# Patient Record
Sex: MALE | Race: WHITE | NOT HISPANIC OR LATINO | Employment: FULL TIME | URBAN - METROPOLITAN AREA
[De-identification: names, ages, dates, MRNs, and addresses within clinical notes are randomized per-mention and may not be internally consistent; named-entity substitution may affect disease eponyms.]

---

## 2018-07-10 ENCOUNTER — HOSPITAL ENCOUNTER (OUTPATIENT)
Dept: RADIOLOGY | Facility: MEDICAL CENTER | Age: 58
End: 2018-07-10

## 2018-07-10 ENCOUNTER — APPOINTMENT (OUTPATIENT)
Dept: RADIOLOGY | Facility: MEDICAL CENTER | Age: 58
DRG: 853 | End: 2018-07-10
Attending: EMERGENCY MEDICINE

## 2018-07-10 ENCOUNTER — HOSPITAL ENCOUNTER (INPATIENT)
Facility: MEDICAL CENTER | Age: 58
LOS: 15 days | DRG: 853 | End: 2018-07-25
Attending: EMERGENCY MEDICINE | Admitting: HOSPITALIST

## 2018-07-10 DIAGNOSIS — J43.1 PANLOBULAR EMPHYSEMA (HCC): Primary | ICD-10-CM

## 2018-07-10 DIAGNOSIS — J86.9 EMPYEMA (HCC): ICD-10-CM

## 2018-07-10 DIAGNOSIS — J44.9 PULMONARY CACHEXIA DUE TO COPD (HCC): ICD-10-CM

## 2018-07-10 DIAGNOSIS — R64 PULMONARY CACHEXIA DUE TO COPD (HCC): ICD-10-CM

## 2018-07-10 DIAGNOSIS — J96.01 ACUTE HYPOXEMIC RESPIRATORY FAILURE (HCC): ICD-10-CM

## 2018-07-10 PROBLEM — E87.20 LACTIC ACIDOSIS: Status: ACTIVE | Noted: 2018-07-10

## 2018-07-10 PROBLEM — D72.829 LEUKOCYTOSIS: Status: ACTIVE | Noted: 2018-07-10

## 2018-07-10 PROBLEM — Z72.0 TOBACCO ABUSE: Status: ACTIVE | Noted: 2018-07-10

## 2018-07-10 LAB
AMYLASE FLD-CCNC: 14 U/L
ANISOCYTOSIS BLD QL SMEAR: ABNORMAL
APPEARANCE FLD: NORMAL
APTT PPP: 34.6 SEC (ref 24.7–36)
BASOPHILS # BLD AUTO: 0 % (ref 0–1.8)
BASOPHILS # BLD: 0 K/UL (ref 0–0.12)
BODY FLD TYPE: NORMAL
COLOR FLD: NORMAL
CSF COMMENTS 1658: NORMAL
CYTOLOGY REG CYTOL: NORMAL
EOSINOPHIL # BLD AUTO: 0 K/UL (ref 0–0.51)
EOSINOPHIL NFR BLD: 0 % (ref 0–6.9)
ERYTHROCYTE [DISTWIDTH] IN BLOOD BY AUTOMATED COUNT: 52.2 FL (ref 35.9–50)
GLUCOSE FLD-MCNC: <10 MG/DL
HCT VFR BLD AUTO: 36.3 % (ref 42–52)
HGB BLD-MCNC: 12.1 G/DL (ref 14–18)
INR PPP: 1.42 (ref 0.87–1.13)
LACTATE BLD-SCNC: 1.9 MMOL/L (ref 0.5–2)
LDH FLD L TO P-CCNC: NORMAL U/L
LYMPHOCYTES # BLD AUTO: 1.09 K/UL (ref 1–4.8)
LYMPHOCYTES NFR BLD: 7 % (ref 22–41)
MACROCYTES BLD QL SMEAR: ABNORMAL
MANUAL DIFF BLD: NORMAL
MCH RBC QN AUTO: 27.8 PG (ref 27–33)
MCHC RBC AUTO-ENTMCNC: 33.3 G/DL (ref 33.7–35.3)
MCV RBC AUTO: 83.4 FL (ref 81.4–97.8)
MONOCYTES # BLD AUTO: 0.67 K/UL (ref 0–0.85)
MONOCYTES NFR BLD AUTO: 4.3 % (ref 0–13.4)
MORPHOLOGY BLD-IMP: NORMAL
NEUTROPHILS # BLD AUTO: 13.75 K/UL (ref 1.82–7.42)
NEUTROPHILS NFR BLD: 82.6 % (ref 44–72)
NEUTS BAND NFR BLD MANUAL: 6.1 % (ref 0–10)
NRBC # BLD AUTO: 0 K/UL
NRBC BLD-RTO: 0 /100 WBC
PH FLD: 7.2 [PH]
PLATELET # BLD AUTO: 727 K/UL (ref 164–446)
PLATELET BLD QL SMEAR: NORMAL
PMV BLD AUTO: 9.3 FL (ref 9–12.9)
POIKILOCYTOSIS BLD QL SMEAR: NORMAL
PROT FLD-MCNC: 3.9 G/DL
PROTHROMBIN TIME: 17 SEC (ref 12–14.6)
RBC # BLD AUTO: 4.35 M/UL (ref 4.7–6.1)
RBC # FLD: NORMAL CELLS/UL
RBC BLD AUTO: PRESENT
STOMATOCYTES BLD QL SMEAR: NORMAL
WBC # BLD AUTO: 15.5 K/UL (ref 4.8–10.8)
WBC # FLD: NORMAL CELLS/UL

## 2018-07-10 PROCEDURE — 83605 ASSAY OF LACTIC ACID: CPT

## 2018-07-10 PROCEDURE — 700105 HCHG RX REV CODE 258: Performed by: STUDENT IN AN ORGANIZED HEALTH CARE EDUCATION/TRAINING PROGRAM

## 2018-07-10 PROCEDURE — 0W9B3ZZ DRAINAGE OF LEFT PLEURAL CAVITY, PERCUTANEOUS APPROACH: ICD-10-PCS | Performed by: STUDENT IN AN ORGANIZED HEALTH CARE EDUCATION/TRAINING PROGRAM

## 2018-07-10 PROCEDURE — 87015 SPECIMEN INFECT AGNT CONCNTJ: CPT

## 2018-07-10 PROCEDURE — 99285 EMERGENCY DEPT VISIT HI MDM: CPT

## 2018-07-10 PROCEDURE — 87206 SMEAR FLUORESCENT/ACID STAI: CPT

## 2018-07-10 PROCEDURE — 770020 HCHG ROOM/CARE - TELE (206)

## 2018-07-10 PROCEDURE — 85007 BL SMEAR W/DIFF WBC COUNT: CPT

## 2018-07-10 PROCEDURE — 87070 CULTURE OTHR SPECIMN AEROBIC: CPT

## 2018-07-10 PROCEDURE — 88112 CYTOPATH CELL ENHANCE TECH: CPT

## 2018-07-10 PROCEDURE — 84157 ASSAY OF PROTEIN OTHER: CPT

## 2018-07-10 PROCEDURE — 83615 LACTATE (LD) (LDH) ENZYME: CPT

## 2018-07-10 PROCEDURE — 82945 GLUCOSE OTHER FLUID: CPT

## 2018-07-10 PROCEDURE — 87102 FUNGUS ISOLATION CULTURE: CPT

## 2018-07-10 PROCEDURE — 99223 1ST HOSP IP/OBS HIGH 75: CPT | Mod: GC | Performed by: HOSPITALIST

## 2018-07-10 PROCEDURE — 304561 HCHG STAT O2

## 2018-07-10 PROCEDURE — 89051 BODY FLUID CELL COUNT: CPT

## 2018-07-10 PROCEDURE — 71045 X-RAY EXAM CHEST 1 VIEW: CPT

## 2018-07-10 PROCEDURE — 82150 ASSAY OF AMYLASE: CPT

## 2018-07-10 PROCEDURE — 700111 HCHG RX REV CODE 636 W/ 250 OVERRIDE (IP): Performed by: STUDENT IN AN ORGANIZED HEALTH CARE EDUCATION/TRAINING PROGRAM

## 2018-07-10 PROCEDURE — 86480 TB TEST CELL IMMUN MEASURE: CPT

## 2018-07-10 PROCEDURE — 85027 COMPLETE CBC AUTOMATED: CPT

## 2018-07-10 PROCEDURE — 88305 TISSUE EXAM BY PATHOLOGIST: CPT

## 2018-07-10 PROCEDURE — 83986 ASSAY PH BODY FLUID NOS: CPT

## 2018-07-10 PROCEDURE — 85610 PROTHROMBIN TIME: CPT

## 2018-07-10 PROCEDURE — 87077 CULTURE AEROBIC IDENTIFY: CPT

## 2018-07-10 PROCEDURE — 87205 SMEAR GRAM STAIN: CPT

## 2018-07-10 PROCEDURE — 99255 IP/OBS CONSLTJ NEW/EST HI 80: CPT | Performed by: INTERNAL MEDICINE

## 2018-07-10 PROCEDURE — 87116 MYCOBACTERIA CULTURE: CPT

## 2018-07-10 PROCEDURE — 36415 COLL VENOUS BLD VENIPUNCTURE: CPT

## 2018-07-10 PROCEDURE — 32555 ASPIRATE PLEURA W/ IMAGING: CPT | Mod: LT

## 2018-07-10 PROCEDURE — 85730 THROMBOPLASTIN TIME PARTIAL: CPT

## 2018-07-10 RX ORDER — PROMETHAZINE HYDROCHLORIDE 25 MG/1
12.5-25 SUPPOSITORY RECTAL EVERY 4 HOURS PRN
Status: DISCONTINUED | OUTPATIENT
Start: 2018-07-10 | End: 2018-07-25 | Stop reason: HOSPADM

## 2018-07-10 RX ORDER — AMOXICILLIN 250 MG
2 CAPSULE ORAL 2 TIMES DAILY
Status: DISCONTINUED | OUTPATIENT
Start: 2018-07-10 | End: 2018-07-12

## 2018-07-10 RX ORDER — NICOTINE 21 MG/24HR
21 PATCH, TRANSDERMAL 24 HOURS TRANSDERMAL
Status: DISCONTINUED | OUTPATIENT
Start: 2018-07-11 | End: 2018-07-25 | Stop reason: HOSPADM

## 2018-07-10 RX ORDER — SODIUM CHLORIDE 9 MG/ML
INJECTION, SOLUTION INTRAVENOUS CONTINUOUS
Status: DISCONTINUED | OUTPATIENT
Start: 2018-07-10 | End: 2018-07-25 | Stop reason: HOSPADM

## 2018-07-10 RX ORDER — ASPIRIN 325 MG
325 TABLET ORAL DAILY
COMMUNITY

## 2018-07-10 RX ORDER — PROMETHAZINE HYDROCHLORIDE 25 MG/1
12.5-25 TABLET ORAL EVERY 4 HOURS PRN
Status: DISCONTINUED | OUTPATIENT
Start: 2018-07-10 | End: 2018-07-25 | Stop reason: HOSPADM

## 2018-07-10 RX ORDER — BISACODYL 10 MG
10 SUPPOSITORY, RECTAL RECTAL
Status: DISCONTINUED | OUTPATIENT
Start: 2018-07-10 | End: 2018-07-12

## 2018-07-10 RX ORDER — ONDANSETRON 4 MG/1
4 TABLET, ORALLY DISINTEGRATING ORAL EVERY 4 HOURS PRN
Status: DISCONTINUED | OUTPATIENT
Start: 2018-07-10 | End: 2018-07-25 | Stop reason: HOSPADM

## 2018-07-10 RX ORDER — ACETAMINOPHEN 325 MG/1
650 TABLET ORAL EVERY 6 HOURS PRN
Status: DISCONTINUED | OUTPATIENT
Start: 2018-07-10 | End: 2018-07-25 | Stop reason: HOSPADM

## 2018-07-10 RX ORDER — ONDANSETRON 2 MG/ML
4 INJECTION INTRAMUSCULAR; INTRAVENOUS EVERY 4 HOURS PRN
Status: DISCONTINUED | OUTPATIENT
Start: 2018-07-10 | End: 2018-07-25 | Stop reason: HOSPADM

## 2018-07-10 RX ORDER — POLYETHYLENE GLYCOL 3350 17 G/17G
1 POWDER, FOR SOLUTION ORAL
Status: DISCONTINUED | OUTPATIENT
Start: 2018-07-10 | End: 2018-07-12

## 2018-07-10 RX ORDER — M-VIT,TX,IRON,MINS/CALC/FOLIC 27MG-0.4MG
1 TABLET ORAL DAILY
COMMUNITY
End: 2018-09-18

## 2018-07-10 RX ADMIN — SODIUM CHLORIDE: 9 INJECTION, SOLUTION INTRAVENOUS at 21:14

## 2018-07-10 RX ADMIN — VANCOMYCIN HYDROCHLORIDE 1700 MG: 100 INJECTION, POWDER, LYOPHILIZED, FOR SOLUTION INTRAVENOUS at 22:28

## 2018-07-10 RX ADMIN — AMPICILLIN SODIUM AND SULBACTAM SODIUM 3 G: 2; 1 INJECTION, POWDER, FOR SOLUTION INTRAMUSCULAR; INTRAVENOUS at 21:14

## 2018-07-10 ASSESSMENT — LIFESTYLE VARIABLES
HAVE YOU EVER FELT YOU SHOULD CUT DOWN ON YOUR DRINKING: NO
ALCOHOL_USE: YES
TOTAL SCORE: 0
EVER_SMOKED: YES
TOTAL SCORE: 0
HOW MANY TIMES IN THE PAST YEAR HAVE YOU HAD 5 OR MORE DRINKS IN A DAY: 12
TOTAL SCORE: 0
AVERAGE NUMBER OF DAYS PER WEEK YOU HAVE A DRINK CONTAINING ALCOHOL: 6
CONSUMPTION TOTAL: POSITIVE
EVER FELT BAD OR GUILTY ABOUT YOUR DRINKING: NO
ON A TYPICAL DAY WHEN YOU DRINK ALCOHOL HOW MANY DRINKS DO YOU HAVE: 6
EVER HAD A DRINK FIRST THING IN THE MORNING TO STEADY YOUR NERVES TO GET RID OF A HANGOVER: NO
HAVE PEOPLE ANNOYED YOU BY CRITICIZING YOUR DRINKING: NO

## 2018-07-10 ASSESSMENT — ENCOUNTER SYMPTOMS
COUGH: 1
DIZZINESS: 0
NAUSEA: 0
FEVER: 0
WEIGHT LOSS: 1
BLURRED VISION: 0
HEARTBURN: 0
SPUTUM PRODUCTION: 1
MYALGIAS: 0
ABDOMINAL PAIN: 0
HEADACHES: 0
CHILLS: 0
HEMOPTYSIS: 1
PALPITATIONS: 1
SHORTNESS OF BREATH: 1
ORTHOPNEA: 1
VOMITING: 0

## 2018-07-10 ASSESSMENT — PAIN SCALES - GENERAL
PAINLEVEL_OUTOF10: 0
PAINLEVEL_OUTOF10: 0

## 2018-07-10 ASSESSMENT — COPD QUESTIONNAIRES
HAVE YOU SMOKED AT LEAST 100 CIGARETTES IN YOUR ENTIRE LIFE: NO/DON'T KNOW
COPD SCREENING SCORE: 1
DURING THE PAST 4 WEEKS HOW MUCH DID YOU FEEL SHORT OF BREATH: MOST  OR ALL OF THE TIME
COPD SCREENING SCORE: 7
HAVE YOU SMOKED AT LEAST 100 CIGARETTES IN YOUR ENTIRE LIFE: YES
IN THE PAST 12 MONTHS DO YOU DO LESS THAN YOU USED TO BECAUSE OF YOUR BREATHING PROBLEMS: DISAGREE/UNSURE
DO YOU EVER COUGH UP ANY MUCUS OR PHLEGM?: NO/ONLY WITH OCCASIONAL COLDS OR INFECTIONS
DURING THE PAST 4 WEEKS HOW MUCH DID YOU FEEL SHORT OF BREATH: NONE/LITTLE OF THE TIME
DO YOU EVER COUGH UP ANY MUCUS OR PHLEGM?: YES, EVERY DAY

## 2018-07-10 ASSESSMENT — PATIENT HEALTH QUESTIONNAIRE - PHQ9
SUM OF ALL RESPONSES TO PHQ QUESTIONS 1-9: 13
5. POOR APPETITE OR OVEREATING: NEARLY EVERY DAY
7. TROUBLE CONCENTRATING ON THINGS, SUCH AS READING THE NEWSPAPER OR WATCHING TELEVISION: SEVERAL DAYS
9. THOUGHTS THAT YOU WOULD BE BETTER OFF DEAD, OR OF HURTING YOURSELF: NOT AT ALL
3. TROUBLE FALLING OR STAYING ASLEEP OR SLEEPING TOO MUCH: NEARLY EVERY DAY
6. FEELING BAD ABOUT YOURSELF - OR THAT YOU ARE A FAILURE OR HAVE LET YOURSELF OR YOUR FAMILY DOWN: SEVERAL DAYS
8. MOVING OR SPEAKING SO SLOWLY THAT OTHER PEOPLE COULD HAVE NOTICED. OR THE OPPOSITE, BEING SO FIGETY OR RESTLESS THAT YOU HAVE BEEN MOVING AROUND A LOT MORE THAN USUAL: NOT AT ALL
SUM OF ALL RESPONSES TO PHQ9 QUESTIONS 1 AND 2: 2
4. FEELING TIRED OR HAVING LITTLE ENERGY: NEARLY EVERY DAY
2. FEELING DOWN, DEPRESSED, IRRITABLE, OR HOPELESS: SEVERAL DAYS
1. LITTLE INTEREST OR PLEASURE IN DOING THINGS: SEVERAL DAYS

## 2018-07-10 NOTE — ED PROVIDER NOTES
ED Provider Note    CHIEF COMPLAINT  Chief Complaint   Patient presents with   • Shortness of Breath     transfer to rule out empyema vs TB       HPI  Donna Frederic Ceballos is a 58 y.o. male who presents for evaluation of fever cough pruning sputum shortness of breath. The patient reportedly been feeling ill for several months. He finally went to a hospital in [facility] and was diagnosed with a large left-sided fluid collection in his left hemithorax consistent with a likely hydrothorax empyema. Patient had noted white blood cell count 24,000. Blood cultures were performed he was given breathing treatments IV magnesium IV Unasyn and doxycycline and transferred here for higher level of care. He does smoke cigarettes and a history of COPD but is otherwise relatively healthy    REVIEW OF SYSTEMS  See HPI for further details. Positive for night sweats weight loss cough no numbness tingling weakness rash All other systems are negative.     PAST MEDICAL HISTORY  Past Medical History:   Diagnosis Date   • Chronic obstructive pulmonary disease (HCC)        FAMILY HISTORY  Noncontributory    SOCIAL HISTORY  Social History     Social History   • Marital status: N/A     Spouse name: N/A   • Number of children: N/A   • Years of education: N/A     Social History Main Topics   • Smoking status: Current Every Day Smoker     Packs/day: 1.00     Types: Cigarettes   • Smokeless tobacco: Never Used   • Alcohol use Yes   • Drug use: Yes     Types: Inhaled      Comment: cannabis   • Sexual activity: Not on file     Other Topics Concern   • Not on file     Social History Narrative   • No narrative on file     Denies IV drugs  SURGICAL HISTORY  No past surgical history on file.  No major surgeries  CURRENT MEDICATIONS  Home Medications    **Home medications have not yet been reviewed for this encounter**       She received IV Unasyn, doxycycline and magnesium prior to arrival  ALLERGIES  No Known Allergies    PHYSICAL EXAM  VITAL SIGNS:  BP (!) 99/59   Pulse 97   Temp 37 °C (98.6 °F)   Resp 20   Ht 1.829 m (6')   Wt 68 kg (150 lb)   SpO2 93%   BMI 20.34 kg/m²  Room air O2: 88    Constitutional: Patient appears chronically ill  HENT: Normocephalic, Atraumatic, Bilateral external ears normal, Oropharynx moist, No oral exudates, Nose normal.   Eyes: PERRLA, EOMI, Conjunctiva normal, No discharge.   Neck: Normal range of motion, No tenderness, Supple, No stridor.   Cardiovascular: Normal heart rate, Normal rhythm, No murmurs, No rubs, No gallops.   Thorax & Lungs rhonchi noted the entire left hemithorax.   Abdomen: Bowel sounds normal, Soft, No tenderness, No masses, No pulsatile masses.   Skin: Warm, Dry, No erythema, No rash.   Back: No tenderness, No CVA tenderness.   Extremities: Intact distal pulses, No edema, No tenderness, No cyanosis, No clubbing.   Musculoskeletal: Good range of motion in all major joints. No tenderness to palpation or major deformities noted.   Neurologic: Alert & oriented x 3, Normal motor function, Normal sensory function, No focal deficits noted.   Psychiatric: Affect normal, Judgment normal, Mood normal.       RADIOLOGY/PROCEDURES  CT scan from outside facility demonstrates a large fluid collection with air that appears to be  from normal lung by pleura represents a large empyema or possible infected pleural effusion with hydrothorax. There are some multiple's dictated masses in the left upper lobe consistent with metastases and a dense area of consolidation in the lingula with a superimposed pneumonia    Results for orders placed or performed during the hospital encounter of 07/10/18   LACTIC ACID   Result Value Ref Range    Lactic Acid 1.9 0.5 - 2.0 mmol/L      Laboratory studies from outside facility are as follows: White blood cell count 24,000 H&H 13 and 39 respectively platelets 732. Metabolic panel is glucose 1:30 BUN 27 creatinine 0.8 sodium 129 potassium 4.2 chloride 92 CO2 25 anion gap 12 L Seim  9.1 albumin 1.5 transaminases normal. Lactic acid was elevated at 4.0. Troponin is less than 0.02    COURSE & MEDICAL DECISION MAKING  Pertinent Labs & Imaging studies reviewed. (See chart for details)  I reviewed the patient's records. His lactic acidemia appears to be clearing. Here he had blood cultures and antibiotics. I consulted interventional radiology to assess whether or not this fluid collection is amenable to either thoracentesis for CT-guided tube thoracostomy. He will be admitted to [facility]    FINAL IMPRESSION  1. Left-sided pleural effusion versus empyema  2. Compensated sepsis    Electronically signed by: El Brush, 7/10/2018 2:40 PM

## 2018-07-10 NOTE — ED TRIAGE NOTES
Pt sent from [facility] in [place] for SOB and to rule out Empyema vs TB. Pt PPD smoker and has had cough and SOB since March. Denies fevers or night sweats. Says that he has lost about 10 lbs since March because he is unable to eat much.

## 2018-07-11 PROBLEM — J43.1 PANLOBULAR EMPHYSEMA (HCC): Status: ACTIVE | Noted: 2018-07-11

## 2018-07-11 PROBLEM — R91.8 PULMONARY PARENCHYMAL MASS: Status: ACTIVE | Noted: 2018-07-11

## 2018-07-11 PROBLEM — J44.9 PULMONARY CACHEXIA DUE TO COPD (HCC): Status: ACTIVE | Noted: 2018-07-11

## 2018-07-11 PROBLEM — R64 PULMONARY CACHEXIA DUE TO COPD (HCC): Status: ACTIVE | Noted: 2018-07-11

## 2018-07-11 PROBLEM — E46 PROTEIN MALNUTRITION (HCC): Status: ACTIVE | Noted: 2018-07-11

## 2018-07-11 LAB
ACTION RANGE TRIGGERED IACRT: NO
ALBUMIN SERPL BCP-MCNC: 2.2 G/DL (ref 3.2–4.9)
ALBUMIN/GLOB SERPL: 0.6 G/DL
ALP SERPL-CCNC: 85 U/L (ref 30–99)
ALT SERPL-CCNC: 27 U/L (ref 2–50)
ANION GAP SERPL CALC-SCNC: 4 MMOL/L (ref 0–11.9)
ANISOCYTOSIS BLD QL SMEAR: ABNORMAL
AST SERPL-CCNC: 20 U/L (ref 12–45)
BASE EXCESS BLDA CALC-SCNC: -2 MMOL/L (ref -4–3)
BASOPHILS # BLD AUTO: 0 % (ref 0–1.8)
BASOPHILS # BLD: 0 K/UL (ref 0–0.12)
BILIRUB SERPL-MCNC: 0.2 MG/DL (ref 0.1–1.5)
BODY TEMPERATURE: ABNORMAL DEGREES
BUN SERPL-MCNC: 22 MG/DL (ref 8–22)
BURR CELLS BLD QL SMEAR: NORMAL
CALCIUM SERPL-MCNC: 8.4 MG/DL (ref 8.5–10.5)
CHLORIDE SERPL-SCNC: 96 MMOL/L (ref 96–112)
CO2 BLDA-SCNC: 26 MMOL/L (ref 20–33)
CO2 SERPL-SCNC: 31 MMOL/L (ref 20–33)
CREAT SERPL-MCNC: 0.53 MG/DL (ref 0.5–1.4)
EOSINOPHIL # BLD AUTO: 0 K/UL (ref 0–0.51)
EOSINOPHIL NFR BLD: 0 % (ref 0–6.9)
ERYTHROCYTE [DISTWIDTH] IN BLOOD BY AUTOMATED COUNT: 58.2 FL (ref 35.9–50)
GLOBULIN SER CALC-MCNC: 3.8 G/DL (ref 1.9–3.5)
GLUCOSE SERPL-MCNC: 103 MG/DL (ref 65–99)
GRAM STN SPEC: ABNORMAL
HCO3 BLDA-SCNC: 24.1 MMOL/L (ref 17–25)
HCT VFR BLD AUTO: 36.7 % (ref 42–52)
HGB BLD-MCNC: 11.7 G/DL (ref 14–18)
INST. QUALIFIED PATIENT IIQPT: YES
LYMPHOCYTES # BLD AUTO: 1.42 K/UL (ref 1–4.8)
LYMPHOCYTES NFR BLD: 8.7 % (ref 22–41)
MACROCYTES BLD QL SMEAR: ABNORMAL
MANUAL DIFF BLD: NORMAL
MCH RBC QN AUTO: 28.1 PG (ref 27–33)
MCHC RBC AUTO-ENTMCNC: 31.9 G/DL (ref 33.7–35.3)
MCV RBC AUTO: 88.2 FL (ref 81.4–97.8)
MONOCYTES # BLD AUTO: 1.14 K/UL (ref 0–0.85)
MONOCYTES NFR BLD AUTO: 7 % (ref 0–13.4)
MORPHOLOGY BLD-IMP: NORMAL
NEUTROPHILS # BLD AUTO: 13.74 K/UL (ref 1.82–7.42)
NEUTROPHILS NFR BLD: 84.3 % (ref 44–72)
NRBC # BLD AUTO: 0 K/UL
NRBC BLD-RTO: 0 /100 WBC
O2/TOTAL GAS SETTING VFR VENT: 100 %
PCO2 BLDA: 47.2 MMHG (ref 26–37)
PCO2 TEMP ADJ BLDA: 45.2 MMHG (ref 26–37)
PH BLDA: 7.32 [PH] (ref 7.4–7.5)
PH TEMP ADJ BLDA: 7.33 [PH] (ref 7.4–7.5)
PLATELET # BLD AUTO: 766 K/UL (ref 164–446)
PMV BLD AUTO: 9.6 FL (ref 9–12.9)
PO2 BLDA: 55 MMHG (ref 64–87)
PO2 TEMP ADJ BLDA: 51 MMHG (ref 64–87)
POIKILOCYTOSIS BLD QL SMEAR: NORMAL
POTASSIUM SERPL-SCNC: 4.8 MMOL/L (ref 3.6–5.5)
PROT SERPL-MCNC: 6 G/DL (ref 6–8.2)
RBC # BLD AUTO: 4.16 M/UL (ref 4.7–6.1)
RBC BLD AUTO: PRESENT
SAO2 % BLDA: 85 % (ref 93–99)
SIGNIFICANT IND 70042: ABNORMAL
SITE SITE: ABNORMAL
SODIUM SERPL-SCNC: 131 MMOL/L (ref 135–145)
SOURCE SOURCE: ABNORMAL
SPECIMEN DRAWN FROM PATIENT: ABNORMAL
TOXIC GRANULES BLD QL SMEAR: NORMAL
WBC # BLD AUTO: 16.3 K/UL (ref 4.8–10.8)

## 2018-07-11 PROCEDURE — 94002 VENT MGMT INPAT INIT DAY: CPT

## 2018-07-11 PROCEDURE — 99291 CRITICAL CARE FIRST HOUR: CPT | Performed by: INTERNAL MEDICINE

## 2018-07-11 PROCEDURE — A6404 STERILE GAUZE > 48 SQ IN: HCPCS | Performed by: SURGERY

## 2018-07-11 PROCEDURE — 500700 HCHG HEMOCLIP, SMALL (RED): Performed by: SURGERY

## 2018-07-11 PROCEDURE — 87015 SPECIMEN INFECT AGNT CONCNTJ: CPT

## 2018-07-11 PROCEDURE — 37799 UNLISTED PX VASCULAR SURGERY: CPT

## 2018-07-11 PROCEDURE — 160041 HCHG SURGERY MINUTES - EA ADDL 1 MIN LEVEL 4: Performed by: SURGERY

## 2018-07-11 PROCEDURE — C1729 CATH, DRAINAGE: HCPCS | Performed by: SURGERY

## 2018-07-11 PROCEDURE — 501445 HCHG STAPLER, SKIN DISP: Performed by: SURGERY

## 2018-07-11 PROCEDURE — 501581 HCHG TROCAR: Performed by: SURGERY

## 2018-07-11 PROCEDURE — 87070 CULTURE OTHR SPECIMN AEROBIC: CPT

## 2018-07-11 PROCEDURE — 500385 HCHG DRAIN, PLEUROVAC ADUL: Performed by: SURGERY

## 2018-07-11 PROCEDURE — 500122 HCHG BOVIE, BLADE: Performed by: SURGERY

## 2018-07-11 PROCEDURE — 700101 HCHG RX REV CODE 250

## 2018-07-11 PROCEDURE — C1725 CATH, TRANSLUMIN NON-LASER: HCPCS | Performed by: SURGERY

## 2018-07-11 PROCEDURE — 80053 COMPREHEN METABOLIC PANEL: CPT

## 2018-07-11 PROCEDURE — 500698 HCHG HEMOCLIP, MEDIUM: Performed by: SURGERY

## 2018-07-11 PROCEDURE — 700111 HCHG RX REV CODE 636 W/ 250 OVERRIDE (IP)

## 2018-07-11 PROCEDURE — 87116 MYCOBACTERIA CULTURE: CPT | Mod: 91

## 2018-07-11 PROCEDURE — 160048 HCHG OR STATISTICAL LEVEL 1-5: Performed by: SURGERY

## 2018-07-11 PROCEDURE — C1894 INTRO/SHEATH, NON-LASER: HCPCS | Performed by: SURGERY

## 2018-07-11 PROCEDURE — 0W9B00Z DRAINAGE OF LEFT PLEURAL CAVITY WITH DRAINAGE DEVICE, OPEN APPROACH: ICD-10-PCS | Performed by: SURGERY

## 2018-07-11 PROCEDURE — 501838 HCHG SUTURE GENERAL: Performed by: SURGERY

## 2018-07-11 PROCEDURE — 700105 HCHG RX REV CODE 258: Performed by: STUDENT IN AN ORGANIZED HEALTH CARE EDUCATION/TRAINING PROGRAM

## 2018-07-11 PROCEDURE — 87077 CULTURE AEROBIC IDENTIFY: CPT

## 2018-07-11 PROCEDURE — 700102 HCHG RX REV CODE 250 W/ 637 OVERRIDE(OP)

## 2018-07-11 PROCEDURE — 85027 COMPLETE CBC AUTOMATED: CPT

## 2018-07-11 PROCEDURE — 87102 FUNGUS ISOLATION CULTURE: CPT

## 2018-07-11 PROCEDURE — 770022 HCHG ROOM/CARE - ICU (200)

## 2018-07-11 PROCEDURE — 160029 HCHG SURGERY MINUTES - 1ST 30 MINS LEVEL 4: Performed by: SURGERY

## 2018-07-11 PROCEDURE — 87206 SMEAR FLUORESCENT/ACID STAI: CPT

## 2018-07-11 PROCEDURE — 160002 HCHG RECOVERY MINUTES (STAT): Performed by: SURGERY

## 2018-07-11 PROCEDURE — 500800 HCHG LAPAROSCOPIC J/L HOOK: Performed by: SURGERY

## 2018-07-11 PROCEDURE — 87075 CULTR BACTERIA EXCEPT BLOOD: CPT

## 2018-07-11 PROCEDURE — 87205 SMEAR GRAM STAIN: CPT

## 2018-07-11 PROCEDURE — 0BNJ0ZZ RELEASE LEFT LOWER LUNG LOBE, OPEN APPROACH: ICD-10-PCS | Performed by: SURGERY

## 2018-07-11 PROCEDURE — 700111 HCHG RX REV CODE 636 W/ 250 OVERRIDE (IP): Performed by: STUDENT IN AN ORGANIZED HEALTH CARE EDUCATION/TRAINING PROGRAM

## 2018-07-11 PROCEDURE — 160035 HCHG PACU - 1ST 60 MINS PHASE I: Performed by: SURGERY

## 2018-07-11 PROCEDURE — 99233 SBSQ HOSP IP/OBS HIGH 50: CPT | Mod: GC | Performed by: HOSPITALIST

## 2018-07-11 PROCEDURE — 85007 BL SMEAR W/DIFF WBC COUNT: CPT

## 2018-07-11 PROCEDURE — A9270 NON-COVERED ITEM OR SERVICE: HCPCS | Performed by: STUDENT IN AN ORGANIZED HEALTH CARE EDUCATION/TRAINING PROGRAM

## 2018-07-11 PROCEDURE — 160009 HCHG ANES TIME/MIN: Performed by: SURGERY

## 2018-07-11 PROCEDURE — 501837 HCHG SUTURE CV: Performed by: SURGERY

## 2018-07-11 PROCEDURE — A6402 STERILE GAUZE <= 16 SQ IN: HCPCS | Performed by: SURGERY

## 2018-07-11 PROCEDURE — 160036 HCHG PACU - EA ADDL 30 MINS PHASE I: Performed by: SURGERY

## 2018-07-11 PROCEDURE — 0BNG0ZZ RELEASE LEFT UPPER LUNG LOBE, OPEN APPROACH: ICD-10-PCS | Performed by: SURGERY

## 2018-07-11 PROCEDURE — 700102 HCHG RX REV CODE 250 W/ 637 OVERRIDE(OP): Performed by: STUDENT IN AN ORGANIZED HEALTH CARE EDUCATION/TRAINING PROGRAM

## 2018-07-11 PROCEDURE — 5A1955Z RESPIRATORY VENTILATION, GREATER THAN 96 CONSECUTIVE HOURS: ICD-10-PCS | Performed by: INTERNAL MEDICINE

## 2018-07-11 PROCEDURE — 0BBG0ZZ EXCISION OF LEFT UPPER LUNG LOBE, OPEN APPROACH: ICD-10-PCS | Performed by: SURGERY

## 2018-07-11 PROCEDURE — 82803 BLOOD GASES ANY COMBINATION: CPT

## 2018-07-11 PROCEDURE — 500697 HCHG HEMOCLIP, LARGE (ORANGE): Performed by: SURGERY

## 2018-07-11 PROCEDURE — A9270 NON-COVERED ITEM OR SERVICE: HCPCS

## 2018-07-11 RX ORDER — MAGNESIUM HYDROXIDE 1200 MG/15ML
LIQUID ORAL
Status: COMPLETED | OUTPATIENT
Start: 2018-07-11 | End: 2018-07-11

## 2018-07-11 RX ORDER — VORICONAZOLE 200 MG/1
200 TABLET, FILM COATED ORAL EVERY 12 HOURS
Status: DISCONTINUED | OUTPATIENT
Start: 2018-07-12 | End: 2018-07-12

## 2018-07-11 RX ORDER — PROPOFOL 10 MG/ML
200 INJECTION, EMULSION INTRAVENOUS ONCE
Status: ACTIVE | OUTPATIENT
Start: 2018-07-11 | End: 2018-07-12

## 2018-07-11 RX ORDER — OXYCODONE HCL 10 MG/1
TABLET, FILM COATED, EXTENDED RELEASE ORAL
Status: DISPENSED
Start: 2018-07-11 | End: 2018-07-12

## 2018-07-11 RX ORDER — MIDAZOLAM HYDROCHLORIDE 1 MG/ML
INJECTION INTRAMUSCULAR; INTRAVENOUS
Status: COMPLETED
Start: 2018-07-11 | End: 2018-07-11

## 2018-07-11 RX ORDER — ACETAMINOPHEN 500 MG
TABLET ORAL
Status: DISPENSED
Start: 2018-07-11 | End: 2018-07-12

## 2018-07-11 RX ORDER — SODIUM CHLORIDE 450 MG/100ML
INJECTION, SOLUTION INTRAVENOUS
Status: ACTIVE
Start: 2018-07-11 | End: 2018-07-11

## 2018-07-11 RX ORDER — CELECOXIB 200 MG/1
CAPSULE ORAL
Status: DISPENSED
Start: 2018-07-11 | End: 2018-07-12

## 2018-07-11 RX ADMIN — NICOTINE 21 MG: 21 PATCH, EXTENDED RELEASE TRANSDERMAL at 05:46

## 2018-07-11 RX ADMIN — MIDAZOLAM 1 MG: 1 INJECTION INTRAMUSCULAR; INTRAVENOUS at 22:08

## 2018-07-11 RX ADMIN — SODIUM CHLORIDE: 9 INJECTION, SOLUTION INTRAVENOUS at 17:42

## 2018-07-11 RX ADMIN — AMPICILLIN SODIUM AND SULBACTAM SODIUM 3 G: 2; 1 INJECTION, POWDER, FOR SOLUTION INTRAMUSCULAR; INTRAVENOUS at 06:11

## 2018-07-11 RX ADMIN — FENTANYL CITRATE 50 MCG: 50 INJECTION, SOLUTION INTRAMUSCULAR; INTRAVENOUS at 22:07

## 2018-07-11 RX ADMIN — AMPICILLIN SODIUM AND SULBACTAM SODIUM 3 G: 2; 1 INJECTION, POWDER, FOR SOLUTION INTRAMUSCULAR; INTRAVENOUS at 11:57

## 2018-07-11 RX ADMIN — MIDAZOLAM 1 MG: 1 INJECTION INTRAMUSCULAR; INTRAVENOUS at 22:15

## 2018-07-11 RX ADMIN — FENTANYL CITRATE 50 MCG: 50 INJECTION, SOLUTION INTRAMUSCULAR; INTRAVENOUS at 22:15

## 2018-07-11 RX ADMIN — VANCOMYCIN HYDROCHLORIDE 1400 MG: 100 INJECTION, POWDER, LYOPHILIZED, FOR SOLUTION INTRAVENOUS at 12:30

## 2018-07-11 RX ADMIN — AMPICILLIN SODIUM AND SULBACTAM SODIUM 3 G: 2; 1 INJECTION, POWDER, FOR SOLUTION INTRAMUSCULAR; INTRAVENOUS at 01:46

## 2018-07-11 RX ADMIN — AMPICILLIN SODIUM AND SULBACTAM SODIUM 3 G: 2; 1 INJECTION, POWDER, FOR SOLUTION INTRAMUSCULAR; INTRAVENOUS at 17:31

## 2018-07-11 ASSESSMENT — ENCOUNTER SYMPTOMS
HEMOPTYSIS: 1
FEVER: 0
DIZZINESS: 0
MYALGIAS: 0
CHILLS: 0
ABDOMINAL PAIN: 0
NAUSEA: 0
COUGH: 1
SPUTUM PRODUCTION: 1
WEIGHT LOSS: 1
HEADACHES: 0
DEPRESSION: 0
STRIDOR: 0
VOMITING: 0
ORTHOPNEA: 1

## 2018-07-11 ASSESSMENT — PAIN SCALES - GENERAL
PAINLEVEL_OUTOF10: 0
PAINLEVEL_OUTOF10: 0
PAINLEVEL_OUTOF10: ASSUMED PAIN PRESENT
PAINLEVEL_OUTOF10: 0

## 2018-07-11 NOTE — CARE PLAN
Problem: Knowledge Deficit  Goal: Knowledge of disease process/condition, treatment plan, diagnostic tests, and medications will improve    Intervention: Explain information regarding disease process/condition, treatment plan, diagnostic tests, and medications and document in education  Patient educated on disease process, treatment plan, medications and diagnostic tests as well as plan of care for the day. Patient verbalized understanding.

## 2018-07-11 NOTE — PROCEDURES
Indication: Large pleural effusion (Left side) Diagnostic and Therapeutic  Resident:Dr Laith Fine PGY3 IM  Attending: Dr Chris Goddard      Consent was obtained after explaining the risks, benefits, and complications. A time-out was completed verifying correct patient, procedure, site, positioning, and special equipment if applicable.  Ultrasound guided fluid pocket was localized. The patient’s  Left  side was prepped and draped in a sterile manner after the appropriate infiltration level was confirmed by ultrasound. 1% lidocaine was used anesthetize the surrounding skin.A finder needle was then used to locate fluid and yellowish hemorraghic pleural fluid was obtained. A 11-blade scalpel used to make the incision. The thoracentesis catheter was then threaded without difficulty.  We could drain 500 mL of thick tubid creamy yellow pus like pleural fluid which clogged the catheter completely, and procedure was terminated . Albeit patient reported feeling much better to breathe, he coughed up sputum about 20 mL that was thick, mucoid, mixed with blood. Attending Dr Goddard  was present for the entire procedure. A post-procedure chest x-ray was ordered. Results of the xray are pending at the time of writing this note.     Estimated Blood Loss: minimal  The patient tolerated the procedure well and there were no complications.

## 2018-07-11 NOTE — PROGRESS NOTES
Pt brought up from ER. Placed in neg pressure iso room. Tele box placed. Monitor room and charge notified. Pt is A&Ox4, on 3L NC, no complaints of pain at this time. Assessment completed, plan of care discussed. Call light and personal possessions in reach. Bed is locked in low position, bed alarm is off, pt calling and ambulating appropriately. All needs met at this time. Hourly rounding in place.

## 2018-07-11 NOTE — CONSULTS
DATE OF SERVICE:  07/10/2018    PULMONARY CRITICAL CARE CONSULTATION    REQUESTING PHYSICIAN:  Dr. Mcghee.    REASON FOR REQUEST:  Acute hypoxic respiratory failure.    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  The patient initially was seen at an   outside facility with approximately 4 days of increasing dyspnea on exertion,   cough with purulent sputum, and chest pain and discomfort with deep   inspiration.  He also indicates that he has had approximately 10 pounds of   weight loss since March of this year.  He denies any fever, chills, or sweats.    Denies any night sweats.  No lymphadenopathy, headache, visual changes, neck   stiffness.  Denies any nausea, vomiting, abdominal pain, diarrhea, or lower   extremity edema.  Patient says he has never been outside of [facility] [place].  He   has never been to [facility] or custodial.  While at the outside facility, patient had   CT scan that showed a significant left greater than right changes with   suggestive empyema as well as multiloculated abscess versus cavitary masses.    Patient was transferred to [facility] for higher level of care and further   evaluation.  Patient indicates at the present time that he is not in any   current discomfort.  Denies any significant sick contacts.  Denies any history   of known lung problems.  Denies any environmental exposures.  Has a cat as a   pet.    ALLERGIES:  No known drug allergies.    OUTPATIENT MEDICATIONS:  None.    FAMILY HISTORY:  Reported no history of lung disease or cancer.    SOCIAL HISTORY:  Six beers per night.  Smokes 1 pack a day x40 years.    PAST MEDICAL HISTORY:  As indicated above in the HPI.    REVIEW OF SYSTEMS:  As indicated in the HPI, otherwise fully reviewed and   negative.    PHYSICAL  EXAMINATION:  VITAL SIGNS:  Temperature 98.6, pulse rate 99, blood pressure 107/67, 99% on   2.5 liters.  GENERAL:  Patient appears older than stated age, somewhat malnourished.  HEENT:  Normocephalic, atraumatic.  Anicteric.  CARDIOVASCULAR:  Regular rate and rhythm.  RESPIRATORY:  Extremely diminished with scattered areas of rhonchi.  No   wheezes or rales.  ABDOMEN:  Soft, nontender and nondistended.  Positive bowel sounds.  EXTREMITIES:  Without edema.  NEUROLOGIC:  Cranial nerves II-XII grossly intact.  PSYCHIATRIC:  Normal affect and behavior.  Lymph node examination did not   identify any cervical or supraclavicular lymphadenopathy, nor did I find any   axillary lymphadenopathy.  SKIN:  No skin rashes or lesions noted.    LABORATORY RESULTS:  From outside facility showed white count of 24, H and H   of 13 and 39, platelet count 732, 29% bands.  Glucose 130, BUN 27, creatinine   0.1.  Sodium 129, potassium 4.2, chloride 92, bicarbonate 25, anion gap 12,   calcium 9.1, albumin 1.5, AST 24, ALT 39, alkaline phosphatase 130.  Lactic   acid 4.0, troponin 0.02.    IMAGING:  Shows a very large left dependent and layering effusion with obvious   fluid-air mixture, also biapical cystic like lesions, left upper lobe   infiltrative process as well as mediastinal shifting towards the right.    ASSESSMENT:  1.  Acute hypoxic respiratory failure.  2.  Bilateral multilobular loculated likely empyema with air fluid levels.  3.  Leukocytosis with bandemia.  4.  Hyponatremia.  5.  Lactic acidosis.  6.  Protein-calorie malnutrition.    PLAN:  Again, this is a 58-year-old gentleman with no known past medical   history that had presented with approximately 4 days of worsening shortness of   breath as well as purulent sputum production and had significantly remarkable   CT findings at an outside facility and transferred to [facility] for further   evaluation.  At this present time, patient will go forward with a   thoracentesis and  further evaluation of the pleural effusion which clearly   appears to be empyema in nature.  Further, he will require chest tube   placement for complete drainage.  This appears most likely infectious in   etiology given the fact that he has got significant leukocytosis with bandemia   and lactic acidosis.  He has not had any associated fever, chills, or sweats.    No significant travels or TB exposures.  No night sweats.  I presume this is   likely bacterial in nature; however, other consideration would be fungal.    Patient will be placed on empiric antibiotic therapy.  We will follow up   culture, cytology, and evaluate amount of fluid that will be able to be   evacuated via interventional radiology.  If chest tube is unable to evacuate,   considerations to a VATS procedure versus TPA and Pulmozyme instillation.  We   will repeat labs and blood work including cultures here.    Prognosis is extremely guarded.         ____________________________________     MD NEAL Hernandez / IRIS    DD:  07/10/2018 17:23:58  DT:  07/10/2018 18:14:13    D#:  [id number]  Job#:  [id number]

## 2018-07-11 NOTE — PROGRESS NOTES
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  The patient initially was seen at an   outside facility with approximately 4 days of increasing dyspnea on exertion,   cough with purulent sputum, and chest pain and discomfort with deep   inspiration.  He also indicates that he has had approximately 10 pounds of   weight loss since March of this year.  He denies any fever, chills, or sweats.   Denies any night sweats.  No lymphadenopathy, headache, visual changes, neck   stiffness.  Denies any nausea, vomiting, abdominal pain, diarrhea, or lower   extremity edema.  Patient says he has never been outside of [place].  He   has never been to [facility] or nursing home.  While at the outside facility, patient had   CT scan that showed a significant left greater than right changes with   suggestive empyema as well as multiloculated abscess versus cavitary masses.    Patient was transferred to [facility] for higher level of care and further   evaluation.  Patient indicates at the present time that he is not in any   current discomfort.  Denies any significant sick contacts.  Denies any history   of known lung problems.  Denies any environmental exposures.  Has a cat as a   pet.    7/11/18: Patient states that he developed productive cough with hemoptysis in month   of march 2018 but he did not seek medical treatment as he had no insurance.   His clinical condition kept on deterioration and he developed exertional dyspnea too.   Functional capacity is now limited to walking a few feet hence he decide to come to hospital.   Patient is s/p left thoracentesis with removal of purulent effusion  left 500 ml.  He complains of cough productive of greenish expectoration accompanied by hemoptysis,  and pleuritic chest pain left. Has exertional dyspnea. Has anorexia with weight loss also.   No joint inflammation or skin rash. Patient is on contact and droplet isolation.     ALLERGIES:  No known drug allergies.     OUTPATIENT MEDICATIONS:  None.     FAMILY HISTORY:  Reported no history of lung disease or cancer.     SOCIAL HISTORY:  Six beers per night.  Smokes 1 pack a day x40 years.     PAST MEDICAL HISTORY:  As indicated above in the HPI.     REVIEW OF SYSTEMS:  As indicated in the HPI, otherwise fully reviewed and   negative.     PHYSICAL EXAMINATION:  VITAL SIGNS:  Temperature 98.8, pulse rate 89, Resp rate: 16. blood pressure 87/56,   SpO2 92% on 2.5 liters.  GENERAL:  Patient appears older than stated age, somewhat malnourished.  HEENT:  Normocephalic, atraumatic.  Anicteric. Poor dental hygiene  CARDIOVASCULAR:  Regular rate and rhythm.  RESPIRATORY:  Percussion is dull on left hemithorax with diminished breath sounds.  Normal breath sounds on right hemithorax. No wheeze  ABDOMEN:  Soft, nontender and nondistended.  Positive bowel sounds.  EXTREMITIES:  Without edema.  NEUROLOGIC:  Cranial nerves II-XII grossly intact.  PSYCHIATRIC:  Normal affect and behavior.  Lymph node examination did not   identify any cervical or supraclavicular lymphadenopathy, nor did I find any   axillary lymphadenopathy.  SKIN:  No skin rashes or lesions noted.     LABORATORY RESULTS:     Results for WALLY HINDS (MRN [id number]) as of 7/11/2018 13:15   Ref. Range 7/10/2018 17:13 7/10/2018 17:33 7/10/2018 18:14 7/10/2018 20:47 7/11/2018 02:16   WBC Latest Ref Range: 4.8 - 10.8 K/uL    15.5 (H) 16.3 (H)   RBC Latest Ref Range: 4.70 - 6.10 M/uL    4.35 (L) 4.16 (L)   Hemoglobin Latest Ref Range: 14.0 - 18.0 g/dL    12.1 (L) 11.7 (L)   Hematocrit Latest Ref Range: 42.0 - 52.0 %    36.3 (L) 36.7 (L)   MCV Latest Ref Range: 81.4 -  97.8 fL    83.4 88.2   MCH Latest Ref Range: 27.0 - 33.0 pg    27.8 28.1   MCHC Latest Ref Range: 33.7 - 35.3 g/dL    33.3 (L) 31.9 (L)   RDW Latest Ref Range: 35.9 - 50.0 fL    52.2 (H) 58.2 (H)   Platelet Count Latest Ref Range: 164 - 446 K/uL    727 (H) 766 (H)   MPV Latest Ref Range: 9.0 - 12.9 fL    9.3 9.6   Neutrophils-Polys Latest Ref Range: 44.00 - 72.00 %    82.60 (H)    Neutrophils (Absolute) Latest Ref Range: 1.82 - 7.42 K/uL    13.75 (H)    Bands-Stabs Latest Ref Range: 0.00 - 10.00 %    6.10    Lymphocytes Latest Ref Range: 22.00 - 41.00 %    7.00 (L)    Results for WALLY HINDS (MRN [id number]) as of 7/11/2018 13:15   Ref. Range 7/11/2018 02:16   Sodium Latest Ref Range: 135 - 145 mmol/L 131 (L)   Potassium Latest Ref Range: 3.6 - 5.5 mmol/L 4.8   Chloride Latest Ref Range: 96 - 112 mmol/L 96   Co2 Latest Ref Range: 20 - 33 mmol/L 31   Anion Gap Latest Ref Range: 0.0 - 11.9  4.0   Glucose Latest Ref Range: 65 - 99 mg/dL 103 (H)   Bun Latest Ref Range: 8 - 22 mg/dL 22   Creatinine Latest Ref Range: 0.50 - 1.40 mg/dL 0.53   GFR If  Latest Ref Range: >60 mL/min/1.73 m 2 >60   GFR If Non  Latest Ref Range: >60 mL/min/1.73 m 2 >60   Calcium Latest Ref Range: 8.5 - 10.5 mg/dL 8.4 (L)   AST(SGOT) Latest Ref Range: 12 - 45 U/L 20   ALT(SGPT) Latest Ref Range: 2 - 50 U/L 27   Alkaline Phosphatase Latest Ref Range: 30 - 99 U/L 85   Total Bilirubin Latest Ref Range: 0.1 - 1.5 mg/dL 0.2   Albumin Latest Ref Range: 3.2 - 4.9 g/dL 2.2 (L)   Total Protein Latest Ref Range: 6.0 - 8.2 g/dL 6.0   Globulin Latest Ref Range: 1.9 - 3.5 g/dL 3.8 (H)   A-G Ratio Latest Units: g/dL 0.6     IMAGING:  CT chest has been reviewed personally  There is a large necrotizing cavitary lesion right upper lobe with mediastinal   shift towards right. Loculated pleural effusion left, with compression atelectasis   of left lower lobe. Consolidation left upper lobe with nodular lesions in left upper  lobe    ASSESSMENT:  1.  Acute hypoxic respiratory failure improved with O2 supplementation  2.  Loculated empyema left with left upper lobe consolidation and lower lobe atelectasis  3.  Right upper lobe necrotizing pneumonia.  4.  Hyponatremia  5.  Leukocytosis with bandemia.  6.  Hyponatremia.  7.  Lactic acidosis.       PLAN:    Administer  ml intravenous bolus stat.  Chest surgery consult appreciated. I agree that patient needs VATS pleuroscopy with   decortication and chest tube placement with under water seal.   Continue Unasyn and vancomycin  Add Voriconazole for empirical treatment of invasive aspergillosis  Send sputum for AFB x 3  Sputum for fungal smear and culture  After VATS and chest tube placement when sputum AFB specimens are negative and patient is   stable will consider bronchoscopy with BAL, brushings and transbroncial biopsy left upper lobe.  Continue droplet isolation  Smoking cessation counseling done    Prognosis is extremely guarded.

## 2018-07-11 NOTE — PROGRESS NOTES
Charo from Lab called to notify of gram + cocci in sputum. [facility] white paged and notified of update. No orders received.

## 2018-07-11 NOTE — ASSESSMENT & PLAN NOTE
Pt reports easy work of breathing  Afebrile  s/p thoracentesis on 7/10  s/p left thoracotomy decortication and evacuation of purulent empyema and decortication of lung and rib resection on 7/11  The cultures + group F strep  Continue Unasyn X 2 wks, complete tomorrow and then Augmentin on d/c  Chest physiotherapy.   Chest tubes in place: 1 removed today, CXR in the AM, d/c home if stable.   OP apt for pulm, CT surg and PCP in place.

## 2018-07-11 NOTE — SENIOR ADMIT NOTE
Senior Admission Note    In summary: Donna Ceballos is a 58 y.o. male with no known past medical history and not on any home meds who presented to the ED as a transfer from [facility] after a CT chest showed a large left empyema requiring higher level of care.  Patient complains of cough with shortness of breath since the past 3 months, associated with greenish and bloody sputum, loss of appetite and weight loss of 10 pounds over the past 3 months.  Since the past 5 days he has noticed increasing dyspnea on exertion, orthopnea and left-sided chest pain, 8 on 10, worsening on inspiration.  Denies any fevers, chills, night sweats, PND, nausea, vomiting, abdominal pain or blood in the stool.  He is a chronic smoker, smokes 1 pack per day since the age of 18.  Drinks 2-3 beers every day and admits to using marijuana. 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  Pertinent labs from outside hospital: WBC 24K, toxic granulation seen, lactic acid 4, troponin less than 0.02, hemoglobin 13.2, platelet count 732, sodium 129, potassium 4.2, BUN/creatinine  27/0.81, albumin 1.5, LFTs within normal limits.  Blood cultures were collected and was started on Unasyn and doxycycline.  Chest x-ray showed moderate to large left hydropneumothorax, nodular opacities in bilateral upper lobes, cavitary right upper lobe lesion and traction on the right hilum.  CT scan showed a very large fluid collection likely empyema.  Large cavitary lesion in the right upper lobe.  Mass in the right upper lobe measuring 3 cm in diameter.  He also received albuterol,  DuoNeb's, 1 L bolus NS, 2 g magnesium, 125 IV methylprednisolone and transferred to [facility].    In our ED, he was alert oriented ×4, not in any acute distress, was able to speak in full sentences.  Vitals showed temperature of 98.6, heart rate 97, respiratory rate 20, blood pressure 99/59, SPO2 93% on 2.5 L nasal cannula.  On exam: Bilateral coarse rhonchi.   Labs here  showed lactic acid of 1.9, CBC CMP pending.  Blood cultures were collected.   EKG showed sinus tachycardia with a rate of 117, no acute ST-T wave changes.  ED physician contacted IR and ultrasound-guided thoracentesis was done 500 mL of thick creamy yellow-purulent fluid was drained which clogged up the catheter and procedure had to be terminated.  Patient did report feeling better after the procedure.  Vitals remained stable and transferred to the floor.        Assessment and plan in summary:    1.  Empyema  -Presented with cough, greenish and bloody sputum, dyspnea on exertion since 3 months  -Labs at the outside hospital showed WBC elevated at 24K, lactic acid at 4  -CT of the outside hospital showed a large empyema with cavitary lesions/masses in the right upper lobe  -ED physician contacted IR and ultrasound-guided thoracentesis was done-500 mL of purulent fluid was drained  -Chest x-ray status post thoracentesis showed an interval decrease in the left pleural effusion  -Fluid sent for studies  Plan  -Admitted to telemetry with continuous pulse oximetry  -Started Unasyn and vancomycin  -Lactic acid came down to 1.9, IV fluids at a low rate of 75 mL/h-do not want fluid to reaccumulate at the site of thoracentesis.  -Follow-up blood and sputum cultures, de-escalate antibiotics based on sensitivities  -Follow-up pleural fluid labs  -Follow-up LDH, AFB cultures X3  and Quantiferon Gold to rule out TB  -Pulmonary evaluated patient in the ED- will require chest tube placement for continuous drainage and possibly VATS and decortication.  -We will follow-up with pulmonary and cardiothoracic surgeon tomorrow morning  -RT and oxygen per protocol  -On aspiration precautions  -On SCDs for DVT prophylaxis     2. Leukocytosis  -24k at the outside hospital  -Secondary to empyema  -On Unasyn and Vanco  -Gentle fluids as do not want reaccumulation to the site of thoracentesis  -Continue to monitor      3.  Lactic  acidosis  -Trended down to 1.9 status post fluids  -Continue to monitor     4. Tobacco abuse  -Nicotine patches while inpatient  -CT scan suspicious for lung carcinoma  -Counseled patient to quit smoking      For full plan, please see Intern note for details   Helene Smith M.D.  PGY 2

## 2018-07-11 NOTE — PROGRESS NOTES
Patient blood pressure 87/57, sinus tach at 106 otherwise patient within normal limits. Patient is asymptomatic, no complaints of pain, lightheadedness, confusion, or diaphoresis. Dr. Peace notified. Orders received to give 500 mL bolus. Will continue to monitor patients vitals/

## 2018-07-11 NOTE — PROGRESS NOTES
Received report from previous shift. Plan of care discussed with patient. no concerns voiced at this time. Patient is A&O 4, bed alarm off.  Patient educated on the importance of calling if in need of assistance.  Patient verbalized understanding.  Bed controls on, bed locked and in lowest position. Patient without pain at this time. Will continue to monitor for safety and comfort.

## 2018-07-11 NOTE — PROGRESS NOTES
2 RN skin assessment completed with Steve MARSHALL. Pt has red and blanching spots on top of both ears, silicone tubing in place, pt also wears glasses at home, glasses are at bedside. Pt also has red and blanching elbows and sacrum. Pt encouraged to turn from side to side. No other signs of skin breakdown at this time

## 2018-07-11 NOTE — H&P
Internal Medicine Admitting History and Physical    Note Author: Yogi Jesus M.D.       Name Donna Ceballos 1960   Age/Sex 58 y.o. male   MRN [id number]   Code Status FULL     After 5PM or if no immediate response to page, please call for cross-coverage  Attending/Team: Dr. Kelsey/Natanael See Patient List for primary contact information  Call [phone] to page    1st Call - Day Intern (R1):   Dr. Jesus 2nd Call - Day Sr. Resident (R2/R3):   Dr. Smith       Chief Complaint:   Chest pain, palpitations    HPI:  Patient is a 58 year old male with no known past history who was transferred from [facility] for higher level of care after chest CT showed large left empyema. He c/o left-sided chest pain and palpitations since the past 5 days. Intensity of chest pain is 8/10 and it is worse on inspiration. He also has dyspnea on exertion, orthopnea, cough with sputum production and shortness of breath since March of this year. Sputum is greenish, thick, mucoid and mixed with blood. No complaints of fever, chills, edema, nausea, vomiting, abdominal pain or blood in the stool. Patient states he lost 10 pounds since March due to decreased appetite.  In the ED, Pt. was alert and oriented x 4. Not in any distress.   Vitals: 98.6 temp, 97 HR, 20 RR, 99/59 BP, 93% spO2 on 2.5 L nasal canula.  On exam: Heart sounds normal                   Bilateral coarse ronchi  Labs: Lactic acid - 1.9, PT/INR - 17/1.42            CBC, CMP, blood cultures pending  EKG - Sinus tachycardia, rate - 117              No ST-T wave changes  CXR - Per radiology :   1.  There has been interval decrease in the left pleural effusion.  2.  There is still some component of LEFT pneumothorax although the hydropneumothorax air-fluid level is no longer evident.  3.  Multiple left bone or masses are again seen.  4.  Left lower lobe airspace disease is again seen.  5.  Ill-defined nodular and linear opacity in the right  upper lobe with pleural thickening and hilar retraction is again seen.  IR was contacted by ED physician. 500 ml of thick, turbid creamy yellow pus like pleural fluid was drained.    Review of Systems   Constitutional: Positive for weight loss. Negative for chills and fever.   HENT: Negative for congestion.    Eyes: Negative for blurred vision.   Respiratory: Positive for cough, hemoptysis, sputum production and shortness of breath.    Cardiovascular: Positive for chest pain, palpitations and orthopnea.   Gastrointestinal: Negative for abdominal pain, heartburn, nausea and vomiting.   Genitourinary: Negative for dysuria and urgency.   Musculoskeletal: Negative for myalgias.   Skin: Negative for rash.   Neurological: Negative for dizziness and headaches.             Past Medical History (Chronic medical problem, known complications and current treatment)    No known past medical history. He doesn't take any medications except for over the counter aspirin.    Past Surgical History:  History reviewed. No pertinent surgical history.    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    Social History (mandatory)   Social History     Social History   • Marital status: Single     Spouse name: N/A   • Number of children: N/A   • Years of education: N/A     Occupational History   • Cook     Social History Main Topics   • Smoking status: Current Every Day Smoker     Packs/day: 1.00     Types: Cigarettes   • Smokeless tobacco: Never Used   • Alcohol use Yes, 6 beers/day   • Drug use: Yes     Types: Inhaled      Comment:  cannabis   • Sexual activity: Not on file     Other Topics Concern   • Not on file     Social History Narrative   • No narrative on file     Living situation: Lives alone in apartment  PCP : Pcp Pt States None    Physical Exam     Vitals:    07/10/18 1630 07/10/18 1645 07/10/18 1650 07/10/18 1805   BP: 104/63 (!) 87/46 107/60    Pulse: 65 (!) 104 99 100   Resp:       Temp:       SpO2: 92% 92% 93% 92%   Weight:       Height:         Body mass index is 20.34 kg/m².  /60   Pulse 100   Temp 37 °C (98.6 °F)   Resp 20   Ht 1.829 m (6')   Wt 68 kg (150 lb)   SpO2 92%   BMI 20.34 kg/m²   O2 therapy: Pulse Oximetry: 92 %, O2 (LPM): 2.5, O2 Delivery: Nasal Cannula    Physical Exam   Constitutional: He is oriented to person, place, and time and well-developed, well-nourished, and in no distress.   HENT:   Head: Normocephalic and atraumatic.   Eyes: Pupils are equal, round, and reactive to light.   Neck: Normal range of motion.   Cardiovascular: Normal rate and normal heart sounds.    Pulmonary/Chest: Effort normal.   Bilateral coarse ronchi heard   Abdominal: Soft.   Musculoskeletal: Normal range of motion.   Neurological: He is alert and oriented to person, place, and time.   Skin: Skin is warm.         Data Review       Old Records Request:   Completed  Current Records review/summary: Completed    Lab Data Review:  Recent Results (from the past 24 hour(s))   LACTIC ACID    Collection Time: 07/10/18  2:50 PM   Result Value Ref Range    Lactic Acid 1.9 0.5 - 2.0 mmol/L   PROTHROMBIN TIME    Collection Time: 07/10/18  2:50 PM   Result Value Ref Range    PT 17.0 (H) 12.0 - 14.6 sec    INR 1.42 (H) 0.87 - 1.13   APTT    Collection Time: 07/10/18  2:50 PM   Result Value Ref Range    APTT 34.6 24.7 - 36.0 sec   FLUID AMYLASE    Collection Time: 07/10/18  4:35 PM   Result Value Ref Range    Fluid Type Pleural     Body Fluid Amylase 14 U/L   FLUID PH    Collection Time: 07/10/18  4:35 PM   Result Value Ref Range    Fluid  Type Pleural     Ph Misc 7.20    FLUID TOTAL PROTEIN    Collection Time: 07/10/18  4:35 PM   Result Value Ref Range    Total Protein Fluid 3.9 g/dL   FLUID LDH    Collection Time: 07/10/18  4:35 PM   Result Value Ref Range    Body Fluid LDH 84886 U/L   FLUID GLUCOSE    Collection Time: 07/10/18  4:35 PM   Result Value Ref Range    Glucose, Fluid <10 mg/dL   FLUID CELL COUNT    Collection Time: 07/10/18  4:35 PM   Result Value Ref Range    Fluid Type Pleural     Color-Body Fluid Brown     Character-Body Fluid Turbid     Total RBC Count 580020 cells/uL    Total WBC 664380 cells/uL    Comments see below    CYTOLOGY    Collection Time: 07/10/18  5:33 PM   Result Value Ref Range    Cytology Reg See Path Report        Imaging/Procedures Review:    Independant Imaging Review: Completed  DX-CHEST-PORTABLE (1 VIEW)   Final Result      1.  There has been interval decrease in the left pleural effusion.   2.  There is still some component of LEFT pneumothorax although the hydropneumothorax air-fluid level is no longer evident.   3.  Multiple left bone or masses are again seen.   4.  Left lower lobe airspace disease is again seen.   5.  Ill-defined nodular and linear opacity in the right upper lobe with pleural thickening and hilar retraction is again seen.      US-THORACENTESIS PUNCTURE LEFT   Final Result      1. Ultrasound guided left sided diagnostic thoracentesis.      2. 500 mL of fluid withdrawn. Additional fluid was not removed as the catheter became occluded by the thick liquid      OUTSIDE IMAGES-CT CHEST/ABDOMEN/PELVIS   Final Result      OUTSIDE IMAGES-DX CHEST   Final Result      DX-CHEST-2 VIEWS    (Results Pending)            EKG:   EKG Independant Review: Completed  QTc: 413, HR: 117, Sinus tachycardia, Non-specific T-abnormalities (lateral leads)    Records reviewed and summarized in current documentation :  Yes  [employer] teaching service handout given to patient:  No         Assessment/Plan     Empyema (HCC)    Assessment & Plan    Pt presents with cough, greenish and bloody sputum, dyspnea on exertion and orthopnea since March of this year.  Labs at other hosp - WBC 24,000 and Lactic acid 4  CT at the other hospital - large empyema with cavitary lesion/masses in the right upper lobe.  IR was contacted by ED physician. U/S guided thoracocentesis was done - 500 ml of thick fluid drained.  CXR post thoracocentesis - interval decrease in the left pleural effusion    Admitted to telemetry  Continuous pulse oximetry  Unasyn and Vancomycin started  IV fluids at 75 ml/hr  Blood and sputum cultures pending.  Pleural fluid labs pending  Follow up LDH, AFB cultures x3 and Quantiferon Gold to r/o TB  Per pulmonary, patient will require chest tube placement for continuous drainage  RT and oxygen per protocol  SCDs for DVT prophylaxis              Tobacco abuse   Assessment & Plan    Nicotine patches while IP.  Tobacco cessation counseling          Lactic acidosis   Assessment & Plan    Lactic acid - 4 at outside hospital.  Came down to 1.9 after IV fluids  Continue to monitor        Leukocytosis   Assessment & Plan    WBC - 24K at outside hospital secondary to empyema  Pt put on Unasyn and Vancomycin  IV fluids at 75 ml/hr  Continue to monitor              Anticipated Hospital stay:  >2 midnights        Quality Measures  Quality-Core Measures   Reviewed items::  Labs reviewed, Medications reviewed and Radiology images reviewed  Motley catheter::  No Motley  DVT prophylaxis - mechanical:  SCDs    PCP: Pcp Pt States None

## 2018-07-11 NOTE — PROGRESS NOTES
PHYSICIAN: DR MARTE AND DR HEAD  TECH: TAMMIE    PROCEDURE: ULTRASOUND GUIDED THORACENTESIS    PROCEDURE WAS DONE IN ULTRASOUND ROOM 1. 500 ML OF FLUID WAS REMOVED. 400 ML OF FLUID WAS SENT TO LAB. VITALS WERE MONITORED AT BEDSIDE. PT APPEARED TO BE IN A STABLE CONDITION BEFORE, DURING AND AFTER THE PROCEDURE. X RAY WAS ORDERED.

## 2018-07-11 NOTE — CARE PLAN
Problem: Nutritional:  Goal: Achieve adequate nutritional intake  Advance diet when medically feasible and patient will consume 50% of meals  Outcome: NOT MET

## 2018-07-11 NOTE — PROGRESS NOTES
Pharmacy Kinetics 58 y.o. male on vancomycin day # 1   7/10/2018    Vancomycin New Start    Indication for Treatment: Empiric - empyema/pleural effusion    Pertinent history per medical record: Admitted on 7/10/2018 for loculated pleural effusion. Patient presented to [facility] complaining of SOB, had CT imaging completed which revealed multiloculated abscess vs cavitary masses and patient transferred to [facility] for further care. Patient underwent thoracentesis in ED on 7/10 and has been initiated on empiric antibiotics for treatment of his effusion.    Other antibiotics: ampicillin/sulbactam 3 g IV q6hrs    Allergies: Patient has no known allergies.     Concerns for renal function include BUN/SCr ratio > 20:1, albumin 1.5 & contrast for CT 7/10.    Pertinent cultures to date:   7/10: pleural fluid cx - in process    Pertinent lab results from [facility] include:   BUN 27  SCr 0.81  Lactic Acid 4 >> repeat 1.9 at [facility]  Albumin 1.5  LFTs wnl    No results for input(s): VANCOTROUGH, VANCOPEAK, VANCORANDOM in the last 72 hours.    No intake or output data in the 24 hours ending 07/10/18 1858     Blood pressure 107/60, pulse 100, temperature 37 °C (98.6 °F), resp. rate 20, height 1.829 m (6'), weight 68 kg (150 lb), SpO2 92 %. Temp (24hrs), Av °C (98.6 °F), Min:37 °C (98.6 °F), Max:37 °C (98.6 °F)      A/P   1. Vancomycin dose change: Give vanco load 1700 mg IV followed by 1400 mg IV q12hrs  2. Next vancomycin level: 2 days (not currently ordered)   3. Goal trough: 16-20 mcg/mL   4. Comments: Empiric vancomycin initiated for treatment of pleural effusion in patient with normal renal function. Pleural fluid sent for cultures. Will start scheduled vancomycin per protocol and plan trough at steady state to evaluate and adjust as necessary to achieve goal trough levels. Will continue to follow cultures and recommend de-escalation of antibiotics if continued MRSA coverage is no longer indicated.    Pharmacy will continue  to follow.     Raiza HawkinsD

## 2018-07-11 NOTE — DIETARY
Nutrition services: Day 1 of admit.  Donna Ceballos is a 58 y.o. male with admitting DX of loculated pleural effusion and empyema.  Pt noted with wt loss on nutrition admit screen.  RD attempted to visit pt however pt was busy with other disciplines and not appropriate at the time per RN.  Pt is currently on airborne precautions.  RD to follow-up with pt at another appropriate time.      Assessment:  Height: 182.9 cm (6')  Weight: 65.2 kg (143 lb 11.8 oz)  Body mass index is 19.49 kg/m². - on the low end of WNL.  Diet/Intake:  Pt was previously on a regular diet however is now NPO for surgery later today.    Evaluation:   1. Pt noted with leukocytosis, lactic acidosis, protein-calorie malnutrition, and tobacco abuse.    2. Per MD note, pt reports feeling ill for several month and states that he has lost 10 lbs since March due to decreased appetite.   3. Labs and meds reviewed.  4. Last BM: 7/10.    Recommendations/Plan:  1. Advance diet when medically feasible.  2. Encourage intake of meals.  3. Document intake of all meals as % taken in ADL's to provide interdisciplinary communication across all shifts.   4. Monitor weight.  5. Nutrition rep will continue to see patient for ongoing meal and snack preferences.   6. RD to obtain supplement order as needed.    RD following

## 2018-07-11 NOTE — RESPIRATORY CARE
COPD EDUCATION by COPD CLINICAL EDUCATOR  7/11/2018 at 11:58 AM by Silva Pearles     Patient reviewed by COPD education team. Patient does not qualify for COPD program.

## 2018-07-11 NOTE — CARE PLAN
Problem: Respiratory:  Goal: Respiratory status will improve    Intervention: Educate and encourage coughing and deep breathing  Patient educated on the importance of coughing and deep breathing. Patient verbalized understanding and performed correctly.

## 2018-07-11 NOTE — PROGRESS NOTES
Pharmacy Kinetics 58 y.o. male on vancomycin day # 2  7/11/2018    Currently Dose: Vancomycin 1100 mg iv q12hr (~17 mg/kg)  *was previously on 1400 mg iv q12h (~20 mg/kg)  Received Load Dose: Yes    Indication for Treatment: pneumonia/empyema  ID Service Following: No (PMA consulted)    Pertinent history per medical record: Admitted on 7/10/2018 for loculated pleural effusion as transfer from [facility]. Noted thoracentesis on admission. PMA consulted.    Other antibiotics: ampicillin/sulbactam 3 gm iv q6h    Allergies: Patient has no known allergies.     List concerns for accumulation of vancomycin: age 58, BUN:SCr > 20:1, recent IV contrast exposure, low albumin/malnutrition, electrolyte derangement    Pertinent cultures to date:   Results     Procedure Component Value Units Date/Time    FLUID CULTURE W/GRAM STAIN [[id number]]  (Abnormal) Collected:  07/10/18 1635    Order Status:  Completed Specimen:  Body Fluid from Thoracentesis Fluid Updated:  07/11/18 1504     Significant Indicator POS (POS)     Source BF     Site THORACENTESIS FLUID     Culture Result Bdf -- (A)     Gram Stain Result Many WBCs.  Many Gram positive cocci.       Culture Result Bdf Beta Streptococcus Group F  Moderate growth   (A)    Narrative:       CALL  [place]  183 tel. [phone],  CALLED  183 tel. [phone] 07/11/2018, 12:41, RB PERF. RESULTS CALLED TO:  ROLANDO [id number]  src:pleural effusion    FUNGAL CULTURE [[id number]] Collected:  07/10/18 1635    Order Status:  Completed Specimen:  Body Fluid from Thoracentesis Fluid Updated:  07/11/18 1504     Significant Indicator NEG     Source BF     Site THORACENTESIS FLUID     Fungal Culture Culture in progress.    Narrative:       CALL  [place]  183 tel. [phone],  CALLED  183 tel. [phone] 07/11/2018, 12:41, RB PERF. RESULTS CALLED TO:  ROLANDO [id number]  src:pleural effusion    AFB CULTURE [[id number]]     Order Status:  No result     GRAM STAIN [[id number]] Collected:  07/10/18 1635    Order  Status:  Completed Specimen:  Body Fluid Updated:  18 1242     Significant Indicator .     Source BF     Site THORACENTESIS FLUID     Gram Stain Result Many WBCs.  Many Gram positive cocci.      Narrative:       CALL  [place]  183 tel. [phone],  CALLED  183 tel. [phone] 2018, 12:41, RB PERF. RESULTS CALLED TO:  ROLANDO [id number]  src:pleural effusion    AFB CULTURE [[id number]] Collected:  18 1100    Order Status:  Completed Specimen:  Sputum from Bile Fluid Updated:  18 1215    Narrative:       [id number] NEYMAR CLEARY    AFB CULTURE [[id number]]     Order Status:  No result     BLOOD CULTURE [[id number]]     Order Status:  Sent Specimen:  Blood from Peripheral     BLOOD CULTURE [[id number]]     Order Status:  Sent Specimen:  Blood from Peripheral     AFB CULTURE [[id number]]     Order Status:  Canceled Specimen:  Sputum from Nasopharyngeal Aspirate     AFB CULTURE [[id number]] Collected:  07/10/18 1635    Order Status:  Completed Specimen:  Body Fluid from Thoracentesis Fluid Updated:  07/10/18 1732        Recent Labs      07/10/18   2047  18   0216   WBC  15.5*  16.3*   NEUTSPOLYS  82.60*  84.30*   BANDSSTABS  6.10   --      Recent Labs      18   0216   BUN  22   CREATININE  0.53   ALBUMIN  2.2*     Intake/Output Summary (Last 24 hours) at 18 1654  Last data filed at 18 0600   Gross per 24 hour   Intake                0 ml   Output              825 ml   Net             -825 ml      Blood pressure (!) 99/64, pulse 100, temperature 37.3 °C (99.2 °F), resp. rate 18, height 1.829 m (6'), weight 65.2 kg (143 lb 11.8 oz), SpO2 92 %. Temp (24hrs), Av.9 °C (98.4 °F), Min:36.6 °C (97.8 °F), Max:37.3 °C (99.2 °F)    Estimated Creatinine Clearance: 143.5 mL/min (by C-G formula based on SCr of 0.53 mg/dL).    A/P   1. Vancomycin dose change: not indicated   2. Next vancomycin level: 18 @1200  3. Goal trough: 16-20 mcg/mL  4. Comments: Hypotensive. Afebrile. WBC  elevated/stable. CrCl ~144 mL/min. Microbiology pending. Factors for accumulation noted. Prior dose regimen noted. Discontinued by primary team then re-ordered by consult service Given factors for accumulation will adjust to per protocol dosing of ~17 mg/kg q12h with repeat trough tentatively placed 7/12/18 to ensure clearance. BMP with AM labs. Pharmacy will follow and continue to adjust as appropriate. Would consider de-escalation as able per clinical course and final C/S results.    Yusuf Yousif, PharmD

## 2018-07-11 NOTE — CONSULTS
DATE OF CONSULTATION: 7/11/2018     REFERRING PHYSICIAN: ROCIO joya MD.     CONSULTING PHYSICIAN: Diego Martínez M.D.      REASON FOR CONSULTATION: Empyema    HISTORY OF PRESENT ILLNESS: The patient is a 58-year-old male who was transferred from [facility] for higher level of care after a CT scan showed what appeared to be a large left-sided empyema. The patient had been experiencing left-sided chest pain and palpitations for the past 5 days prior to transfer. Patient was transferred to [facility] and a thoracentesis was performed which resulted in 500 mL of purulent material being evacuated from the left chest. The lung failed to reexpand after thoracentesis. I'm being consulted for video-assisted thoracoscopy and decortication versus thoracotomy.    PAST MEDICAL HISTORY:  has a past medical history of Chronic obstructive pulmonary disease (HCC).     PAST SURGICAL HISTORY: patient denies any surgical history     ALLERGIES: No Known Allergies     CURRENT MEDICATIONS:   Home Medications     Reviewed by Pepper Davis (Pharmacy Tech) on 07/10/18 at 1526  Med List Status: Complete   Medication Last Dose Status   aspirin (ASA) 325 MG Tab 7/10/2018 Active   therapeutic multivitamin-minerals (THERAGRAN-M) Tab 7/10/2018 Active                FAMILY HISTORY: History reviewed. No pertinent family history.     SOCIAL HISTORY:   Social History     Social History Main Topics   • Smoking status: Current Every Day Smoker     Packs/day: 1.00     Types: Cigarettes   • Smokeless tobacco: Never Used   • Alcohol use Yes   • Drug use: Yes     Types: Inhaled      Comment: cannabis   • Sexual activity: Not on file       Review of Systems:      Constitutional: Denies fevers, Denies weight changes  Eyes: Denies changes in vision, no eye pain  Ears/Nose/Throat/Mouth: Denies nasal congestion or sore throat   Cardiovascular: Reports palpitations.  Respiratory: Denies shortness of breath, cough, and  wheezing.  Gastrointestinal/Hepatic: Denies abdominal pain, nausea, vomiting, diarrhea, constipation or GI bleeding   Genitourinary: Denies dysuria or frequency  Musculoskeletal/Rheum: Reports left-sided discomfort   Skin: Denies rash  Neurological: Denies headache, confusion, memory loss or focal weakness/parasthesias  Psychiatric: denies mood disorder   Endocrine: Dionna thyroid problems  Heme/Oncology/Lymph Nodes: Denies enlarged lymph nodes, denies brusing or known bleeding disorder  All other systems were reviewed and are negative (AMA/CMS criteria)                  PHYSICAL EXAMINATION:       Constitutional:   Well developed, Well nourished, No acute distress  HENMT:  Normocephalic, Atraumatic, Oropharynx moist mucous membranes, No oral exudates, Nose normal.  No thyromegaly.  Eyes:  EOMI, Conjunctiva normal, No discharge.  Neck:  Normal range of motion, No cervical tenderness,  no JVD.  Cardiovascular:  Normal heart rate, Normal rhythm, No murmurs, No rubs, No gallops.   Extremitites with intact distal pulses, no cyanosis, or edema.  Lungs:  Normal breath sounds, breath sounds clear to auscultation bilaterally,  no crackles, no wheezing.   Abdomen: Bowel sounds normal, Soft, No tenderness, No guarding, No rebound, No masses, No hepatosplenomegaly.  Skin: Warm, Dry, No erythema, No rash, no induration.  Neurologic: Alert & oriented x 3, No focal deficits noted, cranial nerves II through X are grossly intact.  Psychiatric: Affect normal, Judgment normal, Mood normal.        LABORATORY VALUES:   Recent Labs      07/10/18   2047   WBC  15.5*   RBC  4.35*   HEMOGLOBIN  12.1*   HEMATOCRIT  36.3*   MCV  83.4   MCH  27.8   MCHC  33.3*   RDW  52.2*   PLATELETCT  727*   MPV  9.3     Recent Labs      07/11/18   0216   SODIUM  131*   POTASSIUM  4.8   CHLORIDE  96   CO2  31   GLUCOSE  103*   BUN  22   CREATININE  0.53   CALCIUM  8.4*     Recent Labs      07/10/18   1450  07/11/18   0216   ASTSGOT   --   20   ALTSGPT   --    27   TBILIRUBIN   --   0.2   ALKPHOSPHAT   --   85   GLOBULIN   --   3.8*   INR  1.42*   --      Recent Labs      07/10/18   1450   APTT  34.6   INR  1.42*        IMAGING:   DX-CHEST-PORTABLE (1 VIEW)   Final Result      1.  There has been interval decrease in the left pleural effusion.   2.  There is still some component of LEFT pneumothorax although the hydropneumothorax air-fluid level is no longer evident.   3.  Multiple left bone or masses are again seen.   4.  Left lower lobe airspace disease is again seen.   5.  Ill-defined nodular and linear opacity in the right upper lobe with pleural thickening and hilar retraction is again seen.      US-THORACENTESIS PUNCTURE LEFT   Final Result      1. Ultrasound guided left sided diagnostic thoracentesis.      2. 500 mL of fluid withdrawn. Additional fluid was not removed as the catheter became occluded by the thick liquid      OUTSIDE IMAGES-CT CHEST/ABDOMEN/PELVIS   Final Result      OUTSIDE IMAGES-DX CHEST   Final Result      DX-CHEST-2 VIEWS    (Results Pending)       IMPRESSION AND PLAN:     Active Hospital Problems    Diagnosis   • Empyema (HCC) [J86.9]     Priority: High   • Leukocytosis [D72.829]     Priority: Medium   • Lactic acidosis [E87.2]     Priority: Medium   • Tobacco abuse [Z72.0]     Priority: Medium     Empyema    Plan- video-assisted thoracoscopy and decortication versus thoracotomy. We'll plan surgery later today.  ____________________________________   Diego Martínez M.D.          DD: 7/11/2018   DT: 9:44 AM

## 2018-07-12 ENCOUNTER — APPOINTMENT (OUTPATIENT)
Dept: RADIOLOGY | Facility: MEDICAL CENTER | Age: 58
DRG: 853 | End: 2018-07-12
Attending: INTERNAL MEDICINE

## 2018-07-12 PROBLEM — E87.20 LACTIC ACIDOSIS: Status: RESOLVED | Noted: 2018-07-10 | Resolved: 2018-07-12

## 2018-07-12 LAB
ACTION RANGE TRIGGERED IACRT: NO
ANION GAP SERPL CALC-SCNC: 11 MMOL/L (ref 0–11.9)
ANISOCYTOSIS BLD QL SMEAR: ABNORMAL
BACTERIA FLD AEROBE CULT: ABNORMAL
BACTERIA FLD AEROBE CULT: ABNORMAL
BASE EXCESS BLDA CALC-SCNC: -1 MMOL/L (ref -4–3)
BASOPHILS # BLD AUTO: 0 % (ref 0–1.8)
BASOPHILS # BLD: 0 K/UL (ref 0–0.12)
BODY TEMPERATURE: ABNORMAL DEGREES
BUN SERPL-MCNC: 16 MG/DL (ref 8–22)
CALCIUM SERPL-MCNC: 7.4 MG/DL (ref 8.5–10.5)
CHLORIDE SERPL-SCNC: 104 MMOL/L (ref 96–112)
CO2 BLDA-SCNC: 21 MMOL/L (ref 20–33)
CO2 SERPL-SCNC: 20 MMOL/L (ref 20–33)
CREAT SERPL-MCNC: 0.39 MG/DL (ref 0.5–1.4)
EOSINOPHIL # BLD AUTO: 0 K/UL (ref 0–0.51)
EOSINOPHIL NFR BLD: 0 % (ref 0–6.9)
ERYTHROCYTE [DISTWIDTH] IN BLOOD BY AUTOMATED COUNT: 54.1 FL (ref 35.9–50)
GLUCOSE SERPL-MCNC: 137 MG/DL (ref 65–99)
GRAM STN SPEC: ABNORMAL
GRAM STN SPEC: NORMAL
GRAM STN SPEC: NORMAL
HCO3 BLDA-SCNC: 20.5 MMOL/L (ref 17–25)
HCT VFR BLD AUTO: 31.6 % (ref 42–52)
HGB BLD-MCNC: 10.3 G/DL (ref 14–18)
INST. QUALIFIED PATIENT IIQPT: YES
LYMPHOCYTES # BLD AUTO: 1.91 K/UL (ref 1–4.8)
LYMPHOCYTES NFR BLD: 8.7 % (ref 22–41)
M TB TUBERC IFN-G BLD QL: NEGATIVE
M TB TUBERC IFN-G/MITOGEN IGNF BLD: 0.01
M TB TUBERC IGNF/MITOGEN IGNF CONTROL: 2.41 [IU]/ML
MACROCYTES BLD QL SMEAR: ABNORMAL
MAGNESIUM SERPL-MCNC: 1.8 MG/DL (ref 1.5–2.5)
MANUAL DIFF BLD: NORMAL
MCH RBC QN AUTO: 27.7 PG (ref 27–33)
MCHC RBC AUTO-ENTMCNC: 32.6 G/DL (ref 33.7–35.3)
MCV RBC AUTO: 84.9 FL (ref 81.4–97.8)
MITOGEN IGNF BCKGRD COR BLD-ACNC: 0.01 [IU]/ML
MONOCYTES # BLD AUTO: 1.34 K/UL (ref 0–0.85)
MONOCYTES NFR BLD AUTO: 6.1 % (ref 0–13.4)
MORPHOLOGY BLD-IMP: NORMAL
NEUTROPHILS # BLD AUTO: 18.66 K/UL (ref 1.82–7.42)
NEUTROPHILS NFR BLD: 76.5 % (ref 44–72)
NEUTS BAND NFR BLD MANUAL: 8.7 % (ref 0–10)
NRBC # BLD AUTO: 0 K/UL
NRBC BLD-RTO: 0 /100 WBC
O2/TOTAL GAS SETTING VFR VENT: 60 %
PCO2 BLDA: 24.7 MMHG (ref 26–37)
PCO2 TEMP ADJ BLDA: 24.3 MMHG (ref 26–37)
PH BLDA: 7.53 [PH] (ref 7.4–7.5)
PH TEMP ADJ BLDA: 7.53 [PH] (ref 7.4–7.5)
PHOSPHATE SERPL-MCNC: 2.8 MG/DL (ref 2.5–4.5)
PLATELET # BLD AUTO: 659 K/UL (ref 164–446)
PLATELET BLD QL SMEAR: NORMAL
PMV BLD AUTO: 9.3 FL (ref 9–12.9)
PO2 BLDA: 57 MMHG (ref 64–87)
PO2 TEMP ADJ BLDA: 55 MMHG (ref 64–87)
POIKILOCYTOSIS BLD QL SMEAR: NORMAL
POTASSIUM SERPL-SCNC: 3.6 MMOL/L (ref 3.6–5.5)
RBC # BLD AUTO: 3.72 M/UL (ref 4.7–6.1)
RBC BLD AUTO: PRESENT
RHODAMINE-AURAMINE STN SPEC: NORMAL
SAO2 % BLDA: 93 % (ref 93–99)
SIGNIFICANT IND 70042: ABNORMAL
SIGNIFICANT IND 70042: NORMAL
SITE SITE: ABNORMAL
SITE SITE: NORMAL
SODIUM SERPL-SCNC: 135 MMOL/L (ref 135–145)
SOURCE SOURCE: ABNORMAL
SOURCE SOURCE: NORMAL
SPECIMEN DRAWN FROM PATIENT: ABNORMAL
TARGETS BLD QL SMEAR: NORMAL
TOXIC GRANULES BLD QL SMEAR: NORMAL
TRIGL SERPL-MCNC: 109 MG/DL (ref 0–149)
WBC # BLD AUTO: 21.9 K/UL (ref 4.8–10.8)

## 2018-07-12 PROCEDURE — 87015 SPECIMEN INFECT AGNT CONCNTJ: CPT

## 2018-07-12 PROCEDURE — 87206 SMEAR FLUORESCENT/ACID STAI: CPT

## 2018-07-12 PROCEDURE — C1751 CATH, INF, PER/CENT/MIDLINE: HCPCS

## 2018-07-12 PROCEDURE — 700105 HCHG RX REV CODE 258: Performed by: STUDENT IN AN ORGANIZED HEALTH CARE EDUCATION/TRAINING PROGRAM

## 2018-07-12 PROCEDURE — 36556 INSERT NON-TUNNEL CV CATH: CPT

## 2018-07-12 PROCEDURE — 84100 ASSAY OF PHOSPHORUS: CPT

## 2018-07-12 PROCEDURE — 85027 COMPLETE CBC AUTOMATED: CPT

## 2018-07-12 PROCEDURE — 71045 X-RAY EXAM CHEST 1 VIEW: CPT

## 2018-07-12 PROCEDURE — B548ZZA ULTRASONOGRAPHY OF SUPERIOR VENA CAVA, GUIDANCE: ICD-10-PCS | Performed by: INTERNAL MEDICINE

## 2018-07-12 PROCEDURE — 87116 MYCOBACTERIA CULTURE: CPT

## 2018-07-12 PROCEDURE — 87040 BLOOD CULTURE FOR BACTERIA: CPT

## 2018-07-12 PROCEDURE — 88112 CYTOPATH CELL ENHANCE TECH: CPT

## 2018-07-12 PROCEDURE — 302978 HCHG BRONCHOSCOPY-DIAGNOSTIC

## 2018-07-12 PROCEDURE — 36556 INSERT NON-TUNNEL CV CATH: CPT | Mod: RT | Performed by: INTERNAL MEDICINE

## 2018-07-12 PROCEDURE — 80048 BASIC METABOLIC PNL TOTAL CA: CPT

## 2018-07-12 PROCEDURE — 700111 HCHG RX REV CODE 636 W/ 250 OVERRIDE (IP): Performed by: INTERNAL MEDICINE

## 2018-07-12 PROCEDURE — HZ2ZZZZ DETOXIFICATION SERVICES FOR SUBSTANCE ABUSE TREATMENT: ICD-10-PCS | Performed by: INTERNAL MEDICINE

## 2018-07-12 PROCEDURE — 0BC38ZZ EXTIRPATION OF MATTER FROM RIGHT MAIN BRONCHUS, VIA NATURAL OR ARTIFICIAL OPENING ENDOSCOPIC: ICD-10-PCS | Performed by: INTERNAL MEDICINE

## 2018-07-12 PROCEDURE — 87070 CULTURE OTHR SPECIMN AEROBIC: CPT

## 2018-07-12 PROCEDURE — 700111 HCHG RX REV CODE 636 W/ 250 OVERRIDE (IP)

## 2018-07-12 PROCEDURE — 82803 BLOOD GASES ANY COMBINATION: CPT

## 2018-07-12 PROCEDURE — 99291 CRITICAL CARE FIRST HOUR: CPT | Mod: 25 | Performed by: INTERNAL MEDICINE

## 2018-07-12 PROCEDURE — 700101 HCHG RX REV CODE 250: Performed by: INTERNAL MEDICINE

## 2018-07-12 PROCEDURE — 88305 TISSUE EXAM BY PATHOLOGIST: CPT

## 2018-07-12 PROCEDURE — 0B9F8ZX DRAINAGE OF RIGHT LOWER LUNG LOBE, VIA NATURAL OR ARTIFICIAL OPENING ENDOSCOPIC, DIAGNOSTIC: ICD-10-PCS | Performed by: INTERNAL MEDICINE

## 2018-07-12 PROCEDURE — 87102 FUNGUS ISOLATION CULTURE: CPT

## 2018-07-12 PROCEDURE — 99153 MOD SED SAME PHYS/QHP EA: CPT

## 2018-07-12 PROCEDURE — 700102 HCHG RX REV CODE 250 W/ 637 OVERRIDE(OP): Performed by: STUDENT IN AN ORGANIZED HEALTH CARE EDUCATION/TRAINING PROGRAM

## 2018-07-12 PROCEDURE — 84478 ASSAY OF TRIGLYCERIDES: CPT

## 2018-07-12 PROCEDURE — 700111 HCHG RX REV CODE 636 W/ 250 OVERRIDE (IP): Performed by: STUDENT IN AN ORGANIZED HEALTH CARE EDUCATION/TRAINING PROGRAM

## 2018-07-12 PROCEDURE — 94640 AIRWAY INHALATION TREATMENT: CPT

## 2018-07-12 PROCEDURE — A9270 NON-COVERED ITEM OR SERVICE: HCPCS | Performed by: STUDENT IN AN ORGANIZED HEALTH CARE EDUCATION/TRAINING PROGRAM

## 2018-07-12 PROCEDURE — 700105 HCHG RX REV CODE 258: Performed by: INTERNAL MEDICINE

## 2018-07-12 PROCEDURE — 02HV33Z INSERTION OF INFUSION DEVICE INTO SUPERIOR VENA CAVA, PERCUTANEOUS APPROACH: ICD-10-PCS | Performed by: INTERNAL MEDICINE

## 2018-07-12 PROCEDURE — 99152 MOD SED SAME PHYS/QHP 5/>YRS: CPT

## 2018-07-12 PROCEDURE — 87077 CULTURE AEROBIC IDENTIFY: CPT

## 2018-07-12 PROCEDURE — 83735 ASSAY OF MAGNESIUM: CPT

## 2018-07-12 PROCEDURE — 31645 BRNCHSC W/THER ASPIR 1ST: CPT | Mod: 59 | Performed by: INTERNAL MEDICINE

## 2018-07-12 PROCEDURE — 770022 HCHG ROOM/CARE - ICU (200)

## 2018-07-12 PROCEDURE — 31624 DX BRONCHOSCOPE/LAVAGE: CPT | Performed by: INTERNAL MEDICINE

## 2018-07-12 PROCEDURE — 87205 SMEAR GRAM STAIN: CPT

## 2018-07-12 PROCEDURE — 85007 BL SMEAR W/DIFF WBC COUNT: CPT

## 2018-07-12 PROCEDURE — 94003 VENT MGMT INPAT SUBQ DAY: CPT

## 2018-07-12 RX ORDER — POTASSIUM CHLORIDE 14.9 MG/ML
20 INJECTION INTRAVENOUS ONCE
Status: COMPLETED | OUTPATIENT
Start: 2018-07-12 | End: 2018-07-12

## 2018-07-12 RX ORDER — BISACODYL 10 MG
10 SUPPOSITORY, RECTAL RECTAL
Status: DISCONTINUED | OUTPATIENT
Start: 2018-07-12 | End: 2018-07-25 | Stop reason: HOSPADM

## 2018-07-12 RX ORDER — POLYETHYLENE GLYCOL 3350 17 G/17G
1 POWDER, FOR SOLUTION ORAL
Status: DISCONTINUED | OUTPATIENT
Start: 2018-07-12 | End: 2018-07-25 | Stop reason: HOSPADM

## 2018-07-12 RX ORDER — IPRATROPIUM BROMIDE AND ALBUTEROL SULFATE 2.5; .5 MG/3ML; MG/3ML
3 SOLUTION RESPIRATORY (INHALATION)
Status: DISCONTINUED | OUTPATIENT
Start: 2018-07-12 | End: 2018-07-12 | Stop reason: ALTCHOICE

## 2018-07-12 RX ORDER — MAGNESIUM SULFATE 1 G/100ML
1 INJECTION INTRAVENOUS
Status: COMPLETED | OUTPATIENT
Start: 2018-07-12 | End: 2018-07-12

## 2018-07-12 RX ORDER — AMOXICILLIN 250 MG
2 CAPSULE ORAL 2 TIMES DAILY
Status: DISCONTINUED | OUTPATIENT
Start: 2018-07-12 | End: 2018-07-25 | Stop reason: HOSPADM

## 2018-07-12 RX ORDER — IPRATROPIUM BROMIDE AND ALBUTEROL SULFATE 2.5; .5 MG/3ML; MG/3ML
3 SOLUTION RESPIRATORY (INHALATION)
Status: DISCONTINUED | OUTPATIENT
Start: 2018-07-12 | End: 2018-07-22

## 2018-07-12 RX ORDER — IPRATROPIUM BROMIDE AND ALBUTEROL SULFATE 2.5; .5 MG/3ML; MG/3ML
3 SOLUTION RESPIRATORY (INHALATION)
Status: DISCONTINUED | OUTPATIENT
Start: 2018-07-12 | End: 2018-07-16

## 2018-07-12 RX ADMIN — VANCOMYCIN HYDROCHLORIDE 1100 MG: 100 INJECTION, POWDER, LYOPHILIZED, FOR SOLUTION INTRAVENOUS at 03:20

## 2018-07-12 RX ADMIN — AMPICILLIN SODIUM AND SULBACTAM SODIUM 3 G: 2; 1 INJECTION, POWDER, FOR SOLUTION INTRAMUSCULAR; INTRAVENOUS at 11:37

## 2018-07-12 RX ADMIN — FENTANYL CITRATE 75 MCG: 50 INJECTION INTRAMUSCULAR; INTRAVENOUS at 11:16

## 2018-07-12 RX ADMIN — NOREPINEPHRINE BITARTRATE 5 MCG/MIN: 1 INJECTION INTRAVENOUS at 00:56

## 2018-07-12 RX ADMIN — SODIUM CHLORIDE: 9 INJECTION, SOLUTION INTRAVENOUS at 11:46

## 2018-07-12 RX ADMIN — VANCOMYCIN HYDROCHLORIDE 1100 MG: 100 INJECTION, POWDER, LYOPHILIZED, FOR SOLUTION INTRAVENOUS at 13:23

## 2018-07-12 RX ADMIN — MAGNESIUM SULFATE 1 G: 1 INJECTION INTRAVENOUS at 10:17

## 2018-07-12 RX ADMIN — FENTANYL CITRATE 100 MCG: 50 INJECTION INTRAMUSCULAR; INTRAVENOUS at 18:21

## 2018-07-12 RX ADMIN — IPRATROPIUM BROMIDE AND ALBUTEROL SULFATE 3 ML: .5; 3 SOLUTION RESPIRATORY (INHALATION) at 14:18

## 2018-07-12 RX ADMIN — AMPICILLIN SODIUM AND SULBACTAM SODIUM 3 G: 2; 1 INJECTION, POWDER, FOR SOLUTION INTRAMUSCULAR; INTRAVENOUS at 06:34

## 2018-07-12 RX ADMIN — NOREPINEPHRINE BITARTRATE 10 MCG/MIN: 1 INJECTION INTRAVENOUS at 21:28

## 2018-07-12 RX ADMIN — PROPOFOL 35 MCG/KG/MIN: 10 INJECTION, EMULSION INTRAVENOUS at 18:14

## 2018-07-12 RX ADMIN — IPRATROPIUM BROMIDE AND ALBUTEROL SULFATE 3 ML: .5; 3 SOLUTION RESPIRATORY (INHALATION) at 18:26

## 2018-07-12 RX ADMIN — SODIUM CHLORIDE: 9 INJECTION, SOLUTION INTRAVENOUS at 00:00

## 2018-07-12 RX ADMIN — AMPICILLIN SODIUM AND SULBACTAM SODIUM 3 G: 2; 1 INJECTION, POWDER, FOR SOLUTION INTRAMUSCULAR; INTRAVENOUS at 01:35

## 2018-07-12 RX ADMIN — FENTANYL CITRATE 100 MCG: 50 INJECTION INTRAMUSCULAR; INTRAVENOUS at 22:43

## 2018-07-12 RX ADMIN — IPRATROPIUM BROMIDE AND ALBUTEROL SULFATE 3 ML: .5; 3 SOLUTION RESPIRATORY (INHALATION) at 06:39

## 2018-07-12 RX ADMIN — FAMOTIDINE 20 MG: 10 INJECTION, SOLUTION INTRAVENOUS at 09:12

## 2018-07-12 RX ADMIN — PROPOFOL 35 MCG/KG/MIN: 10 INJECTION, EMULSION INTRAVENOUS at 11:44

## 2018-07-12 RX ADMIN — AMPICILLIN SODIUM AND SULBACTAM SODIUM 3 G: 2; 1 INJECTION, POWDER, FOR SOLUTION INTRAMUSCULAR; INTRAVENOUS at 17:19

## 2018-07-12 RX ADMIN — NICOTINE 21 MG: 21 PATCH, EXTENDED RELEASE TRANSDERMAL at 06:34

## 2018-07-12 RX ADMIN — MAGNESIUM SULFATE 1 G: 1 INJECTION INTRAVENOUS at 09:07

## 2018-07-12 RX ADMIN — FAMOTIDINE 20 MG: 10 INJECTION, SOLUTION INTRAVENOUS at 17:19

## 2018-07-12 RX ADMIN — POTASSIUM CHLORIDE 20 MEQ: 200 INJECTION, SOLUTION INTRAVENOUS at 09:03

## 2018-07-12 ASSESSMENT — PAIN SCALES - GENERAL
PAINLEVEL_OUTOF10: 0

## 2018-07-12 NOTE — PROGRESS NOTES
Bronch at bedside with Miguelina GARDUNO and RTs and this RN, procedure start 1115, 30 mcg prop push verbal with closed loop communication at 1116, 1117, and 1125. 75 mcg fent pushed verbal with closed loop communication Miguelina GARDUNO at 1116. US of left side start 1130. Procedure stop at 1135. NE titrated per BP. Prop drip titrated to patient pain and RASS. Two sputum samples taken.

## 2018-07-12 NOTE — ASSESSMENT & PLAN NOTE
- Patient appears cachectic.  - Lost around 10 lbs since March when the symptoms started.  - Nutrition consult

## 2018-07-12 NOTE — PROGRESS NOTES
S: sedated on vent    O: Large air leak chest tubes, CXR improved   Blood pressure (!) 97/74, pulse (!) 101, temperature 37 °C (98.6 °F), resp. rate (!) 37, height 1.829 m (6'), weight 65.2 kg (143 lb 11.8 oz), SpO2 100 %.    Intake/Output Summary (Last 24 hours) at 07/12/18 1641  Last data filed at 07/12/18 1600   Gross per 24 hour   Intake          3758.19 ml   Output             1825 ml   Net          1933.19 ml     Recent Labs      07/10/18   2047  07/11/18   0216  07/12/18   0400   WBC  15.5*  16.3*  21.9*   RBC  4.35*  4.16*  3.72*   HEMOGLOBIN  12.1*  11.7*  10.3*   HEMATOCRIT  36.3*  36.7*  31.6*   MCV  83.4  88.2  84.9   MCH  27.8  28.1  27.7   MCHC  33.3*  31.9*  32.6*   RDW  52.2*  58.2*  54.1*   PLATELETCT  727*  766*  659*   MPV  9.3  9.6  9.3     Recent Labs      07/11/18   0216  07/12/18   0400   SODIUM  131*  135   POTASSIUM  4.8  3.6   CHLORIDE  96  104   CO2  31  20   GLUCOSE  103*  137*   BUN  22  16   CREATININE  0.53  0.39*   CALCIUM  8.4*  7.4*     Recent Labs      07/11/18   0216  07/12/18   0400   SODIUM  131*  135   POTASSIUM  4.8  3.6   CHLORIDE  96  104   CO2  31  20   GLUCOSE  103*  137*   BUN  22  16     Recent Labs      07/10/18   1450   APTT  34.6   INR  1.42*           Current Facility-Administered Medications   Medication Dose   • vasopressin (VASOSTRICT) 20 Units in  mL Infusion  0.03 Units/min   • famotidine (PEPCID) injection 20 mg  20 mg   • Respiratory Care per Protocol     • senna-docusate (PERICOLACE or SENOKOT S) 8.6-50 MG per tablet 2 Tab  2 Tab    And   • polyethylene glycol/lytes (MIRALAX) PACKET 1 Packet  1 Packet    And   • magnesium hydroxide (MILK OF MAGNESIA) suspension 30 mL  30 mL    And   • bisacodyl (DULCOLAX) suppository 10 mg  10 mg   • MD ALERT...Adult ICU Electrolyte Replacement per Pharmacy Protocol     • fentaNYL (SUBLIMAZE) injection 25 mcg  25 mcg    Or   • fentaNYL (SUBLIMAZE) injection 50 mcg  50 mcg    Or   • fentaNYL (SUBLIMAZE) injection 100  mcg  100 mcg   • ipratropium-albuterol (DUONEB) nebulizer solution  3 mL   • ipratropium-albuterol (DUONEB) nebulizer solution  3 mL   • MD ALERT... vancomycin per pharmacy protocol     • vancomycin 1,100 mg in  mL IVPB  17 mg/kg   • propofol (DIPRIVAN) injection  200 mg   • norepinephrine (LEVOPHED) 8 mg in  mL Infusion  0-30 mcg/min   • propofol (DIPRIVAN) injection  0-80 mcg/kg/min   • NS infusion     • ondansetron (ZOFRAN) syringe/vial injection 4 mg  4 mg   • ondansetron (ZOFRAN ODT) dispertab 4 mg  4 mg   • promethazine (PHENERGAN) tablet 12.5-25 mg  12.5-25 mg   • promethazine (PHENERGAN) suppository 12.5-25 mg  12.5-25 mg   • prochlorperazine (COMPAZINE) injection 5-10 mg  5-10 mg   • acetaminophen (TYLENOL) tablet 650 mg  650 mg   • nicotine (NICODERM) 21 MG/24HR 21 mg  21 mg    And   • nicotine polacrilex (NICORETTE) 2 MG piece 2 mg  2 mg   • ampicillin/sulbactam (UNASYN) 3 g in  mL IVPB  3 g       A:  Active Hospital Problems    Diagnosis   • Panlobular emphysema (HCC) [J43.1]     Priority: High   • Empyema (HCC) [J86.9]     Priority: High   • Pulmonary parenchymal mass [R91.8]     Priority: Medium   • Pulmonary cachexia due to COPD (HCC) [J44.9, R64]     Priority: Medium   • Protein malnutrition (HCC) [E46]     Priority: Medium   • Leukocytosis [D72.829]     Priority: Low   • Tobacco abuse [Z72.0]     Priority: Low       P - Continue CT drainage of left chest cavity     Follow

## 2018-07-12 NOTE — ASSESSMENT & PLAN NOTE
Improving Resp status  Continue scheduled duonebs.  RT protocol, Oxygen, Cont pulse ox.  IS 10 X q hr.

## 2018-07-12 NOTE — PROGRESS NOTES
Dr. Johnson at bedside reviewing chest X ray on portable x ray machine screen, OKed central line for use

## 2018-07-12 NOTE — CONSULTS
Critical Care/Pulmonary Consultation    Date of service: 7/12/2018    Consulting Physician: Diego Martínez M.D.    Chief Complaint: Shortness of breath, hemoptysis.    History of Present Illness: Donna Ceballos is a 58 y.o. male with a past medical history of COPD, presented to the hospital for evaluation of worsening shortness of breath generalized weakness, progressive weight loss, cough productive of purulent secretions, hemoptysis or 1 month duration.  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  Patient underwent a left lung decortication, postoperative transferred to the ICU on mechanical vent, therefore ICU consult.    ROS: Unable to obtain because the patient is intubated and sedated    No current facility-administered medications on file prior to encounter.      No current outpatient prescriptions on file prior to encounter.       Social History   Substance Use Topics   • Smoking status: Current Every Day Smoker     Packs/day: 1.00     Types: Cigarettes   • Smokeless tobacco: Never Used   • Alcohol use Yes        Past Medical History:   Diagnosis Date   • Chronic obstructive pulmonary disease (HCC)        Past Surgical History:   Procedure Laterality Date   • THORACOTOMY Left 7/11/2018    Procedure: THORACOTOMY-WITH DECORTICATION;  Surgeon: Diego Martínez M.D.;  Location: SURGERY [facility];  Service: General       Allergies: Patient has no known allergies.    History reviewed. No pertinent family history.    Vitals:    07/10/18 1335 07/10/18 1345 07/10/18 1630 07/10/18 1645   Height: 1.829 m (6')      Weight: 68 kg (150 lb)      Weight % change since last entry.: 0 %      BP:  (!) 99/59 104/63 (!) 87/46   Pulse:  97 65 (!) 104   BMI (Calculated): 20.34      Resp:  20     Temp:  37 °C (98.6 °F)      07/10/18 1650 07/10/18 1805 07/10/18 2100 07/11/18 0100   Height:       Weight:       Weight % change since  last entry.:       BP: 107/60   (!) 91/62   Pulse: 99 100 92 90   BMI (Calculated):       Resp:   16 16   Temp:   36.6 °C (97.8 °F) 36.7 °C (98.1 °F)    07/11/18 0400 07/11/18 0856 07/11/18 1240 07/11/18 1755   Height:       Weight: 65.2 kg (143 lb 11.8 oz)      Weight % change since last entry.: -4.17 %      BP: (!) 94/60 (!) 87/56 (!) 99/64 (!) 94/63   Pulse: 92 89 100 (!) 111   BMI (Calculated):       Resp: 16 16 18 16   Temp: 36.7 °C (98.1 °F) 37.1 °C (98.8 °F) 37.3 °C (99.2 °F) 37.3 °C (99.2 °F)    07/11/18 1858 07/11/18 1933 07/11/18 2143 07/11/18 2145   Height:       Weight:       Weight % change since last entry.:       BP:  (!) 97/74     Pulse: (!) 108  (!) 105 (!) 102   BMI (Calculated):       Resp: (!) 31  14 16   Temp: 37.2 °C (98.9 °F)  36.1 °C (96.9 °F)     07/11/18 2200 07/11/18 2215 07/11/18 2230 07/11/18 2245   Height:       Weight:       Weight % change since last entry.:       BP:       Pulse: (!) 107 (!) 107 97 94   BMI (Calculated):       Resp: 19 16 20 20   Temp:        07/12/18 0000 07/12/18 0020 07/12/18 0035 07/12/18 0100   Height:       Weight:       Weight % change since last entry.:       BP:       Pulse: 100 97 100 90   BMI (Calculated):       Resp: (!) 24 15 (!) 24 (!) 26   Temp: 36.7 °C (98 °F)       07/12/18 0200 07/12/18 0300   Height:     Weight:     Weight % change since last entry.:     BP:     Pulse: 90 95   BMI (Calculated):     Resp: 20 (!) 38   Temp:         Physical Examination  General: Slim built  Neuro/Psych: When awake alert and oriented ×4, CN II-XII grossly within normal limits no focal neurological deficits  HEENT: Head normocephalic, atraumatic, PERRLA, EOMI  CVS: S1 - S2 within normal limits, regular rate, no murmurs rubs or gallops, no peripheral edema.  Respiratory: Oral endotracheally intubated, diminished breath sounds bilaterally, left chest tube in place draining serosanguineous fluid and persistent air leak, symmetric expansion of the chest with respirations,  trach is midline  Abdomen/: Diminished soft, nontender, nondistended bowel sounds  Extremities: No bony deformities, joint swelling, joint erythema  Skin: No skin rashes, bruises, jaundice.  Skin turgor is decreased    Recent Labs      07/10/18   2047  07/11/18   0216   WBC  15.5*  16.3*   NEUTSPOLYS  82.60*  84.30*   LYMPHOCYTES  7.00*  8.70*   MONOCYTES  4.30  7.00   EOSINOPHILS  0.00  0.00   BASOPHILS  0.00  0.00   ASTSGOT   --   20   ALTSGPT   --   27   ALKPHOSPHAT   --   85   TBILIRUBIN   --   0.2     Recent Labs      07/11/18   0216   SODIUM  131*   POTASSIUM  4.8   CHLORIDE  96   CO2  31   BUN  22   CREATININE  0.53   CALCIUM  8.4*     Recent Labs      07/11/18   0216   ALTSGPT  27   ASTSGOT  20   ALKPHOSPHAT  85   TBILIRUBIN  0.2   GLUCOSE  103*     Recent Labs      07/11/18   2226   ISTATAPH  7.316*   ISTATAPCO2  47.2*   ISTATAPO2  55*   ISTATATCO2  26   [id number]  85*   ISTATARTHCO3  24.1   ISTATARTBE  -2   ISTATTEMP  96.8 F   ISTATFIO2  100   ISTATSPEC  Arterial   ISTATAPHTC  7.330*   [id number]  51*     DX-CHEST-PORTABLE (1 VIEW)   Final Result      1.  There has been interval decrease in the left pleural effusion.   2.  There is still some component of LEFT pneumothorax although the hydropneumothorax air-fluid level is no longer evident.   3.  Multiple left bone or masses are again seen.   4.  Left lower lobe airspace disease is again seen.   5.  Ill-defined nodular and linear opacity in the right upper lobe with pleural thickening and hilar retraction is again seen.      US-THORACENTESIS PUNCTURE LEFT   Final Result      1. Ultrasound guided left sided diagnostic thoracentesis.      2. 500 mL of fluid withdrawn. Additional fluid was not removed as the catheter became occluded by the thick liquid      OUTSIDE IMAGES-CT CHEST/ABDOMEN/PELVIS   Final Result      OUTSIDE IMAGES-DX CHEST   Final Result      DX-CHEST-PORTABLE (1 VIEW)    (Results Pending)       Assessment and Plan:    1.  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  Postoperative respiratory failure  -Lung protective mechanical ventilation  -Daily spontaneous awakening and spontaneous breathing trials  -Bronchodilators    3.  Hypotension  -IV fluids  -Levophed as needed map above 65 mmHg    4.  ICU sedation  -Propofol    5.  ICU prophylaxis  -SCDs, resume heparin in am if no active bleeding    Critical care time: I spent 30 minutes personally providing critical care services including formulating plan of care, this time was exclusive to this patient and does not include time spent in procedures.

## 2018-07-12 NOTE — OP REPORT
07/11/18    OPERATIVE NOTE    PRE-OPERATIVE DIAGNOSIS -Large left empyema, pneumonia    POST-OPERATIVE DIAGNOSIS - Large left empyema, necrotic pulmonary abscess cavity/lung    PROCEDURE PERFORMED - Left thoracotomy, decortication, evacuation of purulent empyema, debridement of necrotic lung, rib resection    SURGEON - Diego Martínez M.D.    ASSISTANT - TERESITA Giles    TYPE OF ANESTHESIA - General     ANESTHESIOLOGIST  - Dr. Zavala      SPECIMENS - cultures, lung tissue    INDICATIONS - 58 y.o.      PROCEDURE IN DETAIL - patient was taken to the operative suite placed in the supine position. Anesthesia attempted to place a double lumen endotracheal tube but was unsuccessful. The patient had significant desaturations during the course of his attempt to place a double-lumen tube. A standard endotracheal tube was then placed. Based on the difficulty of placing a double-lumen tube like to proceed to proceed with a standard endotracheal tube for patient safety. The patient was then placed in the right lateral decubitus position with left side up. The patient's left chest was prepped and draped in sterile fashion. Posterolateral left thoracotomy incision was made and the incision was carried down through the subcutaneous tissue and through the muscle layers. The rib was encountered and the dissection took place over the cephalad portion of the rib. The periosteum was then removed from the rib and once it was cleared a  was used to resect the rib. A  was then used to resect additional rib. A rib  retractor was then placed and the chest cavity was opened. Approximately 1 L of purulent pus was evacuated from the left chest cavity. Cultures were obtained. After evacuating the purulent parapneumonic effusion the lung was noted to be trapped beneath a rind. In addition there is a cavitary necrotic lesion in the left upper lobe in the region of the fissure. It is unknown if this represents a  ruptured pulmonary abscess or necrotic tumor. The lung was sharply debrided using Metzenbaum scissors and tissue was sent for pathology. A decortication was then performed of the left lower lobe and a portion of the left upper lobe. After the decortication was performed and further debridement of the necrotic lung a Pulsavac was then used to Pulsavac and 3 L of antibiotic saline within the chest cavity. The lung appeared to inflate after the decortication procedure. A 32 [place] right angle tube and a 32 [place] chest tube were placed and brought out through separate stab incisions. The chest tubes were secured to the skin using 0 silk sutures. Rib sutures were then placed #1 Vicryl in figure-of-eight fashion to reapproximate the ribs and #1 Vicryl runners were used to reapproximate the fascial layers. A 2-0 Vicryl runner was then used for the subcutaneous tissue followed by skin staples. Sterile dressings were applied and the chest tube was placed to 20 cm of water suction. Patient was taken intubated to the intensive care unit for further critical care management      Diego Martínez M.D.

## 2018-07-12 NOTE — PROGRESS NOTES
Internal Medicine Interval Note  Note Author: Mendez Blood M.D.     Name Donna Ceballos 1960   Age/Sex 58 y.o. male   MRN [id number]   Code Status Full     After 5PM or if no immediate response to page, please call for cross-coverage  Attending/Team: Dr. Mcintyre/ Jonas  See Patient List for primary contact information  Call [phone] to page    1st Call - Day Intern (R1):   Caitie GARDUNO      ICU day: 1  Ventilator day: 1  Reason for interval visit  (Principal Problem)   Empyema  Acute respiratory failure    Interval Problem Daily Status Update  (24 hours, problem oriented, brief subjective history, new lab/imaging data pertinent to that problem)     2018 -  Left thoracotomy, decortication, evacuation of purulent empyema, debridement of necrotic lung, rib resection by surgery, Dr. Diego Martínez. 1 L of purulent pus was evacuated from the left chest cavity.   Vanc and unasyn, discontinue voriconazole.   Thoracentesis fluid cultures show Gram positive cocci and Beta strep group F.   Bronchoscopy with BAL 2018      Review of Systems   Unable to perform ROS: Intubated       Disposition/Barriers to discharge:   ICU/intubated    Consultants/Specialty  Surgery  Pulmonology      Quality Measures  Quality-Core Measures   Reviewed items::  Labs reviewed, Medications reviewed and Radiology images reviewed  Motley catheter::  Unconscious / Sedated Patient on a Ventilator  DVT prophylaxis - mechanical:  SCDs  Antibiotics:  Treating active infection/contamination beyond 24 hours perioperative coverage          Physical Exam       Vitals:    18 1700 18 1715 18 1730 18 1745   BP:       Pulse: 94 89 89 94   Resp: 20 (!) 21 20 20   Temp:       SpO2: 100% 100% 100% 100%   Weight:       Height:         Body mass index is 19.49 kg/m².    Oxygen Therapy:  Pulse Oximetry: 100 %, O2 (LPM): 4.5, FiO2%: 80 %, O2 Delivery: Ventilator    Physical Exam   Constitutional: No distress.   HENT:   Head:  Normocephalic and atraumatic.   Eyes: Conjunctivae and EOM are normal. Right eye exhibits no discharge. Left eye exhibits no discharge.   Neck: Neck supple. No JVD present.   Cardiovascular: Normal rate and regular rhythm.  Exam reveals no friction rub.    No murmur heard.  Pulmonary/Chest: Effort normal. No respiratory distress. He has no wheezes.   Coarse breath sounds   Abdominal: Soft. He exhibits no distension. There is no tenderness. There is no guarding.   Musculoskeletal: He exhibits no edema.   Neurological:   Drowsy, awakes on stimulation,   Moves all extremities spontaneously   Skin: Skin is warm and dry. He is not diaphoretic.   Psychiatric:   Intubated, sedated   Nursing note and vitals reviewed.            Assessment/Plan     Panlobular emphysema (HCC)- (present on admission)   Assessment & Plan    Current every day smoker  Intubated, scheduled duonebs  Unasyn and Vancomycin for empyema  SBTs        Empyema (HCC)- (present on admission)   Assessment & Plan    Pt presents with cough, greenish and bloody sputum, dyspnea on exertion and orthopnea since March of this year.  Labs at other hosp - WBC 15.5K and Lactic acid 4  CT at the other hospital - large empyema with cavitary lesion/masses in the right upper lobe.  500 ml of thick fluid drained.  Patient seen by pulmonary and surgery  Pleural fluid: turbid, brown, WBC - 021739, RBC - 050011, Glucose- <10, protein - 3.9; exudative  7/11/2018 -  Left thoracotomy, decortication, evacuation of purulent empyema, debridement of necrotic lung, rib resection by surgery, Dr. Diego Martínez. 1 L of purulent pus was evacuated from the left chest cavity  Thoracentesis fluid cultures show Gram positive cocci and Beta strep group F.     Plan  Unasyn and Vancomycin  IV fluids  intubated  Cultures/sensitivities of empyema fluid pending            Protein malnutrition (HCC)- (present on admission)   Assessment & Plan    Patient appears malnourished.  Lost around 10 lbs since  March when the symptoms started.  IV fluids  Restart nutrition as feasible          Pulmonary cachexia due to COPD (HCC)- (present on admission)   Assessment & Plan    Patient states he lost 10 lbs since March when the symptoms started.  IV fluids  Unasyn , vancomycin   Intubated following thoracentesis and decortication procedure        Pulmonary parenchymal mass- (present on admission)   Assessment & Plan    Studies of empyema/thora pending        Tobacco abuse- (present on admission)   Assessment & Plan    Nicotine patches while IP.  Tobacco cessation counseling          Leukocytosis- (present on admission)   Assessment & Plan    WBC - 15.5K  Unasyn , Vancomycin and voriconazole  IV fluids at 75 ml/hr  Continue to monitor

## 2018-07-12 NOTE — PROGRESS NOTES
Sent second page to Dr. Johnson as he has not returned page yet and pt continues to be hypotensive

## 2018-07-12 NOTE — PROGRESS NOTES
Pulmonary Critical Care Progress Note      Admit Date: 7/10/2018    Chief Complaint: SOB    History of Present Illness:  58-year-old gentleman with no known   past medical history, works as a cook in [employer], smokes approximately 1 pack   of cigarettes a day for the last 40 years, drinks approximately 6 beers a day,   little marijuana, denies any illicits.  The patient initially was seen at an   outside facility with approximately 4 days of increasing dyspnea on exertion,   cough with purulent sputum, and chest pain and discomfort with deep   inspiration.  He also indicates that he has had approximately 10 pounds of   weight loss since March of this year.  He denies any fever, chills, or sweats.    Denies any night sweats.  No lymphadenopathy, headache, visual changes, neck   stiffness.  Denies any nausea, vomiting, abdominal pain, diarrhea, or lower   extremity edema.  Patient says he has never been outside of [place].  He   has never been to snf or intermediate.  While at the outside facility, patient had   CT scan that showed a significant left greater than right changes with   suggestive empyema as well as multiloculated abscess versus cavitary masses.    Patient was transferred to [facility] for higher level of care and further   evaluation.  Patient indicates at the present time that he is not in any   current discomfort.  Denies any significant sick contacts.  Denies any history   of known lung problems.  Denies any environmental exposures.  Has a cat as a   pet.        Interval Events:  24 hour interval history reviewed   Tm 99.2  +1.1L over last 24hr  cxr, per my interpretation, left sided with improved aeration and chest tube, 3/4 rt side complete opacification  Wbc 16->21 w 8% bands  K 3.6  Cr 0.39  Mg 1.8  Abg 7.52/24/57/93 on 60%    S/P Left thoracotomy and decortication w evac of emyema 7/11  Chest tube x 2 on left with + airleak    Thora 7/10 w Group F B-Strep      Levophed 5  NS 75  Prop on  pause    vanco  Unasyn  Voriconazole     Review of Systems   Unable to perform ROS: Acuity of condition     Physical Exam   Constitutional:   Malnourished appearing   HENT:   Head: Normocephalic and atraumatic.   Eyes: Conjunctivae are normal. Pupils are equal, round, and reactive to light.   Neck: No tracheal deviation present.   Cardiovascular: Normal rate and regular rhythm.    Pulmonary/Chest:   Diminished with scattered rhonchi   Abdominal: Soft. He exhibits no distension. There is no tenderness.   Musculoskeletal: He exhibits no edema.   Neurological: No cranial nerve deficit.   Skin: Skin is warm and dry. He is not diaphoretic.   Psychiatric:   sedate   Nursing note and vitals reviewed.      PFSH:  No change.    Respiratory:  [place] Vent Mode: APVCMV, Rate (breaths/min): 20, Vt Target (mL): 450, PEEP/CPAP: 8, FiO2: 80, Static Compliance (ml / cm H2O): 27, Control VTE (exp VT): 320  Pulse Oximetry: 100 %  Chest Tube Drains:            Recent Labs      07/11/18   2226  07/12/18   0356   ISTATAPH  7.316*  7.528*   ISTATAPCO2  47.2*  24.7*   ISTATAPO2  55*  57*   ISTATATCO2  26  21   MBXEJXU2TMR  85*  93   ISTATARTHCO3  24.1  20.5   ISTATARTBE  -2  -1   ISTATTEMP  96.8 F  98.0 F   ISTATFIO2  100  60   ISTATSPEC  Arterial  Arterial   ISTATAPHTC  7.330*  7.533*   [id number]  51*  55*       HemoDynamics:  Pulse: 92, Heart Rate (Monitored): 91  Blood Pressure: (!) 97/74, Arterial BP: (!) 97/44, NIBP: 103/66               Neuro:  GCS             Fluids:  Intake/Output       07/10/18 0700 - 07/11/18 0659 07/11/18 0700 - 07/12/18 0659 07/12/18 0700 - 07/13/18 0659      4922-9838 3090-1252 Total 6175-8273 9853-4343 Total 7454-8439 0383-9487 Total       Intake    I.V.  --  -- --  --  1850 1850  --  -- --    Crystalloid Intake -- -- -- -- 1400 1400 -- -- --    IV Volume (0.9% NS) -- -- -- -- 450 450 -- -- --    Total Intake -- -- -- -- 1850 1850 -- -- --       Output    Urine  --  790 947  --  813 652  --  -- --     Urine Void (mL) (non-catheter) -- 625 625 -- -- -- -- -- --    Output (mL) (Urinary Catheter Indwelling Catheter 16) -- -- -- -- 395 395 -- -- --    Emesis  --  200 200  --  -- --  --  -- --    Emesis -- 200 200 -- -- -- -- -- --    Chest Tube  --  -- --  --  180 180  --  -- --    Output (mL) (Chest Tube Group 1 (A) Left straight 32) -- -- -- -- 110 110 -- -- --    Output (mL) (Chest Tube Group 2 (B) Left angled 32) -- -- -- -- 70 70 -- -- --    Blood  --  -- --  --  100 100  --  -- --    Est. Blood Loss (mL) -- -- -- -- 100 100 -- -- --    Total Output -- 825 825 -- 675 675 -- -- --       Net I/O     -- -825 -825 -- 1175 1175 -- -- --           Recent Labs      18   0216  18   0400   SODIUM  131*  135   POTASSIUM  4.8  3.6   CHLORIDE  96  104   CO2  31  20   BUN  22  16   CREATININE  0.53  0.39*   MAGNESIUM   --   1.8   PHOSPHORUS   --   2.8   CALCIUM  8.4*  7.4*       GI/Nutrition:    Liver Function  Recent Labs      18   0216  18   0400   ALTSGPT  27   --    ASTSGOT  20   --    ALKPHOSPHAT  85   --    TBILIRUBIN  0.2   --    GLUCOSE  103*  137*       Heme:  Recent Labs      07/10/18   1450  07/10/18   2047  18   0216  18   0400   RBC   --   4.35*  4.16*  3.72*   HEMOGLOBIN   --   12.1*  11.7*  10.3*   HEMATOCRIT   --   36.3*  36.7*  31.6*   PLATELETCT   --   727*  766*  659*   PROTHROMBTM  17.0*   --    --    --    APTT  34.6   --    --    --    INR  1.42*   --    --    --        Infectious Disease:  Temp  Av.9 °C (98.4 °F)  Min: 36.1 °C (96.9 °F)  Max: 37.3 °C (99.2 °F)  Micro: cultures reviewed  Recent Labs      07/10/18   2047  07/11/18   0216  07/12/18   0400   WBC  15.5*  16.3*  21.9*   NEUTSPOLYS  82.60*  84.30*  76.50*   LYMPHOCYTES  7.00*  8.70*  8.70*   MONOCYTES  4.30  7.00  6.10   EOSINOPHILS  0.00  0.00  0.00   BASOPHILS  0.00  0.00  0.00   ASTSGOT   --   20   --    ALTSGPT   --   27   --    ALKPHOSPHAT   --   85   --    TBILIRUBIN   --   0.2   --      Current  Facility-Administered Medications   Medication Dose Frequency Provider Last Rate Last Dose   • vasopressin (VASOSTRICT) 20 Units in  mL Infusion  0.03 Units/min Continuous Erasmo Johnson M.D.   Stopped at 07/12/18 0130   • ipratropium-albuterol (DUONEB) nebulizer solution  3 mL Q6HRS (RT) Erasmo Johnson M.D.   Stopped at 07/12/18 0415   • MD ALERT... vancomycin per pharmacy protocol   pharmacy to dose Yogi Jesus M.D.       • vancomycin 1,100 mg in  mL IVPB  17 mg/kg Q12HR Yogi Jesus M.D.   Stopped at 07/12/18 0520   • voriconazole (VFEND) 390 mg in  mL IVPB  6 mg/kg Q12HRS Muhammad K Gondal, M.D.   Stopped at 07/11/18 2200    Followed by   • voriconazole (VFEND) tablet 200 mg  200 mg Q12HRS Muhammad K Gondal, M.D.       • OXYCODONE HCL ER 10 MG PO T12A           • CELECOXIB 200 MG PO CAPS           • ACETAMINOPHEN 500 MG PO TABS           • propofol (DIPRIVAN) injection  200 mg Once Ersamo Johnson M.D.   Stopped at 07/11/18 2345   • norepinephrine (LEVOPHED) 8 mg in  mL Infusion  0-30 mcg/min Continuous Erasmo Johnson M.D. 9.4 mL/hr at 07/12/18 0200 5 mcg/min at 07/12/18 0200   • propofol (DIPRIVAN) injection  0-80 mcg/kg/min Continuous Erasmo Johnson M.D.       • MD ALERT...Adult ICU Electrolyte Replacement per Pharmacy Protocol   PRN Erasmo Johnson M.D.       • senna-docusate (PERICOLACE or SENOKOT S) 8.6-50 MG per tablet 2 Tab  2 Tab BID Shruthi Phan M.D.   Stopped at 07/12/18 0600    And   • polyethylene glycol/lytes (MIRALAX) PACKET 1 Packet  1 Packet QDAY PRN Shruthi Phan M.D.        And   • magnesium hydroxide (MILK OF MAGNESIA) suspension 30 mL  30 mL QDAY PRN Shruthi Phan M.D.        And   • bisacodyl (DULCOLAX) suppository 10 mg  10 mg QDAY PRN Shruthi Phan M.D.       • Respiratory Care per Protocol   Continuous RT Shruthi Phan M.D.       • NS infusion   Continuous Helene Smith M.D. 75 mL/hr at 07/12/18 0000     • ondansetron  (ZOFRAN) syringe/vial injection 4 mg  4 mg Q4HRS PRN Shruthi Phan M.D.       • ondansetron (ZOFRAN ODT) dispertab 4 mg  4 mg Q4HRS PRN Shruthi Phan M.D.       • promethazine (PHENERGAN) tablet 12.5-25 mg  12.5-25 mg Q4HRS PRN Shruthi Phan M.D.       • promethazine (PHENERGAN) suppository 12.5-25 mg  12.5-25 mg Q4HRS PRN Shruthi Phan M.D.       • prochlorperazine (COMPAZINE) injection 5-10 mg  5-10 mg Q4HRS PRN Shruthi Phan M.D.       • acetaminophen (TYLENOL) tablet 650 mg  650 mg Q6HRS PRN Shruthi Phan M.D.       • nicotine (NICODERM) 21 MG/24HR 21 mg  21 mg Daily-0600 Shruthi Phan M.D.   21 mg at 07/11/18 0546    And   • nicotine polacrilex (NICORETTE) 2 MG piece 2 mg  2 mg Q HOUR PRN Shruthi Phan M.D.       • ampicillin/sulbactam (UNASYN) 3 g in  mL IVPB  3 g Q6HRS Helene Smith M.D.   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   - s/p left decort 7/11   - thora 7/10 with Group F B-strep   - on abx   - Chest tube in place with drainage     -  Hypotension   - sepsis protocols   - continue abx   - check cortisol   - titrate levophed to maintain map > 65    -  Leukocytosis   - related to pulmonary infection   - thora w group F b-strep   - does not appear bcx were drawn from 7/10 (needs to be collected), stat draw   - abx   - ID consult    -  Chronic Etoh use   - monitor for w/d   - thiamine/folate/mvi    -  Chronic tob use   -  on cessation when appropriate     Discussed patient condition and risk of morbidity  and/or mortality with RN, RT, Therapies, Pharmacy and [employer] [facility] resident.    The patient remains critically ill.  Critical care time = 32 minutes in directly providing and coordinating critical care and extensive data review.  No time overlap and excludes procedures.

## 2018-07-12 NOTE — PROGRESS NOTES
Internal Medicine Interval Note  Note Author: Yogi Jesus M.D.     Name Donna Ceballos 1960   Age/Sex 58 y.o. male   MRN [id number]   Code Status FULL     After 5PM or if no immediate response to page, please call for cross-coverage  Attending/Team: Dr. Kelsey/Natanael See Patient List for primary contact information  Call [phone] to page    1st Call - Day Intern (R1):   Dr. Jesus 2nd Call - Day Sr. Resident (R2/R3):   Dr. Smith         Reason for interval visit  (Principal Problem)   Large left-sided empyema       Interval Problem Daily Status Update  (24 hours, problem oriented, brief subjective history, new lab/imaging data pertinent to that problem)   Patient was alert and oriented.   Vitals: 98.1 temp, 92 pulse, 16 RR, 94/60 BP, 90% on 2 L oxygen through nasal canula  Phy exam: Heart sounds normal , normal breath sounds on right side and diminished sounds on left, soft and non tender abdomen  CT chest: Per pulmonary, large necrotizing cavitary lesion right upper lobe with mediastinal   shift towards right. Loculated pleural effusion left, with compression atelectasis   of left lower lobe. Consolidation left upper lobe with nodular lesions in left upper lobe  PLAN:    Administer  ml intravenous bolus stat.  Chest surgery consult appreciated. I agree that patient needs VATS pleuroscopy with   decortication and chest tube placement with under water seal.   Continue Unasyn and vancomycin  Add Voriconazole for empirical treatment of invasive aspergillosis  Send sputum for AFB x 3  Sputum for fungal smear and culture  After VATS and chest tube placement when sputum AFB specimens are negative and patient is   stable will consider bronchoscopy with BAL, brushings and transbroncial biopsy left upper lobe.  Continue droplet isolation  Smoking cessation counseling done     Prognosis is extremely guarded.    Per surgery, patient scheduled for video-assisted thoracoscopy and  decortication versus thoracotomy later today.    Review of Systems   Constitutional: Positive for weight loss. Negative for chills and fever.   HENT: Negative for hearing loss.    Respiratory: Positive for cough, hemoptysis and sputum production. Negative for stridor.    Cardiovascular: Positive for chest pain and orthopnea.   Gastrointestinal: Negative for abdominal pain, nausea and vomiting.   Genitourinary: Negative for dysuria and urgency.   Musculoskeletal: Negative for joint pain and myalgias.   Skin: Negative for rash.   Neurological: Negative for dizziness and headaches.   Psychiatric/Behavioral: Negative for depression.       Disposition/Barriers to discharge:   Surgery scheduled    Consultants/Specialty  Dr. Martínez/ Surgery ; Dr Mcintyre/Pulmonary  PCP: Pt States None        Quality Measures  Quality-Core Measures   Reviewed items::  Labs reviewed, EKG reviewed, Medications reviewed and Radiology images reviewed  Motley catheter::  No Motley          Physical Exam       Vitals:    07/11/18 0856 07/11/18 1240 07/11/18 1755 07/11/18 1858   BP: (!) 87/56 (!) 99/64 (!) 94/63    Pulse: 89 100 (!) 111 (!) 108   Resp: 16 18 16 (!) 31   Temp: 37.1 °C (98.8 °F) 37.3 °C (99.2 °F) 37.3 °C (99.2 °F) 37.2 °C (98.9 °F)   SpO2: 92% 92% 92% 92%   Weight:       Height:         Body mass index is 19.49 kg/m². Weight: 65.2 kg (143 lb 11.8 oz)  Oxygen Therapy:  Pulse Oximetry: 92 %, O2 (LPM): 4.5, O2 Delivery: None (Room Air)    Physical Exam   Constitutional: He is oriented to person, place, and time. He appears malnourished.   HENT:   Head: Normocephalic.   Eyes: Pupils are equal, round, and reactive to light.   Neck: Normal range of motion.   Cardiovascular: Normal rate and normal heart sounds.    Pulmonary/Chest: Breath sounds normal.   Abdominal: Soft. Bowel sounds are normal.   Musculoskeletal: Normal range of motion.   Neurological: He is alert and oriented to person, place, and time.   Skin: Skin is warm.              Assessment/Plan     Panlobular emphysema (HCC)- (present on admission)   Assessment & Plan    CT at the other hospital - large empyema with cavitary lesion/masses in the right upper lobe.  500 ml of thick fluid drained.  Patient seen by pulmonary and surgery  Scheduled for video-assisted thoracoscopy and decortication versus thoracotomy later today.  Unasyn and Vancomycin and voriconazole  IV fluids at 75 ml/hr  Cultures positive for strptococcus  Pleural fluid: turbid, brown, WBC - 697173, RBC - 594701, Glucose- <10, protein - 3.9        Empyema (HCC)- (present on admission)   Assessment & Plan    Pt presents with cough, greenish and bloody sputum, dyspnea on exertion and orthopnea since March of this year.  Labs at other hosp - WBC 15.5K and Lactic acid 4  CT at the other hospital - large empyema with cavitary lesion/masses in the right upper lobe.  500 ml of thick fluid drained.  Patient seen by pulmonary and surgery  Scheduled for video-assisted thoracoscopy and decortication versus thoracotomy later today.    Unasyn and Vancomycin and voriconazole  IV fluids at 75 ml/hr  Cultures positive for strptococcus  Pleural fluid: turbid, brown, WBC - 382149, RBC - 371104, Glucose- <10, protein - 3.9  RT and oxygen per protocol  SCDs for DVT prophylaxis              Protein malnutrition (HCC)- (present on admission)   Assessment & Plan    Patient appears malnourished.  Lost around 10 lbs since March when the symptoms started.  IV fluids  Scheduled for scheduled for video-assisted thoracoscopy and decortication versus thoracotomy later today.          Pulmonary cachexia due to COPD (HCC)- (present on admission)   Assessment & Plan    Patient states he lost 10 lbs since March when the symptoms started.  IV fluids  Unasyn , vancomycin and voriconazole          Lactic acidosis- (present on admission)   Assessment & Plan    Lactic acid - 4 at outside hospital.  Came down to 1.9 after IV fluids  Continue to  monitor        Tobacco abuse- (present on admission)   Assessment & Plan    Nicotine patches while IP.  Tobacco cessation counseling          Leukocytosis- (present on admission)   Assessment & Plan    WBC - 15.5K  Unasyn , Vancomycin and voriconazole  IV fluids at 75 ml/hr  Continue to monitor

## 2018-07-12 NOTE — PROGRESS NOTES
Pharmacy Kinetics 58 y.o. male on vancomycin day # 3 2018    Currently on Vancomycin 1400 mg iv q12hr    Indication for Treatment: empiric - empyema/pleural effusion    Pertinent history per medical record: Admitted on 7/10/2018 for loculated pleural effusion. Patient presented to  [place] complaining of SOB, had CT imaging completed which revealed multiloculated abscess vs cavitary masses and patient transferred to [facility] for further care. Patient underwent thoracentesis in ED on 7/10 and has been initiated on empiric antibiotics for treatment of his effusion.     Other antibiotics: Unasyn 3 g IV q6h    Allergies: Patient has no known allergies.     List concerns for renal function: BUN/SCr ratio > 20:1, albumin 1.5 & contrast for CT 7/10.    Pertinent cultures to date:   7/10 thoracentesis: Group F strep   L lung tissue: NGTD   BAL: in process    Recent Labs      07/10/18   2047  18   0216  18   0400   WBC  15.5*  16.3*  21.9*   NEUTSPOLYS  82.60*  84.30*  76.50*   BANDSSTABS  6.10   --   8.70     Recent Labs      18   0216  18   0400   BUN  22  16   CREATININE  0.53  0.39*   ALBUMIN  2.2*   --      Intake/Output Summary (Last 24 hours) at 18 1633  Last data filed at 18 1600   Gross per 24 hour   Intake          3758.19 ml   Output             1825 ml   Net          1933.19 ml      Blood pressure (!) 97/74, pulse (!) 101, temperature 37 °C (98.6 °F), resp. rate (!) 37, height 1.829 m (6'), weight 65.2 kg (143 lb 11.8 oz), SpO2 100 %. Temp (24hrs), Av.8 °C (98.2 °F), Min:36.1 °C (96.9 °F), Max:37.3 °C (99.2 °F)      A/P   1. Vancomycin dose change: not indicated at this time  2. Next vancomycin level:  @ 2330 (not yet ordered)  3. Goal trough: 16-20 mcg/mL  4. Comments: dosing with vancomycin continues. Cultures finalized from thoracentesis with Group F strep - unlikely continued dosing with vancomycin is necessary. Will not plan for a level unless  dosing to continue beyond tomorrow. Will continue current dose for now as renal indices are stable. Will follow.    Raiza ToneyD

## 2018-07-12 NOTE — PROGRESS NOTES
Dr. Johnson returned page, notified of hypotension with levophed now initiated via peripheral IV. MD will be coming to bedside to place central line as soon as possible

## 2018-07-12 NOTE — CARE PLAN
Problem: Ventilation Defect:  Goal: Ability to achieve and maintain unassisted ventilation or tolerate decreased levels of ventilator support  Outcome: PROGRESSING AS EXPECTED    Intervention: Support and monitor invasive and noninvasive mechanical ventilation  Adult Ventilation Update    Total Vent Days: 2    Patient Lines/Drains/Airways Status    Active Airway     Name: Placement date: Placement time: Site: Days:    Airway Group ET Tube Oral 8.0 07/11/18 2143   Oral   2                           Plateau Pressure (Q Shift): 23 (07/11/18 2307)  Static Compliance (ml / cm H2O): 27 (07/12/18 0402)                    Mobility  Level of Mobility: Level IV (07/11/18 1600)  Activity Performed: Unable to mobilize (07/11/18 2145)  Time Activity Tolerated: 10 min (07/11/18 1600)  Distance Per Occurrence (ft.): 100 feet (patient walking in room) (07/11/18 1600)  # of Times Distance was Traveled: 1 (07/11/18 1600)  Assistance / Tolerance: No assistance required;Tolerates Well (07/11/18 1600)  Pt Calls for Assistance: Yes (07/11/18 1600)  Staff Present for Mobilization: RN (07/11/18 1600)  Gait: Steady (07/11/18 1600)  Assistive Devices: None (07/11/18 1600)  Reason Not Mobilized: Bed rest (07/11/18 2145)    Events/Summary/Plan: FIO2 increased to 80% post ABG.  (07/12/18 0401)

## 2018-07-12 NOTE — PROGRESS NOTES
Dr Johnson at bedside to place central line at 0245. Timeout performed prior to initiating procedure. Line successfully placed under sterile technique at 0252. Orders placed for stat chest xray.

## 2018-07-12 NOTE — PROCEDURES
Date: 7/12/2018    Procedure:  Emergent diagnostic and therapeutic bronchoscopy w aspiration and bal    Indication: Respiratory failure, ? pneumonia    Physician:  Carlton Mcintyre M.D.    Consent:  Emergent     Procedure:  This patient has developed increasing respiratory failure, on full vent support.  Bronchoscopy was indicated for airway evaluation and BAL to evaluate for pneumonia.  A flexible FOB was inserted through the ETT without difficulty.  All airways were evaluated to the sub-segmental level.  The airway mucosa was mildly inflamed, right main was occluded with dark, thick, creamy secretions which were all flushed and aspirated.  No endobronchial lesions were seen.  The bronchoscope was then wedged in a segment of the right lower lobe bronchus.  30cc of saline was instilled with moderate return of thick, dark, creamy BAL fluid.  The BAL specimen will be sent for appropriate culture.  No immediate complications.  EBL = 0.

## 2018-07-12 NOTE — PROCEDURES
"Date: 7/12/2018    Procedure:  Central Venous Catheter Insertion    Indication: hypotension    Physician:  Dr. Erasmo Vanessa MD    Consent:  Obtained from patient included in the medical record; time out performed    Procedure:  The patient was placed in the [place] position.  Appropriate \"time out\" was performed.  The right neck was prepped and draped in the usual sterile fashion.  Under full body sterile barrier precautions, a triple lumen central venous catheter was placed without difficulty in the right IJ vein.  US was used for localization and placement.  All lumens were flushed with sterile saline and sterile caps were applied.  The line was sutured in place and a sterile dressing was applied.  There were no immediate complications.  A post-procedure CXR will be reviewed.  Estimated blood loss < 5cc.    "

## 2018-07-12 NOTE — PROGRESS NOTES
Starting levo via peripheral IV as I have still not heard back from Dr. Johnson and pt. BP is 78/40 with MAP 52

## 2018-07-13 ENCOUNTER — APPOINTMENT (OUTPATIENT)
Dept: RADIOLOGY | Facility: MEDICAL CENTER | Age: 58
DRG: 853 | End: 2018-07-13
Attending: INTERNAL MEDICINE

## 2018-07-13 LAB
ANION GAP SERPL CALC-SCNC: 12 MMOL/L (ref 0–11.9)
ANISOCYTOSIS BLD QL SMEAR: ABNORMAL
BACTERIA TISS AEROBE CULT: ABNORMAL
BACTERIA TISS AEROBE CULT: ABNORMAL
BASOPHILS # BLD AUTO: 0 % (ref 0–1.8)
BASOPHILS # BLD: 0 K/UL (ref 0–0.12)
BUN SERPL-MCNC: 10 MG/DL (ref 8–22)
CALCIUM SERPL-MCNC: 7.4 MG/DL (ref 8.5–10.5)
CHLORIDE SERPL-SCNC: 109 MMOL/L (ref 96–112)
CO2 SERPL-SCNC: 20 MMOL/L (ref 20–33)
CREAT SERPL-MCNC: 0.4 MG/DL (ref 0.5–1.4)
EOSINOPHIL # BLD AUTO: 0 K/UL (ref 0–0.51)
EOSINOPHIL NFR BLD: 0 % (ref 0–6.9)
ERYTHROCYTE [DISTWIDTH] IN BLOOD BY AUTOMATED COUNT: 54.9 FL (ref 35.9–50)
GLUCOSE SERPL-MCNC: 119 MG/DL (ref 65–99)
GRAM STN SPEC: ABNORMAL
HCT VFR BLD AUTO: 27.9 % (ref 42–52)
HGB BLD-MCNC: 9.2 G/DL (ref 14–18)
LYMPHOCYTES # BLD AUTO: 1.65 K/UL (ref 1–4.8)
LYMPHOCYTES NFR BLD: 8.8 % (ref 22–41)
MACROCYTES BLD QL SMEAR: ABNORMAL
MAGNESIUM SERPL-MCNC: 2.1 MG/DL (ref 1.5–2.5)
MANUAL DIFF BLD: NORMAL
MCH RBC QN AUTO: 28 PG (ref 27–33)
MCHC RBC AUTO-ENTMCNC: 33 G/DL (ref 33.7–35.3)
MCV RBC AUTO: 85.1 FL (ref 81.4–97.8)
MONOCYTES # BLD AUTO: 0.17 K/UL (ref 0–0.85)
MONOCYTES NFR BLD AUTO: 0.9 % (ref 0–13.4)
MORPHOLOGY BLD-IMP: NORMAL
NEUTROPHILS # BLD AUTO: 16.98 K/UL (ref 1.82–7.42)
NEUTROPHILS NFR BLD: 90.3 % (ref 44–72)
NRBC # BLD AUTO: 0 K/UL
NRBC BLD-RTO: 0 /100 WBC
PHOSPHATE SERPL-MCNC: 2 MG/DL (ref 2.5–4.5)
PLATELET # BLD AUTO: 599 K/UL (ref 164–446)
PLATELET BLD QL SMEAR: NORMAL
PMV BLD AUTO: 9.5 FL (ref 9–12.9)
POLYCHROMASIA BLD QL SMEAR: NORMAL
POTASSIUM SERPL-SCNC: 3.9 MMOL/L (ref 3.6–5.5)
RBC # BLD AUTO: 3.28 M/UL (ref 4.7–6.1)
RBC BLD AUTO: PRESENT
SIGNIFICANT IND 70042: ABNORMAL
SITE SITE: ABNORMAL
SODIUM SERPL-SCNC: 141 MMOL/L (ref 135–145)
SOURCE SOURCE: ABNORMAL
TOXIC GRANULES BLD QL SMEAR: SLIGHT
TRIGL SERPL-MCNC: 125 MG/DL (ref 0–149)
VANCOMYCIN TROUGH SERPL-MCNC: 15.2 UG/ML (ref 10–20)
VANCOMYCIN TROUGH SERPL-MCNC: 15.2 UG/ML (ref 10–20)
WBC # BLD AUTO: 18.8 K/UL (ref 4.8–10.8)

## 2018-07-13 PROCEDURE — 37799 UNLISTED PX VASCULAR SURGERY: CPT

## 2018-07-13 PROCEDURE — 700111 HCHG RX REV CODE 636 W/ 250 OVERRIDE (IP): Performed by: INTERNAL MEDICINE

## 2018-07-13 PROCEDURE — 85007 BL SMEAR W/DIFF WBC COUNT: CPT

## 2018-07-13 PROCEDURE — 700105 HCHG RX REV CODE 258: Performed by: INTERNAL MEDICINE

## 2018-07-13 PROCEDURE — 700105 HCHG RX REV CODE 258: Performed by: STUDENT IN AN ORGANIZED HEALTH CARE EDUCATION/TRAINING PROGRAM

## 2018-07-13 PROCEDURE — A9270 NON-COVERED ITEM OR SERVICE: HCPCS | Performed by: STUDENT IN AN ORGANIZED HEALTH CARE EDUCATION/TRAINING PROGRAM

## 2018-07-13 PROCEDURE — 700101 HCHG RX REV CODE 250: Performed by: INTERNAL MEDICINE

## 2018-07-13 PROCEDURE — 700111 HCHG RX REV CODE 636 W/ 250 OVERRIDE (IP): Performed by: STUDENT IN AN ORGANIZED HEALTH CARE EDUCATION/TRAINING PROGRAM

## 2018-07-13 PROCEDURE — 82803 BLOOD GASES ANY COMBINATION: CPT

## 2018-07-13 PROCEDURE — 80048 BASIC METABOLIC PNL TOTAL CA: CPT

## 2018-07-13 PROCEDURE — 83735 ASSAY OF MAGNESIUM: CPT

## 2018-07-13 PROCEDURE — 99291 CRITICAL CARE FIRST HOUR: CPT | Performed by: INTERNAL MEDICINE

## 2018-07-13 PROCEDURE — 770022 HCHG ROOM/CARE - ICU (200)

## 2018-07-13 PROCEDURE — 84478 ASSAY OF TRIGLYCERIDES: CPT

## 2018-07-13 PROCEDURE — 700102 HCHG RX REV CODE 250 W/ 637 OVERRIDE(OP): Performed by: STUDENT IN AN ORGANIZED HEALTH CARE EDUCATION/TRAINING PROGRAM

## 2018-07-13 PROCEDURE — 94003 VENT MGMT INPAT SUBQ DAY: CPT

## 2018-07-13 PROCEDURE — 80202 ASSAY OF VANCOMYCIN: CPT

## 2018-07-13 PROCEDURE — 94640 AIRWAY INHALATION TREATMENT: CPT

## 2018-07-13 PROCEDURE — 85027 COMPLETE CBC AUTOMATED: CPT

## 2018-07-13 PROCEDURE — 84100 ASSAY OF PHOSPHORUS: CPT

## 2018-07-13 PROCEDURE — 71045 X-RAY EXAM CHEST 1 VIEW: CPT

## 2018-07-13 RX ADMIN — IPRATROPIUM BROMIDE AND ALBUTEROL SULFATE 3 ML: .5; 3 SOLUTION RESPIRATORY (INHALATION) at 06:16

## 2018-07-13 RX ADMIN — FENTANYL CITRATE 50 MCG: 50 INJECTION INTRAMUSCULAR; INTRAVENOUS at 23:20

## 2018-07-13 RX ADMIN — PROPOFOL 40 MCG/KG/MIN: 10 INJECTION, EMULSION INTRAVENOUS at 20:38

## 2018-07-13 RX ADMIN — AMPICILLIN SODIUM AND SULBACTAM SODIUM 3 G: 2; 1 INJECTION, POWDER, FOR SOLUTION INTRAMUSCULAR; INTRAVENOUS at 05:56

## 2018-07-13 RX ADMIN — FAMOTIDINE 20 MG: 10 INJECTION, SOLUTION INTRAVENOUS at 17:42

## 2018-07-13 RX ADMIN — FAMOTIDINE 20 MG: 10 INJECTION, SOLUTION INTRAVENOUS at 05:56

## 2018-07-13 RX ADMIN — PROPOFOL 40 MCG/KG/MIN: 10 INJECTION, EMULSION INTRAVENOUS at 14:56

## 2018-07-13 RX ADMIN — AMPICILLIN SODIUM AND SULBACTAM SODIUM 3 G: 2; 1 INJECTION, POWDER, FOR SOLUTION INTRAMUSCULAR; INTRAVENOUS at 17:42

## 2018-07-13 RX ADMIN — IPRATROPIUM BROMIDE AND ALBUTEROL SULFATE 3 ML: .5; 3 SOLUTION RESPIRATORY (INHALATION) at 10:15

## 2018-07-13 RX ADMIN — FENTANYL CITRATE 100 MCG: 50 INJECTION INTRAMUSCULAR; INTRAVENOUS at 08:29

## 2018-07-13 RX ADMIN — VANCOMYCIN HYDROCHLORIDE 1100 MG: 100 INJECTION, POWDER, LYOPHILIZED, FOR SOLUTION INTRAVENOUS at 00:40

## 2018-07-13 RX ADMIN — FENTANYL CITRATE 100 MCG: 50 INJECTION INTRAMUSCULAR; INTRAVENOUS at 02:32

## 2018-07-13 RX ADMIN — FENTANYL CITRATE 50 MCG: 50 INJECTION INTRAMUSCULAR; INTRAVENOUS at 20:38

## 2018-07-13 RX ADMIN — SODIUM CHLORIDE: 9 INJECTION, SOLUTION INTRAVENOUS at 14:55

## 2018-07-13 RX ADMIN — AMPICILLIN SODIUM AND SULBACTAM SODIUM 3 G: 2; 1 INJECTION, POWDER, FOR SOLUTION INTRAMUSCULAR; INTRAVENOUS at 23:20

## 2018-07-13 RX ADMIN — POTASSIUM PHOSPHATE, MONOBASIC AND POTASSIUM PHOSPHATE, DIBASIC 15 MMOL: 224; 236 INJECTION, SOLUTION INTRAVENOUS at 08:39

## 2018-07-13 RX ADMIN — AMPICILLIN SODIUM AND SULBACTAM SODIUM 3 G: 2; 1 INJECTION, POWDER, FOR SOLUTION INTRAMUSCULAR; INTRAVENOUS at 00:40

## 2018-07-13 RX ADMIN — IPRATROPIUM BROMIDE AND ALBUTEROL SULFATE 3 ML: .5; 3 SOLUTION RESPIRATORY (INHALATION) at 14:31

## 2018-07-13 RX ADMIN — PROPOFOL 35 MCG/KG/MIN: 10 INJECTION, EMULSION INTRAVENOUS at 00:44

## 2018-07-13 RX ADMIN — AMPICILLIN SODIUM AND SULBACTAM SODIUM 3 G: 2; 1 INJECTION, POWDER, FOR SOLUTION INTRAMUSCULAR; INTRAVENOUS at 12:09

## 2018-07-13 RX ADMIN — HYDROCORTISONE SODIUM SUCCINATE 50 MG: 100 INJECTION, POWDER, FOR SOLUTION INTRAMUSCULAR; INTRAVENOUS at 23:33

## 2018-07-13 RX ADMIN — SODIUM CHLORIDE: 9 INJECTION, SOLUTION INTRAVENOUS at 00:41

## 2018-07-13 RX ADMIN — VANCOMYCIN HYDROCHLORIDE 1100 MG: 100 INJECTION, POWDER, LYOPHILIZED, FOR SOLUTION INTRAVENOUS at 23:33

## 2018-07-13 RX ADMIN — PROPOFOL 35 MCG/KG/MIN: 10 INJECTION, EMULSION INTRAVENOUS at 08:13

## 2018-07-13 RX ADMIN — IPRATROPIUM BROMIDE AND ALBUTEROL SULFATE 3 ML: .5; 3 SOLUTION RESPIRATORY (INHALATION) at 19:39

## 2018-07-13 RX ADMIN — IPRATROPIUM BROMIDE AND ALBUTEROL SULFATE 3 ML: .5; 3 SOLUTION RESPIRATORY (INHALATION) at 23:46

## 2018-07-13 RX ADMIN — NICOTINE 21 MG: 21 PATCH, EXTENDED RELEASE TRANSDERMAL at 05:55

## 2018-07-13 RX ADMIN — VANCOMYCIN HYDROCHLORIDE 1100 MG: 100 INJECTION, POWDER, LYOPHILIZED, FOR SOLUTION INTRAVENOUS at 12:52

## 2018-07-13 RX ADMIN — IPRATROPIUM BROMIDE AND ALBUTEROL SULFATE 3 ML: .5; 3 SOLUTION RESPIRATORY (INHALATION) at 02:30

## 2018-07-13 NOTE — PROGRESS NOTES
Pharmacy Kinetics 58 y.o. male on vancomycin day # 4 2018    Currently on Vancomycin 1100 mg iv q12hr    Indication for Treatment: empiric - empyema/pleural effusion     Pertinent history per medical record: Admitted on 7/10/2018 for loculated pleural effusion. Patient presented to [facility] [place] complaining of SOB, had CT imaging completed which revealed multiloculated abscess vs cavitary masses and patient transferred to [facility] for further care. Patient underwent thoracentesis in ED on 7/10 and has been initiated on empiric antibiotics for treatment of his effusion.      Other antibiotics: Unasyn 3 g IV q6h     Allergies: Patient has no known allergies.      List concerns for renal function: BUN/SCr ratio > 20:1, albumin 1.5 & contrast for CT 7/10.     Pertinent cultures to date:   7/10 thoracentesis: Group F strep   L lung tissue: NGTD   BAL: NGTD    Recent Labs      07/10/18   2047  18   0216  18   0400  18   0430   WBC  15.5*  16.3*  21.9*  18.8*   NEUTSPOLYS  82.60*  84.30*  76.50*  90.30*   BANDSSTABS  6.10   --   8.70   --      Recent Labs      18   0216  18   0400  18   0430   BUN  22  16  10   CREATININE  0.53  0.39*  0.40*   ALBUMIN  2.2*   --    --      Recent Labs      18   1200   VANCOTROUGH  15.2     Intake/Output Summary (Last 24 hours) at 18 1600  Last data filed at 18 1442   Gross per 24 hour   Intake          3643.51 ml   Output             1365 ml   Net          2278.51 ml      Blood pressure (!) 97/74, pulse 84, temperature 37.1 °C (98.8 °F), resp. rate 20, height 1.829 m (6'), weight 68.9 kg (151 lb 14.4 oz), SpO2 100 %. Temp (24hrs), Av °C (98.6 °F), Min:36.8 °C (98.2 °F), Max:37.1 °C (98.8 °F)      A/P   1. Vancomycin dose change: not indicated at this time  2. Next vancomycin level: tonight at 2330  3. Goal trough: 16-20 mcg/mL  4. Comments: Dosing with vancomycin continues. Cultures positive for Group F strep. Would  highly recommend de-escalation to alternate therapy given lack of MRSA. Level tonight. Will follow.    Corina Murray, PharmD

## 2018-07-13 NOTE — CARE PLAN
Problem: Skin Integrity  Goal: Risk for impaired skin integrity will decrease  2 RN skin assessment at change of shift, q2 hour turns, pillows used to float heels, mepilex in place    Problem: Pain Management  Goal: Pain level will decrease to patient's comfort goal  Pain assessed regularly, repositioned for pain alleviation, medicated per MAR

## 2018-07-13 NOTE — PROGRESS NOTES
Surgery     Remains on vent     Large air leak from chest tube     Continue CT drainage     Will see again on Monday     Diego Martínez MD

## 2018-07-13 NOTE — CARE PLAN
Problem: Ventilation Defect:  Goal: Ability to achieve and maintain unassisted ventilation or tolerate decreased levels of ventilator support    Intervention: Support and monitor invasive and noninvasive mechanical ventilation  Adult Ventilation Update    Total Vent Days: 2    Patient Lines/Drains/Airways Status    Active Airway     Name: Placement date: Placement time: Site: Days:    Airway Group ET Tube Oral 8.0 07/11/18 2143   Oral   less than 1                 Plateau Pressure (Q Shift): 23 (07/12/18 1418)  Static Compliance (ml / cm H2O): 28 (07/12/18 1418)    Patient failed trials because of    Barriers to SBT    Length of Weaning Trial         Cough: Non Productive (07/12/18 1418)  Sputum Amount: Copious (07/12/18 1145)  Sputum Color: Brown;Green (07/12/18 1145)  Sputum Consistency: Thin (07/12/18 1145)    Mobility  Level of Mobility: Level IV (07/12/18 1530)  Activity Performed: Stand;Edge of bed (07/12/18 1530)  Time Activity Tolerated: 20 min (07/12/18 1530)  Distance Per Occurrence (ft.): 100 feet (patient walking in room) (07/11/18 1600)  # of Times Distance was Traveled: 1 (07/11/18 1600)  Assistance / Tolerance: Standby Assist;Tolerates Well (07/12/18 1530)  Pt Calls for Assistance: No (07/12/18 0800)  Staff Present for Mobilization: RN;CNA (07/12/18 1530)  Gait: Steady (07/11/18 1600)  Assistive Devices: None (07/11/18 1600)  Reason Not Mobilized: Bed rest;Unstable condition (07/12/18 0000)    Events/Summary/Plan: bronch done, no vent changes made this shift  (07/12/18 1741)

## 2018-07-13 NOTE — PROGRESS NOTES
Internal Medicine Interval Note  Note Author: Mendez Blood M.D.     Name Donna Ceballos 1960   Age/Sex 58 y.o. male   MRN [id number]   Code Status Full     After 5PM or if no immediate response to page, please call for cross-coverage  Attending/Team: Dr. Espino/ Jonas  See Patient List for primary contact information  Call [phone] to page    1st Call - Day Intern (R1):   Caitie GARDUNO      ICU day: 2  Ventilator day:   Reason for interval visit  (Principal Problem)   Empyema  Acute respiratory failure    Interval Problem Daily Status Update  (24 hours, problem oriented, brief subjective history, new lab/imaging data pertinent to that problem)     Continue Vanc and unasyn   Thoracentesis fluid cultures show Gram positive cocci and Beta strep group F.   Chest tube with air leak  On ventilator- 20/450/+8/40fio2  WBCs trending down      Review of Systems   Unable to perform ROS: Intubated       Disposition/Barriers to discharge:   ICU/intubated    Consultants/Specialty  Surgery  Pulmonology      Quality Measures  Quality-Core Measures   Reviewed items::  Labs reviewed, Medications reviewed and Radiology images reviewed  [place] catheter::  Unconscious / Sedated Patient on a Ventilator  DVT prophylaxis - mechanical:  SCDs  Antibiotics:  Treating active infection/contamination beyond 24 hours perioperative coverage          Physical Exam       Vitals:    18 1615 18 1630 18 1645 18 1700   BP:       Pulse: 90 81 77 72   Resp: 13 20 20 (!) 21   Temp:       SpO2: 100% 100% 100% 100%   Weight:       Height:         Body mass index is 20.6 kg/m². Weight: 68.9 kg (151 lb 14.4 oz)  Oxygen Therapy:  Pulse Oximetry: 100 %, FiO2%: 50 %, O2 Delivery: Ventilator    Physical Exam   Constitutional: He is oriented to person, place, and time. No distress.   HENT:   Head: Normocephalic and atraumatic.   Eyes: Conjunctivae and EOM are normal. Right eye exhibits no discharge. Left eye exhibits no  discharge.   Neck: Neck supple. No JVD present.   Cardiovascular: Normal rate, regular rhythm and normal heart sounds.  Exam reveals no friction rub.    No murmur heard.  Pulmonary/Chest: Effort normal. No respiratory distress. He has no wheezes.   Coarse breath sounds   Abdominal: Soft. He exhibits no distension. There is no tenderness. There is no guarding.   Musculoskeletal: He exhibits no edema.   Neurological: He is oriented to person, place, and time.   Drowsy, awakes on stimulation,   Moves all extremities and follows instructions   Skin: Skin is warm and dry. He is not diaphoretic.   Psychiatric:   Intubated   Nursing note and vitals reviewed.            Assessment/Plan     Panlobular emphysema (HCC)- (present on admission)   Assessment & Plan    Current every day smoker  Intubated, scheduled duonebs  Unasyn and Vancomycin for empyema  SBTs        Empyema (HCC)- (present on admission)   Assessment & Plan    Pt presents with cough, greenish and bloody sputum, dyspnea on exertion and orthopnea since March of this year.  Labs at other hosp - WBC 15.5K and Lactic acid 4  CT at the other hospital - large empyema with cavitary lesion/masses in the right upper lobe.  500 ml of thick fluid drained.  Patient seen by pulmonary and surgery  Pleural fluid: turbid, brown, WBC - 835738, RBC - 413392, Glucose- <10, protein - 3.9; exudative  7/11/2018 -  Left thoracotomy, decortication, evacuation of purulent empyema, debridement of necrotic lung, rib resection by surgery, Dr. Diego Martínez. 1 L of purulent pus was evacuated from the left chest cavity  Thoracentesis fluid cultures show Gram positive cocci and Beta strep group F.     Plan  Unasyn and Vancomycin  IV fluids  intubated  Cultures/sensitivities of empyema fluid pending            Protein malnutrition (HCC)- (present on admission)   Assessment & Plan    Patient appears malnourished.  Lost around 10 lbs since March when the symptoms started.  IV fluids  Restart  nutrition as feasible          Pulmonary cachexia due to COPD (HCC)- (present on admission)   Assessment & Plan    Patient states he lost 10 lbs since March when the symptoms started.  IV fluids  Unasyn , vancomycin   Intubated following thoracentesis and decortication procedure        Pulmonary parenchymal mass- (present on admission)   Assessment & Plan    Studies of empyema/thora pending        Tobacco abuse- (present on admission)   Assessment & Plan    Nicotine patches while IP.  Tobacco cessation counseling          Leukocytosis- (present on admission)   Assessment & Plan    WBC - 15.5K  Unasyn , Vancomycin and voriconazole  IV fluids at 75 ml/hr  Continue to monitor

## 2018-07-13 NOTE — CARE PLAN
Problem: Safety  Goal: Will remain free from injury  Outcome: PROGRESSING AS EXPECTED  Patient restrained, Q2hr restraint checks, patient free from injury, instructed to use call light for assistance    Problem: Venous Thromboembolism (VTW)/Deep Vein Thrombosis (DVT) Prevention:  Goal: Patient will participate in Venous Thrombosis (VTE)/Deep Vein Thrombosis (DVT)Prevention Measures  Outcome: PROGRESSING AS EXPECTED  SCDs on BLE    Problem: Skin Integrity  Goal: Risk for impaired skin integrity will decrease  Outcome: PROGRESSING AS EXPECTED  Q2 hr turns, frequent skin checks

## 2018-07-13 NOTE — CARE PLAN
Problem: Nutritional:  Goal: Achieve adequate nutritional intake  Advance diet when medically feasible and patient will consume 50% of meals   Outcome: NOT MET  Pt is currently intubated. NPO x2 days. Spoke to RN. RN aware of NPO status and discussing nutrition with MD. Recommendations/Plan: Recommend starting nutrition support. RD following

## 2018-07-13 NOTE — PROGRESS NOTES
Pulmonary Critical Care Progress Note      Admit Date: 7/10/2018    Chief Complaint: SOB    History of Present Illness:  58-year-old gentleman with no known   past medical history, works as a cook in [employer], smokes approximately 1 pack   of cigarettes a day for the last 40 years, drinks approximately 6 beers a day,   little marijuana, denies any illicits.  The patient initially was seen at an   outside facility with approximately 4 days of increasing dyspnea on exertion,   cough with purulent sputum, and chest pain and discomfort with deep   inspiration.  He also indicates that he has had approximately 10 pounds of   weight loss since March of this year.  He denies any fever, chills, or sweats.    Denies any night sweats.  No lymphadenopathy, headache, visual changes, neck   stiffness.  Denies any nausea, vomiting, abdominal pain, diarrhea, or lower   extremity edema.  Patient says he has never been outside of [place].  He   has never been to retirement or jail.  While at the outside facility, patient had   CT scan that showed a significant left greater than right changes with   suggestive empyema as well as multiloculated abscess versus cavitary masses.    Patient was transferred to [facility] for higher level of care and further   evaluation.  Patient indicates at the present time that he is not in any   current discomfort.  Denies any significant sick contacts.  Denies any history   of known lung problems.  Denies any environmental exposures.  Has a cat as a   pet.        Interval Events:  24 hour interval history reviewed    - propofol 35, arouses, RASS 0, follows   - norepi 5   - Tm 98.8   - NPO   - L CTs bilat air leak   - vent day 3, PEEP 8, bronch yesterday;    - unasyn/vanco   - strep   - K and phos replace   Yesterday:  Tm 99.2  +1.1L over last 24hr  cxr, per my interpretation, left sided with improved aeration and chest tube, 3/4 rt side complete opacification  Wbc 16->21 w 8% bands  K 3.6  Cr 0.39  Mg  1.8  Abg 7.52/24/57/93 on 60%    S/P Left thoracotomy and decortication w evac of emyema 7/11  Chest tube x 2 on left with + airleak    Thora 7/10 w Group F B-Strep      Levophed 5  NS 75  Prop on pause    vanco  Unasyn  Voriconazole     Review of Systems   Unable to perform ROS: Acuity of condition     Physical Exam   Constitutional:   Malnourished appearing   HENT:   Head: Normocephalic and atraumatic.   Eyes: Conjunctivae are normal. Pupils are equal, round, and reactive to light.   Neck: No tracheal deviation present.   Cardiovascular: Normal rate and regular rhythm.    Pulmonary/Chest:   Diminished with scattered rhonchi   Abdominal: Soft. He exhibits no distension. There is no tenderness.   Musculoskeletal: He exhibits no edema.   Neurological: No cranial nerve deficit.   Skin: Skin is warm and dry. He is not diaphoretic.   Psychiatric:   sedate   Nursing note and vitals reviewed.      PFSH:  No change.    Respiratory:  [place] Vent Mode: APVCMV, Rate (breaths/min): 20, Vt Target (mL): 450, PEEP/CPAP: 8, FiO2: 50, Static Compliance (ml / cm H2O): 41, Control VTE (exp VT): 311  Pulse Oximetry: 100 %  Chest Tube Drains:            Recent Labs      07/11/18   2226  07/12/18   0356   ISTATAPH  7.316*  7.528*   ISTATAPCO2  47.2*  24.7*   ISTATAPO2  55*  57*   ISTATATCO2  26  21   [id number]  85*  93   ISTATARTHCO3  24.1  20.5   ISTATARTBE  -2  -1   ISTATTEMP  96.8 F  98.0 F   ISTATFIO2  100  60   ISTATSPEC  Arterial  Arterial   ISTATAPHTC  7.330*  7.533*   [id number]  51*  55*       HemoDynamics:  Pulse: (!) 101, Heart Rate (Monitored): 98  Arterial BP: 132/58, NIBP: 101/58               Neuro:  GCS             Fluids:  Intake/Output       07/11/18 0700 - 07/12/18 0659 07/12/18 0700 - 07/13/18 0659 07/13/18 0700 - 07/14/18 0659      6683-5154 7101-2199 Total [id number] 1900-0659 Total 0700-1859 1900-0659 Total       Intake    I.V.  --  1900.4 1900.4  2194.3  1945.8 4140.1  --  -- --    Crystalloid Intake -- 1400  1400 -- -- -- -- -- --    Propofol Volume -- -- -- 119.1 163.9 283 -- -- --    Norepinephrine Volume -- 50.4 50.4 150.2 181.9 332.1 -- -- --    IV Volume (0.9% NS) -- 450 450  -- -- --    IV Volume (Mag) -- -- -- 200 -- 200 -- -- --    IV Volume (Potassium) -- -- -- 100 -- 100 -- -- --    IV Volume (Unasyn) -- -- -- 200 200 400 -- -- --    IV Volume (Vancomycin) -- -- --  -- -- --    Total Intake -- 1900.4 1900.4 2194.3 1945.8 4140.1 -- -- --       Output    Urine  --  460 460  630  435 1065  --  -- --    Output (mL) (Urinary Catheter Indwelling Catheter 16) -- 460 460  -- -- --    Chest Tube  --  500 500  290  310 600  --  -- --    Output (mL) (Chest Tube Group 1 (A) Left straight 32) -- 160 160 30 60 90 -- -- --    Output (mL) (Chest Tube Group 2 (B) Left angled 32) -- 340 340 260 250 510 -- -- --    Blood  --  100 100  --  -- --  --  -- --    Est. Blood Loss (mL) -- 100 100 -- -- -- -- -- --    Total Output -- 1060 1060  -- -- --       Net I/O     -- 840.4 840.4 1274.3 1200.8 2475.1 -- -- --        Weight: 68.9 kg (151 lb 14.4 oz)  Recent Labs      07/11/18   0216  07/12/18   0400  07/13/18   0430   SODIUM  131*  135  141   POTASSIUM  4.8  3.6  3.9   CHLORIDE  96  104  109   CO2  31  20  20   BUN  22  16  10   CREATININE  0.53  0.39*  0.40*   MAGNESIUM   --   1.8  2.1   PHOSPHORUS   --   2.8  2.0*   CALCIUM  8.4*  7.4*  7.4*       GI/Nutrition:    Liver Function  Recent Labs      07/11/18   0216  07/12/18   0400  07/13/18 0430   ALTSGPT  27   --    --    ASTSGOT  20   --    --    ALKPHOSPHAT  85   --    --    TBILIRUBIN  0.2   --    --    GLUCOSE  103*  137*  119*       Heme:  Recent Labs      07/10/18   1450   07/11/18   0216  07/12/18 0400  07/13/18 0430   RBC   --    < >  4.16*  3.72*  3.28*   HEMOGLOBIN   --    < >  11.7*  10.3*  9.2*   HEMATOCRIT   --    < >  36.7*  31.6*  27.9*   PLATELETCT   --    < >  766*  659*  599*   PROTHROMBTM  17.0*   --    --     --    --    APTT  34.6   --    --    --    --    INR  1.42*   --    --    --    --     < > = values in this interval not displayed.       Infectious Disease:  Temp  Av.9 °C (98.4 °F)  Min: 36.3 °C (97.3 °F)  Max: 37.1 °C (98.7 °F)  Micro: cultures reviewed  Recent Labs      07/10/18   2047  07/11/18   0216  18   0400  18   0430   WBC  15.5*  16.3*  21.9*  18.8*   NEUTSPOLYS  82.60*  84.30*  76.50*   --    LYMPHOCYTES  7.00*  8.70*  8.70*   --    MONOCYTES  4.30  7.00  6.10   --    EOSINOPHILS  0.00  0.00  0.00   --    BASOPHILS  0.00  0.00  0.00   --    ASTSGOT   --   20   --    --    ALTSGPT   --   27   --    --    ALKPHOSPHAT   --   85   --    --    TBILIRUBIN   --   0.2   --    --      Current Facility-Administered Medications   Medication Dose Frequency Provider Last Rate Last Dose   • vasopressin (VASOSTRICT) 20 Units in  mL Infusion  0.03 Units/min Continuous Erasmo Johnson M.D.   Stopped at 18 0130   • famotidine (PEPCID) injection 20 mg  20 mg BID Carlton Mcintyre M.D.   20 mg at 18 0556   • Respiratory Care per Protocol   Continuous RT Carlton Mcintyre M.D.       • senna-docusate (PERICOLACE or SENOKOT S) 8.6-50 MG per tablet 2 Tab  2 Tab BID Carlton Mcintyre M.D.   Stopped at 18 1800    And   • polyethylene glycol/lytes (MIRALAX) PACKET 1 Packet  1 Packet QDAY PREVONNE Mcintyre M.D.        And   • magnesium hydroxide (MILK OF MAGNESIA) suspension 30 mL  30 mL QDAY LILLIE Mcintyre M.D.        And   • bisacodyl (DULCOLAX) suppository 10 mg  10 mg QDAY PREVONNE Mcintyre M.D.       • MD ALERT...Adult ICU Electrolyte Replacement per Pharmacy Protocol   pharmacy to dose Carlton Mcintyre M.D.       • fentaNYL (SUBLIMAZE) injection 25 mcg  25 mcg Q HOUR PRN Carlton Mcintyre M.D.        Or   • fentaNYL (SUBLIMAZE) injection 50 mcg  50 mcg Q HOUR PRN Carlton Mcintyre M.D.   75 mcg at 18 1116    Or   • fentaNYL (SUBLIMAZE) injection 100 mcg  100 mcg Q HOUR  PRN Carlton Mcintyre M.D.   100 mcg at 07/13/18 0232   • ipratropium-albuterol (DUONEB) nebulizer solution  3 mL Q2HRS PRN (RT) Carlton Mcintyre M.D.       • ipratropium-albuterol (DUONEB) nebulizer solution  3 mL Q4HRS (RT) Carlton Mcintyre M.D.   3 mL at 07/13/18 0616   • MD ALERT... vancomycin per pharmacy protocol   pharmacy to dose Yogi Jesus M.D.       • vancomycin 1,100 mg in  mL IVPB  17 mg/kg Q12HR Yogi Jesus M.D.   Stopped at 07/13/18 0240   • norepinephrine (LEVOPHED) 8 mg in  mL Infusion  0-30 mcg/min Continuous Erasmo Johnson M.D. 9.4 mL/hr at 07/13/18 0431 5 mcg/min at 07/13/18 0431   • propofol (DIPRIVAN) injection  0-80 mcg/kg/min Continuous Erasmo Johnson M.D. 13.7 mL/hr at 07/13/18 0044 35 mcg/kg/min at 07/13/18 0044   • NS infusion   Continuous Helene Smith M.D. 75 mL/hr at 07/13/18 0041     • ondansetron (ZOFRAN) syringe/vial injection 4 mg  4 mg Q4HRS PRN Shruthi Phan M.D.       • ondansetron (ZOFRAN ODT) dispertab 4 mg  4 mg Q4HRS PRN Shruthi Phan M.D.       • promethazine (PHENERGAN) tablet 12.5-25 mg  12.5-25 mg Q4HRS PRN Shruthi Phan M.D.       • promethazine (PHENERGAN) suppository 12.5-25 mg  12.5-25 mg Q4HRS PRN Shruthi Phan M.D.       • prochlorperazine (COMPAZINE) injection 5-10 mg  5-10 mg Q4HRS PRN Shruthi Phan M.D.       • acetaminophen (TYLENOL) tablet 650 mg  650 mg Q6HRS PRN Shruthi Phan M.D.       • nicotine (NICODERM) 21 MG/24HR 21 mg  21 mg Daily-0600 Shruthi Phan M.D.   21 mg at 07/13/18 0555    And   • nicotine polacrilex (NICORETTE) 2 MG piece 2 mg  2 mg Q HOUR PRN Shruthi Phan M.D.       • ampicillin/sulbactam (UNASYN) 3 g in  mL IVPB  3 g Q6HRS Helene Smith M.D. 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  I have titrated vasopressors, made ventilator changes and ongoing critical care management as above.  Discussed patient condition and risk of morbidity and/or mortality with RN, RT, Therapies, Pharmacy and [facility] resident.    The patient remains critically ill.  Critical care time = 34 minutes in directly providing and coordinating critical care and extensive data review.  No time overlap and excludes procedures.

## 2018-07-13 NOTE — CARE PLAN
Problem: Communication  Goal: The ability to communicate needs accurately and effectively will improve  Outcome: PROGRESSING AS EXPECTED  Patient using pen and paper to communicate.     Problem: Safety  Goal: Will remain free from injury  Outcome: PROGRESSING AS EXPECTED  Q2 hr restraints assessment and charting, patient remains safe and free of injury.     Problem: Venous Thromboembolism (VTW)/Deep Vein Thrombosis (DVT) Prevention:  Goal: Patient will participate in Venous Thrombosis (VTE)/Deep Vein Thrombosis (DVT)Prevention Measures  Outcome: PROGRESSING AS EXPECTED  SCDs on BLE    Problem: Skin Integrity  Goal: Risk for impaired skin integrity will decrease  Outcome: PROGRESSING AS EXPECTED  Q2 HR turns, patient moves all 4 extremities and performs active ROM exercises with turns, patient dangled and stood at bedside

## 2018-07-13 NOTE — CARE PLAN
Problem: Ventilation Defect:  Goal: Ability to achieve and maintain unassisted ventilation or tolerate decreased levels of ventilator support    Intervention: Support and monitor invasive and noninvasive mechanical ventilation  Adult Ventilation Update    Total Vent Days: 3    Patient Lines/Drains/Airways Status    Active Airway     Name: Placement date: Placement time: Site: Days:    Airway Group ET Tube Oral 8.0 07/11/18 2143   Oral   3            Plateau Pressure (Q Shift): 25 (07/12/18 1821)  Static Compliance (ml / cm H2O): 22 (07/13/18 1604)    Patient failed trials because of Barriers to Wean: No Order (07/13/18 1604)    Sputum/Suction   Cough: Productive (07/13/18 1426)  Sputum Amount: Small (07/13/18 1426)  Sputum Color: Tan;Yellow (07/13/18 1426)  Sputum Consistency: Thick (07/13/18 1426)    Mobility  Activity Performed: Unable to mobilize (07/13/18 1604)  Reason Not Mobilized: Unstable condition (07/13/18 1604)    Events/Summary/Plan: no vent changes made at this time (07/13/18 1604)

## 2018-07-13 NOTE — CARE PLAN
Problem: Ventilation Defect:  Goal: Ability to achieve and maintain unassisted ventilation or tolerate decreased levels of ventilator support  Adult Ventilation Update    Total Vent Days: 3    Patient Lines/Drains/Airways Status    Active Airway     Name: Placement date: Placement time: Site: Days:    Airway Group ET Tube Oral 8.0 07/11/18 2143   Oral   1              Patient did a bronchoscopy yesterday and got copious amount of secretion. Patient is on CMV settings with high FIO2 for low PO2 noted. NO SBT has been done.

## 2018-07-14 ENCOUNTER — APPOINTMENT (OUTPATIENT)
Dept: RADIOLOGY | Facility: MEDICAL CENTER | Age: 58
DRG: 853 | End: 2018-07-14
Attending: INTERNAL MEDICINE

## 2018-07-14 LAB
ANION GAP SERPL CALC-SCNC: 9 MMOL/L (ref 0–11.9)
BACTERIA SPEC ANAEROBE CULT: NORMAL
BACTERIA SPEC RESP CULT: ABNORMAL
BACTERIA SPEC RESP CULT: ABNORMAL
BASOPHILS # BLD AUTO: 0.2 % (ref 0–1.8)
BASOPHILS # BLD: 0.03 K/UL (ref 0–0.12)
BUN SERPL-MCNC: 10 MG/DL (ref 8–22)
CALCIUM SERPL-MCNC: 7.4 MG/DL (ref 8.5–10.5)
CHLORIDE SERPL-SCNC: 113 MMOL/L (ref 96–112)
CO2 SERPL-SCNC: 20 MMOL/L (ref 20–33)
CREAT SERPL-MCNC: 0.33 MG/DL (ref 0.5–1.4)
EOSINOPHIL # BLD AUTO: 0 K/UL (ref 0–0.51)
EOSINOPHIL NFR BLD: 0 % (ref 0–6.9)
ERYTHROCYTE [DISTWIDTH] IN BLOOD BY AUTOMATED COUNT: 58 FL (ref 35.9–50)
GLUCOSE SERPL-MCNC: 107 MG/DL (ref 65–99)
GRAM STN SPEC: ABNORMAL
HCT VFR BLD AUTO: 26.4 % (ref 42–52)
HGB BLD-MCNC: 8.4 G/DL (ref 14–18)
IMM GRANULOCYTES # BLD AUTO: 0.43 K/UL (ref 0–0.11)
IMM GRANULOCYTES NFR BLD AUTO: 3.1 % (ref 0–0.9)
LYMPHOCYTES # BLD AUTO: 1.51 K/UL (ref 1–4.8)
LYMPHOCYTES NFR BLD: 10.9 % (ref 22–41)
MAGNESIUM SERPL-MCNC: 2 MG/DL (ref 1.5–2.5)
MCH RBC QN AUTO: 28.1 PG (ref 27–33)
MCHC RBC AUTO-ENTMCNC: 31.8 G/DL (ref 33.7–35.3)
MCV RBC AUTO: 88.3 FL (ref 81.4–97.8)
MONOCYTES # BLD AUTO: 0.34 K/UL (ref 0–0.85)
MONOCYTES NFR BLD AUTO: 2.5 % (ref 0–13.4)
NEUTROPHILS # BLD AUTO: 11.5 K/UL (ref 1.82–7.42)
NEUTROPHILS NFR BLD: 83.3 % (ref 44–72)
NRBC # BLD AUTO: 0 K/UL
NRBC BLD-RTO: 0 /100 WBC
PHOSPHATE SERPL-MCNC: 2.9 MG/DL (ref 2.5–4.5)
PLATELET # BLD AUTO: 466 K/UL (ref 164–446)
PMV BLD AUTO: 9.5 FL (ref 9–12.9)
POTASSIUM SERPL-SCNC: 3.7 MMOL/L (ref 3.6–5.5)
RBC # BLD AUTO: 2.99 M/UL (ref 4.7–6.1)
SIGNIFICANT IND 70042: ABNORMAL
SIGNIFICANT IND 70042: NORMAL
SITE SITE: ABNORMAL
SITE SITE: NORMAL
SODIUM SERPL-SCNC: 142 MMOL/L (ref 135–145)
SOURCE SOURCE: ABNORMAL
SOURCE SOURCE: NORMAL
WBC # BLD AUTO: 13.8 K/UL (ref 4.8–10.8)

## 2018-07-14 PROCEDURE — 700105 HCHG RX REV CODE 258: Performed by: STUDENT IN AN ORGANIZED HEALTH CARE EDUCATION/TRAINING PROGRAM

## 2018-07-14 PROCEDURE — 700101 HCHG RX REV CODE 250: Performed by: INTERNAL MEDICINE

## 2018-07-14 PROCEDURE — 85025 COMPLETE CBC W/AUTO DIFF WBC: CPT

## 2018-07-14 PROCEDURE — 700102 HCHG RX REV CODE 250 W/ 637 OVERRIDE(OP): Performed by: INTERNAL MEDICINE

## 2018-07-14 PROCEDURE — 37799 UNLISTED PX VASCULAR SURGERY: CPT

## 2018-07-14 PROCEDURE — 700111 HCHG RX REV CODE 636 W/ 250 OVERRIDE (IP): Performed by: STUDENT IN AN ORGANIZED HEALTH CARE EDUCATION/TRAINING PROGRAM

## 2018-07-14 PROCEDURE — 83735 ASSAY OF MAGNESIUM: CPT

## 2018-07-14 PROCEDURE — 770022 HCHG ROOM/CARE - ICU (200)

## 2018-07-14 PROCEDURE — 94640 AIRWAY INHALATION TREATMENT: CPT

## 2018-07-14 PROCEDURE — A9270 NON-COVERED ITEM OR SERVICE: HCPCS | Performed by: STUDENT IN AN ORGANIZED HEALTH CARE EDUCATION/TRAINING PROGRAM

## 2018-07-14 PROCEDURE — 94150 VITAL CAPACITY TEST: CPT

## 2018-07-14 PROCEDURE — 700111 HCHG RX REV CODE 636 W/ 250 OVERRIDE (IP): Performed by: INTERNAL MEDICINE

## 2018-07-14 PROCEDURE — 71045 X-RAY EXAM CHEST 1 VIEW: CPT

## 2018-07-14 PROCEDURE — 700102 HCHG RX REV CODE 250 W/ 637 OVERRIDE(OP): Performed by: STUDENT IN AN ORGANIZED HEALTH CARE EDUCATION/TRAINING PROGRAM

## 2018-07-14 PROCEDURE — 99291 CRITICAL CARE FIRST HOUR: CPT | Performed by: INTERNAL MEDICINE

## 2018-07-14 PROCEDURE — 80048 BASIC METABOLIC PNL TOTAL CA: CPT

## 2018-07-14 PROCEDURE — 82803 BLOOD GASES ANY COMBINATION: CPT

## 2018-07-14 PROCEDURE — 84100 ASSAY OF PHOSPHORUS: CPT

## 2018-07-14 PROCEDURE — A9270 NON-COVERED ITEM OR SERVICE: HCPCS | Performed by: INTERNAL MEDICINE

## 2018-07-14 PROCEDURE — 94003 VENT MGMT INPAT SUBQ DAY: CPT

## 2018-07-14 RX ORDER — OXYCODONE HYDROCHLORIDE 5 MG/1
5 TABLET ORAL EVERY 4 HOURS PRN
Status: DISCONTINUED | OUTPATIENT
Start: 2018-07-14 | End: 2018-07-16

## 2018-07-14 RX ORDER — POTASSIUM CHLORIDE 14.9 MG/ML
20 INJECTION INTRAVENOUS ONCE
Status: COMPLETED | OUTPATIENT
Start: 2018-07-14 | End: 2018-07-14

## 2018-07-14 RX ORDER — OXYCODONE HYDROCHLORIDE 10 MG/1
10 TABLET ORAL EVERY 4 HOURS PRN
Status: DISCONTINUED | OUTPATIENT
Start: 2018-07-14 | End: 2018-07-16

## 2018-07-14 RX ADMIN — IPRATROPIUM BROMIDE AND ALBUTEROL SULFATE 3 ML: .5; 3 SOLUTION RESPIRATORY (INHALATION) at 14:49

## 2018-07-14 RX ADMIN — IPRATROPIUM BROMIDE AND ALBUTEROL SULFATE 3 ML: .5; 3 SOLUTION RESPIRATORY (INHALATION) at 22:13

## 2018-07-14 RX ADMIN — PROPOFOL 50 MCG/KG/MIN: 10 INJECTION, EMULSION INTRAVENOUS at 00:40

## 2018-07-14 RX ADMIN — STANDARDIZED SENNA CONCENTRATE AND DOCUSATE SODIUM 2 TABLET: 8.6; 5 TABLET, FILM COATED ORAL at 18:36

## 2018-07-14 RX ADMIN — VANCOMYCIN HYDROCHLORIDE 1100 MG: 100 INJECTION, POWDER, LYOPHILIZED, FOR SOLUTION INTRAVENOUS at 23:40

## 2018-07-14 RX ADMIN — IPRATROPIUM BROMIDE AND ALBUTEROL SULFATE 3 ML: .5; 3 SOLUTION RESPIRATORY (INHALATION) at 06:24

## 2018-07-14 RX ADMIN — IPRATROPIUM BROMIDE AND ALBUTEROL SULFATE 3 ML: .5; 3 SOLUTION RESPIRATORY (INHALATION) at 04:27

## 2018-07-14 RX ADMIN — HYDROCORTISONE SODIUM SUCCINATE 50 MG: 100 INJECTION, POWDER, FOR SOLUTION INTRAMUSCULAR; INTRAVENOUS at 17:30

## 2018-07-14 RX ADMIN — HYDROCORTISONE SODIUM SUCCINATE 50 MG: 100 INJECTION, POWDER, FOR SOLUTION INTRAMUSCULAR; INTRAVENOUS at 23:40

## 2018-07-14 RX ADMIN — NICOTINE 21 MG: 21 PATCH, EXTENDED RELEASE TRANSDERMAL at 05:46

## 2018-07-14 RX ADMIN — IPRATROPIUM BROMIDE AND ALBUTEROL SULFATE 3 ML: .5; 3 SOLUTION RESPIRATORY (INHALATION) at 18:55

## 2018-07-14 RX ADMIN — AMPICILLIN SODIUM AND SULBACTAM SODIUM 3 G: 2; 1 INJECTION, POWDER, FOR SOLUTION INTRAMUSCULAR; INTRAVENOUS at 17:30

## 2018-07-14 RX ADMIN — SODIUM CHLORIDE: 9 INJECTION, SOLUTION INTRAVENOUS at 17:26

## 2018-07-14 RX ADMIN — IPRATROPIUM BROMIDE AND ALBUTEROL SULFATE 3 ML: .5; 3 SOLUTION RESPIRATORY (INHALATION) at 10:15

## 2018-07-14 RX ADMIN — SODIUM CHLORIDE: 9 INJECTION, SOLUTION INTRAVENOUS at 04:12

## 2018-07-14 RX ADMIN — HYDROCORTISONE SODIUM SUCCINATE 50 MG: 100 INJECTION, POWDER, FOR SOLUTION INTRAMUSCULAR; INTRAVENOUS at 11:26

## 2018-07-14 RX ADMIN — AMPICILLIN SODIUM AND SULBACTAM SODIUM 3 G: 2; 1 INJECTION, POWDER, FOR SOLUTION INTRAMUSCULAR; INTRAVENOUS at 05:46

## 2018-07-14 RX ADMIN — FAMOTIDINE 20 MG: 10 INJECTION, SOLUTION INTRAVENOUS at 05:46

## 2018-07-14 RX ADMIN — PROPOFOL 20 MCG/KG/MIN: 10 INJECTION, EMULSION INTRAVENOUS at 23:40

## 2018-07-14 RX ADMIN — AMPICILLIN SODIUM AND SULBACTAM SODIUM 3 G: 2; 1 INJECTION, POWDER, FOR SOLUTION INTRAMUSCULAR; INTRAVENOUS at 23:40

## 2018-07-14 RX ADMIN — POTASSIUM CHLORIDE 20 MEQ: 200 INJECTION, SOLUTION INTRAVENOUS at 08:21

## 2018-07-14 RX ADMIN — HYDROCORTISONE SODIUM SUCCINATE 50 MG: 100 INJECTION, POWDER, FOR SOLUTION INTRAMUSCULAR; INTRAVENOUS at 05:46

## 2018-07-14 RX ADMIN — PROPOFOL 40 MCG/KG/MIN: 10 INJECTION, EMULSION INTRAVENOUS at 05:46

## 2018-07-14 RX ADMIN — FAMOTIDINE 20 MG: 10 INJECTION, SOLUTION INTRAVENOUS at 17:30

## 2018-07-14 RX ADMIN — VANCOMYCIN HYDROCHLORIDE 1100 MG: 100 INJECTION, POWDER, LYOPHILIZED, FOR SOLUTION INTRAVENOUS at 14:16

## 2018-07-14 RX ADMIN — PROPOFOL 20 MCG/KG/MIN: 10 INJECTION, EMULSION INTRAVENOUS at 13:33

## 2018-07-14 RX ADMIN — AMPICILLIN SODIUM AND SULBACTAM SODIUM 3 G: 2; 1 INJECTION, POWDER, FOR SOLUTION INTRAMUSCULAR; INTRAVENOUS at 11:26

## 2018-07-14 ASSESSMENT — ENCOUNTER SYMPTOMS
BACK PAIN: 0
ABDOMINAL PAIN: 0
NECK PAIN: 0
FLANK PAIN: 0
HEADACHES: 0
EYE PAIN: 0

## 2018-07-14 ASSESSMENT — PULMONARY FUNCTION TESTS: FVC: .8

## 2018-07-14 NOTE — PROGRESS NOTES
Internal Medicine Interval Note  Note Author: Mendez Blood M.D.     Name Donna Ceballos     1960   Age/Sex 58 y.o. male   MRN [id number]   Code Status Full     After 5PM or if no immediate response to page, please call for cross-coverage  Attending/Team: Dr. Perez / Jonas  See Patient List for primary contact information  Call [phone] to page    1st Call - Day Intern (R1):   Caitie GARDUNO      Ventilator day: 4  Reason for interval visit  (Principal Problem)   Empyema  Acute respiratory failure    Interval Problem Daily Status Update  (24 hours, problem oriented, brief subjective history, new lab/imaging data pertinent to that problem)     Continue Vanc and unasyn   Thoracentesis fluid cultures show Gram positive cocci and Beta strep group F.   Multiple AFB smears negative since 7/10/2018, airborne isolation discontinued  WBCs trending down  Trial of SBT  NG tube for nutrition  On ventilator- 20/450/+8/50fio2        Review of Systems   Unable to perform ROS: Intubated   HENT: Negative for ear pain.    Eyes: Negative for pain.   Cardiovascular: Negative for chest pain.   Gastrointestinal: Negative for abdominal pain.   Genitourinary: Negative for flank pain.   Musculoskeletal: Negative for back pain, joint pain and neck pain.   Neurological: Negative for headaches.     Says no to pain anywhere and otherwise feels okay. Was able to sleep overnight. Intubated, alert, so responds to a few yes/no questions    Disposition/Barriers to discharge:   ICU/intubated    Consultants/Specialty  Surgery  Pulmonology      Quality Measures  Quality-Core Measures   Reviewed items::  Labs reviewed, Medications reviewed and Radiology images reviewed  Motley catheter::  Unconscious / Sedated Patient on a Ventilator  DVT prophylaxis - mechanical:  SCDs  Antibiotics:  Treating active infection/contamination beyond 24 hours perioperative coverage          Physical Exam       Vitals:    18 1039 18 1100 18  1130 07/14/18 1200   BP:       Pulse:  94 75 74   Resp:  (!) 31 20 12   Temp:    37.1 °C (98.7 °F)   SpO2: 100% 100% 100% 100%   Weight:       Height:         Body mass index is 24.69 kg/m². Weight: 82.6 kg (182 lb 1.6 oz)  Oxygen Therapy:  Pulse Oximetry: 100 %, FiO2%: 30 %, O2 Delivery: Ventilator    Physical Exam   Constitutional: He is oriented to person, place, and time. No distress.   HENT:   Head: Normocephalic and atraumatic.   Eyes: Conjunctivae and EOM are normal. Right eye exhibits no discharge. Left eye exhibits no discharge.   Neck: Neck supple. No JVD present.   Cardiovascular: Normal rate, regular rhythm and normal heart sounds.  Exam reveals no friction rub.    No murmur heard.  Pulmonary/Chest: Effort normal. No respiratory distress. He has no wheezes.   Coarse breath sounds   Abdominal: Soft. He exhibits no distension. There is no tenderness. There is no guarding.   Musculoskeletal: He exhibits no edema.   Neurological: He is alert and oriented to person, place, and time.   Alert with appropriate responses as possible  Moves all extremities and follows instructions   Skin: Skin is warm and dry. He is not diaphoretic.   Psychiatric:   Intubated   Nursing note and vitals reviewed.            Assessment/Plan     Panlobular emphysema (HCC)- (present on admission)   Assessment & Plan    Current every day smoker  Intubated, scheduled duonebs  Unasyn and Vancomycin for empyema  SBTs        Empyema (HCC)- (present on admission)   Assessment & Plan    Pt presents with cough, greenish and bloody sputum, dyspnea on exertion and orthopnea since March of this year.  Labs at other hosp - WBC 15.5K and Lactic acid 4  CT at the other hospital - large empyema with cavitary lesion/masses in the right upper lobe.  500 ml of thick fluid drained.  Patient seen by pulmonary and surgery  Pleural fluid: turbid, brown, WBC - 194398, RBC - 187951, Glucose- <10, protein - 3.9; exudative  7/11/2018 -  Left thoracotomy,  decortication, evacuation of purulent empyema, debridement of necrotic lung, rib resection by surgery, Dr. Diego Martínez. 1 L of purulent pus was evacuated from the left chest cavity  Thoracentesis fluid cultures show Gram positive cocci and Beta strep group F.     Plan  Unasyn and Vancomycin  IV fluids  intubated            Protein malnutrition (HCC)- (present on admission)   Assessment & Plan    Patient appears malnourished.  Lost around 10 lbs since March when the symptoms started.  IV fluids  NG tube for nutrition          Pulmonary cachexia due to COPD (HCC)- (present on admission)   Assessment & Plan    Patient states he lost 10 lbs since March when the symptoms started.  IV fluids  Unasyn , vancomycin   Intubated following thoracentesis and decortication procedure        Pulmonary parenchymal mass- (present on admission)   Assessment & Plan    Studies of empyema/thora pending        Tobacco abuse- (present on admission)   Assessment & Plan    Nicotine patches while IP.  Tobacco cessation counseling          Leukocytosis- (present on admission)   Assessment & Plan    WBC - 15.5K  Unasyn , Vancomycin and voriconazole  IV fluids at 75 ml/hr  Continue to monitor

## 2018-07-14 NOTE — CARE PLAN
Problem: Infection  Goal: Will remain free from infection    Intervention: Assess signs and symptoms of infection  Pt at increased risk for infection due to presence of invasive lines and devices. Interventions in place. Pt receiving IV antibiotics per MD order

## 2018-07-14 NOTE — PROGRESS NOTES
Pulmonary Critical Care Progress Note      Admit Date: 7/10/2018    Chief Complaint: SOB    History of Present Illness:  58-year-old gentleman with no known   past medical history, works as a cook in [employer], smokes approximately 1 pack   of cigarettes a day for the last 40 years, drinks approximately 6 beers a day,   little marijuana, denies any illicits.  The patient initially was seen at an   outside facility with approximately 4 days of increasing dyspnea on exertion,   cough with purulent sputum, and chest pain and discomfort with deep   inspiration.  He also indicates that he has had approximately 10 pounds of   weight loss since March of this year.  He denies any fever, chills, or sweats.    Denies any night sweats.  No lymphadenopathy, headache, visual changes, neck   stiffness.  Denies any nausea, vomiting, abdominal pain, diarrhea, or lower   extremity edema.  Patient says he has never been outside of [place].  He   has never been to half-way or skilled nursing.  While at the outside facility, patient had   CT scan that showed a significant left greater than right changes with   suggestive empyema as well as multiloculated abscess versus cavitary masses.    Patient was transferred to [facility] for higher level of care and further   evaluation.  Patient indicates at the present time that he is not in any   current discomfort.  Denies any significant sick contacts.  Denies any history   of known lung problems.  Denies any environmental exposures.  Has a cat as a   pet.    Interval Events:  24 hour interval history reviewed     Vent day #8  PEEP 8 30%  Secretions large  CXR no change L PTX and bilat opacities  Vanco/Unasyn  AFB neg  Group F Strep  Follows  Prop 40  SR 60s  SBP 90-130s  Afeb - WBC better, has been elevated  No TF or BM  UO ok  CTs both with air leak - large    Serial follow-up  Not appropriate for liberation, tachypnea/increased secretions  Still with large air leak from chest tubes but mechanics of  ventilation and oxygenation acceptable  Hemodynamics unchanged    YESTERDAY   - propofol 35, arouses, RASS 0, follows   - norepi 5   - Tm 98.8   - NPO   - L CTs bilat air leak   - vent day 3, PEEP 8, bronch yesterday;    - unasyn/vanco   - strep   - K and phos replace     S/P Left thoracotomy and decortication w evac of emyema 7/11  Chest tube x 2 on left with + airleak  Thora 7/10 w Group F B-Strep    Levophed 5  NS 75  Prop on pause  vanco  Unasyn  Voriconazole     Review of Systems   Unable to perform ROS: Acuity of condition     Physical Exam   Constitutional: He appears well-developed. No distress (On full vent support).   Malnourished appearing   HENT:   Head: Normocephalic and atraumatic.   Eyes: Conjunctivae are normal. Pupils are equal, round, and reactive to light. No scleral icterus.   Neck: No JVD present. No tracheal deviation present.   Cardiovascular: Normal rate, regular rhythm and intact distal pulses.  Exam reveals no gallop and no friction rub.    No murmur heard.  Pulmonary/Chest: No respiratory distress. He has decreased breath sounds in the right lower field and the left lower field. He has no wheezes. He has rhonchi.   Diminished with scattered rhonchi   Abdominal: Soft. He exhibits no distension. There is no tenderness.   Musculoskeletal: He exhibits no edema.   Neurological: No cranial nerve deficit.   Skin: Skin is warm and dry. Capillary refill takes less than 2 seconds. He is not diaphoretic. No cyanosis.   Psychiatric:   sedate   Nursing note and vitals reviewed.      PFSH:  No change.    Respiratory:  [place] Vent Mode: APVCMV, Rate (breaths/min): 20, Vt Target (mL): 450, PEEP/CPAP: 8, FiO2: 30, Static Compliance (ml / cm H2O): 33, Control VTE (exp VT): 442  Pulse Oximetry: 100 %  Ventilator mechanics and waveforms are reviewed at the bedside with RT              Recent Labs      07/11/18 2226  07/12/18   0356   ISTATAPH  7.316*  7.528*   ISTATAPCO2  47.2*  24.7*   ISTATAPO2  55*   57*   ISTATATCO2  26  21   [id number]  85*  93   ISTATARTHCO3  24.1  20.5   ISTATARTBE  -2  -1   ISTATTEMP  96.8 F  98.0 F   ISTATFIO2  100  60   ISTATSPEC  Arterial  Arterial   ISTATAPHTC  7.330*  7.533*   [id number]  51*  55*       HemoDynamics:  Pulse: 64, Heart Rate (Monitored): 81  Arterial BP: 118/49, NIBP: 104/61               Neuro:  GCS             Fluids:  Intake/Output       07/12/18 0700 - 07/13/18 0659 07/13/18 0700 - 07/14/18 0659 07/14/18 0700 - 07/15/18 0659      [id number] 1900-0659 Total 0700-1859 1900-0659 Total 0700-1859 1900-0659 Total       Intake    I.V.  2194.3  1945.8 4140.1  2157  1347 3504  --  -- --    Propofol Volume 119.1 163.9 283 173.9 130.1 304 -- -- --    Norepinephrine Volume 150.2 181.9 332.1 83.1 16.9 100 -- -- --    IV Volume (0.9% NS)   -- -- --    IV Volume (Mag) 200 -- 200 -- -- -- -- -- --    IV Volume (Potassium) 100 -- 100 -- -- -- -- -- --    IV Volume (Unasyn) 200 200 400 200 100 300 -- -- --    IV Volume (Vancomycin) -- -- -- -- 500 500 -- -- --    IV Volume (Vancomycin)  300 -- 300 -- -- --    IV Volume (K Phos) -- -- -- 500 -- 500 -- -- --    Total Intake 2194.3 1945.8 4140.1 2157 1347 3504 -- -- --       Output    Urine  630  435 1065  445  295 740  --  -- --    Output (mL) (Urinary Catheter Indwelling Catheter 16)  445 295 740 -- -- --    Chest Tube  290  310 600  230  -- 230  --  -- --    Output (mL) (Chest Tube Group 1 (A) Left straight 32) 30 60 90 30 -- 30 -- -- --    Output (mL) (Chest Tube Group 2 (B) Left angled 32) 260 250 510 200 -- 200 -- -- --    Total Output  675 295 970 -- -- --       Net I/O     1274.3 1200.8 2475.1 1482 1052 2534 -- -- --           Recent Labs      07/12/18   0400  07/13/18   0430   SODIUM  135  141   POTASSIUM  3.6  3.9   CHLORIDE  104  109   CO2  20  20   BUN  16  10   CREATININE  0.39*  0.40*   MAGNESIUM  1.8  2.1   PHOSPHORUS  2.8  2.0*   CALCIUM  7.4*  7.4*        GI/Nutrition:    Cortrak - TF    Liver Function  Recent Labs      180  18   043   GLUCOSE  137*  119*       Heme:  Recent Labs      18   RBC  3.72*  3.28*  2.99*   HEMOGLOBIN  10.3*  9.2*  8.4*   HEMATOCRIT  31.6*  27.9*  26.4*   PLATELETCT  659*  599*  466*       Infectious Disease:  Temp  Av.8 °C (98.3 °F)  Min: 36.4 °C (97.5 °F)  Max: 37.1 °C (98.8 °F)  Micro: cultures reviewed  Recent Labs      18   WBC  21.9*  18.8*  13.8*   NEUTSPOLYS  76.50*  90.30*  83.30*   LYMPHOCYTES  8.70*  8.80*  10.90*   MONOCYTES  6.10  0.90  2.50   EOSINOPHILS  0.00  0.00  0.00   BASOPHILS  0.00  0.00  0.20     Current Facility-Administered Medications   Medication Dose Frequency Provider Last Rate Last Dose   • hydrocortisone sodium succinate PF (SOLU-CORTEF) 100 MG injection 50 mg  50 mg Q6HRS Kip Espino M.D.   50 mg at 18 2333   • vasopressin (VASOSTRICT) 20 Units in  mL Infusion  0.03 Units/min Continuous Erasmo Johnson M.D.   Stopped at 18 0130   • famotidine (PEPCID) injection 20 mg  20 mg BID Carlton Mcintyre M.D.   20 mg at 18 1742   • Respiratory Care per Protocol   Continuous RT Carlton Mcintyre M.D.       • senna-docusate (PERICOLACE or SENOKOT S) 8.6-50 MG per tablet 2 Tab  2 Tab BID Carlton Mcintyre M.D.   Stopped at 18 1800    And   • polyethylene glycol/lytes (MIRALAX) PACKET 1 Packet  1 Packet QDAY LILLIE Mcintyre M.D.        And   • magnesium hydroxide (MILK OF MAGNESIA) suspension 30 mL  30 mL QDAY LILLIE Mcintyre M.D.        And   • bisacodyl (DULCOLAX) suppository 10 mg  10 mg QDAY PRN Carlton Mcintyre M.D.       • MD ALERT...Adult ICU Electrolyte Replacement per Pharmacy Protocol   pharmacy to dose Carlton Mcintyre M.D.       • fentaNYL (SUBLIMAZE) injection 25 mcg  25 mcg Q HOUR PRN Carlton Mcintyre M.D.        Or   • fentaNYL (SUBLIMAZE)  injection 50 mcg  50 mcg Q HOUR PRN Carlton Mcintyre M.D.   50 mcg at 07/13/18 2320    Or   • fentaNYL (SUBLIMAZE) injection 100 mcg  100 mcg Q HOUR PRN Carlton Mcintyre M.D.   100 mcg at 07/13/18 0829   • ipratropium-albuterol (DUONEB) nebulizer solution  3 mL Q2HRS PRN (RT) Carlton Mcintyre M.D.       • ipratropium-albuterol (DUONEB) nebulizer solution  3 mL Q4HRS (RT) Carlton Mcintyre M.D.   3 mL at 07/14/18 0427   • MD ALERT... vancomycin per pharmacy protocol   pharmacy to dose Yogi Jesus M.D.       • vancomycin 1,100 mg in  mL IVPB  17 mg/kg Q12HR Yogi Jesus M.D.   Stopped at 07/14/18 0133   • norepinephrine (LEVOPHED) 8 mg in  mL Infusion  0-30 mcg/min Continuous Erasmo Johnson M.D. 1.9 mL/hr at 07/13/18 2300 1 mcg/min at 07/13/18 2300   • propofol (DIPRIVAN) injection  0-80 mcg/kg/min Continuous Erasmo Johnosn M.D. 19.6 mL/hr at 07/14/18 0040 50 mcg/kg/min at 07/14/18 0040   • NS infusion   Continuous Helene Smith M.D. 75 mL/hr at 07/14/18 0412     • ondansetron (ZOFRAN) syringe/vial injection 4 mg  4 mg Q4HRS PRN Shruthi Phan M.D.       • ondansetron (ZOFRAN ODT) dispertab 4 mg  4 mg Q4HRS PRN Shruthi Phan M.D.       • promethazine (PHENERGAN) tablet 12.5-25 mg  12.5-25 mg Q4HRS PRN Shruthi Phan M.D.       • promethazine (PHENERGAN) suppository 12.5-25 mg  12.5-25 mg Q4HRS PRN Shruthi Alluri, M.D.       • prochlorperazine (COMPAZINE) injection 5-10 mg  5-10 mg Q4HRS PRN Shruthi Phan M.D.       • acetaminophen (TYLENOL) tablet 650 mg  650 mg Q6HRS PRN Shruthi Phan M.D.       • nicotine (NICODERM) 21 MG/24HR 21 mg  21 mg Daily-0600 Shruthi Phan M.D.   21 mg at 07/13/18 0555    And   • nicotine polacrilex (NICORETTE) 2 MG piece 2 mg  2 mg Q HOUR PRN Shruthi Phan M.D.       • ampicillin/sulbactam (UNASYN) 3 g in  mL IVPB  3 g Q6HRS Helene Smith M.D.   Stopped at 07/13/18 0140     Last reviewed on 7/10/2018  3:26 PM by Monae Mesa,  PhT    Quality  Measures:  Labs reviewed, Radiology images reviewed and Medications reviewed                      Assessment/Plan:  Acute hypoxic respiratory failure   - necrotizing pna   - intubated 7/11   - continue full vent support   - Serial ABG/chest x-ray/ventilator mechanics review   - Not appropriate for SBT/liberation   - continue abx   - near complete atx R lung better after bronch   - Increase secretions persist, monitor for need for repeat therapeutic bronchoscopy   - Monitor for pleural effusion and need for diagnostic/therapeutic thoracentesis on the right  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     - titrate levophed to maintain map > 65 - off late 7/13 - related in part to Propofol?  Leukocytosis   - related to pulmonary infection   - thora w group F b-strep   - Follow blood cultures   - abx  Chronic Etoh use   - monitor for w/d   - thiamine/folate/mvi  Chronic tob use   -  on cessation when appropriate    - Bronchodilators, RT protocols  Enteral nutrition   - Cortrak - TF   - Dietary consultation   - Monitor for refeeding syndrome  Borderline hypokalemia/hypomagnesemia   - Replete per electrolyte replacement protocols   Prophylaxis    DC respirator isolationy, AFB smears times many all negative, identifiable source for above issues obtained    Prognosis remains very guarded.  Critically ill with unstable pulmonary status on full vent support not ready for, aggressive spontaneous breathing trials and liberation from the ventilator, may need further therapeutic bronchoscopies and long-term IV antibiotics.  High risk of deterioration and worsening vital organ dysfunction  and death without the above critical care interventions.    Discussed patient condition and risk of morbidity and/or mortality with RN, RT, Pharmacy, [facility] resident and Charge nurse / hot rounds.      The patient remains critically ill.  Critical care time = 35 minutes in directly providing and coordinating critical care and extensive data review.  No time overlap and excludes procedures.

## 2018-07-14 NOTE — PROGRESS NOTES
Pharmacy Kinetics 58 y.o. male on vancomycin day # 5 2018    Currently on Vancomycin 1100 mg iv q12hr    Indication for Treatment: empiric - empyema/pleural effusion     Pertinent history per medical record: Admitted on 7/10/2018 for loculated pleural effusion. Patient presented to  [place] complaining of SOB, had CT imaging completed which revealed multiloculated abscess vs cavitary masses and patient transferred to [facility] for further care. Patient underwent thoracentesis in ED on 7/10 and has been initiated on empiric antibiotics for treatment of his effusion.      Other antibiotics: Unasyn 3 g IV q6h     Allergies: Patient has no known allergies.      List concerns for renal function: BUN/SCr ratio > 20:1, albumin 1.5 & contrast for CT 7/10.     Pertinent cultures to date:   7/10 thoracentesis: Group F strep   L lung tissue: NGTD   BAL: Group F strep    Recent Labs      18   0400  18   0430  18   0435   WBC  21.9*  18.8*  13.8*   NEUTSPOLYS  76.50*  90.30*  83.30*   BANDSSTABS  8.70   --    --      Recent Labs      18   0400  18   0430  18   0435   BUN  16  10  10   CREATININE  0.39*  0.40*  0.33*     Recent Labs      18   1200  18   2300   VANCOTROUGH  15.2  15.2     Intake/Output Summary (Last 24 hours) at 18 1222  Last data filed at 18 1200   Gross per 24 hour   Intake          4028.59 ml   Output             1425 ml   Net          2603.59 ml      Blood pressure (!) 97/74, pulse 75, temperature 36.5 °C (97.7 °F), resp. rate 20, height 1.829 m (6'), weight 82.6 kg (182 lb 1.6 oz), SpO2 100 %. Temp (24hrs), Av.8 °C (98.2 °F), Min:36.4 °C (97.5 °F), Max:37.1 °C (98.8 °F)      A/P   1. Vancomycin dose change: not indicated at this time  2. Next vancomycin level: ~2-3 days (not yet ordered)  3. Goal trough: ~16 mcg/mL  4. Comments: Dosing with vancomycin continues, pending finalization of BAL. Level drawn last night essentially  therapeutic. Given q12h dosing, anticipate patient will slowly accumulate if to continue for long-term dosing. Renal indices remain stable. Would highly recommend de-escalation given lack of MRSA isolated from cultures. Will follow.    Raiza ToneyD

## 2018-07-14 NOTE — PROGRESS NOTES
Cortrak Placement    Tube Team verified patient name and medical record number prior to tube placement.  Cortrak tube (43 inches, 10 Croatian) placed at 80 cm in right nare.  Per Cortrak picture, tube appears to be in the small bowel.   Nursing Instructions: Awaiting KUB to confirm placement before use for medications or feeding. Once placement confirmed, flush tube with 30 ml of water, and then remove and save stylet, in patient medication drawer.

## 2018-07-14 NOTE — CARE PLAN
Problem: Bowel/Gastric:  Goal: Normal bowel function is maintained or improved  Outcome: PROGRESSING AS EXPECTED  Cortrak placed today, TF started, bowel promoting mediations given, bowel sounds hypoactive    Problem: Respiratory:  Goal: Respiratory status will improve  Outcome: PROGRESSING AS EXPECTED  SBT attempts x 2 today, small amount of secretions in ETT,     Problem: Skin Integrity  Goal: Risk for impaired skin integrity will decrease  Outcome: PROGRESSING AS EXPECTED  Q2 hr and prn turns, skin intact, Q2 hr restraint checks, patient free from injury

## 2018-07-14 NOTE — PROGRESS NOTES
Patient writes he has glasses and a cell phone that he had in his room T809 before OR.  Items at bedside include socks, underwear, shorts, keys and wallet.  This RN called lost and found [id number], unit clerk for previous room T809 [id number], and preop [id number]. Belongings not yet found. Patient confirms that sister has not been by to visit and that these items were not given to her.  No documentation in paper chart about the whereabouts of these items.

## 2018-07-14 NOTE — CARE PLAN
Problem: Pain Management  Goal: Pain level will decrease to patient's comfort goal    Intervention: Follow pain managment plan developed in collaboration with patient and Interdisciplinary Team  Pt currently denies pain. Assessed regularly by RN. Interventions and alternatives to pain medication discussed with pt

## 2018-07-14 NOTE — DIETARY
Nutrition Services: Nutrition Support Assessment  Day 4 of admit.  Donna Ceballos is a 58 y.o. male with admitting DX of Loculated pleural effusion, Empyema      Current problem list:  1. Panlobular emphysema  2. Empyema  3. Pulmonary parenchymal mass  4. Pulmonary cachexia due to COPD  5. Protein malnutrition  6. Leukocytosis  7. Tobacco abuse     Assessment:  Estimated Nutritional Needs: based on: Ht: 182.9 cm, Wt : 68.9 kg via bed scale, IBW: 80.74 kg, BMI: 20.6  Of note wt : 82.6 kg via bed scale - question accuracy of wt pt gained 13.7 kg in 24 hours. Per I/Os pt +5.5 L.     Calculation/Equation: REE per MSJ x 1.2 = 1870 kcal/day; RMR per [facility] (vent L/min 8.6, T max 24 hours 37.1) = 175 kcal/day  Total Calories / day: 1700 - 1900  (Calories / k - 28)  Total Grams Protein / day: 83 - 103  (Grams Protein / k.2 - 1.5)  Total Fluids ml / day: 2070.7 ml    Evaluation:   1. Consult received for TF assessment. Pt is intubated and NPO x3 days, pt is is need of nutrition support.   2. Enteral access: Cortrak placed - waiting to be verified.    3. Labs: Glucose 107, Creatinine 0.33  4. Meds: unasyn, pepcid, solu-cortef, vancomycin, electrolyte replacement, pericolace, levophed, vasostrict  5. Propofol @ 7.8 ml/hr which provides 206 kcal/day;RD will adjust TF as needed.  6. Last BM:   7. Fiber-free TF formula appropriate due to pressors noted in MAR     Recommendations/Plan:  Start Impact Peptide 1.5 @ 25 ml/hr and advance per protocol to 45 ml/hr (goal rate with propofol) to provide 1620 kcal + kcal from propofol (27 kcal/kg), 102 grams protein (1.5 gm/kg), and 832 ml free water per day.   When propofol d/c'd increase TF to final goal rate of 50 ml/hr to provide 1800 kcal (26 kcal/kg), 113 grams protein (1.6 gm/kg), and 924 ml free water per day.  Fluids per MD.       RD following

## 2018-07-14 NOTE — CARE PLAN
Problem: Ventilation Defect:  Goal: Ability to achieve and maintain unassisted ventilation or tolerate decreased levels of ventilator support  Adult Ventilation Update    Total Vent Days: 4    Patient Lines/Drains/Airways Status    Active Airway     Name: Placement date: Placement time: Site: Days:    Airway Group ET Tube Oral 8.0 07/11/18 2143   Oral   2                           Plateau Pressure (Q Shift): 21 (07/13/18 1939)  Static Compliance (ml / cm H2O): 33 (07/14/18 0448)    Patient failed trials because of Barriers to Wean: No Order (07/13/18 1604)  Barriers to SBT    Length of Weaning Trial        Cough: Productive;Moist (07/14/18 0000)  Sputum Amount: Scant (07/14/18 0000)  Sputum Color: Pink Tinged;White (07/14/18 0000)  Sputum Consistency: Thick;Thin (07/14/18 0000)    Mobility  Level of Mobility: Level III (07/14/18 0000)  Activity Performed: Edge of bed (07/14/18 0000)  Time Activity Tolerated: 10 min (07/14/18 0000)  Distance Per Occurrence (ft.): 0 feet (07/14/18 0000)  # of Times Distance was Traveled: 0 (07/14/18 0000)  Assistance / Tolerance: Assistance of Two or More;Tolerates Well (07/14/18 0000)  Pt Calls for Assistance: Yes (07/14/18 0000)  Staff Present for Mobilization: RN;CNA (07/14/18 0000)  Gait: Unable to Ambulate (07/14/18 0000)  Assistive Devices: Rails;Hand held assist (07/14/18 0000)  Reason Not Mobilized: Unstable condition (07/13/18 1604)    Events/Summary/Plan: ABG draw (07/14/18 0448)

## 2018-07-15 ENCOUNTER — APPOINTMENT (OUTPATIENT)
Dept: RADIOLOGY | Facility: MEDICAL CENTER | Age: 58
DRG: 853 | End: 2018-07-15
Attending: INTERNAL MEDICINE

## 2018-07-15 LAB
ANION GAP SERPL CALC-SCNC: 8 MMOL/L (ref 0–11.9)
ANISOCYTOSIS BLD QL SMEAR: ABNORMAL
BASOPHILS # BLD AUTO: 0 % (ref 0–1.8)
BASOPHILS # BLD: 0 K/UL (ref 0–0.12)
BUN SERPL-MCNC: 15 MG/DL (ref 8–22)
CALCIUM SERPL-MCNC: 7.4 MG/DL (ref 8.5–10.5)
CHLORIDE SERPL-SCNC: 117 MMOL/L (ref 96–112)
CO2 SERPL-SCNC: 20 MMOL/L (ref 20–33)
CORTIS SERPL-MCNC: 27.7 UG/DL (ref 0–23)
CREAT SERPL-MCNC: 0.37 MG/DL (ref 0.5–1.4)
EOSINOPHIL # BLD AUTO: 0 K/UL (ref 0–0.51)
EOSINOPHIL NFR BLD: 0 % (ref 0–6.9)
ERYTHROCYTE [DISTWIDTH] IN BLOOD BY AUTOMATED COUNT: 59.5 FL (ref 35.9–50)
GLUCOSE SERPL-MCNC: 130 MG/DL (ref 65–99)
HCT VFR BLD AUTO: 28.1 % (ref 42–52)
HGB BLD-MCNC: 8.6 G/DL (ref 14–18)
HYPOCHROMIA BLD QL SMEAR: ABNORMAL
LYMPHOCYTES # BLD AUTO: 1.04 K/UL (ref 1–4.8)
LYMPHOCYTES NFR BLD: 9.9 % (ref 22–41)
MACROCYTES BLD QL SMEAR: ABNORMAL
MAGNESIUM SERPL-MCNC: 2 MG/DL (ref 1.5–2.5)
MANUAL DIFF BLD: NORMAL
MCH RBC QN AUTO: 27.2 PG (ref 27–33)
MCHC RBC AUTO-ENTMCNC: 30.6 G/DL (ref 33.7–35.3)
MCV RBC AUTO: 88.9 FL (ref 81.4–97.8)
METAMYELOCYTES NFR BLD MANUAL: 0.9 %
MONOCYTES # BLD AUTO: 0 K/UL (ref 0–0.85)
MONOCYTES NFR BLD AUTO: 0 % (ref 0–13.4)
MORPHOLOGY BLD-IMP: NORMAL
MYELOCYTES NFR BLD MANUAL: 0.9 %
NEUTROPHILS # BLD AUTO: 9.18 K/UL (ref 1.82–7.42)
NEUTROPHILS NFR BLD: 87.4 % (ref 44–72)
NRBC # BLD AUTO: 0 K/UL
NRBC BLD-RTO: 0 /100 WBC
PHOSPHATE SERPL-MCNC: 2.5 MG/DL (ref 2.5–4.5)
PLATELET # BLD AUTO: 499 K/UL (ref 164–446)
PLATELET BLD QL SMEAR: NORMAL
PMV BLD AUTO: 9.5 FL (ref 9–12.9)
POIKILOCYTOSIS BLD QL SMEAR: NORMAL
POLYCHROMASIA BLD QL SMEAR: NORMAL
POTASSIUM SERPL-SCNC: 3.6 MMOL/L (ref 3.6–5.5)
PROCALCITONIN SERPL-MCNC: <0.05 NG/ML
PROMYELOCYTES NFR BLD MANUAL: 0.9 %
RBC # BLD AUTO: 3.16 M/UL (ref 4.7–6.1)
RBC BLD AUTO: PRESENT
SODIUM SERPL-SCNC: 145 MMOL/L (ref 135–145)
TOXIC GRANULES BLD QL SMEAR: SLIGHT
WBC # BLD AUTO: 10.5 K/UL (ref 4.8–10.8)

## 2018-07-15 PROCEDURE — 700102 HCHG RX REV CODE 250 W/ 637 OVERRIDE(OP): Performed by: INTERNAL MEDICINE

## 2018-07-15 PROCEDURE — 700102 HCHG RX REV CODE 250 W/ 637 OVERRIDE(OP): Performed by: STUDENT IN AN ORGANIZED HEALTH CARE EDUCATION/TRAINING PROGRAM

## 2018-07-15 PROCEDURE — 85027 COMPLETE CBC AUTOMATED: CPT

## 2018-07-15 PROCEDURE — 80048 BASIC METABOLIC PNL TOTAL CA: CPT

## 2018-07-15 PROCEDURE — 94640 AIRWAY INHALATION TREATMENT: CPT

## 2018-07-15 PROCEDURE — 71045 X-RAY EXAM CHEST 1 VIEW: CPT

## 2018-07-15 PROCEDURE — 99255 IP/OBS CONSLTJ NEW/EST HI 80: CPT | Performed by: INTERNAL MEDICINE

## 2018-07-15 PROCEDURE — A9270 NON-COVERED ITEM OR SERVICE: HCPCS | Performed by: STUDENT IN AN ORGANIZED HEALTH CARE EDUCATION/TRAINING PROGRAM

## 2018-07-15 PROCEDURE — 84100 ASSAY OF PHOSPHORUS: CPT

## 2018-07-15 PROCEDURE — 700105 HCHG RX REV CODE 258: Performed by: STUDENT IN AN ORGANIZED HEALTH CARE EDUCATION/TRAINING PROGRAM

## 2018-07-15 PROCEDURE — 94150 VITAL CAPACITY TEST: CPT

## 2018-07-15 PROCEDURE — A9270 NON-COVERED ITEM OR SERVICE: HCPCS | Performed by: INTERNAL MEDICINE

## 2018-07-15 PROCEDURE — 84145 PROCALCITONIN (PCT): CPT

## 2018-07-15 PROCEDURE — 82803 BLOOD GASES ANY COMBINATION: CPT

## 2018-07-15 PROCEDURE — 99291 CRITICAL CARE FIRST HOUR: CPT | Performed by: INTERNAL MEDICINE

## 2018-07-15 PROCEDURE — 700111 HCHG RX REV CODE 636 W/ 250 OVERRIDE (IP): Performed by: INTERNAL MEDICINE

## 2018-07-15 PROCEDURE — 700105 HCHG RX REV CODE 258: Performed by: INTERNAL MEDICINE

## 2018-07-15 PROCEDURE — 700111 HCHG RX REV CODE 636 W/ 250 OVERRIDE (IP): Performed by: STUDENT IN AN ORGANIZED HEALTH CARE EDUCATION/TRAINING PROGRAM

## 2018-07-15 PROCEDURE — 37799 UNLISTED PX VASCULAR SURGERY: CPT

## 2018-07-15 PROCEDURE — 770022 HCHG ROOM/CARE - ICU (200)

## 2018-07-15 PROCEDURE — 700101 HCHG RX REV CODE 250: Performed by: INTERNAL MEDICINE

## 2018-07-15 PROCEDURE — 85007 BL SMEAR W/DIFF WBC COUNT: CPT

## 2018-07-15 PROCEDURE — 94003 VENT MGMT INPAT SUBQ DAY: CPT

## 2018-07-15 PROCEDURE — 83735 ASSAY OF MAGNESIUM: CPT

## 2018-07-15 PROCEDURE — 82533 TOTAL CORTISOL: CPT

## 2018-07-15 RX ADMIN — POTASSIUM PHOSPHATE, MONOBASIC AND POTASSIUM PHOSPHATE, DIBASIC 15 MMOL: 224; 236 INJECTION, SOLUTION INTRAVENOUS at 08:35

## 2018-07-15 RX ADMIN — STANDARDIZED SENNA CONCENTRATE AND DOCUSATE SODIUM 2 TABLET: 8.6; 5 TABLET, FILM COATED ORAL at 05:36

## 2018-07-15 RX ADMIN — HYDROCORTISONE SODIUM SUCCINATE 50 MG: 100 INJECTION, POWDER, FOR SOLUTION INTRAMUSCULAR; INTRAVENOUS at 17:47

## 2018-07-15 RX ADMIN — FENTANYL CITRATE 50 MCG: 50 INJECTION INTRAMUSCULAR; INTRAVENOUS at 23:12

## 2018-07-15 RX ADMIN — IPRATROPIUM BROMIDE AND ALBUTEROL SULFATE 3 ML: .5; 3 SOLUTION RESPIRATORY (INHALATION) at 19:08

## 2018-07-15 RX ADMIN — IPRATROPIUM BROMIDE AND ALBUTEROL SULFATE 3 ML: .5; 3 SOLUTION RESPIRATORY (INHALATION) at 03:14

## 2018-07-15 RX ADMIN — STANDARDIZED SENNA CONCENTRATE AND DOCUSATE SODIUM 2 TABLET: 8.6; 5 TABLET, FILM COATED ORAL at 17:47

## 2018-07-15 RX ADMIN — IPRATROPIUM BROMIDE AND ALBUTEROL SULFATE 3 ML: .5; 3 SOLUTION RESPIRATORY (INHALATION) at 10:34

## 2018-07-15 RX ADMIN — IPRATROPIUM BROMIDE AND ALBUTEROL SULFATE 3 ML: .5; 3 SOLUTION RESPIRATORY (INHALATION) at 06:29

## 2018-07-15 RX ADMIN — SODIUM CHLORIDE: 9 INJECTION, SOLUTION INTRAVENOUS at 05:41

## 2018-07-15 RX ADMIN — HYDROCORTISONE SODIUM SUCCINATE 50 MG: 100 INJECTION, POWDER, FOR SOLUTION INTRAMUSCULAR; INTRAVENOUS at 05:35

## 2018-07-15 RX ADMIN — FENTANYL CITRATE 50 MCG: 50 INJECTION INTRAMUSCULAR; INTRAVENOUS at 02:10

## 2018-07-15 RX ADMIN — AMPICILLIN SODIUM AND SULBACTAM SODIUM 3 G: 2; 1 INJECTION, POWDER, FOR SOLUTION INTRAMUSCULAR; INTRAVENOUS at 17:47

## 2018-07-15 RX ADMIN — IPRATROPIUM BROMIDE AND ALBUTEROL SULFATE 3 ML: .5; 3 SOLUTION RESPIRATORY (INHALATION) at 22:52

## 2018-07-15 RX ADMIN — AMPICILLIN SODIUM AND SULBACTAM SODIUM 3 G: 2; 1 INJECTION, POWDER, FOR SOLUTION INTRAMUSCULAR; INTRAVENOUS at 23:14

## 2018-07-15 RX ADMIN — AMPICILLIN SODIUM AND SULBACTAM SODIUM 3 G: 2; 1 INJECTION, POWDER, FOR SOLUTION INTRAMUSCULAR; INTRAVENOUS at 12:44

## 2018-07-15 RX ADMIN — OXYCODONE HYDROCHLORIDE 5 MG: 5 TABLET ORAL at 23:42

## 2018-07-15 RX ADMIN — PROPOFOL 20 MCG/KG/MIN: 10 INJECTION, EMULSION INTRAVENOUS at 12:20

## 2018-07-15 RX ADMIN — POLYETHYLENE GLYCOL 3350 1 PACKET: 17 POWDER, FOR SOLUTION ORAL at 08:35

## 2018-07-15 RX ADMIN — FAMOTIDINE 20 MG: 10 INJECTION, SOLUTION INTRAVENOUS at 17:47

## 2018-07-15 RX ADMIN — PROPOFOL 20 MCG/KG/MIN: 10 INJECTION, EMULSION INTRAVENOUS at 23:50

## 2018-07-15 RX ADMIN — NICOTINE 21 MG: 21 PATCH, EXTENDED RELEASE TRANSDERMAL at 05:36

## 2018-07-15 RX ADMIN — IPRATROPIUM BROMIDE AND ALBUTEROL SULFATE 3 ML: .5; 3 SOLUTION RESPIRATORY (INHALATION) at 14:36

## 2018-07-15 RX ADMIN — POTASSIUM BICARBONATE 25 MEQ: 25 TABLET, EFFERVESCENT ORAL at 08:35

## 2018-07-15 RX ADMIN — AMPICILLIN SODIUM AND SULBACTAM SODIUM 3 G: 2; 1 INJECTION, POWDER, FOR SOLUTION INTRAMUSCULAR; INTRAVENOUS at 05:35

## 2018-07-15 RX ADMIN — FAMOTIDINE 20 MG: 10 INJECTION, SOLUTION INTRAVENOUS at 05:36

## 2018-07-15 RX ADMIN — FENTANYL CITRATE 50 MCG: 50 INJECTION INTRAMUSCULAR; INTRAVENOUS at 20:47

## 2018-07-15 ASSESSMENT — ENCOUNTER SYMPTOMS
FEVER: 0
HEADACHES: 0
DOUBLE VISION: 0
ABDOMINAL PAIN: 0
NECK PAIN: 0
BLURRED VISION: 0
CHILLS: 0
BACK PAIN: 0

## 2018-07-15 ASSESSMENT — PAIN SCALES - GENERAL
PAINLEVEL_OUTOF10: 10
PAINLEVEL_OUTOF10: 8
PAINLEVEL_OUTOF10: 7

## 2018-07-15 ASSESSMENT — PULMONARY FUNCTION TESTS
FVC: 1.1
FVC: 1

## 2018-07-15 NOTE — CARE PLAN
Problem: Knowledge Deficit  Goal: Knowledge of disease process/condition, treatment plan, diagnostic tests, and medications will improve  Reviewed POC with pt, pt able to write comprehensive notes, questions and concerns addressed      Problem: Skin Integrity  Goal: Risk for impaired skin integrity will decrease  Q2H turns in place, heels floated on pillows, pillows for positioning, lines and tubes monitored and repositioned prn

## 2018-07-15 NOTE — CARE PLAN
Problem: Infection  Goal: Will remain free from infection    Intervention: Assess signs and symptoms of infection  Pt at increased risk of infection due to presence of invasive lines and devices. Pt assessed for increasing signs and symptoms of infection. Interventions in place. Pt receiving IV antibiotics per MD order

## 2018-07-15 NOTE — CARE PLAN
Problem: Ventilation Defect:  Goal: Ability to achieve and maintain unassisted ventilation or tolerate decreased levels of ventilator support    Intervention: Manage ventilation therapy by monitoring diagnostic test results  Adult Ventilation Update    Total Vent Days: 5    Patient Lines/Drains/Airways Status    Active Airway     Name: Placement date: Placement time: Site: Days:    Airway Group ET Tube Oral 8.0 07/11/18 2143   Oral   5              In the last 24 hours, the patient tolerated SBT for 1 hour 15 minutes.    Cough: Productive (07/15/18 0314)  Sputum Amount: Small (07/15/18 0314)  Sputum Color: White;Yellow (07/15/18 0314)  Sputum Consistency: Thick;Thin (07/15/18 0314)    Mobility  Level of Mobility: Level IV (07/14/18 2200)  Activity Performed: Edge of bed (07/14/18 2200)  Time Activity Tolerated: 15 min (07/14/18 2200)  Distance Per Occurrence (ft.): 0 feet (07/14/18 2200)  # of Times Distance was Traveled: 0 (07/14/18 2200)  Assistance / Tolerance: Assistance of Two or More;Tolerates Well (07/14/18 2200)  Pt Calls for Assistance: Yes (07/15/18 0200)  Staff Present for Mobilization: RN;CNA (07/14/18 2200)  Gait: Unable to Ambulate (07/14/18 2200)  Assistive Devices: Rails;Hand held assist (07/14/18 2200)

## 2018-07-15 NOTE — CONSULTS
INFECTIOUS DISEASES INPATIENT CONSULT NOTE     Date of Service: 7/15/2018    Consult Requested By: Frederic Perez M.D.    Reason for Consultation: Empyema    History of Present Illness:   Donna Ceballos is a 58 y.o. man with a history of long-standing tobacco use, and COPD admitted 7/10/2018 from an outside hospital where he presented with worsening shortness of breath and productive cough.  Patient is currently intubated but is awake and alert and is able to communicate by nodding and in writing.  Per the notes, patient had been complaining of left-sided chest pain, shortness of breath with a productive cough for 5 days prior to admission his shortness of breath was worse with exertion and he was coughing up thick greenish sputum.  He denies any associated fevers, or chills.  No recent sick contacts.  Denies any recent procedures or dental procedures.  He however had a 10 pound weight loss since March due to poor appetite.  Given his persistent symptoms, he presented to an outside hospital where a chest CT revealed a large left empyema.  He was transferred to [facility] for higher level of care.  On presentation he was afebrile with a leukocytosis of 15.5.  Checks x-ray revealed a new large pleural effusion as well as opacification.  He underwent a thoracentesis on 7/10 removing 500 mL of thick turbid creamy yellow puslike fluid.  Given concerns for an empyema, he was evaluated by surgery and the patient underwent a left thoracotomy, decortication and debridement of necrotic lung and rib resection on 7/11 with Dr. Martínez.  He developed respiratory failure and hypotension, and was subsequently intubated and placed on mechanical vent on 7/12 and started on pressors.  Pressors have since been weaned off.  OR cultures are now positive for group F Streptococcus.  Blood cultures from admission are negative to date.  Per nursing staff he is having lots of secretions.  Patient is currently on Unasyn.  Infectious disease  service consulted for antibiotic recommendations and duration.    Review Of Systems:  All other ROS were reviewed and are negative except as noted above in the HPI.    PMH:   Past Medical History:   Diagnosis Date   • Chronic obstructive pulmonary disease (HCC)    Long-standing tobacco use    PSH:  Past Surgical History:   Procedure Laterality Date   • THORACOTOMY Left 7/11/2018    Procedure: THORACOTOMY-WITH DECORTICATION;  Surgeon: Diego Martínez M.D.;  Location: SURGERY [facility];  Service: General   Left hernia surgery at the age of 11    FAMILY HX:  Negative for diabetes, heart disease or cancer per patient    SOCIAL HX:  Social History     Social History   • Marital status: Single     Spouse name: N/A   • Number of children: N/A   • Years of education: N/A     Occupational History   • Not on file.     Social History Main Topics   • Smoking status: Current Every Day Smoker     Packs/day: 1.00     Types: Cigarettes   • Smokeless tobacco: Never Used   • Alcohol use Yes   • Drug use: Yes     Types: Inhaled      Comment: cannabis   • Sexual activity: Not on file     Other Topics Concern   • Not on file     Social History Narrative   • No narrative on file     History   Smoking Status   • Current Every Day Smoker   • Packs/day: 1.00   • Types: Cigarettes   Smokeless Tobacco   • Never Used     History   Alcohol Use   • Yes   Denies any IV drug use    Allergies/Intolerances:  No Known Allergies    History reviewed with the patient via nodding and in writing    Other Current Medications:    Current Facility-Administered Medications:   •  hydrocortisone sodium succinate PF (SOLU-CORTEF) 100 MG injection 50 mg, 50 mg, Intravenous, Q12HRS, Carlton Mcintyre M.D.  •  oxyCODONE immediate-release (ROXICODONE) tablet 5 mg, 5 mg, Oral, Q4HRS PRN **OR** oxyCODONE immediate release (ROXICODONE) tablet 10 mg, 10 mg, Oral, Q4HRS PRN, Frederic Perez M.D.  •  famotidine (PEPCID) injection 20 mg, 20 mg, Intravenous, BID,  Carlton Mcintyre M.D., 20 mg at 07/15/18 0536  •  Respiratory Care per Protocol, , Nebulization, Continuous RT, Carlton Mcintyre M.D.  •  senna-docusate (PERICOLACE or SENOKOT S) 8.6-50 MG per tablet 2 Tab, 2 Tab, Oral, BID, 2 Tab at 07/15/18 0536 **AND** polyethylene glycol/lytes (MIRALAX) PACKET 1 Packet, 1 Packet, Oral, QDAY PRN, 1 Packet at 07/15/18 0835 **AND** magnesium hydroxide (MILK OF MAGNESIA) suspension 30 mL, 30 mL, Oral, QDAY PRN **AND** bisacodyl (DULCOLAX) suppository 10 mg, 10 mg, Rectal, QDAY PRN, Carlton Mcintyre M.D.  •  MD ALERT...Adult ICU Electrolyte Replacement per Pharmacy Protocol, , Other, pharmacy to dose, Carlton Mcintyre M.D.  •  fentaNYL (SUBLIMAZE) injection 25 mcg, 25 mcg, Intravenous, Q HOUR PRN **OR** fentaNYL (SUBLIMAZE) injection 50 mcg, 50 mcg, Intravenous, Q HOUR PRN, 50 mcg at 07/15/18 0210 **OR** fentaNYL (SUBLIMAZE) injection 100 mcg, 100 mcg, Intravenous, Q HOUR PRN, Carlton Mcintyre M.D., 100 mcg at 07/13/18 0829  •  ipratropium-albuterol (DUONEB) nebulizer solution, 3 mL, Nebulization, Q2HRS PRN (RT), Carlton Mcintyre M.D.  •  ipratropium-albuterol (DUONEB) nebulizer solution, 3 mL, Nebulization, Q4HRS (RT), Carlton Mcintyre M.D., 3 mL at 07/15/18 1034  •  propofol (DIPRIVAN) injection, 0-80 mcg/kg/min, Intravenous, Continuous, Last Rate: 7.8 mL/hr at 07/14/18 2340, 20 mcg/kg/min at 07/14/18 2340 **AND** Triglycerides Starting now and then Every 3 Days, , , Every 3 Days (0300), Erasmo Johnson M.D.  •  NS infusion, , Intravenous, Continuous, Jose Bourne M.D., Last Rate: 10 mL/hr at 07/15/18 1026  •  ondansetron (ZOFRAN) syringe/vial injection 4 mg, 4 mg, Intravenous, Q4HRS PRN, Shruthi Phan M.D.  •  ondansetron (ZOFRAN ODT) dispertab 4 mg, 4 mg, Oral, Q4HRS PRN, Shruthi Phan M.D.  •  promethazine (PHENERGAN) tablet 12.5-25 mg, 12.5-25 mg, Oral, Q4HRS PRN, Shruthi Phan M.D.  •  promethazine (PHENERGAN) suppository 12.5-25 mg, 12.5-25 mg, Rectal, Q4HRS PRN, Shruthi  "RAAD Phan  •  prochlorperazine (COMPAZINE) injection 5-10 mg, 5-10 mg, Intravenous, Q4HRS PRN, Shruthi Phan M.D.  •  acetaminophen (TYLENOL) tablet 650 mg, 650 mg, Oral, Q6HRS PRN, Shruthi Phan M.D.  •  nicotine (NICODERM) 21 MG/24HR 21 mg, 21 mg, Transdermal, Daily-0600, 21 mg at 07/15/18 0536 **AND** Nicotine Replacement Patient Education Materials, , , Once **AND** nicotine polacrilex (NICORETTE) 2 MG piece 2 mg, 2 mg, Oral, Q HOUR PRN, Shruthi Phan M.D.  •  ampicillin/sulbactam (UNASYN) 3 g in  mL IVPB, 3 g, Intravenous, Q6HRS, Helene Smith M.D., Stopped at 07/15/18 0605  [unfilled]    Most Recent Vital Signs:  BP (!) 97/74   Pulse 61   Temp 37.7 °C (99.9 °F)   Resp 20   Ht 1.829 m (6' 0.01\")   Wt 85.4 kg (188 lb 4.4 oz)   SpO2 100%   BMI 25.53 kg/m²   Temp  Av.9 °C (98.4 °F)  Min: 36.1 °C (96.9 °F)  Max: 38.1 °C (100.5 °F)    Physical Exam:  General: well-appearing, no acute distress, nontoxic  HEENT: sclera anicteric, PERRL, EOMI, MMM, no oral lesions, cord tract, ET tube in place  Neck: supple, no lymphadenopathy, right IJ catheter  Chest: Decreased breath sounds on left, chest tubes ×2 on left, no r/r/w, normal work of breathing.  Cardiac: RRR, normal S1 S2, no m/r/g   Abdomen: + bowel sounds, soft, non-tender, non-distended, no HSM  : Motley catheter  Extremities: WWP, no edema, 2+ pulses  Skin: no rashes or worrisome lesions  Neuro: Alert and oriented times 3, non-focal exam, moves all extremities    Pertinent Lab Results:  Recent Labs      18   0430  185  07/15/18   0410   WBC  18.8*  13.8*  10.5      Recent Labs      18   0430  185  07/15/18   0410   HEMOGLOBIN  9.2*  8.4*  8.6*   HEMATOCRIT  27.9*  26.4*  28.1*   MCV  85.1  88.3  88.9   MCH  28.0  28.1  27.2   MACROCYTOSIS  2+   --   1+   ANISOCYTOSIS  2+   --   1+   PLATELETCT  599*  466*  499*         Recent Labs      18   0430  185  07/15/18   0410   SODIUM  " 141  142  145   POTASSIUM  3.9  3.7  3.6   CHLORIDE  109  113*  117*   CO2  20  20  20   CREATININE  0.40*  0.33*  0.37*        No results for input(s): ALBUMIN in the last 72 hours.    Invalid input(s): AST, ALT, ALKPHOS, BILITOT, TOTALBILIRUB, BILIRUBINTOT, BILIRUBINDIR, BILIRUBININD, ALKALINEPHOS     Pertinent Micro:  Results     Procedure Component Value Units Date/Time    CULTURE TISSUE W/ GRM STAIN [[id number]]  (Abnormal) Collected:  07/11/18 2033    Order Status:  Completed Specimen:  Tissue Updated:  07/14/18 1316     Significant Indicator POS (POS)     Source TISS     Site Left Lung Tissue     Tissue Culture Growth noted after further incubation, see below for  organism identification.   (A)     Gram Stain Result Few WBCs.  No organisms seen.       Tissue Culture Beta Streptococcus Group F  Light growth   (A)    ANAEROBIC CULTURE [[id number]] Collected:  07/11/18 2033    Order Status:  Completed Specimen:  Tissue Updated:  07/14/18 1316     Significant Indicator NEG     Source TISS     Site Left Lung Tissue     Anaerobic Culture, Culture Res No Anaerobes isolated.    AFB CULTURE [[id number]] Collected:  07/11/18 2033    Order Status:  Completed Specimen:  Tissue Updated:  07/14/18 1316     Significant Indicator NEG     Source TISS     Site Left Lung Tissue     AFB Culture Culture in progress.     AFB Smear Results No acid fast bacilli seen.    FUNGAL CULTURE [[id number]] Collected:  07/11/18 2033    Order Status:  Completed Specimen:  Tissue Updated:  07/14/18 1316     Significant Indicator NEG     Source TISS     Site Left Lung Tissue     Fungal Culture Culture in progress.    AFB CULTURE [[id number]] Collected:  07/12/18 1130    Order Status:  Completed Specimen:  Respirate from Lung Updated:  07/14/18 1053     Significant Indicator NEG     Source RESP     Site Bronchoalveolar Lavage RLL     AFB Culture Culture in progress.     AFB Smear Results No acid fast bacilli seen.    Narrative:       [id number]  "ANDREA LOPEZ  3rd specimin  Which Lobe (Bronch Only):->RLL    CULTURE RESPIRATORY W/ GRM STN [[id number]]  (Abnormal) Collected:  07/12/18 1130    Order Status:  Completed Specimen:  Respirate Updated:  07/14/18 1053     Gram Stain Result Rare WBCs.  No organisms seen.       Significant Indicator POS (POS)     Source RESP     Site Bronchoalveolar Lavage RLL     Respiratory Culture -- (A)      Beta Streptococcus Group F  Moderate growth   (A)    Narrative:       [id number] ANDREA LOPEZ  3rd specimin  Which Lobe (Bronch Only):->RLL    FUNGAL CULTURE [[id number]] Collected:  07/12/18 1130    Order Status:  Completed Specimen:  Respirate Updated:  07/14/18 1053     Significant Indicator NEG     Source RESP     Site Bronchoalveolar Lavage RLL     Fungal Culture Culture in progress.    Narrative:       [id number] ANDREA LOPEZ  3rd specimin  Which Lobe (Bronch Only):->RLL    BLOOD CULTURE [[id number]] Collected:  07/12/18 1046    Order Status:  Completed Specimen:  Blood from Peripheral Updated:  07/13/18 0734     Significant Indicator NEG     Source BLD     Site PERIPHERAL     Blood Culture No Growth    Note: Blood cultures are incubated for 5 days and  are monitored continuously.Positive blood cultures  are called to the RN and reported as soon as  they are identified.      Narrative:       [id number] MEGAN HERNANDEZ  Per Hospital Policy: Only change Specimen Src: to \"Line\" if  specified by physician order.    BLOOD CULTURE [[id number]] Collected:  07/12/18 1046    Order Status:  Completed Specimen:  Blood from Peripheral Updated:  07/13/18 0733     Significant Indicator NEG     Source BLD     Site PERIPHERAL     Blood Culture No Growth    Note: Blood cultures are incubated for 5 days and  are monitored continuously.Positive blood cultures  are called to the RN and reported as soon as  they are identified.      Narrative:       [id number] MEGAN HERNANDEZ  Per Hospital Policy: Only change Specimen Src: to \"Line\" " if  specified by physician order.    GRAM STAIN [[id number]] Collected:  07/12/18 1130    Order Status:  Completed Specimen:  Respirate Updated:  07/12/18 2342     Significant Indicator .     Source RESP     Site Bronchoalveolar Lavage RLL     Gram Stain Result Rare WBCs.  No organisms seen.      Narrative:       [id number] MENDEZ JOHN  3rd specimin  Which Lobe (Bronch Only):->RLL    ACID FAST STAIN [[id number]] Collected:  07/12/18 1130    Order Status:  Completed Specimen:  Respirate Updated:  07/12/18 2342     Significant Indicator NEG     Source RESP     Site Bronchoalveolar Lavage RLL     AFB Smear Results No acid fast bacilli seen.    Narrative:       [id number] MENDEZ JOHN  3rd specimin  Which Lobe (Bronch Only):->RLL    GRAM STAIN [[id number]] Collected:  07/11/18 2033    Order Status:  Completed Specimen:  Tissue Updated:  07/12/18 2336     Significant Indicator .     Source TISS     Site Left Lung Tissue     Gram Stain Result Few WBCs.  No organisms seen.      ACID FAST STAIN [[id number]] Collected:  07/11/18 2033    Order Status:  Completed Specimen:  Tissue Updated:  07/12/18 2336     Significant Indicator NEG     Source TISS     Site Left Lung Tissue     AFB Smear Results No acid fast bacilli seen.    ACID FAST STAIN [[id number]] Collected:  07/11/18 1848    Order Status:  Completed Specimen:  Respirate Updated:  07/12/18 2335     Significant Indicator NEG     Source RESP     Site SPUTUM     AFB Smear Results No acid fast bacilli seen.    Narrative:       [id number] NEYMAR LASHON R.    AFB CULTURE [[id number]] Collected:  07/11/18 1848    Order Status:  Completed Specimen:  Respirate from Sputum Updated:  07/12/18 2334     Significant Indicator NEG     Source RESP     Site SPUTUM     AFB Culture Culture in progress.     AFB Smear Results No acid fast bacilli seen.    Narrative:       [id number] NEYMAR LASHON R.    ACID FAST STAIN [[id number]] Collected:  07/11/18 1100    Order Status:  Completed  Specimen:  Respirate Updated:  07/12/18 1427     Significant Indicator NEG     Source RESP     Site Sputum (coughed)     AFB Smear Results No acid fast bacilli seen.    Narrative:       [id number] NEYMAR LASHON R.    AFB CULTURE [[id number]] Collected:  07/11/18 1100    Order Status:  Completed Specimen:  Respirate from Bile Fluid Updated:  07/12/18 1427     Significant Indicator NEG     Source RESP     Site Sputum (coughed)     AFB Culture Culture in progress.     AFB Smear Results No acid fast bacilli seen.    Narrative:       [id number] NEYMAR LASHON R.    ACID FAST STAIN [[id number]] Collected:  07/10/18 1635    Order Status:  Completed Specimen:  Body Fluid Updated:  07/12/18 1327     Significant Indicator NEG     Source BF     Site THORACENTESIS FLUID     AFB Smear Results No acid fast bacilli seen.    Narrative:       CALL  [place]  183 tel. [phone],  CALLED  183 tel. [phone] 07/11/2018, 12:41, RB PERF. RESULTS CALLED TO:  ROLANDO [id number]  src:pleural effusion    GRAM STAIN [[id number]]  (Abnormal) Collected:  07/10/18 1635    Order Status:  Completed Specimen:  Body Fluid Updated:  07/12/18 1327     Significant Indicator . (POS)     Source BF     Site THORACENTESIS FLUID     Gram Stain Result Many WBCs.  Many Gram positive cocci.   (A)    Narrative:       CALL  [place]  183 tel. [phone],  CALLED  183 tel. [phone] 07/11/2018, 12:41, RB PERF. RESULTS CALLED TO:  ROLANDO [id number]  src:pleural effusion    FLUID CULTURE W/GRAM STAIN [[id number]]  (Abnormal) Collected:  07/10/18 1635    Order Status:  Completed Specimen:  Body Fluid from Thoracentesis Fluid Updated:  07/12/18 1327     Significant Indicator POS (POS)     Source BF     Site THORACENTESIS FLUID     Culture Result Bdf -- (A)     Gram Stain Result Many WBCs.  Many Gram positive cocci.   (A)     Culture Result Bdf Beta Streptococcus Group F  Moderate growth   (A)    Narrative:       CALL  [place]  183 tel. [phone],  CALLED  183 tel. [phone] 07/11/2018, 12:41,  RB PERF. RESULTS CALLED TO:  RN [id number]  src:pleural effusion    AFB CULTURE [[id number]] Collected:  07/10/18 1635    Order Status:  Completed Specimen:  Body Fluid from Thoracentesis Fluid Updated:  07/12/18 1327     Significant Indicator NEG     Source BF     Site THORACENTESIS FLUID     AFB Culture Culture in progress.     AFB Smear Results No acid fast bacilli seen.    Narrative:       CALL  [place]  183 tel. [phone],  CALLED  183 tel. [phone] 07/11/2018, 12:41, RB PERF. RESULTS CALLED TO:  RN [id number]  src:pleural effusion    FUNGAL CULTURE [[id number]] Collected:  07/10/18 1635    Order Status:  Completed Specimen:  Body Fluid from Thoracentesis Fluid Updated:  07/12/18 1327     Significant Indicator NEG     Source BF     Site THORACENTESIS FLUID     Fungal Culture Culture in progress.    Narrative:       CALL  [place]  183 tel. [phone],  CALLED  183 tel. [phone] 07/11/2018, 12:41, RB PERF. RESULTS CALLED TO:  ROLANDO [id number]  src:pleural effusion    AFB [[id number]] Collected:  07/12/18 1150    Order Status:  Sent Specimen:  Sputum from Sputum     RESP CULTURE [[id number]] Collected:  07/12/18 1150    Order Status:  Sent Specimen:  Sputum from Sputum     FUNGAL CULTURE [[id number]] Collected:  07/12/18 1150    Order Status:  Sent Specimen:  Sputum from Sputum     AFB CULTURE [[id number]] Collected:  07/11/18 1848    Order Status:  Sent Specimen:  Sputum from Sputum     AFB CULTURE [[id number]]     Order Status:  Canceled Specimen:  Sputum from Nasopharyngeal Aspirate     BLOOD CULTURE [[id number]] Collected:  07/10/18 0000    Order Status:  Canceled Specimen:  Other from Peripheral     BLOOD CULTURE [[id number]] Collected:  07/10/18 0000    Order Status:  Canceled Specimen:  Other from Peripheral         Blood Culture   Date Value Ref Range Status   07/12/2018   Preliminary    No Growth    Note: Blood cultures are incubated for 5 days and  are monitored continuously.Positive blood cultures  are called to the RN and  reported as soon as  they are identified.     07/12/2018   Preliminary    No Growth    Note: Blood cultures are incubated for 5 days and  are monitored continuously.Positive blood cultures  are called to the RN and reported as soon as  they are identified.          Studies:  Dx-chest-portable (1 View)    Result Date: 7/15/2018    7/15/2018 4:29 AM HISTORY/REASON FOR EXAM: For indication of respiratory failure Line placement TECHNIQUE/EXAM DESCRIPTION:  Single AP view of the chest. COMPARISON: Yesterday FINDINGS: Position of medical devices appears stable. The cardiac silhouette appears within normal limits. The mediastinal contour appears within normal limits.  The central pulmonary vasculature appears normal. Hazy bilateral pulmonary opacities are seen. Residual left pneumothorax is seen similar to prior study. No significant pleural effusions are identified. The bony structures appear age-appropriate.  Soft tissue gas in the left chest wall is seen.     1.  Stable pulmonary infiltrates and/or edema with probable component of chronic underlying lung disease. 2.  Left pneumothorax, stable since prior with 2 thoracostomy tubes in place.     Dx-chest-portable (1 View)    Result Date: 7/14/2018 7/14/2018 5:13 AM HISTORY/REASON FOR EXAM: For indication of respiratory failure Line placement TECHNIQUE/EXAM DESCRIPTION:  Single AP view of the chest. COMPARISON: Yesterday FINDINGS: Position of medical devices appears stable. The cardiac silhouette appears within normal limits. The mediastinal contour appears within normal limits.  The central pulmonary vasculature appears normal. Increased opacification throughout the right hemithorax is seen. There is decreased opacification within the left hemithorax. Residual left pneumothorax is seen similar to prior study. No significant pleural effusions are identified. The bony structures appear age-appropriate.  Soft tissue gas in the left chest wall is seen.     1.  Increased  opacification of the right hemithorax, compatible with new large pleural effusion and/or infiltrates and/or atelectasis. 2.  Left pneumothorax, stable since prior with 2 thoracostomy tubes in place.    Dx-chest-portable (1 View)    Result Date: 7/13/2018 7/13/2018 7:21 AM HISTORY/REASON FOR EXAM:  Respiratory failure. TECHNIQUE/EXAM DESCRIPTION AND NUMBER OF VIEWS: Single portable view of the chest. COMPARISON: 7/12/2018 FINDINGS: LUNGS: Multifocal patchy consolidations are reidentified. Stable cavity in the right lung apex, with improving opacities in the right perihilar and infrahilar regions. PNEUMOTHORAX: Stable left pneumothorax and subcutaneous emphysema of the left chest wall. LINES AND TUBES: Stable well-positioned ETT. Stable right IJ central catheter, tip in the superior cavoatrial junction. Stable left chest tubes. MEDIASTINUM: No cardiomegaly. MUSCULOSKELETAL STRUCTURES: No acute fracture. Skin staples in the left lateral chest wall.     1. Improving opacities in the right perihilar and infrahilar regions. Otherwise stable multifocal patchy consolidations. 2. Stable lines and tubes. 3. Stable left pneumothorax subcutaneous emphysema of the left chest wall.    Dx-chest-portable (1 View)    Result Date: 7/12/2018 7/12/2018 12:11 PM HISTORY/REASON FOR EXAM:  POST INTUBATION. TECHNIQUE/EXAM DESCRIPTION AND NUMBER OF VIEWS: Single portable view of the chest. COMPARISON: 7/12/2018 FINDINGS: Interval improvement of RIGHT pleural fluid collection and increased aeration of RIGHT lower lung. Patchy consolidation again seen in the RIGHT upper lung. Focal nodular densities are seen in the LEFT lung as well as consolidation at the mid lung level. Small LEFT pneumothorax is unchanged, with multiple LEFT chest tubes present. Increasing LEFT chest wall emphysema. Other supportive tubing is unchanged.     1.  Interval improved aeration of RIGHT lung base with improvement of RIGHT pleural effusion. 2.  Stable LEFT  pneumothorax with chest tubes present. 3.  Patchy consolidation again seen in both lungs with basilar densities in the LEFT upper lung again noted. 4.  Increasing LEFT chest wall emphysema.    Dx-chest-portable (1 View)    Result Date: 7/12/2018 7/12/2018 3:16 AM HISTORY/REASON FOR EXAM: Line placement TECHNIQUE/EXAM DESCRIPTION:  Single AP view of the chest. COMPARISON: July 10, 2018 FINDINGS: Right internal jugular central line is seen terminating at the right atriocaval junction.  To left thoracostomy tube is in place terminating at the left apex and left lung base. The cardiac silhouette appears within normal limits. The mediastinal contour appears within normal limits.  The central pulmonary vasculature appears normal. The lungs appear well expanded bilaterally.  Increased opacification throughout the right hemithorax is seen. There is decreased opacification within the left hemithorax. Residual left pneumothorax is seen similar to prior study. No significant pleural effusions are identified. The bony structures appear age-appropriate.  Soft tissue gas in the left chest wall is seen.     1.  Increased opacification of the right hemithorax, compatible with new large pleural effusion and/or infiltrates and/or atelectasis. 2.  Improved aeration of the left hemithorax status post thoracostomy tube placement. Residual infiltrates in the left lung are seen. 3.  Left pneumothorax, stable since prior with 2 thoracostomy tubes in place.    Dx-chest-portable (1 View)    Result Date: 7/10/2018  7/10/2018 6:14 PM HISTORY/REASON FOR EXAM:  Post thoracentesis left chest. TECHNIQUE/EXAM DESCRIPTION AND NUMBER OF VIEWS: Single portable view of the chest. COMPARISON: 7/10/2018 FINDINGS: The mediastinal and cardiac silhouette is partially obscured by the left-sided parenchymal opacity and pleural fluid The right pulmonary vascularity is within normal limits. Multiple lung masses are seen on the left. There is an airspace  process in the left mid and lower hemithorax. There is ill-defined nodular parenchymal opacity in the right upper lobe with some hilar retraction. There is no significant pleural effusion. There was a previous hydropneumothorax seen on the outside prior chest x-ray. Pneumothorax is still seen on the lateral aspect of the left chip-thorax. There are no acute bony abnormalities.     1.  There has been interval decrease in the left pleural effusion. 2.  There is still some component of LEFT pneumothorax although the hydropneumothorax air-fluid level is no longer evident. 3.  Multiple left bone or masses are again seen. 4.  Left lower lobe airspace disease is again seen. 5.  Ill-defined nodular and linear opacity in the right upper lobe with pleural thickening and hilar retraction is again seen.    Us-thoracentesis Puncture Left    Result Date: 7/10/2018  7/10/2018 4:01 PM HISTORY/REASON FOR EXAM: Pleural effusion TECHNIQUE/EXAM DESCRIPTION: Ultrasound-guided thoracentesis. Indication:  LEFT pleural fluid collection. COMPARISON:  None PROCEDURE:     Informed consent was obtained. A timeout was taken. A left pleural effusion was localized with real-time ultrasound guidance. The left posterior chest wall was prepped and draped in a sterile manner. Dr. Fien performed the procedure  with my guidance.  Following local anesthesia with 1% lidocaine, a 5 Malian Yueh pigtail catheter was advanced into the pleural space with trocar technique and pleural fluid was drained. The patient tolerated the procedure well without evidence of complication. A post thoracentesis chest radiograph is forthcoming. FINDINGS: Highly complicated left large volume pleural fluid has extensive internal debris Fluid was  sent to the laboratory. Fluid character: purulent     1. Ultrasound guided left sided diagnostic thoracentesis. 2. 500 mL of fluid withdrawn. Additional fluid was not removed as the catheter became occluded by the thick  liquid    Dx-abdomen For Tube Placement    Result Date: 7/14/2018 7/14/2018 12:59 PM HISTORY/REASON FOR EXAM:  Cortrak evaluation. TECHNIQUE/EXAM DESCRIPTION AND NUMBER OF VIEWS:  1 view(s) of the abdomen. COMPARISON:  None. FINDINGS: Cortrak feeding tube tip projects in the region of the gastric fundus. The tube coils back upon itself from the mid stomach. The thoracic course appears appropriate. The bowel gas pattern is within normal limits.     Cortrak feeding tube tip projects in the region of the gastric fundus.      IMPRESSION:   1.  Severe sepsis   2.  Vent dependent respiratory failure  3.  Left lung empyema  4.  Group F Streptococcus infection      PLAN:   Donna Ceballos is a 58 y.o. man with a history of long-standing tobacco use admitted from an outside hospital where he was found to have an empyema on CT in the setting of worsening shortness of breath, chest pain and productive cough.  He is status post left thoracentesis on 7/10 with fluid analysis consistent with empyema.  Patient subsequently underwent a left thoracotomy, decortication, debridement of necrotic lung and rib resection on 7/11 with Dr. Martínez.  Course complicated by acute hypoxic respiratory failure and hypotension requiring intubation and pressors.  Pressors have since been weaned however the patient remains on mechanical ventilation.  OR cultures are positive for group F Streptococcus.  Blood cultures are negative to date agree with Unasyn for now.  Anticipate 4 weeks of antibiotics from 7/11 with at least 2 weeks of IV antibiotics.  Continue supportive care and wean off the vent as able.  Chest tube removal per CT surgery.  Further recommendations per clinical course.      Plan of care discussed with IM [facility] resident. Will continue to follow    Tessa Rich M.D.

## 2018-07-15 NOTE — PROGRESS NOTES
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  The patient initially was seen at an   outside facility with approximately 4 days of increasing dyspnea on exertion,   cough with purulent sputum, and chest pain and discomfort with deep   inspiration.  He also indicates that he has had approximately 10 pounds of   weight loss since March of this year.  He denies any fever, chills, or sweats.    Denies any night sweats.  No lymphadenopathy, headache, visual changes, neck   stiffness.  Denies any nausea, vomiting, abdominal pain, diarrhea, or lower   extremity edema.  Patient says he has never been outside of [place].  He   has never been to senior living or care home.  While at the outside facility, patient had   CT scan that showed a significant left greater than right changes with   suggestive empyema as well as multiloculated abscess versus cavitary masses.    Patient was transferred to [facility] for higher level of care and further   evaluation.  Patient indicates at the present time that he is not in any   current discomfort.  Denies any significant sick contacts.  Denies any history   of known lung problems.  Denies any environmental exposures.  Has a cat as a   pet.    Interval Events:  24 hour interval history reviewed   Tm 98.8  +2.6L over last 24hr, +7.7L since admit  cxr with ongoing left sided pneumo, b/l opacities, chest tube x 2 in place  Wbc 13->10  K 3.6  Cr 0.37  Mg 2.0    Thora 7/10 Group F B-Strep  Left Lung Tissue 7/11 Group F B-Strep  Bcx 7/12 ngtd  Bal 7/12 Group F B-Strep    NS @ 75  Prop @ 20  Unaysn  Vanco  Hydrocoritosol 50 @Q6    S/P Left thoracotomy and decortication w evac of emyema 7/11  Chest  tube x 2 on left with + airleak in both      Review of Systems   Unable to perform ROS: Acuity of condition     Physical Exam   Constitutional: He appears well-developed.   Malnourished appearing   HENT:   Head: Normocephalic and atraumatic.   Eyes: Conjunctivae are normal. Pupils are equal, round, and reactive to light.   Neck: No tracheal deviation present.   Cardiovascular: Normal rate and regular rhythm.    Pulmonary/Chest: He has no wheezes. He has no rales.   Diminished with scattered rhonchi   Abdominal: Soft. He exhibits no distension. There is no tenderness.   Musculoskeletal: He exhibits no edema.   Neurological: No cranial nerve deficit.   Skin: Skin is warm and dry. Capillary refill takes less than 2 seconds. No cyanosis.   Psychiatric:   sedate   Nursing note and vitals reviewed.      PFSH:  No change.    Respiratory:  [place] Vent Mode: APVCMV, Rate (breaths/min): 20, Vt Target (mL): 450, PEEP/CPAP: 8, FiO2: 30, Static Compliance (ml / cm H2O): 39, Control VTE (exp VT): 416  Pulse Oximetry: 100 %                      Invalid input(s): [id number]    HemoDynamics:  Pulse: 79, Heart Rate (Monitored): 79  Arterial BP: 142/62, NIBP: 137/70               Neuro:  GCS             Fluids:  Intake/Output       07/13/18 0700 - 07/14/18 0659 07/14/18 0700 - 07/15/18 0659 07/15/18 0700 - 07/16/18 0659      0910-2461 3305-6044 Total 4009-8192 5234-1005 Total 0357-4112 5548-3972 Total       Intake    I.V.  2157  1828.3 3985.3  1628.7  1813.6 3442.3  --  -- --    Propofol Volume 173.9 207.6 381.5 128.7 93.6 222.3 -- -- --    Norepinephrine Volume 83.1 20.7 103.8 -- -- -- -- -- --    IV Volume (TKO) -- -- -- -- 120 120 -- -- --    IV Volume (0.9% NS)   -- -- --    IV Volume (Potassium) -- -- -- 100 -- 100 -- -- --    IV Volume (Unasyn) 200 200 400 200 200 400 -- -- --    IV Volume (Vancomycin) -- 500 500 300 500 800 -- -- --    IV Volume (Vancomycin) 300 -- 300 -- -- -- -- -- --    IV  Volume (K Phos) 500 -- 500 -- -- -- -- -- --    Other  --  -- --  --  30 30  --  -- --    Medications (P.O./ Enteral Liquids) -- -- -- -- 30 30 -- -- --    Enteral  --  -- --  206  150 356  --  -- --    Free Water / Tube Flush -- -- -- 60 150 210 -- -- --    Intake (mL) (Enteral Tube Right Nare Cortrak Small Bowel Feeding Tube) -- -- -- 146 -- 146 -- -- --    Total Intake 2157 1828.3 3985.3 1834.7 1993.6 3828.3 -- -- --       Output    Urine  445  460 905  440  375 815  --  -- --    Output (mL) (Urinary Catheter Indwelling Catheter 16) 445 460 905 440 375 815 -- -- --    Chest Tube  230  260 490  190  200 390  --  -- --    Output (mL) (Chest Tube Group 1 (A) Left straight 32) 30 10 40 40 0 40 -- -- --    Output (mL) (Chest Tube Group 2 (B) Left angled 32) 200 250 450 150 200 350 -- -- --    Total Output   -- -- --       Net I/O     1482 1108.3 2590.3 1204.7 1418.6 2623.3 -- -- --        Weight: 85.4 kg (188 lb 4.4 oz)  Recent Labs      07/13/18   0430  07/14/18   0435  07/15/18   0410   SODIUM  141  142  145   POTASSIUM  3.9  3.7  3.6   CHLORIDE  109  113*  117*   CO2  20  20  20   BUN  10  10  15   CREATININE  0.40*  0.33*  0.37*   MAGNESIUM  2.1  2.0  2.0   PHOSPHORUS  2.0*  2.9  2.5   CALCIUM  7.4*  7.4*  7.4*       GI/Nutrition:      Liver Function  Recent Labs      07/13/18   0430  07/14/18   0435  07/15/18   0410   GLUCOSE  119*  107*  130*       Heme:  Recent Labs      07/13/18   0430  07/14/18   0435  07/15/18   0410   RBC  3.28*  2.99*  3.16*   HEMOGLOBIN  9.2*  8.4*  8.6*   HEMATOCRIT  27.9*  26.4*  28.1*   PLATELETCT  599*  466*  499*       Infectious Disease:  Temp  Av.7 °C (98.1 °F)  Min: 36.4 °C (97.5 °F)  Max: 37.1 °C (98.8 °F)  Micro: cultures reviewed  Recent Labs      18   0430  18   0435  07/15/18   0410   WBC  18.8*  13.8*  10.5   NEUTSPOLYS  90.30*  83.30*   --    LYMPHOCYTES  8.80*  10.90*   --    MONOCYTES  0.90  2.50   --    EOSINOPHILS  0.00  0.00    --    BASOPHILS  0.00  0.20   --      Current Facility-Administered Medications   Medication Dose Frequency Provider Last Rate Last Dose   • oxyCODONE immediate-release (ROXICODONE) tablet 5 mg  5 mg Q4HRS PRN Frederic Perez M.D.        Or   • oxyCODONE immediate release (ROXICODONE) tablet 10 mg  10 mg Q4HRS PRN Frederic Perez M.D.       • hydrocortisone sodium succinate PF (SOLU-CORTEF) 100 MG injection 50 mg  50 mg Q6HRS Kip Espino M.D.   50 mg at 07/15/18 0535   • vasopressin (VASOSTRICT) 20 Units in  mL Infusion  0.03 Units/min Continuous Erasmo Johnson M.D.   Stopped at 07/12/18 0130   • famotidine (PEPCID) injection 20 mg  20 mg BID Carlton Mcintyre M.D.   20 mg at 07/15/18 0536   • Respiratory Care per Protocol   Continuous RT Carlton Mcintyre M.D.       • senna-docusate (PERICOLACE or SENOKOT S) 8.6-50 MG per tablet 2 Tab  2 Tab BID Carlton Mcintyre M.D.   2 Tab at 07/15/18 0536    And   • polyethylene glycol/lytes (MIRALAX) PACKET 1 Packet  1 Packet QDAY PRN Carlton Mcintyre M.D.        And   • magnesium hydroxide (MILK OF MAGNESIA) suspension 30 mL  30 mL QDAY PRN Carlton Mcintyre M.D.        And   • bisacodyl (DULCOLAX) suppository 10 mg  10 mg QDAY PRN Carlton Mcintyre M.D.       • MD ALERT...Adult ICU Electrolyte Replacement per Pharmacy Protocol   pharmacy to dose Carlton Mcintyre M.D.       • fentaNYL (SUBLIMAZE) injection 25 mcg  25 mcg Q HOUR PRN Carlton Mcintyre M.D.        Or   • fentaNYL (SUBLIMAZE) injection 50 mcg  50 mcg Q HOUR PRN Carlton Mcintyre M.D.   50 mcg at 07/15/18 0210    Or   • fentaNYL (SUBLIMAZE) injection 100 mcg  100 mcg Q HOUR PRN Carlton Mcintyre M.D.   100 mcg at 07/13/18 0829   • ipratropium-albuterol (DUONEB) nebulizer solution  3 mL Q2HRS PRN (RT) Carlton Mcintyre M.D.       • ipratropium-albuterol (DUONEB) nebulizer solution  3 mL Q4HRS (RT) Carlton Mcintyre M.D.   3 mL at 07/15/18 0314   • MD ALERT... vancomycin per pharmacy protocol   pharmacy to  dose Yogi Jesus M.D.       • vancomycin 1,100 mg in  mL IVPB  17 mg/kg Q12HR Yogi Jesus M.D.   Stopped at 07/15/18 0140   • norepinephrine (LEVOPHED) 8 mg in  mL Infusion  0-30 mcg/min Continuous Erasmo Johnson M.D.   Stopped at 07/14/18 0400   • propofol (DIPRIVAN) injection  0-80 mcg/kg/min Continuous Erasmo Johnson M.D. 7.8 mL/hr at 07/14/18 2340 20 mcg/kg/min at 07/14/18 2340   • NS infusion   Continuous Helene Smith M.D. 75 mL/hr at 07/15/18 0541     • ondansetron (ZOFRAN) syringe/vial injection 4 mg  4 mg Q4HRS PRN Shruthi Phan M.D.       • ondansetron (ZOFRAN ODT) dispertab 4 mg  4 mg Q4HRS PRN Shruthi Phan M.D.       • promethazine (PHENERGAN) tablet 12.5-25 mg  12.5-25 mg Q4HRS PRN Shruthi Phan M.D.       • promethazine (PHENERGAN) suppository 12.5-25 mg  12.5-25 mg Q4HRS PRN Shruthi Phan M.D.       • prochlorperazine (COMPAZINE) injection 5-10 mg  5-10 mg Q4HRS PRN Shruthi Phan M.D.       • acetaminophen (TYLENOL) tablet 650 mg  650 mg Q6HRS PRN Shruthi Phan M.D.       • nicotine (NICODERM) 21 MG/24HR 21 mg  21 mg Daily-0600 Shruthi Phan M.D.   21 mg at 07/15/18 0536    And   • nicotine polacrilex (NICORETTE) 2 MG piece 2 mg  2 mg Q HOUR PRN Shruthi Phan M.D.       • ampicillin/sulbactam (UNASYN) 3 g in  mL IVPB  3 g Q6HRS Helene Smith M.D.   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 and persisting small pneumothorax   - s/p left decort 7/11   - thora 7/10 with Group F B-strep   - on IV abx - may need 4-6-week course post op   - Chest tube in place with drainage, ongoing airleak    Hypotension - improved   - sepsis protocols   - continue abx IV   - wean steroids    Leukocytosis   - related to pulmonary infection   - thora w group F b-strep   - Follow blood cultures   - abx    Chronic Etoh use   - monitor for w/d   - thiamine/folate/mvi    Chronic tob use   -  on cessation when appropriate    - Bronchodilators, RT protocols        Discussed patient condition and risk of morbidity and/or mortality with RN, RT, Pharmacy, [facility] resident and Charge nurse / hot rounds.      The patient remains critically ill.  Critical care time = 33 minutes in directly providing and coordinating critical care and extensive data review.  No time overlap and excludes procedures.

## 2018-07-15 NOTE — PROGRESS NOTES
Internal Medicine Interval Note  Note Author: Jose Bourne M.D.     Name Donna Ceballos 1960   Age/Sex 58 y.o. male   MRN [id number]   Code Status FULL     After 5PM or if no immediate response to page, please call for cross-coverage  Attending/Team: Miguelina / Jonas  See Patient List for primary contact information  Call [phone] to page    1st Call - Day Resident (R2/3):   Tayla      Ventilator day: 5  Reason for interval visit  (Principal Problem)   Empyema  Acute respiratory failure    Mr. 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 He presented with dyspnea on exertion and production of sputum with cough associated with 10 pound weight loss since March. He was transferred from the outside facility due to findings of empyema left greater than right and multiloculated abscess versus cavitary masses. Patient was admitted to the ICU following his left thoracostomy with continued intubation and management of chest tubes.    Procedures:  7/10-thoracentesis of large left pleural effusion with thick turbid creamy yellow pus.-Interventional radiology  -left thoracostomy, decortication, evacuation of purulent empyema, debridement of necrotic lung, and rib resection.-Dr. Diego Martínez  -Central line placement  -emergent diagnostic and therapeutic bronchoscopy with aspiration and bronchial alveolar lavage    Interval Problem Daily Status Update  (24 hours, problem oriented, brief subjective history, new lab/imaging data pertinent to that problem)     Continue Vanc and unasyn   Thoracentesis fluid cultures show Gram positive cocci and Beta strep group F.     WBCs trending down    NG tube for nutrition  On ventilator- 20/450/+8/50fio2    NEURO:  Opening eyes spontaneously.  Writing on board. Moves all extremities with full strength.  Acetaminophen, Tylenol,  oxycodone, propofol 20 mg/kg/m  CVS: HR 50-80s       -150s/60-70s  Hydrocortisone decreased to 50 mg IV every 12 hours. Wean off as tolerated  PULM:  RR    20-27      SpO2 100%  Ventilator day #5    Spontaneous 5/8/0.3  Left chest tube x2 (placed 7/11) out 390 mL in 24 hours.     Chest x-ray 7/15: Stable pulmonary infiltrates and/or edema with probable component of chronic underlying lung disease. Left pneumothorax, stable since prior with 2 thoracostomy tubes in place.  GI:  Tube feeds Impact Peptide 1.5 at 45 mL/hour. Tolerating. Bowel movement this morning, semi-firm.  :  I/O 3828/1205 (+2.6 L)  IV fluids with normal saline at 75 mL per hour. Changed to TKO.  HEME/ID:  Tmax  98.8 Tc  97.9 , WBC 13.8->10.5, hemoglobin 8.6. Platelets 499  Multiple AFB smears negative since 7/10/2018, airborne isolation discontinued  Tissue 7/11: Beta Streptococcus Group F  BAL 7/12: Beta Streptococcus Group F  Ampicillin/sulbactam started 7/10, day #6  Vancomycin 7/10-7/15-discontinued.  ID consultation requested with Dr. Rich for antibiotic selection and duration.  ENDO:  Blood glucose  130.  DVT:  SCDs      Review of Systems   Unable to perform ROS: Intubated   Constitutional: Negative for chills and fever.   HENT: Negative for ear pain.    Eyes: Negative for blurred vision and double vision.   Cardiovascular: Negative for chest pain.   Gastrointestinal: Negative for abdominal pain.   Genitourinary: Negative for dysuria and urgency.   Musculoskeletal: Negative for back pain, joint pain and neck pain.   Skin: Negative for itching and rash.   Neurological: Negative for headaches.     Says no to pain anywhere and otherwise feels okay. Was able to sleep overnight. Intubated, alert, so responds to a few yes/no questions    Disposition/Barriers to discharge:   ICU/intubated    Consultants/Specialty  General surgery  Pulmonary/critical care  Infectious diseases-pending    Quality Measures  Quality-Core Measures   Reviewed  items::  Labs reviewed, Medications reviewed and Radiology images reviewed  Motley catheter::  Unconscious / Sedated Patient on a Ventilator  DVT prophylaxis pharmacological::  Contraindicated - High bleeding risk  DVT prophylaxis - mechanical:  SCDs  Antibiotics:  Treating active infection/contamination beyond 24 hours perioperative coverage          Physical Exam       Vitals:    07/15/18 0400 07/15/18 0500 07/15/18 0600 07/15/18 0700   BP:       Pulse: 63 62 79 87   Resp: 20 20 20 20   Temp: 36.4 °C (97.6 °F)  36.6 °C (97.9 °F)    SpO2: 100% 100% 100% 100%   Weight: 85.4 kg (188 lb 4.4 oz)      Height:         Body mass index is 25.53 kg/m². Weight: 85.4 kg (188 lb 4.4 oz)  Oxygen Therapy:  Pulse Oximetry: 100 %, FiO2%: 30 %, O2 Delivery: Ventilator    Physical Exam   Constitutional: No distress.   Intubated, awake and writing on paper.   HENT:   Head: Normocephalic and atraumatic.   Eyes: Conjunctivae and EOM are normal. Right eye exhibits no discharge. Left eye exhibits no discharge.   Neck: Neck supple. No JVD present. No tracheal deviation present. No thyromegaly present.   Cardiovascular: Normal rate, regular rhythm and normal heart sounds.  Exam reveals no friction rub.    No murmur heard.  Pulmonary/Chest: Effort normal. No stridor. No respiratory distress. He has wheezes.   Coarse breath sounds bilaterally   Abdominal: Soft. He exhibits no distension. There is no tenderness. There is no rebound and no guarding.   Musculoskeletal: He exhibits no edema.   Neurological: He is alert. No cranial nerve deficit. He exhibits normal muscle tone.   Alert with appropriate responses as possible  Moves all extremities and follows instructions   Skin: Skin is warm and dry. No rash noted. He is not diaphoretic. No erythema.   Psychiatric:   Intubated   Nursing note and vitals reviewed.            Assessment/Plan     Panlobular emphysema (HCC)- (present on admission)   Assessment & Plan    Current every day  smoker  Intubated, scheduled duonebs  Plan:  -Continue Unasyn for empyema.  -Wean to extubation.        Empyema (HCC)- (present on admission)   Assessment & Plan    Pt presents with cough, greenish and bloody sputum, dyspnea on exertion and orthopnea since March of this year.  Labs at other hosp - WBC 15.5K and Lactic acid 4  CT at the other hospital - large empyema with cavitary lesion/masses in the right upper lobe.  500 ml of thick fluid drained.  Patient seen by pulmonary and surgery  Pleural fluid: turbid, brown, WBC - 648030, RBC - 246047, Glucose- <10, protein - 3.9; exudative  7/11/2018 -  Left thoracotomy, decortication, evacuation of purulent empyema, debridement of necrotic lung, rib resection by surgery, Dr. Diego Martínez. 1 L of purulent pus was evacuated from the left chest cavity  Thoracentesis fluid cultures show Gram positive cocci and Beta strep group F.   Plan  -Discontinue vancomycin.  -Continue Unasyn.  -Wean to extubation.  -Decrease IV fluids to keep open.  -Infectious diseases consultation to guide antibody choice in length of treatment. Dr. Rich to see.        Protein malnutrition (HCC)- (present on admission)   Assessment & Plan    Patient appears malnourished.  Lost around 10 lbs since March when the symptoms started.  IV fluids  Plan:  -Continue tube feeds at goal.          Pulmonary cachexia due to COPD (HCC)- (present on admission)   Assessment & Plan    Patient states he lost 10 lbs since March when the symptoms started.  Unasyn , vancomycin   Intubated following thoracentesis and decortication procedure.  Plan:  -Wean to extubation.  -Continue tube feeds at goal.        Pulmonary parenchymal mass- (present on admission)   Assessment & Plan    Studies of empyema/thora pending        Tobacco abuse- (present on admission)   Assessment & Plan    Nicotine patches while IP.   Tobacco cessation counseling  Patient says is ready to quit.  Plan:  -Continue nicotine patches.         Leukocytosis- (present on admission)   Assessment & Plan    WBC - 15.5-> 13.8-> 10.5. Improving. Likely secondary to infectious process and steroids.  Plan:  -Continue otherwise as per above.

## 2018-07-15 NOTE — PROGRESS NOTES
Pt sister called per pt request to update on his condition and status.  Message left for Rianna to return call.

## 2018-07-15 NOTE — CARE PLAN
Problem: Ventilation Defect:  Goal: Ability to achieve and maintain unassisted ventilation or tolerate decreased levels of ventilator support    Intervention: Support and monitor invasive and noninvasive mechanical ventilation  Adult Ventilation Update    Total Vent Days: 4    Patient Lines/Drains/Airways Status    Active Airway     Name: Placement date: Placement time: Site: Days:    Airway Group ET Tube Oral 8.0 07/11/18 2143   Oral   4              #FVC / Vital Capacity (liters) : 0.8 (07/14/18 1550)  NIF (cm H2O) : -20 (07/14/18 1550)  Rapid Shallow Breathing Index (RR/VT): 56 (07/14/18 1550)  Plateau Pressure (Q Shift): 21 (07/13/18 1939)  Static Compliance (ml / cm H2O): 34 (07/14/18 1700)      Sputum/Suction   Cough: Productive (07/14/18 1446)  Sputum Amount: Scant (07/14/18 1600)  Sputum Color: Yellow;Tan (07/14/18 1446)  Sputum Consistency: Thick (07/14/18 1446)    Mobility  Level of Mobility: Level III (07/14/18 0000)  Activity Performed: Edge of bed (07/14/18 0000)  Time Activity Tolerated: 10 min (07/14/18 0000)    Events/Summary/Plan: sbt bid, One 15 minutes, 2nd one- 1 hour with fair parmaeters. Large amt secretions (07/14/18 1700)

## 2018-07-15 NOTE — CARE PLAN
Problem: Skin Integrity  Goal: Risk for impaired skin integrity will decrease    Intervention: Assess risk factors for impaired skin integrity and/or pressure ulcers  Pt at risk for loss of skin integrity. Pt turned q2hrs with pillows in position for offloading of bony prominences. Mepilex on sacrum. Interventions in place.

## 2018-07-16 ENCOUNTER — APPOINTMENT (OUTPATIENT)
Dept: RADIOLOGY | Facility: MEDICAL CENTER | Age: 58
DRG: 853 | End: 2018-07-16
Attending: INTERNAL MEDICINE

## 2018-07-16 PROBLEM — J96.01 ACUTE HYPOXEMIC RESPIRATORY FAILURE (HCC): Status: ACTIVE | Noted: 2018-07-16

## 2018-07-16 LAB
ALBUMIN SERPL BCP-MCNC: 2 G/DL (ref 3.2–4.9)
ALBUMIN/GLOB SERPL: 0.6 G/DL
ALP SERPL-CCNC: 51 U/L (ref 30–99)
ALT SERPL-CCNC: 14 U/L (ref 2–50)
ANION GAP SERPL CALC-SCNC: 8 MMOL/L (ref 0–11.9)
AST SERPL-CCNC: 11 U/L (ref 12–45)
BASOPHILS # BLD AUTO: 0.2 % (ref 0–1.8)
BASOPHILS # BLD: 0.02 K/UL (ref 0–0.12)
BILIRUB SERPL-MCNC: 0.2 MG/DL (ref 0.1–1.5)
BUN SERPL-MCNC: 20 MG/DL (ref 8–22)
CALCIUM SERPL-MCNC: 7.8 MG/DL (ref 8.5–10.5)
CHLORIDE SERPL-SCNC: 116 MMOL/L (ref 96–112)
CO2 SERPL-SCNC: 22 MMOL/L (ref 20–33)
CREAT SERPL-MCNC: 0.34 MG/DL (ref 0.5–1.4)
CRP SERPL HS-MCNC: 2.93 MG/DL (ref 0–0.75)
EOSINOPHIL # BLD AUTO: 0.07 K/UL (ref 0–0.51)
EOSINOPHIL NFR BLD: 0.6 % (ref 0–6.9)
ERYTHROCYTE [DISTWIDTH] IN BLOOD BY AUTOMATED COUNT: 58.4 FL (ref 35.9–50)
GLOBULIN SER CALC-MCNC: 3.6 G/DL (ref 1.9–3.5)
GLUCOSE SERPL-MCNC: 120 MG/DL (ref 65–99)
HCT VFR BLD AUTO: 27.8 % (ref 42–52)
HGB BLD-MCNC: 8.7 G/DL (ref 14–18)
IMM GRANULOCYTES # BLD AUTO: 0.24 K/UL (ref 0–0.11)
IMM GRANULOCYTES NFR BLD AUTO: 2.2 % (ref 0–0.9)
LYMPHOCYTES # BLD AUTO: 2.55 K/UL (ref 1–4.8)
LYMPHOCYTES NFR BLD: 23.3 % (ref 22–41)
MAGNESIUM SERPL-MCNC: 2.1 MG/DL (ref 1.5–2.5)
MCH RBC QN AUTO: 27.6 PG (ref 27–33)
MCHC RBC AUTO-ENTMCNC: 31.3 G/DL (ref 33.7–35.3)
MCV RBC AUTO: 88.3 FL (ref 81.4–97.8)
MONOCYTES # BLD AUTO: 0.62 K/UL (ref 0–0.85)
MONOCYTES NFR BLD AUTO: 5.7 % (ref 0–13.4)
NEUTROPHILS # BLD AUTO: 7.43 K/UL (ref 1.82–7.42)
NEUTROPHILS NFR BLD: 68 % (ref 44–72)
NRBC # BLD AUTO: 0 K/UL
NRBC BLD-RTO: 0 /100 WBC
PHOSPHATE SERPL-MCNC: 2.2 MG/DL (ref 2.5–4.5)
PLATELET # BLD AUTO: 495 K/UL (ref 164–446)
PMV BLD AUTO: 9.6 FL (ref 9–12.9)
POTASSIUM SERPL-SCNC: 3.8 MMOL/L (ref 3.6–5.5)
PREALB SERPL-MCNC: 12 MG/DL (ref 18–38)
PROT SERPL-MCNC: 5.6 G/DL (ref 6–8.2)
RBC # BLD AUTO: 3.15 M/UL (ref 4.7–6.1)
SODIUM SERPL-SCNC: 146 MMOL/L (ref 135–145)
TRIGL SERPL-MCNC: 167 MG/DL (ref 0–149)
WBC # BLD AUTO: 10.9 K/UL (ref 4.8–10.8)

## 2018-07-16 PROCEDURE — 94667 MNPJ CHEST WALL 1ST: CPT

## 2018-07-16 PROCEDURE — 37799 UNLISTED PX VASCULAR SURGERY: CPT

## 2018-07-16 PROCEDURE — 770022 HCHG ROOM/CARE - ICU (200)

## 2018-07-16 PROCEDURE — 700105 HCHG RX REV CODE 258: Performed by: STUDENT IN AN ORGANIZED HEALTH CARE EDUCATION/TRAINING PROGRAM

## 2018-07-16 PROCEDURE — 84134 ASSAY OF PREALBUMIN: CPT

## 2018-07-16 PROCEDURE — 700102 HCHG RX REV CODE 250 W/ 637 OVERRIDE(OP): Performed by: INTERNAL MEDICINE

## 2018-07-16 PROCEDURE — G8996 SWALLOW CURRENT STATUS: HCPCS | Mod: CI

## 2018-07-16 PROCEDURE — A9270 NON-COVERED ITEM OR SERVICE: HCPCS | Performed by: STUDENT IN AN ORGANIZED HEALTH CARE EDUCATION/TRAINING PROGRAM

## 2018-07-16 PROCEDURE — 99291 CRITICAL CARE FIRST HOUR: CPT | Performed by: INTERNAL MEDICINE

## 2018-07-16 PROCEDURE — 71045 X-RAY EXAM CHEST 1 VIEW: CPT

## 2018-07-16 PROCEDURE — 86140 C-REACTIVE PROTEIN: CPT

## 2018-07-16 PROCEDURE — 92610 EVALUATE SWALLOWING FUNCTION: CPT

## 2018-07-16 PROCEDURE — A9270 NON-COVERED ITEM OR SERVICE: HCPCS | Performed by: INTERNAL MEDICINE

## 2018-07-16 PROCEDURE — 700101 HCHG RX REV CODE 250: Performed by: INTERNAL MEDICINE

## 2018-07-16 PROCEDURE — 80053 COMPREHEN METABOLIC PANEL: CPT

## 2018-07-16 PROCEDURE — 94003 VENT MGMT INPAT SUBQ DAY: CPT

## 2018-07-16 PROCEDURE — G8997 SWALLOW GOAL STATUS: HCPCS | Mod: CH

## 2018-07-16 PROCEDURE — 94640 AIRWAY INHALATION TREATMENT: CPT

## 2018-07-16 PROCEDURE — 82803 BLOOD GASES ANY COMBINATION: CPT

## 2018-07-16 PROCEDURE — 700105 HCHG RX REV CODE 258: Performed by: INTERNAL MEDICINE

## 2018-07-16 PROCEDURE — 700102 HCHG RX REV CODE 250 W/ 637 OVERRIDE(OP): Performed by: STUDENT IN AN ORGANIZED HEALTH CARE EDUCATION/TRAINING PROGRAM

## 2018-07-16 PROCEDURE — 700111 HCHG RX REV CODE 636 W/ 250 OVERRIDE (IP): Performed by: STUDENT IN AN ORGANIZED HEALTH CARE EDUCATION/TRAINING PROGRAM

## 2018-07-16 PROCEDURE — 700111 HCHG RX REV CODE 636 W/ 250 OVERRIDE (IP): Performed by: INTERNAL MEDICINE

## 2018-07-16 PROCEDURE — 83735 ASSAY OF MAGNESIUM: CPT

## 2018-07-16 PROCEDURE — 94150 VITAL CAPACITY TEST: CPT

## 2018-07-16 PROCEDURE — 84100 ASSAY OF PHOSPHORUS: CPT

## 2018-07-16 PROCEDURE — 85025 COMPLETE CBC W/AUTO DIFF WBC: CPT

## 2018-07-16 PROCEDURE — 99233 SBSQ HOSP IP/OBS HIGH 50: CPT | Performed by: INTERNAL MEDICINE

## 2018-07-16 PROCEDURE — 84478 ASSAY OF TRIGLYCERIDES: CPT

## 2018-07-16 RX ORDER — FUROSEMIDE 10 MG/ML
40 INJECTION INTRAMUSCULAR; INTRAVENOUS ONCE
Status: COMPLETED | OUTPATIENT
Start: 2018-07-16 | End: 2018-07-16

## 2018-07-16 RX ORDER — OXYCODONE HYDROCHLORIDE 5 MG/1
2.5 TABLET ORAL EVERY 4 HOURS PRN
Status: DISCONTINUED | OUTPATIENT
Start: 2018-07-16 | End: 2018-07-16

## 2018-07-16 RX ORDER — POTASSIUM CHLORIDE 20 MEQ/1
40 TABLET, EXTENDED RELEASE ORAL DAILY
Status: DISCONTINUED | OUTPATIENT
Start: 2018-07-17 | End: 2018-07-16

## 2018-07-16 RX ORDER — OXYCODONE HYDROCHLORIDE 5 MG/1
5 TABLET ORAL EVERY 4 HOURS PRN
Status: DISCONTINUED | OUTPATIENT
Start: 2018-07-16 | End: 2018-07-25 | Stop reason: HOSPADM

## 2018-07-16 RX ORDER — OXYCODONE HYDROCHLORIDE 5 MG/1
5 TABLET ORAL EVERY 4 HOURS PRN
Status: DISCONTINUED | OUTPATIENT
Start: 2018-07-16 | End: 2018-07-16

## 2018-07-16 RX ORDER — POTASSIUM CHLORIDE 20 MEQ/1
40 TABLET, EXTENDED RELEASE ORAL ONCE
Status: COMPLETED | OUTPATIENT
Start: 2018-07-16 | End: 2018-07-16

## 2018-07-16 RX ORDER — OXYCODONE HYDROCHLORIDE 10 MG/1
10 TABLET ORAL EVERY 4 HOURS PRN
Status: DISCONTINUED | OUTPATIENT
Start: 2018-07-16 | End: 2018-07-25 | Stop reason: HOSPADM

## 2018-07-16 RX ADMIN — OXYCODONE HYDROCHLORIDE 10 MG: 10 TABLET ORAL at 14:50

## 2018-07-16 RX ADMIN — POTASSIUM CHLORIDE 40 MEQ: 1500 TABLET, EXTENDED RELEASE ORAL at 18:35

## 2018-07-16 RX ADMIN — FAMOTIDINE 20 MG: 10 INJECTION, SOLUTION INTRAVENOUS at 17:22

## 2018-07-16 RX ADMIN — AMPICILLIN SODIUM AND SULBACTAM SODIUM 3 G: 2; 1 INJECTION, POWDER, FOR SOLUTION INTRAMUSCULAR; INTRAVENOUS at 05:14

## 2018-07-16 RX ADMIN — MAGNESIUM HYDROXIDE 30 ML: 400 SUSPENSION ORAL at 05:14

## 2018-07-16 RX ADMIN — OXYCODONE HYDROCHLORIDE 10 MG: 10 TABLET ORAL at 04:25

## 2018-07-16 RX ADMIN — IPRATROPIUM BROMIDE AND ALBUTEROL SULFATE 3 ML: .5; 3 SOLUTION RESPIRATORY (INHALATION) at 03:05

## 2018-07-16 RX ADMIN — NICOTINE 21 MG: 21 PATCH, EXTENDED RELEASE TRANSDERMAL at 05:14

## 2018-07-16 RX ADMIN — FUROSEMIDE 40 MG: 10 INJECTION, SOLUTION INTRAMUSCULAR; INTRAVENOUS at 18:35

## 2018-07-16 RX ADMIN — HYDROCORTISONE SODIUM SUCCINATE 50 MG: 100 INJECTION, POWDER, FOR SOLUTION INTRAMUSCULAR; INTRAVENOUS at 17:22

## 2018-07-16 RX ADMIN — BISACODYL 10 MG: 10 SUPPOSITORY RECTAL at 16:10

## 2018-07-16 RX ADMIN — AMPICILLIN SODIUM AND SULBACTAM SODIUM 3 G: 2; 1 INJECTION, POWDER, FOR SOLUTION INTRAMUSCULAR; INTRAVENOUS at 17:22

## 2018-07-16 RX ADMIN — FUROSEMIDE 40 MG: 10 INJECTION, SOLUTION INTRAMUSCULAR; INTRAVENOUS at 08:07

## 2018-07-16 RX ADMIN — IPRATROPIUM BROMIDE AND ALBUTEROL SULFATE 3 ML: .5; 3 SOLUTION RESPIRATORY (INHALATION) at 10:47

## 2018-07-16 RX ADMIN — ACETAMINOPHEN 650 MG: 325 TABLET, FILM COATED ORAL at 13:59

## 2018-07-16 RX ADMIN — IPRATROPIUM BROMIDE AND ALBUTEROL SULFATE 3 ML: .5; 3 SOLUTION RESPIRATORY (INHALATION) at 06:55

## 2018-07-16 RX ADMIN — HYDROCORTISONE SODIUM SUCCINATE 50 MG: 100 INJECTION, POWDER, FOR SOLUTION INTRAMUSCULAR; INTRAVENOUS at 05:14

## 2018-07-16 RX ADMIN — FAMOTIDINE 20 MG: 10 INJECTION, SOLUTION INTRAVENOUS at 05:14

## 2018-07-16 RX ADMIN — AMPICILLIN SODIUM AND SULBACTAM SODIUM 3 G: 2; 1 INJECTION, POWDER, FOR SOLUTION INTRAMUSCULAR; INTRAVENOUS at 23:51

## 2018-07-16 RX ADMIN — FENTANYL CITRATE 50 MCG: 50 INJECTION INTRAMUSCULAR; INTRAVENOUS at 01:57

## 2018-07-16 RX ADMIN — OXYCODONE HYDROCHLORIDE 5 MG: 5 TABLET ORAL at 10:40

## 2018-07-16 RX ADMIN — STANDARDIZED SENNA CONCENTRATE AND DOCUSATE SODIUM 2 TABLET: 8.6; 5 TABLET, FILM COATED ORAL at 05:14

## 2018-07-16 RX ADMIN — POTASSIUM PHOSPHATE, MONOBASIC AND POTASSIUM PHOSPHATE, DIBASIC 15 MMOL: 224; 236 INJECTION, SOLUTION INTRAVENOUS at 10:11

## 2018-07-16 RX ADMIN — ENOXAPARIN SODIUM 40 MG: 100 INJECTION SUBCUTANEOUS at 10:11

## 2018-07-16 RX ADMIN — AMPICILLIN SODIUM AND SULBACTAM SODIUM 3 G: 2; 1 INJECTION, POWDER, FOR SOLUTION INTRAMUSCULAR; INTRAVENOUS at 12:10

## 2018-07-16 ASSESSMENT — ENCOUNTER SYMPTOMS
SPEECH CHANGE: 0
VOMITING: 0
COUGH: 1
ABDOMINAL PAIN: 0
MYALGIAS: 1
NAUSEA: 0
SHORTNESS OF BREATH: 1
SENSORY CHANGE: 0
FEVER: 0

## 2018-07-16 ASSESSMENT — PAIN SCALES - GENERAL
PAINLEVEL_OUTOF10: 8
PAINLEVEL_OUTOF10: 8
PAINLEVEL_OUTOF10: 0
PAINLEVEL_OUTOF10: 6
PAINLEVEL_OUTOF10: 2
PAINLEVEL_OUTOF10: 0
PAINLEVEL_OUTOF10: 4
PAINLEVEL_OUTOF10: 5
PAINLEVEL_OUTOF10: 4
PAINLEVEL_OUTOF10: 2
PAINLEVEL_OUTOF10: 5
PAINLEVEL_OUTOF10: 1
PAINLEVEL_OUTOF10: 1
PAINLEVEL_OUTOF10: 6

## 2018-07-16 ASSESSMENT — PULMONARY FUNCTION TESTS: FVC: 1.8

## 2018-07-16 NOTE — CARE PLAN
Problem: Respiratory:  Goal: Respiratory status will improve  Outcome: PROGRESSING AS EXPECTED  Pt extubated today. 5L NC at this time.  CT on 20cm suction. Air leak present in both, MD aware.     Problem: Pain Management  Goal: Pain level will decrease to patient's comfort goal  Outcome: PROGRESSING AS EXPECTED  Pt educated on importance of pain management and deep breathing and coughing. Oxy used PRN for pain.

## 2018-07-16 NOTE — PROGRESS NOTES
Anesthesiology Follow Up    Patient remains intubated and ventilated. Off pressors. No obvious anesthetic complications.

## 2018-07-16 NOTE — CARE PLAN
Problem: Skin Integrity  Goal: Risk for impaired skin integrity will decrease    Intervention: Assess risk factors for impaired skin integrity and/or pressure ulcers  Pt at increased risk for loss of skin integrity. Pt turned q2hrs with pillows in position for offloading of bony prominences. Pts sacrum is red, but blanching. Mepilex in place.

## 2018-07-16 NOTE — PROGRESS NOTES
0500 RN spoke with REINA Vázquez via telephone. Per Kathie, she will notify day shift SW this morning about the pts cat.

## 2018-07-16 NOTE — PROGRESS NOTES
[facility] ICU Progress Note      Admit Date: 7/10/2018  ICU Day: 6    Resident(s): Sweta Stroud   Attending: [facility]/ Carlton Quiñonez    Date & Time:   2018   8:08 AM       Patient ID:    Name:             Donna Ceballos   YOB: 1960  Age:                 58 y.o.  male   MRN:               [id number]    Diagnosis:  Empyema/Necrotizing pneumonia  Acute Hypoxic Respiratory Failure  Left bronchopleural  Fistula     ID:  Patient is a 58M with no known past medical history, who works as a cook in [employer] and smokes 1 pack per day for 40 years, occasional marijuana, drinks 6 beers daily. He presented with dyspnea on exertion and production of sputum with cough associated with 10 pound weight loss since March. He was transferred from the outside facility due to findings of empyema left greater than right and multiloculated abscess versus cavitary masses. Patient was admitted to the ICU following his left thoracostomy with continued intubation and management of chest tubes.       Interval Events:  - Awake, alert.  - Tolerated SBT, Extubation today.   - Chest tube drain 370ml in past 24 hours. Still with air leak.  - CXR persistent pneumo, no changes in cavitatory lesions.   - WBC normalized. Per ID, ABx for 4 weeks. Still on Unasyn.     Review of Systems   Unable to perform ROS: Acuity of condition       VITALS:  Pulse: (!) 54, Heart Rate (Monitored): 62  Arterial BP: 129/58, NIBP: 126/69       Temp  Av.9 °C (98.5 °F)  Min: 36.6 °C (97.8 °F)  Max: 37.7 °C (99.9 °F)    [place]oredo Vent Mode: Spont, Rate (breaths/min): 20, Vt Target (mL): 450, PEEP/CPAP: 8, FiO2: 30, P Support: 5, Static Compliance (ml / cm H2O): 131    Physical Exam:  GEN: Vented, awake and alert.    HEENT: NC/AT. ET tube securely in place. NGT in place.  CV: S1, S2, NSR, No m/r/g  RESP: Diminished air entry. Scattered Rhonchi. Chest tube x 2 in place with some airleak.    ABD: Soft, NT, ND; +BS  EXT: No LE edema b/l. No signs of  cyanosis  NEURO: alert and awake. NFD. Vented.     Consultants:  General Surgery: Dr. Diego Martínez M.D  Infectious disease :Dr. Tessa Rich M.D.  PMA: Dr. Carlton Mcintyre    Procedures:  7/10-thoracentesis of large left pleural effusion with thick turbid creamy yellow pus.-Interventional radiology  7/11-left thoracostomy, decortication, evacuation of purulent empyema, debridement of necrotic lung, and rib resection.-Dr. Diego Martínez  7/12-Central line placement  7/12-emergent diagnostic and therapeutic bronchoscopy with aspiration and bronchial alveolar lavage    Respiratory:  Loredo Vent Mode: Spont  Respiration: 20, Pulse Oximetry: 99 %    Chest Tube Drains:          Invalid input(s): [id number]    HemoDynamics:  Pulse: (!) 54, Heart Rate (Monitored): 62 Arterial BP: 129/58, NIBP: 126/69      Neuro:      Fluids:  Intake/Output       07/14/18 0700 - 07/15/18 0659 07/15/18 0700 - 07/16/18 0659 07/16/18 0700 - 07/17/18 0659      [id number] 3837-4284 Total 0016-3139 5170-8661 Total 6837-9877 3769-5787 Total       Intake    I.V.  1628.7  1813.6 3442.3  943.6  413.6 1357.2  --  -- --    Propofol Volume 128.7 93.6 222.3 93.6 93.6 187.2 -- -- --    IV Volume (TKO) -- 120 120 20 -- 20 -- -- --    IV Volume (0.9% NS)  380 120 500 -- -- --    IV Volume (Potassium) 100 -- 100 -- -- -- -- -- --    IV Volume (Unasyn) 200 200 400 200 200 400 -- -- --    IV Volume (Vancomycin) 300 500 800 -- -- -- -- -- --    IV Volume (K Phos) -- -- -- 250 -- 250 -- -- --    Other  --  30 30  240  120 360  --  -- --    Medications (P.O./ Enteral Liquids) -- 30 30 240 120 360 -- -- --    Enteral  206  150 356  600  240 840  --  -- --    Free Water / Tube Flush 60 150 210 60 240 300 -- -- --    Intake (mL) (Enteral Tube Right Nare Cortrak Small Bowel Feeding Tube) 146 -- 146 540 -- 540 -- -- --    Total Intake 1834.7 1993.6 3828.3 1783.6 773.6 2557.2 -- -- --       Output    Urine  440  375 815  500  655 1155  --  -- --     Output (mL) (Urinary Catheter Indwelling Catheter 16) 440 375 815  -- -- --    Chest Tube  190  200 390  220  150 370  --  -- --    Output (mL) (Chest Tube Group 1 (A) Left straight 32) 40 0 40 10 0 10 -- -- --    Output (mL) (Chest Tube Group 2 (B) Left angled 32) 150 200 350 210 150 360 -- -- --    Total Output   -- -- --       Net I/O     1204.7 1418.6 2623.3 1063.6 -31.4 1032.2 -- -- --        Weight: 86.8 kg (191 lb 5.8 oz)  Recent Labs      07/14/18   0435  07/15/18   0410  07/16/18   0415   SODIUM  142  145  146*   POTASSIUM  3.7  3.6  3.8   CHLORIDE  113*  117*  116*   CO2  20  20  22   BUN  10  15  20   CREATININE  0.33*  0.37*  0.34*   MAGNESIUM  2.0  2.0  2.1   PHOSPHORUS  2.9  2.5  2.2*   CALCIUM  7.4*  7.4*  7.8*     Body mass index is 25.95 kg/m².    GI/Nutrition:  Recent Labs      07/14/18   0435  07/15/18   0410  07/16/18   0415   ALTSGPT   --    --   14   ASTSGOT   --    --   11*   ALKPHOSPHAT   --    --   51   TBILIRUBIN   --    --   0.2   PREALBUMIN   --    --   12.0*   GLUCOSE  107*  130*  120*       Heme:  Recent Labs      07/14/18   0435  07/15/18   0410  07/16/18   0415   RBC  2.99*  3.16*  3.15*   HEMOGLOBIN  8.4*  8.6*  8.7*   HEMATOCRIT  26.4*  28.1*  27.8*   PLATELETCT  466*  499*  495*       Infectious Disease:  Temp  Av.9 °C (98.5 °F)  Min: 36.6 °C (97.8 °F)  Max: 37.7 °C (99.9 °F)  Recent Labs      07/14/18   0435  07/15/18   0410  07/16/18   0415   WBC  13.8*  10.5  10.9*   NEUTSPOLYS  83.30*  87.40*  68.00   LYMPHOCYTES  10.90*  9.90*  23.30   MONOCYTES  2.50  0.00  5.70   EOSINOPHILS  0.00  0.00  0.60   BASOPHILS  0.20  0.00  0.20   ASTSGOT   --    --   11*   ALTSGPT   --    --   14   ALKPHOSPHAT   --    --   51   TBILIRUBIN   --    --   0.2       Meds:  • hydrocortisone sodium succinate PF  50 mg     • oxyCODONE immediate-release  5 mg      Or   • oxyCODONE immediate-release  10 mg     • famotidine  20 mg     • Respiratory Care per Protocol        • senna-docusate  2 Tab      And   • polyethylene glycol/lytes  1 Packet      And   • magnesium hydroxide  30 mL      And   • bisacodyl  10 mg     • MD ALERT...Adult ICU Electrolyte Replacement per Pharmacy Protocol       • fentaNYL  25 mcg      Or   • fentaNYL  50 mcg      Or   • fentaNYL  100 mcg     • ipratropium-albuterol  3 mL     • ipratropium-albuterol  3 mL     • propofol  0-80 mcg/kg/min 20 mcg/kg/min (07/15/18 2350)   • NS   10 mL/hr at 07/15/18 1026   • ondansetron  4 mg     • ondansetron  4 mg     • promethazine  12.5-25 mg     • promethazine  12.5-25 mg     • prochlorperazine  5-10 mg     • acetaminophen  650 mg     • nicotine  21 mg      And   • nicotine polacrilex  2 mg     • ampicillin-sulbactam (UNASYN) IV  3 g Stopped (07/16/18 0544)        Imaging:  DX-CHEST-PORTABLE (1 VIEW)   Final Result      1.  Unchanged small LEFT pneumothorax with chest tubes in place   2.  Unchanged BILATERAL cavitary airspace disease      DX-CHEST-PORTABLE (1 VIEW)   Final Result         1.  Stable pulmonary infiltrates and/or edema with probable component of chronic underlying lung disease.   2.  Left pneumothorax, stable since prior with 2 thoracostomy tubes in place.         DX-ABDOMEN FOR TUBE PLACEMENT   Final Result      Cortrak feeding tube tip projects in the region of the gastric fundus.      DX-CHEST-PORTABLE (1 VIEW)   Final Result         1.  Increased opacification of the right hemithorax, compatible with new large pleural effusion and/or infiltrates and/or atelectasis.   2.  Left pneumothorax, stable since prior with 2 thoracostomy tubes in place.      DX-CHEST-PORTABLE (1 VIEW)   Final Result         1. Improving opacities in the right perihilar and infrahilar regions. Otherwise stable multifocal patchy consolidations.   2. Stable lines and tubes.   3. Stable left pneumothorax subcutaneous emphysema of the left chest wall.      DX-CHEST-PORTABLE (1 VIEW)   Final Result      1.  Interval improved  aeration of RIGHT lung base with improvement of RIGHT pleural effusion.   2.  Stable LEFT pneumothorax with chest tubes present.   3.  Patchy consolidation again seen in both lungs with basilar densities in the LEFT upper lung again noted.   4.  Increasing LEFT chest wall emphysema.      DX-CHEST-PORTABLE (1 VIEW)   Final Result         1.  Increased opacification of the right hemithorax, compatible with new large pleural effusion and/or infiltrates and/or atelectasis.   2.  Improved aeration of the left hemithorax status post thoracostomy tube placement. Residual infiltrates in the left lung are seen.   3.  Left pneumothorax, stable since prior with 2 thoracostomy tubes in place.      DX-CHEST-PORTABLE (1 VIEW)   Final Result      1.  There has been interval decrease in the left pleural effusion.   2.  There is still some component of LEFT pneumothorax although the hydropneumothorax air-fluid level is no longer evident.   3.  Multiple left bone or masses are again seen.   4.  Left lower lobe airspace disease is again seen.   5.  Ill-defined nodular and linear opacity in the right upper lobe with pleural thickening and hilar retraction is again seen.      US-THORACENTESIS PUNCTURE LEFT   Final Result      1. Ultrasound guided left sided diagnostic thoracentesis.      2. 500 mL of fluid withdrawn. Additional fluid was not removed as the catheter became occluded by the thick liquid      OUTSIDE IMAGES-CT CHEST/ABDOMEN/PELVIS   Final Result      OUTSIDE IMAGES-DX CHEST   Final Result            Assessment and plan    * Empyema (HCC)- (present on admission)   Overview    Cough, greenish and bloody sputum, dyspnea on exertion and orthopnea x 4 months.   Leukocytosis and lactic acidosis on admission.  CT chest: large empyema with cavitary lesion/masses in the right upper lobe.  S/p Left thoracotomy, decortication, evacuation of purulent empyema, debridement of necrotic lung, rib resection by surgery, Dr. Diego Martínez  7/11/2018.  S/p Chest tube x 2.  Pleural fluid culture: Beta strep group F.      Assessment & Plan    - Improving status.  - Chest tube still draining. Air leak.  - Extubation today.  - As per ID, Continue Unasyn. ABx duration is 4 weeks.   - Continue with Unasyn and chest tubes.   - Blood culture NTGD.         Acute hypoxemic respiratory failure (HCC)   Assessment & Plan    - Secondary to necrotizing pneumona/Empyema   - Resolving.  - Intubated 7/11- 7/16.  - Continue abx.  - Continuous pulse ox.          Panlobular emphysema (HCC)- (present on admission)   Assessment & Plan    - Active everyday smoker.   - No evidence of acute exacerbation.  - Continue scheduled duonebs.  - RT protocol, Oxysgen, Cont pulse ox.  - Counseling.        Protein malnutrition (HCC)- (present on admission)   Assessment & Plan    - Patient appears malnourished.  - Lost around 10 lbs since March when the symptoms started.  - Continue tube feeds for now.         Pulmonary cachexia due to COPD (HCC)- (present on admission)   Assessment & Plan    - Likely secondary to emphysema and empyema  - Currently on tube feeds.   - Nutritional consult.   - Continue antibiotics.         Pulmonary parenchymal mass- (present on admission)   Assessment & Plan    Studies of empyema/thora pending        Tobacco abuse- (present on admission)   Assessment & Plan    - Nicotine patches while IP.   - Tobacco cessation counseling          Leukocytosis- (present on admission)   Assessment & Plan    - WBC - 15.5 on admission.  - Resolving with antibiotics.             DISPO: ICU    CODE STATUS: Full code    Quality Measures:  Quality-Core Measures   Reviewed items::  Labs reviewed, Medications reviewed and Radiology images reviewed  Motley catheter::  No Motley  Central line in place:  Need for access and Sepsis  DVT prophylaxis pharmacological::  Enoxaparin (Lovenox)  Ulcer Prophylaxis::  Yes  Antibiotics:  Treating active infection/contamination beyond 24 hours  perioperative coverage

## 2018-07-16 NOTE — DISCHARGE PLANNING
Care Transition Team Assessment    Information for this assessment was compiled from an interview with the pt. The pt reports living in an upstairs apartment by himself and his cat in [facility]. The pt reports working at, [employer] in [place]. Pt reports being completely independent with ADL/IADls prior to being admitted. There are no reported issues with MH, drug abuse, or leaving AMA. Pt reports no PCP which will need to be established before dc. The pt will also require additional assistance with dc'ing in the event the pt is dc'ed home. Pt's dc needs are not known at the time of this assessment.     Information Source  Orientation : Oriented x 4  Information Given By: Patient  Who is responsible for making decisions for patient? : Patient    Readmission Evaluation  Is this a readmission?: No    Elopement Risk  Legal Hold: No  Ambulatory or Self Mobile in Wheelchair: No-Not an Elopement Risk  Disoriented: No  Psychiatric Symptoms: None  History of Wandering: No  Elopement this Admit: No  Vocalizing Wanting to Leave: No  Displays Behaviors, Body Language Wanting to Leave: No-Not at Risk for Elopement  Elopement Risk: At Risk for Elopement    Interdisciplinary Discharge Planning  Does Admitting Nurse Feel This Could be a Complex Discharge?: No  Primary Care Physician: none  Lives with - Patient's Self Care Capacity: Alone and Able to Care For Self  Patient or legal guardian wants to designate a caregiver (see row info): No  Support Systems: Friends / Neighbors, Family Member(s)  Housing / Facility: 1 Story Apartment / Condo  Do You Take your Prescribed Medications Regularly: Yes  Able to Return to Previous ADL's: Yes  Mobility Issues: No  Prior Services: None  Assistance Needed: No  Durable Medical Equipment: Not Applicable    Discharge Preparedness  What is your plan after discharge?: Uncertain - pending medical team collaboration  What are your discharge supports?: Other (comment) (Friends at work)  Prior  Functional Level: Ambulatory, Drives Self, Independent with Activities of Daily Living, Independent with Medication Management  Difficulity with ADLs: None  Difficulity with IADLs: None    Functional Assesment  Prior Functional Level: Ambulatory, Drives Self, Independent with Activities of Daily Living, Independent with Medication Management    Finances  Financial Barriers to Discharge: No  Prescription Coverage: Yes    Vision / Hearing Impairment  Vision Impairment : Yes  Right Eye Vision: Impaired, Wears Glasses  Left Eye Vision: Impaired, Wears Glasses  Hearing Impairment : No         Advance Directive  Advance Directive?: None  Advance Directive offered?: AD Booklet refused    Domestic Abuse  Have you ever been the victim of abuse or violence?: No    Psychological Assessment  History of Substance Abuse: None  History of Psychiatric Problems: No  Non-compliant with Treatment: No  Newly Diagnosed Illness: Yes    Discharge Risks or Barriers  Discharge risks or barriers?: No PCP, Transportation, Post-acute placement / services, Lives alone, no community support  Patient risk factors: Lack of outside supports, Lives alone and no community support, No PCP    Anticipated Discharge Information  Anticipated discharge disposition: Discharge needs currently unknown

## 2018-07-16 NOTE — THERAPY
"Speech Language Therapy Clinical Swallow Evaluation completed.  Functional Status: The patient was seen for clinical swallow evaluation this date. The patient was awake, alert and oriented x4. The patient was sitting in bedside chair and eager for swallow evaluation. The patient was able to follow oral motor directives with no gross deficits appreciated. The patient was given PO trials of ice chips, thins, purees, solids and mixed consistency solids. The patient consumed PO trials with no overt s/s of aspiration. Min cues provided for swallow strategies given respiratory status. SLP will follow up x1 to ensure tolerance and follow through with swallow strategies.     Recommendations - Diet:  Regular                          Strategies: Direct supervision during meals and Head of Bed at 90 Degrees                          Medication Administration: Whole with Liquid Wash    Plan of Care: Will benefit from Speech Therapy 1 times per week  Post-Acute Therapy: Currently anticipate no further skilled therapy needs once patient is discharged from the inpatient setting.    See \"Rehab Therapy-Acute\" Patient Summary Report for complete documentation.   "

## 2018-07-16 NOTE — CARE PLAN
Problem: Infection  Goal: Will remain free from infection    Intervention: Assess signs and symptoms of infection  Pt at increased risk of infection. Interventions in place. Pt receiving IV antibiotics per MD order.

## 2018-07-16 NOTE — DISCHARGE PLANNING
Informed by bedside RN that the pt had concerns over his cat being alone at the pt's house. The pt believed that if he could call someone from his work at, [employer] in [place], he could get a workmate to come to his house and check on his cat. Informed bedside RN. She states she will provide a phone to the pt.

## 2018-07-16 NOTE — PROGRESS NOTES
Tele 8 contacted. Unable to find missing glasses, shirt and cell phone.   Patient was able to contact co-worker that will care for his cat while he is in the hospital.

## 2018-07-16 NOTE — CARE PLAN
Problem: Ventilation Defect:  Goal: Ability to achieve and maintain unassisted ventilation or tolerate decreased levels of ventilator support    Intervention: Manage ventilation therapy by monitoring diagnostic test results  Adult Ventilation Update    Total Vent Days: 6    Patient Lines/Drains/Airways Status    Active Airway     Name: Placement date: Placement time: Site: Days:    Airway Group ET Tube Oral 8.0 07/11/18 2143   Oral   6              In the last 24 hours, the patient tolerated SBT for 2 hours.    Cough: Productive (07/16/18 0305)  Sputum Amount: Small (07/16/18 0305)  Sputum Color: Yellow;White (07/16/18 0305)  Sputum Consistency: Thin;Thick (07/16/18 0305)    Mobility  Level of Mobility: Level IV (07/15/18 2000)  Activity Performed: Edge of bed;Stand (07/15/18 2000)  Time Activity Tolerated: 25 min (07/15/18 2000)  Distance Per Occurrence (ft.): 0 feet (07/15/18 2000)  # of Times Distance was Traveled: 0 (07/15/18 2000)  Assistance / Tolerance: Assistance of Two or More;Tolerates Well (07/15/18 2000)  Pt Calls for Assistance: Yes (07/16/18 0000)  Staff Present for Mobilization: RN;CNA (07/15/18 2000)  Gait: Steady (07/15/18 2000)  Assistive Devices: Hand held assist;Rails (07/15/18 2000)

## 2018-07-16 NOTE — DISCHARGE PLANNING
Medical Social Work    MSW received page from bedside RN; however, no call back information was originally received.  MSW received second page and returned RN's call.  Per RN pt's cat has been at home since 07/10/2018 when pt was admitted and pt is requesting assistance to check on cat.  Pt lives in [address] and apartment complex may be able to assist with cat after 0900.  See nursing note from 07/15/2018 at 8:30 PM for further information.  MSW provided information for unit SW to follow up today.    Plan: Unit SW to follow up regarding pt's cat.

## 2018-07-16 NOTE — PROGRESS NOTES
Pulmonary Critical Care Progress Note      Admit Date: 7/10/2018    Chief Complaint: SOB    History of Present Illness:  58-year-old gentleman with no known   past medical history, works as a cook in [employer], smokes approximately 1 pack   of cigarettes a day for the last 40 years, drinks approximately 6 beers a day,   little marijuana, denies any illicits.  The patient initially was seen at an   outside facility with approximately 4 days of increasing dyspnea on exertion,   cough with purulent sputum, and chest pain and discomfort with deep   inspiration.  He also indicates that he has had approximately 10 pounds of   weight loss since March of this year.  He denies any fever, chills, or sweats.    Denies any night sweats.  No lymphadenopathy, headache, visual changes, neck   stiffness.  Denies any nausea, vomiting, abdominal pain, diarrhea, or lower   extremity edema.  Patient says he has never been outside of [place].  He   has never been to [facility] or residential.  While at the outside facility, patient had   CT scan that showed a significant left greater than right changes with   suggestive empyema as well as multiloculated abscess versus cavitary masses.    Patient was transferred to [facility] for higher level of care and further   evaluation.  Patient indicates at the present time that he is not in any   current discomfort.  Denies any significant sick contacts.  Denies any history   of known lung problems.  Denies any environmental exposures.  Has a cat as a   pet.    Interval Events:  24 hour interval history reviewed   Tm 100.5  +1L over last 24hr, +8.7L since admit  cxr with continued left sided pneumo, b/l multifocal opacities, chest tube x 2 in place  Wbc 13->10->10.9  K 3.8  Cr 0.34  Mg 2.1    Thora 7/10 Group F B-Strep  Left Lung Tissue 7/11 Group F B-Strep  Bcx 7/12 ngtd  Bal 7/12 Group F B-Strep    Prop @ 20  Unaysn  Hydrocoritosol 50 @Q12    S/P Left thoracotomy and decortication w evac of emyema  7/11  Chest tube x 2 on left with + airleak in both      Review of Systems   Unable to perform ROS: Acuity of condition     Physical Exam   Constitutional: He appears well-developed.   Malnourished appearing   HENT:   Head: Normocephalic and atraumatic.   Eyes: Conjunctivae are normal. Pupils are equal, round, and reactive to light.   Neck: No tracheal deviation present.   Cardiovascular: Normal rate and regular rhythm.    Pulmonary/Chest: He has no wheezes. He has no rales.   Diminished with scattered rhonchi   Abdominal: Soft. He exhibits no distension. There is no tenderness.   Musculoskeletal: He exhibits no edema.   Neurological: No cranial nerve deficit.   Skin: Skin is warm and dry. Capillary refill takes less than 2 seconds. No cyanosis.   Psychiatric:   sedate   Nursing note and vitals reviewed.      PFSH:  No change.    Respiratory:  [place] Vent Mode: APVCMV, Rate (breaths/min): 20, Vt Target (mL): 450, PEEP/CPAP: 8, FiO2: 30, Static Compliance (ml / cm H2O): 35, Control VTE (exp VT): 451  Pulse Oximetry: 97 %                      Invalid input(s): [id number]    HemoDynamics:  Pulse: (!) 54, Heart Rate (Monitored): (!) 54  Arterial BP: 129/58, NIBP: 126/69               Neuro:  GCS             Fluids:  Intake/Output       07/14/18 0700 - 07/15/18 0659 07/15/18 0700 - 07/16/18 0659 07/16/18 0700 - 07/17/18 0659      [id number] 5317-5119 Total 7285-6162 8522-9318 Total 7609-3807 9507-2551 Total       Intake    I.V.  1628.7  1813.6 3442.3  943.6  413.6 1357.2  --  -- --    Propofol Volume 128.7 93.6 222.3 93.6 93.6 187.2 -- -- --    IV Volume (TKO) -- 120 120 20 -- 20 -- -- --    IV Volume (0.9% NS)  380 120 500 -- -- --    IV Volume (Potassium) 100 -- 100 -- -- -- -- -- --    IV Volume (Unasyn) 200 200 400 200 200 400 -- -- --    IV Volume (Vancomycin) 300 500 800 -- -- -- -- -- --    IV Volume (K Phos) -- -- -- 250 -- 250 -- -- --    Other  --  30 30  240  120 360  --  -- --    Medications  (P.O./ Enteral Liquids) -- 30 30 240 120 360 -- -- --    Enteral  206  150 356  600  240 840  --  -- --    Free Water / Tube Flush 60 150 210 60 240 300 -- -- --    Intake (mL) (Enteral Tube Right Nare Cortrak Small Bowel Feeding Tube) 146 -- 146 540 -- 540 -- -- --    Total Intake 1834.7 1993.6 3828.3 1783.6 773.6 2557.2 -- -- --       Output    Urine  440  375 815  500  655 1155  --  -- --    Output (mL) (Urinary Catheter Indwelling Catheter 16) 440 375 815  -- -- --    Chest Tube  190  200 390  220  150 370  --  -- --    Output (mL) (Chest Tube Group 1 (A) Left straight 32) 40 0 40 10 0 10 -- -- --    Output (mL) (Chest Tube Group 2 (B) Left angled 32) 150 200 350 210 150 360 -- -- --    Total Output   -- -- --       Net I/O     1204.7 1418.6 2623.3 1063.6 -31.4 1032.2 -- -- --        Weight: 86.8 kg (191 lb 5.8 oz)  Recent Labs      07/14/18   0435  07/15/18   0410  07/16/18   0415   SODIUM  142  145  146*   POTASSIUM  3.7  3.6  3.8   CHLORIDE  113*  117*  116*   CO2  20  20  22   BUN  10  15  20   CREATININE  0.33*  0.37*  0.34*   MAGNESIUM  2.0  2.0  2.1   PHOSPHORUS  2.9  2.5  2.2*   CALCIUM  7.4*  7.4*  7.8*       GI/Nutrition:      Liver Function  Recent Labs      07/14/18   0435  07/15/18   0410  07/16/18   0415   ALTSGPT   --    --   14   ASTSGOT   --    --   11*   ALKPHOSPHAT   --    --   51   TBILIRUBIN   --    --   0.2   PREALBUMIN   --    --   12.0*   GLUCOSE  107*  130*  120*       Heme:  Recent Labs      07/14/18   0435  07/15/18   0410  07/16/18   0415   RBC  2.99*  3.16*  3.15*   HEMOGLOBIN  8.4*  8.6*  8.7*   HEMATOCRIT  26.4*  28.1*  27.8*   PLATELETCT  466*  499*  495*       Infectious Disease:  Temp  Av.1 °C (98.7 °F)  Min: 36.6 °C (97.8 °F)  Max: 38.1 °C (100.5 °F)  Micro: cultures reviewed  Recent Labs      18   0435  07/15/18   0410  18   WBC  13.8*  10.5  10.9*   NEUTSPOLYS  83.30*  87.40*  68.00   LYMPHOCYTES  10.90*  9.90*   23.30   MONOCYTES  2.50  0.00  5.70   EOSINOPHILS  0.00  0.00  0.60   BASOPHILS  0.20  0.00  0.20   ASTSGOT   --    --   11*   ALTSGPT   --    --   14   ALKPHOSPHAT   --    --   51   TBILIRUBIN   --    --   0.2     Current Facility-Administered Medications   Medication Dose Frequency Provider Last Rate Last Dose   • hydrocortisone sodium succinate PF (SOLU-CORTEF) 100 MG injection 50 mg  50 mg Q12HRS Carlton Mcintyre M.D.   50 mg at 07/16/18 0514   • oxyCODONE immediate-release (ROXICODONE) tablet 5 mg  5 mg Q4HRS PRN Frederic Perez M.D.   5 mg at 07/15/18 2342    Or   • oxyCODONE immediate release (ROXICODONE) tablet 10 mg  10 mg Q4HRS PRN Frederic Perez M.D.   10 mg at 07/16/18 0425   • famotidine (PEPCID) injection 20 mg  20 mg BID Carlton Mcintyre M.D.   20 mg at 07/16/18 0514   • Respiratory Care per Protocol   Continuous RT Carlton Mcintyre M.D.       • senna-docusate (PERICOLACE or SENOKOT S) 8.6-50 MG per tablet 2 Tab  2 Tab BID Carlton Mcintyre M.D.   2 Tab at 07/16/18 0514    And   • polyethylene glycol/lytes (MIRALAX) PACKET 1 Packet  1 Packet QDAY PRN Carlton Mcintyre M.D.   1 Packet at 07/15/18 0835    And   • magnesium hydroxide (MILK OF MAGNESIA) suspension 30 mL  30 mL QDAY PRN Carlton Mcintyre M.D.   30 mL at 07/16/18 0514    And   • bisacodyl (DULCOLAX) suppository 10 mg  10 mg QDAY PRN Carlton Mcintyre M.D.       • MD ALERT...Adult ICU Electrolyte Replacement per Pharmacy Protocol   pharmacy to dose Carlton Mcintyre M.D.       • fentaNYL (SUBLIMAZE) injection 25 mcg  25 mcg Q HOUR PRN Carlton Mcintyre M.D.        Or   • fentaNYL (SUBLIMAZE) injection 50 mcg  50 mcg Q HOUR PRN Carlton Mcintyre M.D.   50 mcg at 07/16/18 0157    Or   • fentaNYL (SUBLIMAZE) injection 100 mcg  100 mcg Q HOUR PRN Carlton Mcintyre M.D.   100 mcg at 07/13/18 0829   • ipratropium-albuterol (DUONEB) nebulizer solution  3 mL Q2HRS PRN (RT) Carlton Mcintyre M.D.       • ipratropium-albuterol (DUONEB) nebulizer  solution  3 mL Q4HRS (RT) Carlton Mcintyre M.D.   3 mL at 07/16/18 0305   • propofol (DIPRIVAN) injection  0-80 mcg/kg/min Continuous Erasmo Johnson M.D. 7.8 mL/hr at 07/15/18 2350 20 mcg/kg/min at 07/15/18 2350   • NS infusion   Continuous Jose Bourne M.D. 10 mL/hr at 07/15/18 1026     • ondansetron (ZOFRAN) syringe/vial injection 4 mg  4 mg Q4HRS PRN Shruthi Phan M.D.       • ondansetron (ZOFRAN ODT) dispertab 4 mg  4 mg Q4HRS PRN Shruthi Phan M.D.       • promethazine (PHENERGAN) tablet 12.5-25 mg  12.5-25 mg Q4HRS PRN Shruthi Phan M.D.       • promethazine (PHENERGAN) suppository 12.5-25 mg  12.5-25 mg Q4HRS PRN Shruthi Phan M.D.       • prochlorperazine (COMPAZINE) injection 5-10 mg  5-10 mg Q4HRS PRN Shruthi Phan M.D.       • acetaminophen (TYLENOL) tablet 650 mg  650 mg Q6HRS PRN Shruthi Phan M.D.       • nicotine (NICODERM) 21 MG/24HR 21 mg  21 mg Daily-0600 Shruthi Phan M.D.   21 mg at 07/16/18 0514    And   • nicotine polacrilex (NICORETTE) 2 MG piece 2 mg  2 mg Q HOUR PRN Shruthi Phan M.D.       • ampicillin/sulbactam (UNASYN) 3 g in  mL IVPB  3 g Q6HRS Helene Smith M.D.   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 and persisting small pneumothorax   - s/p left decort 7/11   - thora 7/10 with Group F B-strep   - on IV abx - may need 4-6-week course post op   - Chest tube in place with drainage, ongoing airleak    Hypotension - improved   - sepsis protocols   - continue abx IV   - wean steroids    Leukocytosis   - related to pulmonary infection   - thora w group F  b-strep   - Follow blood cultures   - abx    Chronic Etoh use   - monitor for w/d   - thiamine/folate/mvi    Chronic tob use   -  on cessation when appropriate    - Bronchodilators, RT protocols        Discussed patient condition and risk of morbidity and/or mortality with RN, RT, Pharmacy, [facility] resident and Charge nurse / hot rounds.      The patient remains critically ill.  Critical care time = 31 minutes in directly providing and coordinating critical care and extensive data review.  No time overlap and excludes procedures.

## 2018-07-16 NOTE — PROGRESS NOTES
Infectious Disease Progress Note    Author: Laina Lopez M.D. Date & Time of service: 2018  12:29 PM    Chief Complaint:  Empyema     Interval History:  18- Tmax 99.3 WBC 10.9 creatinine 0.34 patient continues to remain significantly debilitated  Labs Reviewed, Medications Reviewed, Radiology Reviewed and Wound Reviewed.    Review of Systems:  Review of Systems   Constitutional: Positive for malaise/fatigue. Negative for fever.   HENT: Negative for hearing loss.    Respiratory: Positive for cough and shortness of breath.         Chest pain   Cardiovascular: Negative for chest pain and leg swelling.   Gastrointestinal: Negative for abdominal pain, nausea and vomiting.   Genitourinary: Negative for dysuria.   Musculoskeletal: Positive for myalgias.   Neurological: Negative for sensory change and speech change.       Hemodynamics:  Temp (24hrs), Av.9 °C (98.4 °F), Min:36.6 °C (97.8 °F), Max:37.4 °C (99.3 °F)  Temperature: 37.2 °C (98.9 °F)  Pulse  Av.2  Min: 50  Max: 111Heart Rate (Monitored): 87  Arterial BP: 158/64, NIBP: 139/66       Physical Exam:  Physical Exam   Constitutional: He has a sickly appearance.   Thin male looking chronically ill   Eyes: No scleral icterus.   Pulmonary/Chest: He has rales.   Chest tubes X2 on left side   Abdominal: Soft. There is no tenderness. There is no rebound.   Genitourinary:   Genitourinary Comments: Plus crystal   Musculoskeletal: He exhibits no edema.   Neurological: He is alert.   Vitals reviewed.      Meds:    Current Facility-Administered Medications:   •  potassium phosphate ivpb  •  enoxaparin (LOVENOX) injection  •  oxyCODONE immediate-release **OR** oxyCODONE immediate-release  •  hydrocortisone sodium succinate PF  •  famotidine  •  Respiratory Care per Protocol  •  senna-docusate **AND** polyethylene glycol/lytes **AND** magnesium hydroxide **AND** bisacodyl  •  MD ALERT...Adult ICU Electrolyte Replacement per Pharmacy Protocol  •   ipratropium-albuterol  •  NS  •  ondansetron  •  ondansetron  •  promethazine  •  promethazine  •  prochlorperazine  •  acetaminophen  •  nicotine **AND** Nicotine Replacement Patient Education Materials **AND** nicotine polacrilex  •  ampicillin-sulbactam (UNASYN) IV    Labs:  Recent Labs      07/14/18   0435  07/15/18   0410  07/16/18   0415   WBC  13.8*  10.5  10.9*   RBC  2.99*  3.16*  3.15*   HEMOGLOBIN  8.4*  8.6*  8.7*   HEMATOCRIT  26.4*  28.1*  27.8*   MCV  88.3  88.9  88.3   MCH  28.1  27.2  27.6   RDW  58.0*  59.5*  58.4*   PLATELETCT  466*  499*  495*   MPV  9.5  9.5  9.6   NEUTSPOLYS  83.30*  87.40*  68.00   LYMPHOCYTES  10.90*  9.90*  23.30   MONOCYTES  2.50  0.00  5.70   EOSINOPHILS  0.00  0.00  0.60   BASOPHILS  0.20  0.00  0.20   RBCMORPHOLO   --   Present   --      Recent Labs      07/14/18   0435  07/15/18   0410  07/16/18   0415   SODIUM  142  145  146*   POTASSIUM  3.7  3.6  3.8   CHLORIDE  113*  117*  116*   CO2  20  20  22   GLUCOSE  107*  130*  120*   BUN  10  15  20     Recent Labs      07/14/18   0435  07/15/18   0410  07/16/18   0415   ALBUMIN   --    --   2.0*   TBILIRUBIN   --    --   0.2   ALKPHOSPHAT   --    --   51   TOTPROTEIN   --    --   5.6*   ALTSGPT   --    --   14   ASTSGOT   --    --   11*   CREATININE  0.33*  0.37*  0.34*       Imaging:  Dx-chest-portable (1 View)    Result Date: 7/16/2018 7/16/2018 4:16 AM HISTORY/REASON FOR EXAM:  For indication of respiratory failure TECHNIQUE/EXAM DESCRIPTION AND NUMBER OF VIEWS: Single portable view of the chest. COMPARISON: Yesterday FINDINGS: Hardware is stably positioned in its visualized extent. HEART: Stable size. LUNGS: BILATERAL pulmonary opacities are unchanged. PLEURA: Small LEFT pneumothorax is unchanged.     1.  Unchanged small LEFT pneumothorax with chest tubes in place 2.  Unchanged BILATERAL cavitary airspace disease    Dx-chest-portable (1 View)    Result Date: 7/15/2018    7/15/2018 4:29 AM HISTORY/REASON FOR EXAM: For  indication of respiratory failure Line placement TECHNIQUE/EXAM DESCRIPTION:  Single AP view of the chest. COMPARISON: Yesterday FINDINGS: Position of medical devices appears stable. The cardiac silhouette appears within normal limits. The mediastinal contour appears within normal limits.  The central pulmonary vasculature appears normal. Hazy bilateral pulmonary opacities are seen. Residual left pneumothorax is seen similar to prior study. No significant pleural effusions are identified. The bony structures appear age-appropriate.  Soft tissue gas in the left chest wall is seen.     1.  Stable pulmonary infiltrates and/or edema with probable component of chronic underlying lung disease. 2.  Left pneumothorax, stable since prior with 2 thoracostomy tubes in place.     Dx-chest-portable (1 View)    Result Date: 7/14/2018 7/14/2018 5:13 AM HISTORY/REASON FOR EXAM: For indication of respiratory failure Line placement TECHNIQUE/EXAM DESCRIPTION:  Single AP view of the chest. COMPARISON: Yesterday FINDINGS: Position of medical devices appears stable. The cardiac silhouette appears within normal limits. The mediastinal contour appears within normal limits.  The central pulmonary vasculature appears normal. Increased opacification throughout the right hemithorax is seen. There is decreased opacification within the left hemithorax. Residual left pneumothorax is seen similar to prior study. No significant pleural effusions are identified. The bony structures appear age-appropriate.  Soft tissue gas in the left chest wall is seen.     1.  Increased opacification of the right hemithorax, compatible with new large pleural effusion and/or infiltrates and/or atelectasis. 2.  Left pneumothorax, stable since prior with 2 thoracostomy tubes in place.    Dx-chest-portable (1 View)    Result Date: 7/13/2018 7/13/2018 7:21 AM HISTORY/REASON FOR EXAM:  Respiratory failure. TECHNIQUE/EXAM DESCRIPTION AND NUMBER OF VIEWS: Single  portable view of the chest. COMPARISON: 7/12/2018 FINDINGS: LUNGS: Multifocal patchy consolidations are reidentified. Stable cavity in the right lung apex, with improving opacities in the right perihilar and infrahilar regions. PNEUMOTHORAX: Stable left pneumothorax and subcutaneous emphysema of the left chest wall. LINES AND TUBES: Stable well-positioned ETT. Stable right IJ central catheter, tip in the superior cavoatrial junction. Stable left chest tubes. MEDIASTINUM: No cardiomegaly. MUSCULOSKELETAL STRUCTURES: No acute fracture. Skin staples in the left lateral chest wall.     1. Improving opacities in the right perihilar and infrahilar regions. Otherwise stable multifocal patchy consolidations. 2. Stable lines and tubes. 3. Stable left pneumothorax subcutaneous emphysema of the left chest wall.    Dx-chest-portable (1 View)    Result Date: 7/12/2018 7/12/2018 12:11 PM HISTORY/REASON FOR EXAM:  POST INTUBATION. TECHNIQUE/EXAM DESCRIPTION AND NUMBER OF VIEWS: Single portable view of the chest. COMPARISON: 7/12/2018 FINDINGS: Interval improvement of RIGHT pleural fluid collection and increased aeration of RIGHT lower lung. Patchy consolidation again seen in the RIGHT upper lung. Focal nodular densities are seen in the LEFT lung as well as consolidation at the mid lung level. Small LEFT pneumothorax is unchanged, with multiple LEFT chest tubes present. Increasing LEFT chest wall emphysema. Other supportive tubing is unchanged.     1.  Interval improved aeration of RIGHT lung base with improvement of RIGHT pleural effusion. 2.  Stable LEFT pneumothorax with chest tubes present. 3.  Patchy consolidation again seen in both lungs with basilar densities in the LEFT upper lung again noted. 4.  Increasing LEFT chest wall emphysema.    Dx-chest-portable (1 View)    Result Date: 7/12/2018 7/12/2018 3:16 AM HISTORY/REASON FOR EXAM: Line placement TECHNIQUE/EXAM DESCRIPTION:  Single AP view of the chest. COMPARISON: July  10, 2018 FINDINGS: Right internal jugular central line is seen terminating at the right atriocaval junction.  To left thoracostomy tube is in place terminating at the left apex and left lung base. The cardiac silhouette appears within normal limits. The mediastinal contour appears within normal limits.  The central pulmonary vasculature appears normal. The lungs appear well expanded bilaterally.  Increased opacification throughout the right hemithorax is seen. There is decreased opacification within the left hemithorax. Residual left pneumothorax is seen similar to prior study. No significant pleural effusions are identified. The bony structures appear age-appropriate.  Soft tissue gas in the left chest wall is seen.     1.  Increased opacification of the right hemithorax, compatible with new large pleural effusion and/or infiltrates and/or atelectasis. 2.  Improved aeration of the left hemithorax status post thoracostomy tube placement. Residual infiltrates in the left lung are seen. 3.  Left pneumothorax, stable since prior with 2 thoracostomy tubes in place.    Dx-chest-portable (1 View)    Result Date: 7/10/2018  7/10/2018 6:14 PM HISTORY/REASON FOR EXAM:  Post thoracentesis left chest. TECHNIQUE/EXAM DESCRIPTION AND NUMBER OF VIEWS: Single portable view of the chest. COMPARISON: 7/10/2018 FINDINGS: The mediastinal and cardiac silhouette is partially obscured by the left-sided parenchymal opacity and pleural fluid The right pulmonary vascularity is within normal limits. Multiple lung masses are seen on the left. There is an airspace process in the left mid and lower hemithorax. There is ill-defined nodular parenchymal opacity in the right upper lobe with some hilar retraction. There is no significant pleural effusion. There was a previous hydropneumothorax seen on the outside prior chest x-ray. Pneumothorax is still seen on the lateral aspect of the left chip-thorax. There are no acute bony abnormalities.     1.   There has been interval decrease in the left pleural effusion. 2.  There is still some component of LEFT pneumothorax although the hydropneumothorax air-fluid level is no longer evident. 3.  Multiple left bone or masses are again seen. 4.  Left lower lobe airspace disease is again seen. 5.  Ill-defined nodular and linear opacity in the right upper lobe with pleural thickening and hilar retraction is again seen.    Us-thoracentesis Puncture Left    Result Date: 7/10/2018  7/10/2018 4:01 PM HISTORY/REASON FOR EXAM: Pleural effusion TECHNIQUE/EXAM DESCRIPTION: Ultrasound-guided thoracentesis. Indication:  LEFT pleural fluid collection. COMPARISON:  None PROCEDURE:     Informed consent was obtained. A timeout was taken. A left pleural effusion was localized with real-time ultrasound guidance. The left posterior chest wall was prepped and draped in a sterile manner. Dr. Fine performed the procedure  with my guidance.  Following local anesthesia with 1% lidocaine, a 5 [place] Yueh pigtail catheter was advanced into the pleural space with trocar technique and pleural fluid was drained. The patient tolerated the procedure well without evidence of complication. A post thoracentesis chest radiograph is forthcoming. FINDINGS: Highly complicated left large volume pleural fluid has extensive internal debris Fluid was  sent to the laboratory. Fluid character: purulent     1. Ultrasound guided left sided diagnostic thoracentesis. 2. 500 mL of fluid withdrawn. Additional fluid was not removed as the catheter became occluded by the thick liquid    Dx-abdomen For Tube Placement    Result Date: 7/14/2018 7/14/2018 12:59 PM HISTORY/REASON FOR EXAM:  Cortrak evaluation. TECHNIQUE/EXAM DESCRIPTION AND NUMBER OF VIEWS:  1 view(s) of the abdomen. COMPARISON:  None. FINDINGS: Cortrak feeding tube tip projects in the region of the gastric fundus. The tube coils back upon itself from the mid stomach. The thoracic course appears  appropriate. The bowel gas pattern is within normal limits.     Cortrak feeding tube tip projects in the region of the gastric fundus.      Micro:  Results     Procedure Component Value Units Date/Time    CULTURE TISSUE W/ GRM STAIN [[id number]]  (Abnormal) Collected:  07/11/18 2033    Order Status:  Completed Specimen:  Tissue Updated:  07/14/18 1316     Significant Indicator POS (POS)     Source TISS     Site Left Lung Tissue     Tissue Culture Growth noted after further incubation, see below for  organism identification.   (A)     Gram Stain Result Few WBCs.  No organisms seen.       Tissue Culture Beta Streptococcus Group F  Light growth   (A)    ANAEROBIC CULTURE [[id number]] Collected:  07/11/18 2033    Order Status:  Completed Specimen:  Tissue Updated:  07/14/18 1316     Significant Indicator NEG     Source TISS     Site Left Lung Tissue     Anaerobic Culture, Culture Res No Anaerobes isolated.    AFB CULTURE [[id number]] Collected:  07/11/18 2033    Order Status:  Completed Specimen:  Tissue Updated:  07/14/18 1316     Significant Indicator NEG     Source TISS     Site Left Lung Tissue     AFB Culture Culture in progress.     AFB Smear Results No acid fast bacilli seen.    FUNGAL CULTURE [[id number]] Collected:  07/11/18 2033    Order Status:  Completed Specimen:  Tissue Updated:  07/14/18 1316     Significant Indicator NEG     Source TISS     Site Left Lung Tissue     Fungal Culture Culture in progress.    AFB CULTURE [[id number]] Collected:  07/12/18 1130    Order Status:  Completed Specimen:  Respirate from Lung Updated:  07/14/18 1053     Significant Indicator NEG     Source RESP     Site Bronchoalveolar Lavage RLL     AFB Culture Culture in progress.     AFB Smear Results No acid fast bacilli seen.    Narrative:       [id number] MENDEZ JOHN  3rd specimin  Which Lobe (Bronch Only):->RLL    CULTURE RESPIRATORY W/ GRM STN [[id number]]  (Abnormal) Collected:  07/12/18 1130    Order Status:  Completed  "Specimen:  Respirate Updated:  07/14/18 1053     Gram Stain Result Rare WBCs.  No organisms seen.       Significant Indicator POS (POS)     Source RESP     Site Bronchoalveolar Lavage RLL     Respiratory Culture -- (A)      Beta Streptococcus Group F  Moderate growth   (A)    Narrative:       [id number] ANDREA LOPEZ  3rd specimin  Which Lobe (Bronch Only):->RLL    FUNGAL CULTURE [[id number]] Collected:  07/12/18 1130    Order Status:  Completed Specimen:  Respirate Updated:  07/14/18 1053     Significant Indicator NEG     Source RESP     Site Bronchoalveolar Lavage RLL     Fungal Culture Culture in progress.    Narrative:       [id number] ANDREA LOPEZ  3rd specimin  Which Lobe (Bronch Only):->RLL    BLOOD CULTURE [[id number]] Collected:  07/12/18 1046    Order Status:  Completed Specimen:  Blood from Peripheral Updated:  07/13/18 0734     Significant Indicator NEG     Source BLD     Site PERIPHERAL     Blood Culture No Growth    Note: Blood cultures are incubated for 5 days and  are monitored continuously.Positive blood cultures  are called to the RN and reported as soon as  they are identified.      Narrative:       [id number] MEGAN HERNANDEZ  Per Hospital Policy: Only change Specimen Src: to \"Line\" if  specified by physician order.    BLOOD CULTURE [[id number]] Collected:  07/12/18 1046    Order Status:  Completed Specimen:  Blood from Peripheral Updated:  07/13/18 0733     Significant Indicator NEG     Source BLD     Site PERIPHERAL     Blood Culture No Growth    Note: Blood cultures are incubated for 5 days and  are monitored continuously.Positive blood cultures  are called to the RN and reported as soon as  they are identified.      Narrative:       [id number] MEGAN HERNANDEZ  Per Hospital Policy: Only change Specimen Src: to \"Line\" if  specified by physician order.    GRAM STAIN [[id number]] Collected:  07/12/18 1130    Order Status:  Completed Specimen:  Respirate Updated:  07/12/18 2342     " Significant Indicator .     Source RESP     Site Bronchoalveolar Lavage RLL     Gram Stain Result Rare WBCs.  No organisms seen.      Narrative:       [id number] MENDEZ JOHN  3rd specimin  Which Lobe (Bronch Only):->RLL    ACID FAST STAIN [[id number]] Collected:  07/12/18 1130    Order Status:  Completed Specimen:  Respirate Updated:  07/12/18 2342     Significant Indicator NEG     Source RESP     Site Bronchoalveolar Lavage RLL     AFB Smear Results No acid fast bacilli seen.    Narrative:       [id number] MENDEZ JOHN  3rd specimin  Which Lobe (Bronch Only):->RLL    GRAM STAIN [[id number]] Collected:  07/11/18 2033    Order Status:  Completed Specimen:  Tissue Updated:  07/12/18 2336     Significant Indicator .     Source TISS     Site Left Lung Tissue     Gram Stain Result Few WBCs.  No organisms seen.      ACID FAST STAIN [[id number]] Collected:  07/11/18 2033    Order Status:  Completed Specimen:  Tissue Updated:  07/12/18 2336     Significant Indicator NEG     Source TISS     Site Left Lung Tissue     AFB Smear Results No acid fast bacilli seen.    ACID FAST STAIN [[id number]] Collected:  07/11/18 1848    Order Status:  Completed Specimen:  Respirate Updated:  07/12/18 2335     Significant Indicator NEG     Source RESP     Site SPUTUM     AFB Smear Results No acid fast bacilli seen.    Narrative:       [id number] NEYMAR OATES R.    AFB CULTURE [[id number]] Collected:  07/11/18 1848    Order Status:  Completed Specimen:  Respirate from Sputum Updated:  07/12/18 2334     Significant Indicator NEG     Source RESP     Site SPUTUM     AFB Culture Culture in progress.     AFB Smear Results No acid fast bacilli seen.    Narrative:       [id number] NEYMAR OATES R.    ACID FAST STAIN [[id number]] Collected:  07/11/18 1100    Order Status:  Completed Specimen:  Respirate Updated:  07/12/18 1427     Significant Indicator NEG     Source RESP     Site Sputum (coughed)     AFB Smear Results No acid fast bacilli seen.     Narrative:       [id number] NEYMAR LASHON R.    AFB CULTURE [[id number]] Collected:  07/11/18 1100    Order Status:  Completed Specimen:  Respirate from Bile Fluid Updated:  07/12/18 1427     Significant Indicator NEG     Source RESP     Site Sputum (coughed)     AFB Culture Culture in progress.     AFB Smear Results No acid fast bacilli seen.    Narrative:       [id number] NEYMAR OATES R.    ACID FAST STAIN [[id number]] Collected:  07/10/18 1635    Order Status:  Completed Specimen:  Body Fluid Updated:  07/12/18 1327     Significant Indicator NEG     Source BF     Site THORACENTESIS FLUID     AFB Smear Results No acid fast bacilli seen.    Narrative:       CALL  [place]  183 tel. [phone],  CALLED  183 tel. [phone] 07/11/2018, 12:41, RB PERF. RESULTS CALLED TO:  RN [id number]  src:pleural effusion    GRAM STAIN [[id number]]  (Abnormal) Collected:  07/10/18 1635    Order Status:  Completed Specimen:  Body Fluid Updated:  07/12/18 1327     Significant Indicator . (POS)     Source BF     Site THORACENTESIS FLUID     Gram Stain Result Many WBCs.  Many Gram positive cocci.   (A)    Narrative:       CALL  [place]  183 tel. [phone],  CALLED  183 tel. [phone] 07/11/2018, 12:41, RB PERF. RESULTS CALLED TO:  RN [id number]  src:pleural effusion    FLUID CULTURE W/GRAM STAIN [[id number]]  (Abnormal) Collected:  07/10/18 1635    Order Status:  Completed Specimen:  Body Fluid from Thoracentesis Fluid Updated:  07/12/18 1327     Significant Indicator POS (POS)     Source BF     Site THORACENTESIS FLUID     Culture Result Bdf -- (A)     Gram Stain Result Many WBCs.  Many Gram positive cocci.   (A)     Culture Result Bdf Beta Streptococcus Group F  Moderate growth   (A)    Narrative:       CALL  [place]  183 tel. [phone],  CALLED  183 tel. [phone] 07/11/2018, 12:41, RB PERF. RESULTS CALLED TO:  RN [id number]  src:pleural effusion    AFB CULTURE [[id number]] Collected:  07/10/18 1635    Order Status:  Completed Specimen:  Body Fluid from  Thoracentesis Fluid Updated:  07/12/18 1327     Significant Indicator NEG     Source BF     Site THORACENTESIS FLUID     AFB Culture Culture in progress.     AFB Smear Results No acid fast bacilli seen.    Narrative:       CALL  [place]  183 tel. [phone],  CALLED  183 tel. [phone] 07/11/2018, 12:41, RB PERF. RESULTS CALLED TO:  ROLANDO [id number]  src:pleural effusion    FUNGAL CULTURE [[id number]] Collected:  07/10/18 1635    Order Status:  Completed Specimen:  Body Fluid from Thoracentesis Fluid Updated:  07/12/18 1327     Significant Indicator NEG     Source BF     Site THORACENTESIS FLUID     Fungal Culture Culture in progress.    Narrative:       CALL  [place]  183 tel. [phone],  CALLED  183 tel. [phone] 07/11/2018, 12:41, RB PERF. RESULTS CALLED TO:  ROLANDO [id number]  src:pleural effusion    AFB [[id number]] Collected:  07/12/18 1150    Order Status:  Sent Specimen:  Sputum from Sputum     RESP CULTURE [[id number]] Collected:  07/12/18 1150    Order Status:  Sent Specimen:  Sputum from Sputum     FUNGAL CULTURE [[id number]] Collected:  07/12/18 1150    Order Status:  Sent Specimen:  Sputum from Sputum     AFB CULTURE [[id number]] Collected:  07/11/18 1848    Order Status:  Sent Specimen:  Sputum from Sputum     AFB CULTURE [[id number]]     Order Status:  Canceled Specimen:  Sputum from Nasopharyngeal Aspirate     BLOOD CULTURE [[id number]] Collected:  07/10/18 0000    Order Status:  Canceled Specimen:  Other from Peripheral     BLOOD CULTURE [[id number]] Collected:  07/10/18 0000    Order Status:  Canceled Specimen:  Other from Peripheral           Assessment:  Active Hospital Problems    Diagnosis   • *Empyema (HCC) [J86.9]   • Acute hypoxemic respiratory failure (HCC) [J96.01]   • Panlobular emphysema (HCC) [J43.1]   • Pulmonary parenchymal mass [R91.8]   • Pulmonary cachexia due to COPD (HCC) [J44.9, R64]   • Protein malnutrition (HCC) [E46]   • Leukocytosis [D72.829]   • Tobacco abuse [Z72.0]       Plan:  Empyema  Underwent  thoracentesis on 7/10/2018  Underwent left thoracotomy decortication and evacuation of purulent empyema and decortication of lung and rib resection on 7/11/2018  The cultures have been group F strep  Continue Unasyn  Check sed rate and CRP    COPD  Patient has considerable changes on his right side of the lung as well    Leukocytosis  Resolving  Due to above    Consider discontinue Motley and the A-line    Prognosis  Guarded      Discussed with internal medicine.

## 2018-07-16 NOTE — PROGRESS NOTES
Pt has pet cat at apartment without anyone to take care of it. Cat has not been taken care of since 7/10/18 when pt was admitted to hospital. Pt, whom is able to write comprehensive notes while intubated, states he would like someone to make home visit to check on cats well being.      Pt lives at [facility] in [facility]. [address] Apartments are owned by [employer], telephone# [phone], which are open Mon-Fri 9am-5pm.    This RN put in a  consult and also paged on call social work via [facility] intranet.

## 2018-07-16 NOTE — DIETARY
Nutrition services: pt extubated and no longer on propofol.  Also no longer on pressors. Will change tube feeding formula to better meet current nutritional needs.    Recommendations/Plan:  Change TF to Diabetisource with full goal 70 ml/hr to provide 2016 kcals, 101 g protein, 1364 ml H20/day

## 2018-07-16 NOTE — RESPIRATORY CARE
Extubation    Events/Summary/Plan: pt extubated per Dr. Mcintyre, cuff leak present, no stridor noted, placed pt on 2LNC (07/16/18  0830)

## 2018-07-17 ENCOUNTER — APPOINTMENT (OUTPATIENT)
Dept: RADIOLOGY | Facility: MEDICAL CENTER | Age: 58
DRG: 853 | End: 2018-07-17
Attending: INTERNAL MEDICINE

## 2018-07-17 LAB
ACTION RANGE TRIGGERED IACRT: NO
ANION GAP SERPL CALC-SCNC: 4 MMOL/L (ref 0–11.9)
BACTERIA BLD CULT: NORMAL
BACTERIA BLD CULT: NORMAL
BASE EXCESS BLDA CALC-SCNC: -1 MMOL/L (ref -4–3)
BASE EXCESS BLDA CALC-SCNC: -3 MMOL/L (ref -4–3)
BASE EXCESS BLDA CALC-SCNC: 0 MMOL/L (ref -4–3)
BASE EXCESS BLDA CALC-SCNC: 1 MMOL/L (ref -4–3)
BASOPHILS # BLD AUTO: 0.1 % (ref 0–1.8)
BASOPHILS # BLD: 0.02 K/UL (ref 0–0.12)
BODY TEMPERATURE: ABNORMAL DEGREES
BODY TEMPERATURE: NORMAL DEGREES
BUN SERPL-MCNC: 13 MG/DL (ref 8–22)
CALCIUM SERPL-MCNC: 7.7 MG/DL (ref 8.5–10.5)
CHLORIDE SERPL-SCNC: 104 MMOL/L (ref 96–112)
CO2 BLDA-SCNC: 20 MMOL/L (ref 20–33)
CO2 BLDA-SCNC: 22 MMOL/L (ref 20–33)
CO2 BLDA-SCNC: 22 MMOL/L (ref 20–33)
CO2 BLDA-SCNC: 25 MMOL/L (ref 20–33)
CO2 SERPL-SCNC: 33 MMOL/L (ref 20–33)
CREAT SERPL-MCNC: 0.34 MG/DL (ref 0.5–1.4)
CRP SERPL HS-MCNC: 3.08 MG/DL (ref 0–0.75)
EOSINOPHIL # BLD AUTO: 0.11 K/UL (ref 0–0.51)
EOSINOPHIL NFR BLD: 0.8 % (ref 0–6.9)
ERYTHROCYTE [DISTWIDTH] IN BLOOD BY AUTOMATED COUNT: 59.1 FL (ref 35.9–50)
ERYTHROCYTE [SEDIMENTATION RATE] IN BLOOD BY WESTERGREN METHOD: 62 MM/HOUR (ref 0–20)
GLUCOSE SERPL-MCNC: 78 MG/DL (ref 65–99)
HCO3 BLDA-SCNC: 19.2 MMOL/L (ref 17–25)
HCO3 BLDA-SCNC: 21.3 MMOL/L (ref 17–25)
HCO3 BLDA-SCNC: 21.5 MMOL/L (ref 17–25)
HCO3 BLDA-SCNC: 24.5 MMOL/L (ref 17–25)
HCT VFR BLD AUTO: 28.5 % (ref 42–52)
HGB BLD-MCNC: 8.9 G/DL (ref 14–18)
IMM GRANULOCYTES # BLD AUTO: 0.17 K/UL (ref 0–0.11)
IMM GRANULOCYTES NFR BLD AUTO: 1.2 % (ref 0–0.9)
INST. QUALIFIED PATIENT IIQPT: YES
LYMPHOCYTES # BLD AUTO: 2.62 K/UL (ref 1–4.8)
LYMPHOCYTES NFR BLD: 18.5 % (ref 22–41)
MAGNESIUM SERPL-MCNC: 1.8 MG/DL (ref 1.5–2.5)
MCH RBC QN AUTO: 27.8 PG (ref 27–33)
MCHC RBC AUTO-ENTMCNC: 31.2 G/DL (ref 33.7–35.3)
MCV RBC AUTO: 89.1 FL (ref 81.4–97.8)
MONOCYTES # BLD AUTO: 0.71 K/UL (ref 0–0.85)
MONOCYTES NFR BLD AUTO: 5 % (ref 0–13.4)
NEUTROPHILS # BLD AUTO: 10.53 K/UL (ref 1.82–7.42)
NEUTROPHILS NFR BLD: 74.4 % (ref 44–72)
NRBC # BLD AUTO: 0 K/UL
NRBC BLD-RTO: 0 /100 WBC
O2/TOTAL GAS SETTING VFR VENT: 30 %
O2/TOTAL GAS SETTING VFR VENT: 70 %
PCO2 BLDA: 23.7 MMHG (ref 26–37)
PCO2 BLDA: 26.6 MMHG (ref 26–37)
PCO2 BLDA: 27 MMHG (ref 26–37)
PCO2 BLDA: 32 MMHG (ref 26–37)
PCO2 TEMP ADJ BLDA: 22.9 MMHG (ref 26–37)
PCO2 TEMP ADJ BLDA: 26.3 MMHG (ref 26–37)
PCO2 TEMP ADJ BLDA: 26.6 MMHG (ref 26–37)
PCO2 TEMP ADJ BLDA: 31.4 MMHG (ref 26–37)
PH BLDA: 7.46 [PH] (ref 7.4–7.5)
PH BLDA: 7.49 [PH] (ref 7.4–7.5)
PH BLDA: 7.51 [PH] (ref 7.4–7.5)
PH BLDA: 7.57 [PH] (ref 7.4–7.5)
PH TEMP ADJ BLDA: 7.46 [PH] (ref 7.4–7.5)
PH TEMP ADJ BLDA: 7.5 [PH] (ref 7.4–7.5)
PH TEMP ADJ BLDA: 7.51 [PH] (ref 7.4–7.5)
PH TEMP ADJ BLDA: 7.58 [PH] (ref 7.4–7.5)
PHOSPHATE SERPL-MCNC: 3.6 MG/DL (ref 2.5–4.5)
PLATELET # BLD AUTO: 512 K/UL (ref 164–446)
PMV BLD AUTO: 9.3 FL (ref 9–12.9)
PO2 BLDA: 179 MMHG (ref 64–87)
PO2 BLDA: 269 MMHG (ref 64–87)
PO2 BLDA: 69 MMHG (ref 64–87)
PO2 BLDA: 81 MMHG (ref 64–87)
PO2 TEMP ADJ BLDA: 174 MMHG (ref 64–87)
PO2 TEMP ADJ BLDA: 267 MMHG (ref 64–87)
PO2 TEMP ADJ BLDA: 67 MMHG (ref 64–87)
PO2 TEMP ADJ BLDA: 79 MMHG (ref 64–87)
POTASSIUM SERPL-SCNC: 3.4 MMOL/L (ref 3.6–5.5)
RBC # BLD AUTO: 3.2 M/UL (ref 4.7–6.1)
SAO2 % BLDA: 100 % (ref 93–99)
SAO2 % BLDA: 100 % (ref 93–99)
SAO2 % BLDA: 95 % (ref 93–99)
SAO2 % BLDA: 97 % (ref 93–99)
SIGNIFICANT IND 70042: NORMAL
SIGNIFICANT IND 70042: NORMAL
SITE SITE: NORMAL
SITE SITE: NORMAL
SODIUM SERPL-SCNC: 141 MMOL/L (ref 135–145)
SOURCE SOURCE: NORMAL
SOURCE SOURCE: NORMAL
SPECIMEN DRAWN FROM PATIENT: ABNORMAL
SPECIMEN DRAWN FROM PATIENT: NORMAL
WBC # BLD AUTO: 14.2 K/UL (ref 4.8–10.8)

## 2018-07-17 PROCEDURE — 83735 ASSAY OF MAGNESIUM: CPT

## 2018-07-17 PROCEDURE — 94668 MNPJ CHEST WALL SBSQ: CPT

## 2018-07-17 PROCEDURE — 700111 HCHG RX REV CODE 636 W/ 250 OVERRIDE (IP): Performed by: STUDENT IN AN ORGANIZED HEALTH CARE EDUCATION/TRAINING PROGRAM

## 2018-07-17 PROCEDURE — G8979 MOBILITY GOAL STATUS: HCPCS | Mod: CI

## 2018-07-17 PROCEDURE — 97166 OT EVAL MOD COMPLEX 45 MIN: CPT

## 2018-07-17 PROCEDURE — 99232 SBSQ HOSP IP/OBS MODERATE 35: CPT | Performed by: INTERNAL MEDICINE

## 2018-07-17 PROCEDURE — G8978 MOBILITY CURRENT STATUS: HCPCS | Mod: CJ

## 2018-07-17 PROCEDURE — 85652 RBC SED RATE AUTOMATED: CPT

## 2018-07-17 PROCEDURE — 99233 SBSQ HOSP IP/OBS HIGH 50: CPT | Performed by: INTERNAL MEDICINE

## 2018-07-17 PROCEDURE — G8987 SELF CARE CURRENT STATUS: HCPCS | Mod: CJ

## 2018-07-17 PROCEDURE — 86140 C-REACTIVE PROTEIN: CPT

## 2018-07-17 PROCEDURE — 71250 CT THORAX DX C-: CPT

## 2018-07-17 PROCEDURE — 71045 X-RAY EXAM CHEST 1 VIEW: CPT

## 2018-07-17 PROCEDURE — 770019 HCHG ROOM/CARE - PEDIATRIC ICU (20*

## 2018-07-17 PROCEDURE — 85025 COMPLETE CBC W/AUTO DIFF WBC: CPT

## 2018-07-17 PROCEDURE — 80048 BASIC METABOLIC PNL TOTAL CA: CPT

## 2018-07-17 PROCEDURE — 84100 ASSAY OF PHOSPHORUS: CPT

## 2018-07-17 PROCEDURE — 700102 HCHG RX REV CODE 250 W/ 637 OVERRIDE(OP): Performed by: STUDENT IN AN ORGANIZED HEALTH CARE EDUCATION/TRAINING PROGRAM

## 2018-07-17 PROCEDURE — 700105 HCHG RX REV CODE 258: Performed by: STUDENT IN AN ORGANIZED HEALTH CARE EDUCATION/TRAINING PROGRAM

## 2018-07-17 PROCEDURE — 97161 PT EVAL LOW COMPLEX 20 MIN: CPT

## 2018-07-17 PROCEDURE — A9270 NON-COVERED ITEM OR SERVICE: HCPCS | Performed by: STUDENT IN AN ORGANIZED HEALTH CARE EDUCATION/TRAINING PROGRAM

## 2018-07-17 PROCEDURE — 700102 HCHG RX REV CODE 250 W/ 637 OVERRIDE(OP): Performed by: INTERNAL MEDICINE

## 2018-07-17 PROCEDURE — A9270 NON-COVERED ITEM OR SERVICE: HCPCS | Performed by: INTERNAL MEDICINE

## 2018-07-17 PROCEDURE — G8988 SELF CARE GOAL STATUS: HCPCS | Mod: CI

## 2018-07-17 PROCEDURE — 700111 HCHG RX REV CODE 636 W/ 250 OVERRIDE (IP): Performed by: INTERNAL MEDICINE

## 2018-07-17 RX ORDER — FUROSEMIDE 10 MG/ML
40 INJECTION INTRAMUSCULAR; INTRAVENOUS ONCE
Status: COMPLETED | OUTPATIENT
Start: 2018-07-17 | End: 2018-07-17

## 2018-07-17 RX ORDER — FUROSEMIDE 10 MG/ML
INJECTION INTRAMUSCULAR; INTRAVENOUS
Status: ACTIVE
Start: 2018-07-17 | End: 2018-07-17

## 2018-07-17 RX ORDER — MAGNESIUM SULFATE 1 G/100ML
1 INJECTION INTRAVENOUS
Status: COMPLETED | OUTPATIENT
Start: 2018-07-17 | End: 2018-07-17

## 2018-07-17 RX ORDER — AMOXICILLIN 250 MG
CAPSULE ORAL
Status: ACTIVE
Start: 2018-07-17 | End: 2018-07-17

## 2018-07-17 RX ORDER — NICOTINE 21 MG/24HR
PATCH, TRANSDERMAL 24 HOURS TRANSDERMAL
Status: ACTIVE
Start: 2018-07-17 | End: 2018-07-17

## 2018-07-17 RX ORDER — MAGNESIUM SULFATE 1 G/100ML
INJECTION INTRAVENOUS
Status: ACTIVE
Start: 2018-07-17 | End: 2018-07-17

## 2018-07-17 RX ORDER — POTASSIUM CHLORIDE 20 MEQ/1
40 TABLET, EXTENDED RELEASE ORAL 2 TIMES DAILY
Status: COMPLETED | OUTPATIENT
Start: 2018-07-17 | End: 2018-07-18

## 2018-07-17 RX ORDER — OXYCODONE HYDROCHLORIDE 10 MG/1
TABLET ORAL
Status: ACTIVE
Start: 2018-07-17 | End: 2018-07-17

## 2018-07-17 RX ORDER — POTASSIUM CHLORIDE 20 MEQ/1
TABLET, EXTENDED RELEASE ORAL
Status: ACTIVE
Start: 2018-07-17 | End: 2018-07-17

## 2018-07-17 RX ADMIN — ENOXAPARIN SODIUM 40 MG: 100 INJECTION SUBCUTANEOUS at 06:04

## 2018-07-17 RX ADMIN — STANDARDIZED SENNA CONCENTRATE AND DOCUSATE SODIUM 2 TABLET: 8.6; 5 TABLET, FILM COATED ORAL at 17:57

## 2018-07-17 RX ADMIN — FAMOTIDINE 20 MG: 10 INJECTION, SOLUTION INTRAVENOUS at 06:04

## 2018-07-17 RX ADMIN — OXYCODONE HYDROCHLORIDE 10 MG: 10 TABLET ORAL at 13:12

## 2018-07-17 RX ADMIN — FUROSEMIDE 40 MG: 10 INJECTION, SOLUTION INTRAMUSCULAR; INTRAVENOUS at 06:46

## 2018-07-17 RX ADMIN — HYDROCORTISONE SODIUM SUCCINATE 50 MG: 100 INJECTION, POWDER, FOR SOLUTION INTRAMUSCULAR; INTRAVENOUS at 06:04

## 2018-07-17 RX ADMIN — POTASSIUM CHLORIDE 40 MEQ: 1500 TABLET, EXTENDED RELEASE ORAL at 17:58

## 2018-07-17 RX ADMIN — MAGNESIUM SULFATE IN DEXTROSE 1 G: 10 INJECTION, SOLUTION INTRAVENOUS at 08:21

## 2018-07-17 RX ADMIN — AMPICILLIN SODIUM AND SULBACTAM SODIUM 3 G: 2; 1 INJECTION, POWDER, FOR SOLUTION INTRAMUSCULAR; INTRAVENOUS at 11:32

## 2018-07-17 RX ADMIN — POTASSIUM CHLORIDE 40 MEQ: 1500 TABLET, EXTENDED RELEASE ORAL at 06:46

## 2018-07-17 RX ADMIN — NICOTINE 21 MG: 21 PATCH, EXTENDED RELEASE TRANSDERMAL at 06:03

## 2018-07-17 RX ADMIN — AMPICILLIN SODIUM AND SULBACTAM SODIUM 3 G: 2; 1 INJECTION, POWDER, FOR SOLUTION INTRAMUSCULAR; INTRAVENOUS at 23:57

## 2018-07-17 RX ADMIN — AMPICILLIN SODIUM AND SULBACTAM SODIUM 3 G: 2; 1 INJECTION, POWDER, FOR SOLUTION INTRAMUSCULAR; INTRAVENOUS at 06:03

## 2018-07-17 RX ADMIN — MAGNESIUM SULFATE IN DEXTROSE 1 G: 10 INJECTION, SOLUTION INTRAVENOUS at 09:30

## 2018-07-17 RX ADMIN — AMPICILLIN SODIUM AND SULBACTAM SODIUM 3 G: 2; 1 INJECTION, POWDER, FOR SOLUTION INTRAMUSCULAR; INTRAVENOUS at 17:58

## 2018-07-17 RX ADMIN — OXYCODONE HYDROCHLORIDE 10 MG: 10 TABLET ORAL at 03:02

## 2018-07-17 RX ADMIN — OXYCODONE HYDROCHLORIDE 5 MG: 5 TABLET ORAL at 21:43

## 2018-07-17 RX ADMIN — STANDARDIZED SENNA CONCENTRATE AND DOCUSATE SODIUM 2 TABLET: 8.6; 5 TABLET, FILM COATED ORAL at 06:04

## 2018-07-17 ASSESSMENT — ENCOUNTER SYMPTOMS
MYALGIAS: 1
DIZZINESS: 0
HALLUCINATIONS: 0
HEMOPTYSIS: 0
BRUISES/BLEEDS EASILY: 0
ABDOMINAL PAIN: 0
NAUSEA: 0
SHORTNESS OF BREATH: 0
CONSTIPATION: 0
SHORTNESS OF BREATH: 1
SENSORY CHANGE: 0
PALPITATIONS: 0
COUGH: 1
SPUTUM PRODUCTION: 1
FEVER: 0
HEADACHES: 0
HEARTBURN: 0
MYALGIAS: 0
VOMITING: 0
ORTHOPNEA: 0
DEPRESSION: 0
DIARRHEA: 0
CHILLS: 0
COUGH: 0
FOCAL WEAKNESS: 0
WHEEZING: 0
SPEECH CHANGE: 0
BLURRED VISION: 0
BACK PAIN: 1

## 2018-07-17 ASSESSMENT — PAIN SCALES - GENERAL
PAINLEVEL_OUTOF10: 6
PAINLEVEL_OUTOF10: 4
PAINLEVEL_OUTOF10: 5
PAINLEVEL_OUTOF10: 2
PAINLEVEL_OUTOF10: 8
PAINLEVEL_OUTOF10: 0
PAINLEVEL_OUTOF10: 1
PAINLEVEL_OUTOF10: 2
PAINLEVEL_OUTOF10: 4
PAINLEVEL_OUTOF10: 2
PAINLEVEL_OUTOF10: 0
PAINLEVEL_OUTOF10: 2
PAINLEVEL_OUTOF10: 2
PAINLEVEL_OUTOF10: 0
PAINLEVEL_OUTOF10: 2

## 2018-07-17 ASSESSMENT — COGNITIVE AND FUNCTIONAL STATUS - GENERAL
DAILY ACTIVITIY SCORE: 20
HELP NEEDED FOR BATHING: A LITTLE
WALKING IN HOSPITAL ROOM: A LITTLE
TOILETING: A LITTLE
SUGGESTED CMS G CODE MODIFIER MOBILITY: CJ
PERSONAL GROOMING: A LITTLE
STANDING UP FROM CHAIR USING ARMS: A LITTLE
MOBILITY SCORE: 20
DRESSING REGULAR LOWER BODY CLOTHING: A LITTLE
CLIMB 3 TO 5 STEPS WITH RAILING: A LITTLE
MOVING TO AND FROM BED TO CHAIR: A LITTLE
SUGGESTED CMS G CODE MODIFIER DAILY ACTIVITY: CJ

## 2018-07-17 ASSESSMENT — ACTIVITIES OF DAILY LIVING (ADL): TOILETING: INDEPENDENT

## 2018-07-17 ASSESSMENT — GAIT ASSESSMENTS
ASSISTIVE DEVICE: FRONT WHEEL WALKER
DISTANCE (FEET): 20
GAIT LEVEL OF ASSIST: CONTACT GUARD ASSIST

## 2018-07-17 NOTE — PROGRESS NOTES
[facility] ICU Progress Note      Admit Date: 7/10/2018  ICU Day: 6    Resident(s): Sweta Stroud   Attending: [facility]/ Carlton Quiñonez    Date & Time:   7/17/2018   10:40 AM       Patient ID:    Name:             Donna Ceballos   YOB: 1960  Age:                 58 y.o.  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 He presented with dyspnea on exertion and production of sputum with cough associated with 10 pound weight loss since March. He was transferred from the outside facility due to findings of empyema left greater than right and multiloculated abscess versus cavitary masses. Patient was admitted to the ICU following his left thoracostomy with continued intubation and management of chest tubes.       Interval Events:  - Awake, alert.  - Extubated 7/16. Increased secretions/Sputum production. Lung abscess??  - Chest tube drain 440ml in past 24 hours. Air leak improving. Plan for increasing suction as per CTS.   - Leukocytosis trending up, however bands are paradoxically decreasing. Cont Abx.   - FT Dced. Tolerating oral intake. Ambulating in room.    Review of Systems   Constitutional: Positive for malaise/fatigue. Negative for chills and fever.   HENT: Negative for congestion.    Respiratory: Positive for sputum production. Negative for cough, hemoptysis, shortness of breath and wheezing.    Cardiovascular: Positive for chest pain and leg swelling. Negative for palpitations and orthopnea.   Gastrointestinal: Negative for abdominal pain, constipation, diarrhea and heartburn.   Genitourinary: Negative for frequency and hematuria.   Musculoskeletal: Positive for back pain. Negative for myalgias.   Skin: Negative for  rash.   Neurological: Negative for dizziness, speech change, focal weakness and headaches.   Endo/Heme/Allergies: Does not bruise/bleed easily.   Psychiatric/Behavioral: Negative for depression and hallucinations.       VITALS:  Pulse: 62, Heart Rate (Monitored): 69  Arterial BP: 158/64, NIBP: (!) (P) 99/56       Temp  Av.9 °C (98.4 °F)  Min: 36.6 °C (97.8 °F)  Max: 37.2 °C (98.9 °F)         Physical Exam:  GEN: awake and alert.    HEENT: NC/AT.   CV: S1, S2, NSR, No m/r/g  RESP: Diminished air entry. Scattered Rhonchi. Chest tube x 2 in place with some airleak.    ABD: Soft, NT, ND; +BS  EXT: No LE edema b/l. No signs of cyanosis  NEURO: alert and awake. NFD.     Consultants:  General Surgery: Dr. Diego Martínez M.D  Infectious disease :Dr. Tessa Rich M.D.  PMA: Dr. Carlton Mcintyre    Procedures:  7/10-thoracentesis of large left pleural effusion with thick turbid creamy yellow pus.-Interventional radiology  -left thoracostomy, decortication, evacuation of purulent empyema, debridement of necrotic lung, and rib resection.-Dr. Diego Martínez  -Central line placement  -emergent diagnostic and therapeutic bronchoscopy with aspiration and bronchial alveolar lavage    Respiratory:     Respiration: (!) 21, Pulse Oximetry: 95 %, O2 Daily Delivery Respiratory : OxyMask    Chest Tube Drains:          Invalid input(s): [id number]    HemoDynamics:  Pulse: 62, Heart Rate (Monitored): 69 Arterial BP: 158/64, NIBP: (!) (P) 99/56      Neuro:      Fluids:  Intake/Output       07/15/18 0700 - 18 0659 18 07 - 18 0659 18 07 - 18 0659      4983-2468 0180-4764 Total 3452-5225 6067-4706 Total 0326-0617 8320-9950 Total       Intake    P.O.  --  -- --  600  600 1200  --  -- --    P.O. -- -- --  -- -- --    I.V.  943.6  413.6 1357.2  736.3  320 1056.3  20  -- 20    Propofol Volume 93.6 93.6 187.2 16.3 -- 16.3 -- -- --    IV Volume (TKO) 20 -- 20 -- -- -- -- -- --    IV  Volume (0.9% NS) 380 120 500 120 120 240 20 -- 20    IV Volume (Unasyn) 200 200 400 100 200 300 -- -- --    IV Volume (K Phos) 250 -- 250 500 -- 500 -- -- --    Other  240  120 360  30  -- 30  --  -- --    Medications (P.O./ Enteral Liquids) 240 120 360 30 -- 30 -- -- --    Enteral  600  240 840  90  -- 90  --  -- --    Free Water / Tube Flush 60 240 300 90 -- 90 -- -- --    Intake (mL) ([REMOVED] Enteral Tube Right Nare Cortrak Small Bowel Feeding Tube) 540 -- 540 -- -- -- -- -- --    Total Intake 1783.6 773.6 2557.2 1456.3 920 2376.3 20 -- 20       Output    Urine  500  655 1155  3850  2650 6500  1705  -- 1705    Urine Void (mL) (non-catheter) -- -- -- 200 2650 2850 1705 -- 1705    Output (mL) ([REMOVED] Urinary Catheter Indwelling Catheter 16)  3650 -- 3650 -- -- --    Chest Tube  220  150 370  190  250 440  --  -- --    Output (mL) (Chest Tube Group 1 (A) Left straight 32) 10 0 10 40 0 40 -- -- --    Output (mL) (Chest Tube Group 2 (B) Left angled 32) 210 150 360 150 250 400 -- -- --    Total Output  4040 2900 6940 1705 -- 1705       Net I/O     1063.6 -31.4 1032.2 -2583.8 -1980 -4563.8 -1685 -- -1685        Weight: 83.4 kg (183 lb 13.8 oz)  Recent Labs      07/15/18   0410  07/16/18   0415  07/17/18   0420   SODIUM  145  146*  141   POTASSIUM  3.6  3.8  3.4*   CHLORIDE  117*  116*  104   CO2  20  22  33   BUN  15  20  13   CREATININE  0.37*  0.34*  0.34*   MAGNESIUM  2.0  2.1  1.8   PHOSPHORUS  2.5  2.2*  3.6   CALCIUM  7.4*  7.8*  7.7*     Body mass index is 24.93 kg/m².    GI/Nutrition:  Recent Labs      07/15/18   0410  07/16/18   0415  07/17/18   0420   ALTSGPT   --   14   --    ASTSGOT   --   11*   --    ALKPHOSPHAT   --   51   --    TBILIRUBIN   --   0.2   --    PREALBUMIN   --   12.0*   --    GLUCOSE  130*  120*  78       Heme:  Recent Labs      07/15/18   0410  07/16/18   0415  07/17/18   0420   RBC  3.16*  3.15*  3.20*   HEMOGLOBIN  8.6*  8.7*  8.9*   HEMATOCRIT  28.1*  27.8*   28.5*   PLATELETCT  499*  495*  512*       Infectious Disease:  Temp  Av.9 °C (98.4 °F)  Min: 36.6 °C (97.8 °F)  Max: 37.2 °C (98.9 °F)  Recent Labs      07/15/18   0410  18   0415  18   0420   WBC  10.5  10.9*  14.2*   NEUTSPOLYS  87.40*  68.00  74.40*   LYMPHOCYTES  9.90*  23.30  18.50*   MONOCYTES  0.00  5.70  5.00   EOSINOPHILS  0.00  0.60  0.80   BASOPHILS  0.00  0.20  0.10   ASTSGOT   --   11*   --    ALTSGPT   --   14   --    ALKPHOSPHAT   --   51   --    TBILIRUBIN   --   0.2   --        Meds:  • [START ON 2018] hydrocortisone sodium succinate PF  50 mg     • potassium chloride SA  40 mEq     • oxyCODONE immediate release       • enoxaparin       • hydrocortisone sodium succinate PF       • nicotine       • senna-docusate       • furosemide       • potassium chloride SA       • famotidine       • Magnesium Sulfate in D5W       • enoxaparin (LOVENOX) injection  40 mg     • oxyCODONE immediate-release  5 mg      Or   • oxyCODONE immediate-release  10 mg     • Respiratory Care per Protocol       • senna-docusate  2 Tab      And   • polyethylene glycol/lytes  1 Packet      And   • magnesium hydroxide  30 mL      And   • bisacodyl  10 mg     • MD ALERT...Adult ICU Electrolyte Replacement per Pharmacy Protocol       • ipratropium-albuterol  3 mL     • NS   10 mL/hr at 07/15/18 1026   • ondansetron  4 mg     • ondansetron  4 mg     • promethazine  12.5-25 mg     • promethazine  12.5-25 mg     • prochlorperazine  5-10 mg     • acetaminophen  650 mg     • nicotine  21 mg      And   • nicotine polacrilex  2 mg     • ampicillin-sulbactam (UNASYN) IV  3 g Stopped (18 0633)        Imaging:  DX-CHEST-PORTABLE (1 VIEW)   Final Result      1.  Unchanged small left lateral pneumothorax with 2 chest tubes in place.      2.  Unchanging bilateral pulmonary opacities.      DX-CHEST-PORTABLE (1 VIEW)   Final Result      1.  Unchanged small LEFT pneumothorax with chest tubes in place   2.  Unchanged  BILATERAL cavitary airspace disease      DX-CHEST-PORTABLE (1 VIEW)   Final Result         1.  Stable pulmonary infiltrates and/or edema with probable component of chronic underlying lung disease.   2.  Left pneumothorax, stable since prior with 2 thoracostomy tubes in place.         DX-ABDOMEN FOR TUBE PLACEMENT   Final Result      Cortrak feeding tube tip projects in the region of the gastric fundus.      DX-CHEST-PORTABLE (1 VIEW)   Final Result         1.  Increased opacification of the right hemithorax, compatible with new large pleural effusion and/or infiltrates and/or atelectasis.   2.  Left pneumothorax, stable since prior with 2 thoracostomy tubes in place.      DX-CHEST-PORTABLE (1 VIEW)   Final Result         1. Improving opacities in the right perihilar and infrahilar regions. Otherwise stable multifocal patchy consolidations.   2. Stable lines and tubes.   3. Stable left pneumothorax subcutaneous emphysema of the left chest wall.      DX-CHEST-PORTABLE (1 VIEW)   Final Result      1.  Interval improved aeration of RIGHT lung base with improvement of RIGHT pleural effusion.   2.  Stable LEFT pneumothorax with chest tubes present.   3.  Patchy consolidation again seen in both lungs with basilar densities in the LEFT upper lung again noted.   4.  Increasing LEFT chest wall emphysema.      DX-CHEST-PORTABLE (1 VIEW)   Final Result         1.  Increased opacification of the right hemithorax, compatible with new large pleural effusion and/or infiltrates and/or atelectasis.   2.  Improved aeration of the left hemithorax status post thoracostomy tube placement. Residual infiltrates in the left lung are seen.   3.  Left pneumothorax, stable since prior with 2 thoracostomy tubes in place.      DX-CHEST-PORTABLE (1 VIEW)   Final Result      1.  There has been interval decrease in the left pleural effusion.   2.  There is still some component of LEFT pneumothorax although the hydropneumothorax air-fluid level is  no longer evident.   3.  Multiple left bone or masses are again seen.   4.  Left lower lobe airspace disease is again seen.   5.  Ill-defined nodular and linear opacity in the right upper lobe with pleural thickening and hilar retraction is again seen.      US-THORACENTESIS PUNCTURE LEFT   Final Result      1. Ultrasound guided left sided diagnostic thoracentesis.      2. 500 mL of fluid withdrawn. Additional fluid was not removed as the catheter became occluded by the thick liquid      OUTSIDE IMAGES-CT CHEST/ABDOMEN/PELVIS   Final Result      OUTSIDE IMAGES-DX CHEST   Final Result      DX-CHEST-LIMITED (1 VIEW)    (Results Pending)         Assessment and plan    * Empyema (HCC)- (present on admission)   Overview    Cough, greenish and bloody sputum, dyspnea on exertion and orthopnea x 4 months.   Leukocytosis and lactic acidosis on admission.  CT chest: large empyema with cavitary lesion/masses in the right upper lobe.  S/p Left thoracotomy, decortication, evacuation of purulent empyema, debridement of necrotic lung, rib resection by surgery, Dr. Diego Martínez 7/11/2018.  S/p Chest tube x 2.  Pleural fluid culture: Beta strep group F.      Assessment & Plan    - Improving status.  - Chest tube drain 440 ml last 24 hours. Air leak improving.   - Extubated 7/16.  - As per ID, Continue Unasyn. ABx duration is 4 weeks.   - Continue with Unasyn and chest tubes.   - Blood culture NTGD.   - CTS and gen surgery on board. Appreciate input.         Acute hypoxemic respiratory failure (HCC)   Assessment & Plan    - Resolved.   - Secondary to necrotizing pneumona/Empyema   - Intubated 7/11- 7/16.  - Continue abx.  - Continuous pulse ox.          Panlobular emphysema (HCC)- (present on admission)   Assessment & Plan    - Active everyday smoker.   - No evidence of acute exacerbation.  - Continue scheduled duonebs.  - RT protocol, Oxysgen, Cont pulse ox.  - Counseling.        Protein malnutrition (HCC)- (present on admission)    Assessment & Plan    - Patient appears malnourished.  - Lost around 10 lbs since March when the symptoms started.  - Continue tube feeds for now.         Pulmonary cachexia due to COPD (HCC)- (present on admission)   Assessment & Plan    - Likely secondary to emphysema and empyema  - Currently on tube feeds.   - Nutritional consult.   - Continue antibiotics.         Tobacco abuse- (present on admission)   Assessment & Plan    - Nicotine patches while IP.   - Tobacco cessation counseling          Leukocytosis- (present on admission)   Assessment & Plan    - WBC - 15.5 on admission.  - Resolving with antibiotics.             DISPO: ICU    CODE STATUS: Full code    Quality Measures:  Quality-Core Measures   Reviewed items::  Labs reviewed, Medications reviewed and Radiology images reviewed  Motley catheter::  No Motley  Central line in place:  Need for access and Sepsis  DVT prophylaxis pharmacological::  Enoxaparin (Lovenox)  Ulcer Prophylaxis::  Yes  Antibiotics:  Treating active infection/contamination beyond 24 hours perioperative coverage

## 2018-07-17 NOTE — PROGRESS NOTES
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  The patient initially was seen at an   outside facility with approximately 4 days of increasing dyspnea on exertion,   cough with purulent sputum, and chest pain and discomfort with deep   inspiration.  He also indicates that he has had approximately 10 pounds of   weight loss since March of this year.  He denies any fever, chills, or sweats.    Denies any night sweats.  No lymphadenopathy, headache, visual changes, neck   stiffness.  Denies any nausea, vomiting, abdominal pain, diarrhea, or lower   extremity edema.  Patient says he has never been outside of [place].  He   has never been to snf or retirement.  While at the outside facility, patient had   CT scan that showed a significant left greater than right changes with   suggestive empyema as well as multiloculated abscess versus cavitary masses.    Patient was transferred to [facility] for higher level of care and further   evaluation.  Patient indicates at the present time that he is not in any   current discomfort.  Denies any significant sick contacts.  Denies any history   of known lung problems.  Denies any environmental exposures.  Has a cat as a   pet.    Interval Events:  24 hour interval history reviewed   Tm 99.3  -4.1L over last 24hr, +4.5L since admit  cxr with continued left sided pneumo, b/l multifocal opacities, chest tube x 2 in place, extubated  Wbc 13->10->10.9->14  K 3.4  Cr 0.34  Mg 1.8    Thora 7/10 Group F B-Strep  Left Lung Tissue 7/11 Group F B-Strep  Bcx 7/12 ngtd  Bal 7/12 Group F B-Strep    Prop @ 20  Unaysn  Hydrocoritosol 50 @Q12    S/P Left thoracotomy and decortication w evac of  emyema 7/11  Chest tube x 2 on left with persistent airleak in both      Review of Systems   Constitutional: Negative for chills and fever.   HENT: Negative for congestion.    Eyes: Negative for blurred vision.   Respiratory: Positive for cough and sputum production. Negative for shortness of breath.    Cardiovascular: Negative for chest pain and palpitations.   Gastrointestinal: Negative for abdominal pain, nausea and vomiting.   Neurological: Negative for focal weakness.   Psychiatric/Behavioral: Negative for depression.   All other systems reviewed and are negative.    Physical Exam   Constitutional: He appears well-developed.   Malnourished appearing   HENT:   Head: Normocephalic and atraumatic.   Eyes: Conjunctivae are normal. Pupils are equal, round, and reactive to light.   Neck: No tracheal deviation present.   Cardiovascular: Normal rate and regular rhythm.    Pulmonary/Chest: He has no wheezes. He has no rales.   Diminished with scattered rhonchi   Abdominal: Soft. He exhibits no distension. There is no tenderness.   Musculoskeletal: He exhibits no edema.   Neurological: No cranial nerve deficit.   Skin: Skin is warm and dry. Capillary refill takes less than 2 seconds. No cyanosis.   Psychiatric: He has a normal mood and affect.   Nursing note and vitals reviewed.      PFSH:  No change.    Respiratory:     Pulse Oximetry: 97 %                      Invalid input(s): [id number]    HemoDynamics:  Pulse: 63, Heart Rate (Monitored): 68  Arterial BP: 158/64, NIBP: 115/61               Neuro:  GCS             Fluids:  Intake/Output       07/15/18 0700 - 07/16/18 0659 07/16/18 0700 - 07/17/18 0659 07/17/18 0700 - 07/18/18 0659      1223-2175 5556-1243 Total 5832-7363 2673-9936 Total 8621-5822 3251-0048 Total       Intake    P.O.  --  -- --  600  300 900  --  -- --    P.O. -- -- -- 600 300 900 -- -- --    I.V.  943.6  413.6 1357.2  736.3  180 916.3  --  -- --    Propofol Volume 93.6 93.6 187.2 16.3 -- 16.3 --  -- --    IV Volume (TKO) 20 -- 20 -- -- -- -- -- --    IV Volume (0.9% NS) 380 120 500 120 80 200 -- -- --    IV Volume (Unasyn) 200 200 400 100 100 200 -- -- --    IV Volume (K Phos) 250 -- 250 500 -- 500 -- -- --    Other  240  120 360  30  -- 30  --  -- --    Medications (P.O./ Enteral Liquids) 240 120 360 30 -- 30 -- -- --    Enteral  600  240 840  90  -- 90  --  -- --    Free Water / Tube Flush 60 240 300 90 -- 90 -- -- --    Intake (mL) ([REMOVED] Enteral Tube Right Nare Cortrak Small Bowel Feeding Tube) 540 -- 540 -- -- -- -- -- --    Total Intake 1783.6 773.6 2557.2 1456.3 480 1936.3 -- -- --       Output    Urine  500  655 1155  3850  2050 5900  --  -- --    Urine Void (mL) (non-catheter) -- -- -- 200 2050 2250 -- -- --    Output (mL) ([REMOVED] Urinary Catheter Indwelling Catheter 16)  3650 -- 3650 -- -- --    Chest Tube  220  150 370  190  -- 190  --  -- --    Output (mL) (Chest Tube Group 1 (A) Left straight 32) 10 0 10 40 -- 40 -- -- --    Output (mL) (Chest Tube Group 2 (B) Left angled 32) 210 150 360 150 -- 150 -- -- --    Total Output  4040 2050 6090 -- -- --       Net I/O     1063.6 -31.4 1032.2 -2583.8 -1570 -4153.8 -- -- --           Recent Labs      07/15/18   0410  07/16/18   0415  07/17/18   0420   SODIUM  145  146*  141   POTASSIUM  3.6  3.8  3.4*   CHLORIDE  117*  116*  104   CO2  20  22  33   BUN  15  20  13   CREATININE  0.37*  0.34*  0.34*   MAGNESIUM  2.0  2.1  1.8   PHOSPHORUS  2.5  2.2*  3.6   CALCIUM  7.4*  7.8*  7.7*       GI/Nutrition:      Liver Function  Recent Labs      07/15/18   0410  07/16/18   0415  07/17/18   0420   ALTSGPT   --   14   --    ASTSGOT   --   11*   --    ALKPHOSPHAT   --   51   --    TBILIRUBIN   --   0.2   --    PREALBUMIN   --   12.0*   --    GLUCOSE  130*  120*  78       Heme:  Recent Labs      07/15/18   0410  07/16/18   0415  07/17/18   0420   RBC  3.16*  3.15*  3.20*   HEMOGLOBIN  8.6*  8.7*  8.9*   HEMATOCRIT  28.1*  27.8*  28.5*    PLATELETCT  499*  495*  512*       Infectious Disease:  Temp  Av.9 °C (98.5 °F)  Min: 36.6 °C (97.8 °F)  Max: 37.4 °C (99.3 °F)  Micro: cultures reviewed  Recent Labs      07/15/18   04118   0415  18   042   WBC  10.5  10.9*  14.2*   NEUTSPOLYS  87.40*  68.00  74.40*   LYMPHOCYTES  9.90*  23.30  18.50*   MONOCYTES  0.00  5.70  5.00   EOSINOPHILS  0.00  0.60  0.80   BASOPHILS  0.00  0.20  0.10   ASTSGOT   --   11*   --    ALTSGPT   --   14   --    ALKPHOSPHAT   --   51   --    TBILIRUBIN   --   0.2   --      Current Facility-Administered Medications   Medication Dose Frequency Provider Last Rate Last Dose   • enoxaparin (LOVENOX) inj 40 mg  40 mg DAILY Carlton Mcintyre M.D.   40 mg at 18 06   • oxyCODONE immediate-release (ROXICODONE) tablet 5 mg  5 mg Q4HRS PREVONNE Mcintyre M.D.        Or   • oxyCODONE immediate release (ROXICODONE) tablet 10 mg  10 mg Q4HRS PRN Carlton Mcintyre M.D.   10 mg at 18 0302   • hydrocortisone sodium succinate PF (SOLU-CORTEF) 100 MG injection 50 mg  50 mg Q12HRS Carlton Mcintyre M.D.   50 mg at 18 06   • famotidine (PEPCID) injection 20 mg  20 mg BID Carlton Mcintyre M.D.   20 mg at 18 06   • Respiratory Care per Protocol   Continuous RT Carlton Mcintyre M.D.       • senna-docusate (PERICOLACE or SENOKOT S) 8.6-50 MG per tablet 2 Tab  2 Tab BID Carlton Mcintyre M.D.   2 Tab at 18 0604    And   • polyethylene glycol/lytes (MIRALAX) PACKET 1 Packet  1 Packet QDAY PRN Carlton Mcintyre M.D.   1 Packet at 07/15/18 0835    And   • magnesium hydroxide (MILK OF MAGNESIA) suspension 30 mL  30 mL QDAY PRN Carlton Mcintyre M.D.   30 mL at 18 0514    And   • bisacodyl (DULCOLAX) suppository 10 mg  10 mg QDAY PRN Carlton Mcintyre M.D.   10 mg at 18 1610   • MD ALERT...Adult ICU Electrolyte Replacement per Pharmacy Protocol   pharmacy to dose Carlton Mcintyre M.D.       • ipratropium-albuterol (DUONEB) nebulizer solution  3 mL  Q2HRS PRN (RT) Carlton Mcintyre M.D.       • NS infusion   Continuous Jose Bourne M.D. 10 mL/hr at 07/15/18 1026     • ondansetron (ZOFRAN) syringe/vial injection 4 mg  4 mg Q4HRS PRN Shruthi Phan M.D.       • ondansetron (ZOFRAN ODT) dispertab 4 mg  4 mg Q4HRS PRN Shruthi Phan M.D.       • promethazine (PHENERGAN) tablet 12.5-25 mg  12.5-25 mg Q4HRS PRN Shruthi Phan M.D.       • promethazine (PHENERGAN) suppository 12.5-25 mg  12.5-25 mg Q4HRS PRN Shruthi Phan M.D.       • prochlorperazine (COMPAZINE) injection 5-10 mg  5-10 mg Q4HRS PRN Shruthi Phan M.D.       • acetaminophen (TYLENOL) tablet 650 mg  650 mg Q6HRS PRN Shruthi Phan M.D.   650 mg at 07/16/18 1359   • nicotine (NICODERM) 21 MG/24HR 21 mg  21 mg Daily-0600 Shruthi Phan M.D.   21 mg at 07/17/18 0603    And   • nicotine polacrilex (NICORETTE) 2 MG piece 2 mg  2 mg Q HOUR PRN Shruthi Phan M.D.       • ampicillin/sulbactam (UNASYN) 3 g in  mL IVPB  3 g Q6HRS Helene Smith M.D. 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 and persisting small pneumothorax   - s/p left decort 7/11   - thora 7/10 with Group F B-strep   - on IV abx - min 6-week course post op   - Chest tube in place with drainage, ongoing airleak, keep to seuction    Multifocal PNA   - ct with diffuse lung involvement with necrotizing and cavitary changes   - s/p decort 7/11 on left   - repeat ct chest to re-evaluate collections   - CPT, Postural draining, Aggressive Pulm Toilet   - continue abx, pending ct findings, will likely repeat in 4-5 weeks, at which point further surgical intervention can be  considered    Hypotension - improved   - sepsis protocols   - continue abx IV   - change steroids to daily    Leukocytosis   - related to pulmonary infection   - hallie gilbert group F b-strep   - abx    Chronic Etoh use   - thiamine/folate/mvi    Chronic tob use   -  on cessation   - Bronchodilators, RT protocols    Hypokalemia   - I am replacing to keep > 4     Hypomagnesemia   - I am replacing to keep > 2      Discussed patient condition and risk of morbidity and/or mortality with RN, RT, Pharmacy, [facility] resident and Charge nurse / hot rounds.

## 2018-07-17 NOTE — CARE PLAN
Problem: Respiratory:  Goal: Respiratory status will improve  Outcome: PROGRESSING AS EXPECTED  Pt getting 750 on IS. CPT started. 4L NC. CT up to 30cm suction per Dr. Martínez.     Problem: Mobility  Goal: Risk for activity intolerance will decrease  Outcome: PROGRESSING AS EXPECTED  Pt worked with PT and OT. Generalized weakness. Minimal assistance. FWW.

## 2018-07-17 NOTE — DIETARY
Nutrition: Tube feeding discontinued and regular diet started.  Pt to be seen daily by nutrition representative for meal preferences.

## 2018-07-17 NOTE — PROGRESS NOTES
Infectious Disease Progress Note    Author: Laina Lopez M.D. Date & Time of service: 2018  12:23 PM    Chief Complaint:  Empyema     Interval History:  18- Tmax 99.3 WBC 10.9 creatinine 0.34 patient continues to remain significantly debilitated  2018 T-max 98.9 feels much better.  WBC 14.2 platelets 512 creatinine 0.34  Labs Reviewed, Medications Reviewed, Radiology Reviewed and Wound Reviewed.    Review of Systems:  Review of Systems   Constitutional: Positive for malaise/fatigue. Negative for fever.   HENT: Negative for hearing loss.    Respiratory: Positive for cough and shortness of breath.         Chest pain improved   Cardiovascular: Negative for chest pain and leg swelling.   Gastrointestinal: Negative for abdominal pain, nausea and vomiting.   Genitourinary: Negative for dysuria.   Musculoskeletal: Positive for myalgias.   Neurological: Negative for sensory change and speech change.       Hemodynamics:  Temp (24hrs), Av.9 °C (98.4 °F), Min:36.6 °C (97.8 °F), Max:37.2 °C (98.9 °F)  Temperature: 36.7 °C (98.1 °F)  Pulse  Av.7  Min: 50  Max: 111Heart Rate (Monitored): 90  NIBP: (!) 93/54       Physical Exam:  Physical Exam   Constitutional: He has a sickly appearance.   Thin male looking chronically ill   Eyes: No scleral icterus.   Pulmonary/Chest: He has rales.   Chest tubes X2 on left side   Abdominal: Soft. There is no tenderness. There is no rebound.   Genitourinary:   Genitourinary Comments: Plus crystal   Musculoskeletal: He exhibits no edema.   Neurological: He is alert.   Vitals reviewed.      Meds:    Current Facility-Administered Medications:   •  [START ON 2018] hydrocortisone sodium succinate PF  •  potassium chloride SA  •  oxyCODONE immediate release  •  enoxaparin  •  hydrocortisone sodium succinate PF  •  nicotine  •  senna-docusate  •  furosemide  •  potassium chloride SA  •  famotidine  •  Magnesium Sulfate in D5W  •  enoxaparin (LOVENOX) injection  •   oxyCODONE immediate-release **OR** oxyCODONE immediate-release  •  Respiratory Care per Protocol  •  senna-docusate **AND** polyethylene glycol/lytes **AND** magnesium hydroxide **AND** bisacodyl  •  MD ALERT...Adult ICU Electrolyte Replacement per Pharmacy Protocol  •  ipratropium-albuterol  •  NS  •  ondansetron  •  ondansetron  •  promethazine  •  promethazine  •  prochlorperazine  •  acetaminophen  •  nicotine **AND** Nicotine Replacement Patient Education Materials **AND** nicotine polacrilex  •  ampicillin-sulbactam (UNASYN) IV    Labs:  Recent Labs      07/15/18   0410  07/16/18   0415  07/17/18   0420   WBC  10.5  10.9*  14.2*   RBC  3.16*  3.15*  3.20*   HEMOGLOBIN  8.6*  8.7*  8.9*   HEMATOCRIT  28.1*  27.8*  28.5*   MCV  88.9  88.3  89.1   MCH  27.2  27.6  27.8   RDW  59.5*  58.4*  59.1*   PLATELETCT  499*  495*  512*   MPV  9.5  9.6  9.3   NEUTSPOLYS  87.40*  68.00  74.40*   LYMPHOCYTES  9.90*  23.30  18.50*   MONOCYTES  0.00  5.70  5.00   EOSINOPHILS  0.00  0.60  0.80   BASOPHILS  0.00  0.20  0.10   RBCMORPHOLO  Present   --    --      Recent Labs      07/15/18   0410  07/16/18   0415  07/17/18   0420   SODIUM  145  146*  141   POTASSIUM  3.6  3.8  3.4*   CHLORIDE  117*  116*  104   CO2  20  22  33   GLUCOSE  130*  120*  78   BUN  15  20  13     Recent Labs      07/15/18   0410  07/16/18   0415  07/17/18   0420   ALBUMIN   --   2.0*   --    TBILIRUBIN   --   0.2   --    ALKPHOSPHAT   --   51   --    TOTPROTEIN   --   5.6*   --    ALTSGPT   --   14   --    ASTSGOT   --   11*   --    CREATININE  0.37*  0.34*  0.34*       Imaging:  Dx-chest-portable (1 View)    Result Date: 7/16/2018 7/16/2018 4:16 AM HISTORY/REASON FOR EXAM:  For indication of respiratory failure TECHNIQUE/EXAM DESCRIPTION AND NUMBER OF VIEWS: Single portable view of the chest. COMPARISON: Yesterday FINDINGS: Hardware is stably positioned in its visualized extent. HEART: Stable size. LUNGS: BILATERAL pulmonary opacities are unchanged.  PLEURA: Small LEFT pneumothorax is unchanged.     1.  Unchanged small LEFT pneumothorax with chest tubes in place 2.  Unchanged BILATERAL cavitary airspace disease    Dx-chest-portable (1 View)    Result Date: 7/15/2018    7/15/2018 4:29 AM HISTORY/REASON FOR EXAM: For indication of respiratory failure Line placement TECHNIQUE/EXAM DESCRIPTION:  Single AP view of the chest. COMPARISON: Yesterday FINDINGS: Position of medical devices appears stable. The cardiac silhouette appears within normal limits. The mediastinal contour appears within normal limits.  The central pulmonary vasculature appears normal. Hazy bilateral pulmonary opacities are seen. Residual left pneumothorax is seen similar to prior study. No significant pleural effusions are identified. The bony structures appear age-appropriate.  Soft tissue gas in the left chest wall is seen.     1.  Stable pulmonary infiltrates and/or edema with probable component of chronic underlying lung disease. 2.  Left pneumothorax, stable since prior with 2 thoracostomy tubes in place.     Dx-chest-portable (1 View)    Result Date: 7/14/2018 7/14/2018 5:13 AM HISTORY/REASON FOR EXAM: For indication of respiratory failure Line placement TECHNIQUE/EXAM DESCRIPTION:  Single AP view of the chest. COMPARISON: Yesterday FINDINGS: Position of medical devices appears stable. The cardiac silhouette appears within normal limits. The mediastinal contour appears within normal limits.  The central pulmonary vasculature appears normal. Increased opacification throughout the right hemithorax is seen. There is decreased opacification within the left hemithorax. Residual left pneumothorax is seen similar to prior study. No significant pleural effusions are identified. The bony structures appear age-appropriate.  Soft tissue gas in the left chest wall is seen.     1.  Increased opacification of the right hemithorax, compatible with new large pleural effusion and/or infiltrates and/or  atelectasis. 2.  Left pneumothorax, stable since prior with 2 thoracostomy tubes in place.    Dx-chest-portable (1 View)    Result Date: 7/13/2018 7/13/2018 7:21 AM HISTORY/REASON FOR EXAM:  Respiratory failure. TECHNIQUE/EXAM DESCRIPTION AND NUMBER OF VIEWS: Single portable view of the chest. COMPARISON: 7/12/2018 FINDINGS: LUNGS: Multifocal patchy consolidations are reidentified. Stable cavity in the right lung apex, with improving opacities in the right perihilar and infrahilar regions. PNEUMOTHORAX: Stable left pneumothorax and subcutaneous emphysema of the left chest wall. LINES AND TUBES: Stable well-positioned ETT. Stable right IJ central catheter, tip in the superior cavoatrial junction. Stable left chest tubes. MEDIASTINUM: No cardiomegaly. MUSCULOSKELETAL STRUCTURES: No acute fracture. Skin staples in the left lateral chest wall.     1. Improving opacities in the right perihilar and infrahilar regions. Otherwise stable multifocal patchy consolidations. 2. Stable lines and tubes. 3. Stable left pneumothorax subcutaneous emphysema of the left chest wall.    Dx-chest-portable (1 View)    Result Date: 7/12/2018 7/12/2018 12:11 PM HISTORY/REASON FOR EXAM:  POST INTUBATION. TECHNIQUE/EXAM DESCRIPTION AND NUMBER OF VIEWS: Single portable view of the chest. COMPARISON: 7/12/2018 FINDINGS: Interval improvement of RIGHT pleural fluid collection and increased aeration of RIGHT lower lung. Patchy consolidation again seen in the RIGHT upper lung. Focal nodular densities are seen in the LEFT lung as well as consolidation at the mid lung level. Small LEFT pneumothorax is unchanged, with multiple LEFT chest tubes present. Increasing LEFT chest wall emphysema. Other supportive tubing is unchanged.     1.  Interval improved aeration of RIGHT lung base with improvement of RIGHT pleural effusion. 2.  Stable LEFT pneumothorax with chest tubes present. 3.  Patchy consolidation again seen in both lungs with basilar densities  in the LEFT upper lung again noted. 4.  Increasing LEFT chest wall emphysema.    Dx-chest-portable (1 View)    Result Date: 7/12/2018 7/12/2018 3:16 AM HISTORY/REASON FOR EXAM: Line placement TECHNIQUE/EXAM DESCRIPTION:  Single AP view of the chest. COMPARISON: July 10, 2018 FINDINGS: Right internal jugular central line is seen terminating at the right atriocaval junction.  To left thoracostomy tube is in place terminating at the left apex and left lung base. The cardiac silhouette appears within normal limits. The mediastinal contour appears within normal limits.  The central pulmonary vasculature appears normal. The lungs appear well expanded bilaterally.  Increased opacification throughout the right hemithorax is seen. There is decreased opacification within the left hemithorax. Residual left pneumothorax is seen similar to prior study. No significant pleural effusions are identified. The bony structures appear age-appropriate.  Soft tissue gas in the left chest wall is seen.     1.  Increased opacification of the right hemithorax, compatible with new large pleural effusion and/or infiltrates and/or atelectasis. 2.  Improved aeration of the left hemithorax status post thoracostomy tube placement. Residual infiltrates in the left lung are seen. 3.  Left pneumothorax, stable since prior with 2 thoracostomy tubes in place.    Dx-chest-portable (1 View)    Result Date: 7/10/2018  7/10/2018 6:14 PM HISTORY/REASON FOR EXAM:  Post thoracentesis left chest. TECHNIQUE/EXAM DESCRIPTION AND NUMBER OF VIEWS: Single portable view of the chest. COMPARISON: 7/10/2018 FINDINGS: The mediastinal and cardiac silhouette is partially obscured by the left-sided parenchymal opacity and pleural fluid The right pulmonary vascularity is within normal limits. Multiple lung masses are seen on the left. There is an airspace process in the left mid and lower hemithorax. There is ill-defined nodular parenchymal opacity in the right upper  lobe with some hilar retraction. There is no significant pleural effusion. There was a previous hydropneumothorax seen on the outside prior chest x-ray. Pneumothorax is still seen on the lateral aspect of the left chip-thorax. There are no acute bony abnormalities.     1.  There has been interval decrease in the left pleural effusion. 2.  There is still some component of LEFT pneumothorax although the hydropneumothorax air-fluid level is no longer evident. 3.  Multiple left bone or masses are again seen. 4.  Left lower lobe airspace disease is again seen. 5.  Ill-defined nodular and linear opacity in the right upper lobe with pleural thickening and hilar retraction is again seen.    Us-thoracentesis Puncture Left    Result Date: 7/10/2018  7/10/2018 4:01 PM HISTORY/REASON FOR EXAM: Pleural effusion TECHNIQUE/EXAM DESCRIPTION: Ultrasound-guided thoracentesis. Indication:  LEFT pleural fluid collection. COMPARISON:  None PROCEDURE:     Informed consent was obtained. A timeout was taken. A left pleural effusion was localized with real-time ultrasound guidance. The left posterior chest wall was prepped and draped in a sterile manner. Dr. Fine performed the procedure  with my guidance.  Following local anesthesia with 1% lidocaine, a 5 Cyp[place] Yueh pigtail catheter was advanced into the pleural space with trocar technique and pleural fluid was drained. The patient tolerated the procedure well without evidence of complication. A post thoracentesis chest radiograph is forthcoming. FINDINGS: Highly complicated left large volume pleural fluid has extensive internal debris Fluid was  sent to the laboratory. Fluid character: purulent     1. Ultrasound guided left sided diagnostic thoracentesis. 2. 500 mL of fluid withdrawn. Additional fluid was not removed as the catheter became occluded by the thick liquid    Dx-abdomen For Tube Placement    Result Date: 7/14/2018 7/14/2018 12:59 PM HISTORY/REASON FOR EXAM:  Cortrak  evaluation. TECHNIQUE/EXAM DESCRIPTION AND NUMBER OF VIEWS:  1 view(s) of the abdomen. COMPARISON:  None. FINDINGS: Cortrak feeding tube tip projects in the region of the gastric fundus. The tube coils back upon itself from the mid stomach. The thoracic course appears appropriate. The bowel gas pattern is within normal limits.     Cortrak feeding tube tip projects in the region of the gastric fundus.      Micro:  Results     Procedure Component Value Units Date/Time    CULTURE TISSUE W/ GRM STAIN [[id number]]  (Abnormal) Collected:  07/11/18 2033    Order Status:  Completed Specimen:  Tissue Updated:  07/14/18 1316     Significant Indicator POS (POS)     Source TISS     Site Left Lung Tissue     Tissue Culture Growth noted after further incubation, see below for  organism identification.   (A)     Gram Stain Result Few WBCs.  No organisms seen.       Tissue Culture Beta Streptococcus Group F  Light growth   (A)    ANAEROBIC CULTURE [[id number]] Collected:  07/11/18 2033    Order Status:  Completed Specimen:  Tissue Updated:  07/14/18 1316     Significant Indicator NEG     Source TISS     Site Left Lung Tissue     Anaerobic Culture, Culture Res No Anaerobes isolated.    AFB CULTURE [[id number]] Collected:  07/11/18 2033    Order Status:  Completed Specimen:  Tissue Updated:  07/14/18 1316     Significant Indicator NEG     Source TISS     Site Left Lung Tissue     AFB Culture Culture in progress.     AFB Smear Results No acid fast bacilli seen.    FUNGAL CULTURE [[id number]] Collected:  07/11/18 2033    Order Status:  Completed Specimen:  Tissue Updated:  07/14/18 1316     Significant Indicator NEG     Source TISS     Site Left Lung Tissue     Fungal Culture Culture in progress.    AFB CULTURE [[id number]] Collected:  07/12/18 1130    Order Status:  Completed Specimen:  Respirate from Lung Updated:  07/14/18 1053     Significant Indicator NEG     Source RESP     Site Bronchoalveolar Lavage RLL     AFB Culture Culture in  "progress.     AFB Smear Results No acid fast bacilli seen.    Narrative:       [id number] MENDEZ JOHN  3rd specimin  Which Lobe (Bronch Only):->RLL    CULTURE RESPIRATORY W/ GRM STN [[id number]]  (Abnormal) Collected:  07/12/18 1130    Order Status:  Completed Specimen:  Respirate Updated:  07/14/18 1053     Gram Stain Result Rare WBCs.  No organisms seen.       Significant Indicator POS (POS)     Source RESP     Site Bronchoalveolar Lavage RLL     Respiratory Culture -- (A)      Beta Streptococcus Group F  Moderate growth   (A)    Narrative:       [id number] MENDEZ JOHN  3rd specimin  Which Lobe (Bronch Only):->RLL    FUNGAL CULTURE [[id number]] Collected:  07/12/18 1130    Order Status:  Completed Specimen:  Respirate Updated:  07/14/18 1053     Significant Indicator NEG     Source RESP     Site Bronchoalveolar Lavage RLL     Fungal Culture Culture in progress.    Narrative:       [id number] MENDEZCrelowY  3rd specimin  Which Lobe (Bronch Only):->RLL    BLOOD CULTURE [[id number]] Collected:  07/12/18 1046    Order Status:  Completed Specimen:  Blood from Peripheral Updated:  07/13/18 0734     Significant Indicator NEG     Source BLD     Site PERIPHERAL     Blood Culture No Growth    Note: Blood cultures are incubated for 5 days and  are monitored continuously.Positive blood cultures  are called to the RN and reported as soon as  they are identified.      Narrative:       [id number] MEGAN HERNANDEZ  Per Hospital Policy: Only change Specimen Src: to \"Line\" if  specified by physician order.    BLOOD CULTURE [[id number]] Collected:  07/12/18 1046    Order Status:  Completed Specimen:  Blood from Peripheral Updated:  07/13/18 0733     Significant Indicator NEG     Source BLD     Site PERIPHERAL     Blood Culture No Growth    Note: Blood cultures are incubated for 5 days and  are monitored continuously.Positive blood cultures  are called to the RN and reported as soon as  they are identified.      Narrative:    " "   [id number] MEGAN HERNANDEZ  Per Hospital Policy: Only change Specimen Src: to \"Line\" if  specified by physician order.    GRAM STAIN [[id number]] Collected:  07/12/18 1130    Order Status:  Completed Specimen:  Respirate Updated:  07/12/18 2342     Significant Indicator .     Source RESP     Site Bronchoalveolar Lavage RLL     Gram Stain Result Rare WBCs.  No organisms seen.      Narrative:       [id number] MENDEZ JOHN  3rd specimin  Which Lobe (Bronch Only):->RLL    ACID FAST STAIN [[id number]] Collected:  07/12/18 1130    Order Status:  Completed Specimen:  Respirate Updated:  07/12/18 2342     Significant Indicator NEG     Source RESP     Site Bronchoalveolar Lavage RLL     AFB Smear Results No acid fast bacilli seen.    Narrative:       [id number] MENDEZ JOHN  3rd specimin  Which Lobe (Bronch Only):->RLL    GRAM STAIN [[id number]] Collected:  07/11/18 2033    Order Status:  Completed Specimen:  Tissue Updated:  07/12/18 2336     Significant Indicator .     Source TISS     Site Left Lung Tissue     Gram Stain Result Few WBCs.  No organisms seen.      ACID FAST STAIN [[id number]] Collected:  07/11/18 2033    Order Status:  Completed Specimen:  Tissue Updated:  07/12/18 2336     Significant Indicator NEG     Source TISS     Site Left Lung Tissue     AFB Smear Results No acid fast bacilli seen.    ACID FAST STAIN [[id number]] Collected:  07/11/18 1848    Order Status:  Completed Specimen:  Respirate Updated:  07/12/18 2335     Significant Indicator NEG     Source RESP     Site SPUTUM     AFB Smear Results No acid fast bacilli seen.    Narrative:       [id number] NEYMAR CLEARY    AFB CULTURE [[id number]] Collected:  07/11/18 1848    Order Status:  Completed Specimen:  Respirate from Sputum Updated:  07/12/18 2334     Significant Indicator NEG     Source RESP     Site SPUTUM     AFB Culture Culture in progress.     AFB Smear Results No acid fast bacilli seen.    Narrative:       [id number] NEYMAR OATES" R.    ACID FAST STAIN [[id number]] Collected:  07/11/18 1100    Order Status:  Completed Specimen:  Respirate Updated:  07/12/18 1427     Significant Indicator NEG     Source RESP     Site Sputum (coughed)     AFB Smear Results No acid fast bacilli seen.    Narrative:       [id number] NEYMAR OATES R.    AFB CULTURE [[id number]] Collected:  07/11/18 1100    Order Status:  Completed Specimen:  Respirate from Bile Fluid Updated:  07/12/18 1427     Significant Indicator NEG     Source RESP     Site Sputum (coughed)     AFB Culture Culture in progress.     AFB Smear Results No acid fast bacilli seen.    Narrative:       [id number] NEYMAR OATES R.    ACID FAST STAIN [[id number]] Collected:  07/10/18 1635    Order Status:  Completed Specimen:  Body Fluid Updated:  07/12/18 1327     Significant Indicator NEG     Source BF     Site THORACENTESIS FLUID     AFB Smear Results No acid fast bacilli seen.    Narrative:       CALL  [place]  183 tel. [phone],  CALLED  183 tel. [phone] 07/11/2018, 12:41, RB PERF. RESULTS CALLED TO:  ROLANDO [id number]  src:pleural effusion    GRAM STAIN [[id number]]  (Abnormal) Collected:  07/10/18 1635    Order Status:  Completed Specimen:  Body Fluid Updated:  07/12/18 1327     Significant Indicator . (POS)     Source BF     Site THORACENTESIS FLUID     Gram Stain Result Many WBCs.  Many Gram positive cocci.   (A)    Narrative:       CALL  [place]  183 tel. [phone],  CALLED  183 tel. [phone] 07/11/2018, 12:41, RB PERF. RESULTS CALLED TO:  RN [id number]  src:pleural effusion    FLUID CULTURE W/GRAM STAIN [[id number]]  (Abnormal) Collected:  07/10/18 1635    Order Status:  Completed Specimen:  Body Fluid from Thoracentesis Fluid Updated:  07/12/18 1327     Significant Indicator POS (POS)     Source BF     Site THORACENTESIS FLUID     Culture Result Bdf -- (A)     Gram Stain Result Many WBCs.  Many Gram positive cocci.   (A)     Culture Result Bdf Beta Streptococcus Group F  Moderate growth   (A)    Narrative:        CALL  [place]  183 tel. [phone],  CALLED  183 tel. [phone] 07/11/2018, 12:41, RB PERF. RESULTS CALLED TO:  ROLANDO [id number]  src:pleural effusion    AFB CULTURE [[id number]] Collected:  07/10/18 1635    Order Status:  Completed Specimen:  Body Fluid from Thoracentesis Fluid Updated:  07/12/18 1327     Significant Indicator NEG     Source BF     Site THORACENTESIS FLUID     AFB Culture Culture in progress.     AFB Smear Results No acid fast bacilli seen.    Narrative:       CALL  [place]  183 tel. [phone],  CALLED  183 tel. [phone] 07/11/2018, 12:41, RB PERF. RESULTS CALLED TO:  ROLANDO [id number]  src:pleural effusion    FUNGAL CULTURE [[id number]] Collected:  07/10/18 1635    Order Status:  Completed Specimen:  Body Fluid from Thoracentesis Fluid Updated:  07/12/18 1327     Significant Indicator NEG     Source BF     Site THORACENTESIS FLUID     Fungal Culture Culture in progress.    Narrative:       CALL  [place]  183 tel. [phone],  CALLED  183 tel. [phone] 07/11/2018, 12:41, RB PERF. RESULTS CALLED TO:  ROLANDO [id number]  src:pleural effusion    AFB [[id number]] Collected:  07/12/18 1150    Order Status:  Canceled Specimen:  Sputum from Sputum     RESP CULTURE [[id number]] Collected:  07/12/18 1150    Order Status:  Canceled Specimen:  Sputum from Sputum     FUNGAL CULTURE [[id number]] Collected:  07/12/18 1150    Order Status:  Canceled Specimen:  Sputum from Sputum     AFB CULTURE [[id number]] Collected:  07/11/18 1848    Order Status:  Canceled Specimen:  Sputum from Sputum     AFB CULTURE [[id number]]     Order Status:  Canceled Specimen:  Sputum from Nasopharyngeal Aspirate           Assessment:  Active Hospital Problems    Diagnosis   • *Empyema (HCC) [J86.9]   • Acute hypoxemic respiratory failure (HCC) [J96.01]   • Panlobular emphysema (HCC) [J43.1]   • Pulmonary parenchymal mass [R91.8]   • Pulmonary cachexia due to COPD (HCC) [J44.9, R64]   • Protein malnutrition (HCC) [E46]   • Leukocytosis [D72.829]   • Tobacco abuse  [Z72.0]       Plan:  Empyema  Underwent thoracentesis on 7/10/2018  Underwent left thoracotomy decortication and evacuation of purulent empyema and decortication of lung and rib resection on 7/11/2018  The cultures have been group F strep  Continue Unasyn  ESR is 62 and CRP is 3.08  Motley and A-line have been discontinued    COPD  Patient has considerable changes on his right side of the lung as well    Leukocytosis    Tobacco abuse  Patient was smoking 1-1/2 pack per day    Prognosis  Guarded      Discussed with Dr. Mcintyre

## 2018-07-17 NOTE — CARE PLAN
Problem: Skin Integrity  Goal: Risk for impaired skin integrity will decrease    Intervention: Assess risk factors for impaired skin integrity and/or pressure ulcers  Pt with L chest tubes x 2. Chest tube dressing clean, dry, intact. Pt able to move self around in bed. Sacrum red but blanching with mepilex in place.

## 2018-07-17 NOTE — CARE PLAN
Problem: Infection  Goal: Will remain free from infection    Intervention: Assess signs and symptoms of infection  Pt at increased risk of infection due to presence of invasive lines and devices and recent surgical procedure. Interventions in place. Pt receiving IV antibiotics per MD order.

## 2018-07-17 NOTE — PROGRESS NOTES
Surgery     Extubated  Feels much better  CT airleaks improving     Persistent pneumothorax left chest     Plan - Increase Suction on chest tubes today     Discuss with Dr. Ganser (Thoracic Surgery)     Diego Martínez MD

## 2018-07-17 NOTE — PROGRESS NOTES
Dr. Martínez at bedside reviewed chest xray and chest tube. Both chest tubes changed to 30cm suction.

## 2018-07-17 NOTE — THERAPY
"Occupational Therapy Evaluation completed.   Functional Status: Up in chair min A LB dressing, CGA w/sit>Stand walking in room w/fww and CGA no overt c/o pain or fatigue, mobility limited by lines w/chest tube to suction. Able to complete grooming tasks seated (oral care completed) and  motivated for activity. Remained up in chair, RN aware of session  Plan of Care: Will benefit from Occupational Therapy 2 times per week  Discharge Recommendations:  Equipment: Will Continue to Assess for Equipment Needs. Post-acute therapy Discharge to a transitional care facility for continued skilled therapy services.    See \"Rehab Therapy-Acute\" Patient Summary Report for complete documentation.    58 yr old male admitted for SOB, dx w/ empyema, s/p Left thoracotomy, decortication, evacuation of purulent empyema, debridement of necrotic lung, rib resection, w/2 chest tubes, acute hypoxemic respiratory failure intubated 7/11-7/16 since extubated on 3L, pt is demonstrating generalized deconditioning, and mobility is greatly limited by lines. Pt reports living alone and being active and independent pta. Pt will benefit from acute OT (will increase frequency as pt is more medially able to participate in therapy) and currently recommend post acute placement prior to d/c home     "

## 2018-07-17 NOTE — THERAPY
"Physical Therapy Evaluation completed.   Bed Mobility:  Supine to Sit:  (up in chair)  Transfers: Sit to Stand: Contact Guard Assist  Gait: Level Of Assist: Contact Guard Assist with Front-Wheel Walker       Plan of Care: Will benefit from Physical Therapy 2 times per week  Discharge Recommendations: Equipment: Will Continue to Assess for Equipment Needs.   Pt presents s/p left thoracotomy decortication and evacuation of purulent empyema and decortication of lung and rib resection on 7/11/18, now with chest tubes x 2 and short lines as he must remain on wall suction. Pt will benefit from inpt PT to address problems and assist with d/c plan. Pt will likely need a post acute transitional care stay before home.     See \"Rehab Therapy-Acute\" Patient Summary Report for complete documentation.     "

## 2018-07-18 ENCOUNTER — APPOINTMENT (OUTPATIENT)
Dept: RADIOLOGY | Facility: MEDICAL CENTER | Age: 58
DRG: 853 | End: 2018-07-18
Attending: INTERNAL MEDICINE

## 2018-07-18 LAB
ANION GAP SERPL CALC-SCNC: 4 MMOL/L (ref 0–11.9)
BASOPHILS # BLD AUTO: 0.2 % (ref 0–1.8)
BASOPHILS # BLD: 0.02 K/UL (ref 0–0.12)
BUN SERPL-MCNC: 10 MG/DL (ref 8–22)
CALCIUM SERPL-MCNC: 7.5 MG/DL (ref 8.5–10.5)
CHLORIDE SERPL-SCNC: 100 MMOL/L (ref 96–112)
CO2 SERPL-SCNC: 34 MMOL/L (ref 20–33)
CREAT SERPL-MCNC: 0.33 MG/DL (ref 0.5–1.4)
EOSINOPHIL # BLD AUTO: 0.12 K/UL (ref 0–0.51)
EOSINOPHIL NFR BLD: 1.1 % (ref 0–6.9)
ERYTHROCYTE [DISTWIDTH] IN BLOOD BY AUTOMATED COUNT: 56.8 FL (ref 35.9–50)
GLUCOSE SERPL-MCNC: 87 MG/DL (ref 65–99)
HCT VFR BLD AUTO: 28.7 % (ref 42–52)
HGB BLD-MCNC: 9 G/DL (ref 14–18)
IMM GRANULOCYTES # BLD AUTO: 0.11 K/UL (ref 0–0.11)
IMM GRANULOCYTES NFR BLD AUTO: 1 % (ref 0–0.9)
LYMPHOCYTES # BLD AUTO: 1.93 K/UL (ref 1–4.8)
LYMPHOCYTES NFR BLD: 17 % (ref 22–41)
MAGNESIUM SERPL-MCNC: 1.9 MG/DL (ref 1.5–2.5)
MCH RBC QN AUTO: 27.5 PG (ref 27–33)
MCHC RBC AUTO-ENTMCNC: 31.4 G/DL (ref 33.7–35.3)
MCV RBC AUTO: 87.8 FL (ref 81.4–97.8)
MONOCYTES # BLD AUTO: 0.64 K/UL (ref 0–0.85)
MONOCYTES NFR BLD AUTO: 5.6 % (ref 0–13.4)
NEUTROPHILS # BLD AUTO: 8.52 K/UL (ref 1.82–7.42)
NEUTROPHILS NFR BLD: 75.1 % (ref 44–72)
NRBC # BLD AUTO: 0 K/UL
NRBC BLD-RTO: 0 /100 WBC
PHOSPHATE SERPL-MCNC: 3.1 MG/DL (ref 2.5–4.5)
PLATELET # BLD AUTO: 523 K/UL (ref 164–446)
PMV BLD AUTO: 9.8 FL (ref 9–12.9)
POTASSIUM SERPL-SCNC: 3.6 MMOL/L (ref 3.6–5.5)
RBC # BLD AUTO: 3.27 M/UL (ref 4.7–6.1)
SODIUM SERPL-SCNC: 138 MMOL/L (ref 135–145)
WBC # BLD AUTO: 11.3 K/UL (ref 4.8–10.8)

## 2018-07-18 PROCEDURE — 83735 ASSAY OF MAGNESIUM: CPT

## 2018-07-18 PROCEDURE — 80048 BASIC METABOLIC PNL TOTAL CA: CPT

## 2018-07-18 PROCEDURE — 700111 HCHG RX REV CODE 636 W/ 250 OVERRIDE (IP): Performed by: INTERNAL MEDICINE

## 2018-07-18 PROCEDURE — 770006 HCHG ROOM/CARE - MED/SURG/GYN SEMI*

## 2018-07-18 PROCEDURE — A9270 NON-COVERED ITEM OR SERVICE: HCPCS | Performed by: HOSPITALIST

## 2018-07-18 PROCEDURE — 700102 HCHG RX REV CODE 250 W/ 637 OVERRIDE(OP): Performed by: STUDENT IN AN ORGANIZED HEALTH CARE EDUCATION/TRAINING PROGRAM

## 2018-07-18 PROCEDURE — 85025 COMPLETE CBC W/AUTO DIFF WBC: CPT

## 2018-07-18 PROCEDURE — 700102 HCHG RX REV CODE 250 W/ 637 OVERRIDE(OP): Performed by: INTERNAL MEDICINE

## 2018-07-18 PROCEDURE — 99233 SBSQ HOSP IP/OBS HIGH 50: CPT | Performed by: INTERNAL MEDICINE

## 2018-07-18 PROCEDURE — 700105 HCHG RX REV CODE 258: Performed by: STUDENT IN AN ORGANIZED HEALTH CARE EDUCATION/TRAINING PROGRAM

## 2018-07-18 PROCEDURE — 700105 HCHG RX REV CODE 258: Performed by: INTERNAL MEDICINE

## 2018-07-18 PROCEDURE — A9270 NON-COVERED ITEM OR SERVICE: HCPCS | Performed by: STUDENT IN AN ORGANIZED HEALTH CARE EDUCATION/TRAINING PROGRAM

## 2018-07-18 PROCEDURE — A9270 NON-COVERED ITEM OR SERVICE: HCPCS | Performed by: INTERNAL MEDICINE

## 2018-07-18 PROCEDURE — 71045 X-RAY EXAM CHEST 1 VIEW: CPT

## 2018-07-18 PROCEDURE — 84100 ASSAY OF PHOSPHORUS: CPT

## 2018-07-18 PROCEDURE — 700102 HCHG RX REV CODE 250 W/ 637 OVERRIDE(OP): Performed by: HOSPITALIST

## 2018-07-18 PROCEDURE — 700111 HCHG RX REV CODE 636 W/ 250 OVERRIDE (IP): Performed by: STUDENT IN AN ORGANIZED HEALTH CARE EDUCATION/TRAINING PROGRAM

## 2018-07-18 PROCEDURE — 94668 MNPJ CHEST WALL SBSQ: CPT

## 2018-07-18 PROCEDURE — 770001 HCHG ROOM/CARE - MED/SURG/GYN PRIV*

## 2018-07-18 PROCEDURE — 99232 SBSQ HOSP IP/OBS MODERATE 35: CPT | Performed by: INTERNAL MEDICINE

## 2018-07-18 RX ORDER — AMOXICILLIN AND CLAVULANATE POTASSIUM 875; 125 MG/1; MG/1
1 TABLET, FILM COATED ORAL EVERY 12 HOURS
Status: DISCONTINUED | OUTPATIENT
Start: 2018-07-24 | End: 2018-07-23

## 2018-07-18 RX ORDER — MAGNESIUM SULFATE 1 G/100ML
1 INJECTION INTRAVENOUS
Status: COMPLETED | OUTPATIENT
Start: 2018-07-18 | End: 2018-07-18

## 2018-07-18 RX ORDER — POTASSIUM CHLORIDE 20 MEQ/1
20 TABLET, EXTENDED RELEASE ORAL 2 TIMES DAILY
Status: COMPLETED | OUTPATIENT
Start: 2018-07-18 | End: 2018-07-20

## 2018-07-18 RX ORDER — FUROSEMIDE 10 MG/ML
20 INJECTION INTRAMUSCULAR; INTRAVENOUS ONCE
Status: COMPLETED | OUTPATIENT
Start: 2018-07-18 | End: 2018-07-18

## 2018-07-18 RX ADMIN — STANDARDIZED SENNA CONCENTRATE AND DOCUSATE SODIUM 2 TABLET: 8.6; 5 TABLET, FILM COATED ORAL at 17:22

## 2018-07-18 RX ADMIN — MAGNESIUM SULFATE IN DEXTROSE 1 G: 10 INJECTION, SOLUTION INTRAVENOUS at 10:41

## 2018-07-18 RX ADMIN — FUROSEMIDE 20 MG: 10 INJECTION, SOLUTION INTRAMUSCULAR; INTRAVENOUS at 10:40

## 2018-07-18 RX ADMIN — POTASSIUM CHLORIDE 20 MEQ: 1500 TABLET, EXTENDED RELEASE ORAL at 17:22

## 2018-07-18 RX ADMIN — OXYCODONE HYDROCHLORIDE 5 MG: 5 TABLET ORAL at 14:15

## 2018-07-18 RX ADMIN — MAGNESIUM SULFATE IN DEXTROSE 1 G: 10 INJECTION, SOLUTION INTRAVENOUS at 11:40

## 2018-07-18 RX ADMIN — HYDROCORTISONE SODIUM SUCCINATE 50 MG: 100 INJECTION, POWDER, FOR SOLUTION INTRAMUSCULAR; INTRAVENOUS at 05:38

## 2018-07-18 RX ADMIN — ENOXAPARIN SODIUM 40 MG: 100 INJECTION SUBCUTANEOUS at 05:38

## 2018-07-18 RX ADMIN — STANDARDIZED SENNA CONCENTRATE AND DOCUSATE SODIUM 2 TABLET: 8.6; 5 TABLET, FILM COATED ORAL at 05:39

## 2018-07-18 RX ADMIN — AMPICILLIN SODIUM AND SULBACTAM SODIUM 3 G: 2; 1 INJECTION, POWDER, FOR SOLUTION INTRAMUSCULAR; INTRAVENOUS at 14:17

## 2018-07-18 RX ADMIN — AMPICILLIN SODIUM AND SULBACTAM SODIUM 3 G: 2; 1 INJECTION, POWDER, FOR SOLUTION INTRAMUSCULAR; INTRAVENOUS at 17:22

## 2018-07-18 RX ADMIN — POTASSIUM CHLORIDE 40 MEQ: 1500 TABLET, EXTENDED RELEASE ORAL at 05:40

## 2018-07-18 RX ADMIN — OXYCODONE HYDROCHLORIDE 5 MG: 5 TABLET ORAL at 22:52

## 2018-07-18 RX ADMIN — AMPICILLIN SODIUM AND SULBACTAM SODIUM 3 G: 2; 1 INJECTION, POWDER, FOR SOLUTION INTRAMUSCULAR; INTRAVENOUS at 05:37

## 2018-07-18 RX ADMIN — AMPICILLIN SODIUM AND SULBACTAM SODIUM 3 G: 2; 1 INJECTION, POWDER, FOR SOLUTION INTRAMUSCULAR; INTRAVENOUS at 23:24

## 2018-07-18 RX ADMIN — NICOTINE 21 MG: 21 PATCH, EXTENDED RELEASE TRANSDERMAL at 05:39

## 2018-07-18 ASSESSMENT — ENCOUNTER SYMPTOMS
SPEECH CHANGE: 0
HEMOPTYSIS: 0
FOCAL WEAKNESS: 0
PALPITATIONS: 0
ORTHOPNEA: 0
ABDOMINAL PAIN: 0
BLURRED VISION: 0
MYALGIAS: 0
BRUISES/BLEEDS EASILY: 0
NAUSEA: 0
COUGH: 1
SHORTNESS OF BREATH: 1
CONSTIPATION: 0
DIARRHEA: 0
DIZZINESS: 0
HEARTBURN: 0
HALLUCINATIONS: 0
FEVER: 0
DEPRESSION: 0
MYALGIAS: 1
SPUTUM PRODUCTION: 1
VOMITING: 0
SENSORY CHANGE: 0
COUGH: 0
SHORTNESS OF BREATH: 0
CHILLS: 0
HEADACHES: 0
WHEEZING: 0
BACK PAIN: 1

## 2018-07-18 ASSESSMENT — PATIENT HEALTH QUESTIONNAIRE - PHQ9
1. LITTLE INTEREST OR PLEASURE IN DOING THINGS: NOT AT ALL
2. FEELING DOWN, DEPRESSED, IRRITABLE, OR HOPELESS: NOT AT ALL
SUM OF ALL RESPONSES TO PHQ9 QUESTIONS 1 AND 2: 0

## 2018-07-18 ASSESSMENT — PAIN SCALES - GENERAL
PAINLEVEL_OUTOF10: 3
PAINLEVEL_OUTOF10: 4
PAINLEVEL_OUTOF10: 5
PAINLEVEL_OUTOF10: 3
PAINLEVEL_OUTOF10: 3
PAINLEVEL_OUTOF10: 4
PAINLEVEL_OUTOF10: 2
PAINLEVEL_OUTOF10: 6
PAINLEVEL_OUTOF10: 4
PAINLEVEL_OUTOF10: 4
PAINLEVEL_OUTOF10: 3

## 2018-07-18 NOTE — PROGRESS NOTES
Pulmonary Critical Care Progress Note      Admit Date: 7/10/2018    Chief Complaint: SOB    History of Present Illness:  58-year-old gentleman with no known   past medical history, works as a cook in [employer], smokes approximately 1 pack   of cigarettes a day for the last 40 years, drinks approximately 6 beers a day,   little marijuana, denies any illicits.  The patient initially was seen at an   outside facility with approximately 4 days of increasing dyspnea on exertion,   cough with purulent sputum, and chest pain and discomfort with deep   inspiration.  He also indicates that he has had approximately 10 pounds of   weight loss since March of this year.  He denies any fever, chills, or sweats.    Denies any night sweats.  No lymphadenopathy, headache, visual changes, neck   stiffness.  Denies any nausea, vomiting, abdominal pain, diarrhea, or lower   extremity edema.  Patient says he has never been outside of [place].  He   has never been to [facility] or shelter.  While at the outside facility, patient had   CT scan that showed a significant left greater than right changes with   suggestive empyema as well as multiloculated abscess versus cavitary masses.    Patient was transferred to [facility] for higher level of care and further   evaluation.  Patient indicates at the present time that he is not in any   current discomfort.  Denies any significant sick contacts.  Denies any history   of known lung problems.  Denies any environmental exposures.  Has a cat as a   pet.    Interval Events:  24 hour interval history reviewed   Tm 99.4  -2.8L over last 24hr, +1.3L since admit  cxr with continued left sided pneumo, b/l multifocal opacities, chest tube x 2 in place  Wbc 10.9->14->11  K 3.6  Cr 0.33  Mg 1.9    Thora 7/10 Group F B-Strep  Left Lung Tissue 7/11 Group F B-Strep  Bcx 7/12 ngtd  Bal 7/12 Group F B-Strep    Unaysn  Hydrocoritosol 50 @Qdaily      Review of Systems   Constitutional: Negative for chills and fever.    HENT: Negative for congestion.    Eyes: Negative for blurred vision.   Respiratory: Positive for cough and sputum production. Negative for shortness of breath.    Cardiovascular: Negative for chest pain and palpitations.   Gastrointestinal: Negative for abdominal pain, nausea and vomiting.   Neurological: Negative for focal weakness.   Psychiatric/Behavioral: Negative for depression.   All other systems reviewed and are negative.    Physical Exam   Constitutional: He appears well-developed.   Malnourished appearing   HENT:   Head: Normocephalic and atraumatic.   Eyes: Conjunctivae are normal. Pupils are equal, round, and reactive to light.   Neck: No tracheal deviation present.   Cardiovascular: Normal rate and regular rhythm.    Pulmonary/Chest: He has no wheezes. He has no rales.   Diminished with scattered rhonchi   Abdominal: Soft. He exhibits no distension. There is no tenderness.   Musculoskeletal: He exhibits no edema.   Neurological: No cranial nerve deficit.   Skin: Skin is warm and dry. Capillary refill takes less than 2 seconds. No cyanosis.   Psychiatric: He has a normal mood and affect.   Nursing note and vitals reviewed.      PFSH:  No change.    Respiratory:     Pulse Oximetry: 92 %                Recent Labs      07/16/18   0532   ISTATAPH  7.492   ISTATAPCO2  32.0   ISTATAPO2  69   ISTATATCO2  25   [id number]  95   ISTATARTHCO3  24.5   ISTATARTBE  1   ISTATTEMP  97.8 F   ISTATFIO2  30   ISTATSPEC  Arterial   ISTATAPHTC  7.499   [id number]  67       HemoDynamics:  Pulse: 73, Heart Rate (Monitored): 71  NIBP: 102/57               Neuro:  GCS             Fluids:  Intake/Output       07/16/18 0700 - 07/17/18 0659 07/17/18 0700 - 07/18/18 0659 07/18/18 0700 - 07/19/18 0659      1985-9338 4408-1995 Total 3581-3144 1880-6117 Total 3550-2033 7961-9591 Total       Intake    P.O.  600  600 1200  100  450 550  --  -- --    P.O.  100 450 550 -- -- --    I.V.  736.3  320 1056.3  300  320 620  --   -- --    Propofol Volume 16.3 -- 16.3 -- -- -- -- -- --    IV Volume (TKO) -- -- -- -- 120 120 -- -- --    IV Volume (0.9% NS) 120 120 240 100 -- 100 -- -- --    IV Volume (Unasyn) 100 200 300 200 200 400 -- -- --    IV Volume (K Phos) 500 -- 500 -- -- -- -- -- --    Other  30  -- 30  --  300 300  --  -- --    Medications (P.O./ Enteral Liquids) 30 -- 30 -- 300 300 -- -- --    Enteral  90  -- 90  --  -- --  --  -- --    Free Water / Tube Flush 90 -- 90 -- -- -- -- -- --    Total Intake 1456.3 920 2376.3 400 1070 1470 -- -- --       Output    Urine  3850  2650 6500  2630  1350 3980  --  -- --    Number of Times Voided -- -- -- -- 7 x 7 x -- -- --    Urine Void (mL) (non-catheter) 200 2650 2850 2630 1350 3980 -- -- --    Output (mL) ([REMOVED] Urinary Catheter Indwelling Catheter 16) 3650 -- 3650 -- -- -- -- -- --    Emesis  --  -- --  --  0 0  --  -- --    Emesis -- -- -- -- 0 0 -- -- --    Stool  --  -- --  --  -- --  --  -- --    Number of Times Stooled -- -- -- -- 0 x 0 x -- -- --    Chest Tube  190  250 440  150  180 330  --  -- --    Output (mL) (Chest Tube Group 1 (A) Left straight 32) 40 0 40 50 0 50 -- -- --    Output (mL) (Chest Tube Group 2 (B) Left angled 32) 150 250 400 100 180 280 -- -- --    Total Output 4040 2900 6940 2780 1530 4310 -- -- --       Net I/O     -2583.8 -1980 -1623.8 -9387 -726 -9821 -- -- --        Weight: 81 kg (178 lb 9.2 oz)  Recent Labs      07/16/18 0415 07/17/18 0420 07/18/18 0424   SODIUM  146*  141  138   POTASSIUM  3.8  3.4*  3.6   CHLORIDE  116*  104  100   CO2  22  33  34*   BUN  20  13  10   CREATININE  0.34*  0.34*  0.33*   MAGNESIUM  2.1  1.8  1.9   PHOSPHORUS  2.2*  3.6  3.1   CALCIUM  7.8*  7.7*  7.5*       GI/Nutrition:      Liver Function  Recent Labs      07/16/18 0415 07/17/18 0420 07/18/18 0424   ALTSGPT  14   --    --    ASTSGOT  11*   --    --    ALKPHOSPHAT  51   --    --    TBILIRUBIN  0.2   --    --    PREALBUMIN  12.0*   --    --    GLUCOSE   120*  78  87       Heme:  Recent Labs      185  18   04218   RBC  3.15*  3.20*  3.27*   HEMOGLOBIN  8.7*  8.9*  9.0*   HEMATOCRIT  27.8*  28.5*  28.7*   PLATELETCT  495*  512*  523*       Infectious Disease:  Temp  Av.9 °C (98.4 °F)  Min: 36.3 °C (97.4 °F)  Max: 37.4 °C (99.4 °F)  Micro: cultures reviewed  Recent Labs      18   04218   WBC  10.9*  14.2*  11.3*   NEUTSPOLYS  68.00  74.40*  75.10*   LYMPHOCYTES  23.30  18.50*  17.00*   MONOCYTES  5.70  5.00  5.60   EOSINOPHILS  0.60  0.80  1.10   BASOPHILS  0.20  0.10  0.20   ASTSGOT  11*   --    --    ALTSGPT  14   --    --    ALKPHOSPHAT  51   --    --    TBILIRUBIN  0.2   --    --      Current Facility-Administered Medications   Medication Dose Frequency Provider Last Rate Last Dose   • hydrocortisone sodium succinate PF (SOLU-CORTEF) 100 MG injection 50 mg  50 mg DAILY Carlton Mcintyre M.D.   50 mg at 18 0538   • enoxaparin (LOVENOX) inj 40 mg  40 mg DAILY Carlton Mcintyre M.D.   40 mg at 18 0538   • oxyCODONE immediate-release (ROXICODONE) tablet 5 mg  5 mg Q4HRS PRN Carlton Mcintyre M.D.   5 mg at 18 2143    Or   • oxyCODONE immediate release (ROXICODONE) tablet 10 mg  10 mg Q4HRS PRN Carlton Mcintyre M.D.   10 mg at 18 1312   • Respiratory Care per Protocol   Continuous RT Carlton Mcintyre M.D.       • senna-docusate (PERICOLACE or SENOKOT S) 8.6-50 MG per tablet 2 Tab  2 Tab BID Carlton Mcintyre M.D.   2 Tab at 18 0539    And   • polyethylene glycol/lytes (MIRALAX) PACKET 1 Packet  1 Packet QDAY LILLIE Mcintyre M.D.   1 Packet at 07/15/18 0835    And   • magnesium hydroxide (MILK OF MAGNESIA) suspension 30 mL  30 mL QDAY LILLIE Mcintyre M.D.   30 mL at 18 0514    And   • bisacodyl (DULCOLAX) suppository 10 mg  10 mg QDAY LILLIE Mcintyre M.D.   10 mg at 18 1610   • MD ALERT...Adult ICU Electrolyte Replacement per Pharmacy  Protocol   pharmacy to dose Carlton Mcintyre M.D.       • ipratropium-albuterol (DUONEB) nebulizer solution  3 mL Q2HRS PRN (RT) Carlton Mcintyre M.D.       • NS infusion   Continuous Jose Bourne M.D. 10 mL/hr at 07/15/18 1026     • ondansetron (ZOFRAN) syringe/vial injection 4 mg  4 mg Q4HRS PRN Shruthi Phan M.D.       • ondansetron (ZOFRAN ODT) dispertab 4 mg  4 mg Q4HRS PRN Shruthi Phan M.D.       • promethazine (PHENERGAN) tablet 12.5-25 mg  12.5-25 mg Q4HRS PRN Shruthi Phan M.D.       • promethazine (PHENERGAN) suppository 12.5-25 mg  12.5-25 mg Q4HRS PRN Shruthi Phan M.D.       • prochlorperazine (COMPAZINE) injection 5-10 mg  5-10 mg Q4HRS PRN Shruthi Phan M.D.       • acetaminophen (TYLENOL) tablet 650 mg  650 mg Q6HRS PRN Shruthi Phan M.D.   650 mg at 07/16/18 1359   • nicotine (NICODERM) 21 MG/24HR 21 mg  21 mg Daily-0600 Shruthi Phan M.D.   21 mg at 07/18/18 0539    And   • nicotine polacrilex (NICORETTE) 2 MG piece 2 mg  2 mg Q HOUR PRN Shruthi Phan M.D.       • ampicillin/sulbactam (UNASYN) 3 g in  mL IVPB  3 g Q6HRS Helene Smith M.D.   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 and persisting small pneumothorax   - s/p left decort 7/11   - thora 7/10 with Group F B-strep   - on IV abx - min 6-week course post op   - Chest tube in place with drainage, ongoing airleak, keep to suction -@ [id number] now    Multifocal PNA   - ct with diffuse lung involvement with necrotizing and cavitary changes   - s/p decort 7/11 on left   - repeat ct chest 7/17 with scattered cavitary lesions on left, large on right w/o air/fluid  level, multifocal  areas of infiltrate   - CPT, Postural draining, Aggressive Pulm Toilet   - continue abx for 6-12 weeks post decort, repeat ct in 4-5 weeks, earlier if clinical decline    Hypotension - improved   - sepsis protocols   - continue abx IV   - wean steroids    Leukocytosis   - related to pulmonary infection   - thora w group F b-strep   - abx    Chronic Etoh use   - thiamine/folate/mvi    Chronic tob use   -  on cessation   - Bronchodilators, RT protocols    Hypokalemia   - I am replacing to keep > 4     Hypomagnesemia   - I am replacing to keep > 2      Discussed patient condition and risk of morbidity and/or mortality with RN, RT, Pharmacy, [employer] resident and Charge nurse / hot rounds.

## 2018-07-18 NOTE — PROGRESS NOTES
Infectious Disease Progress Note    Author: Laina Lopez M.D. Date & Time of service: 2018  3:07 PM    Chief Complaint:  Empyema     Interval History:  18- Tmax 99.3 WBC 10.9 creatinine 0.34 patient continues to remain significantly debilitated  2018 T-max 98.9 feels much better.  WBC 14.2 platelets 512 creatinine 0.34  2018 T-max 98.8.  Feels better.  WBC 11.3 platelets 520 sed rate 62 CRP 3  Labs Reviewed, Medications Reviewed, Radiology Reviewed and Wound Reviewed.    Review of Systems:  Review of Systems   Constitutional: Positive for malaise/fatigue. Negative for fever.   HENT: Negative for hearing loss.    Respiratory: Positive for cough and shortness of breath.         Chest pain improved   Cardiovascular: Negative for chest pain and leg swelling.   Gastrointestinal: Negative for abdominal pain, nausea and vomiting.   Genitourinary: Negative for dysuria.   Musculoskeletal: Positive for myalgias.   Neurological: Negative for sensory change and speech change.       Hemodynamics:  Temp (24hrs), Av.8 °C (98.3 °F), Min:36.3 °C (97.4 °F), Max:37.1 °C (98.8 °F)  Temperature: 36.9 °C (98.4 °F)  Pulse  Av.2  Min: 50  Max: 111Heart Rate (Monitored): 72  NIBP: (!) 96/60       Physical Exam:  Physical Exam   Constitutional: He has a sickly appearance.   Thin male looking chronically ill   Eyes: No scleral icterus.   Pulmonary/Chest: He has rales.   Chest tubes X2 on left side   Abdominal: Soft. There is no tenderness. There is no rebound.   Genitourinary:   Genitourinary Comments: Plus crystal   Musculoskeletal: He exhibits no edema.   Neurological: He is alert.   Vitals reviewed.      Meds:    Current Facility-Administered Medications:   •  potassium chloride SA  •  hydrocortisone sodium succinate PF  •  enoxaparin (LOVENOX) injection  •  oxyCODONE immediate-release **OR** oxyCODONE immediate-release  •  Respiratory Care per Protocol  •  senna-docusate **AND** polyethylene glycol/lytes  **AND** magnesium hydroxide **AND** bisacodyl  •  MD ALERT...Adult ICU Electrolyte Replacement per Pharmacy Protocol  •  ipratropium-albuterol  •  NS  •  ondansetron  •  ondansetron  •  promethazine  •  promethazine  •  prochlorperazine  •  acetaminophen  •  nicotine **AND** Nicotine Replacement Patient Education Materials **AND** nicotine polacrilex  •  ampicillin-sulbactam (UNASYN) IV    Labs:  Recent Labs      07/16/18 0415 07/17/18   0420 07/18/18   0424   WBC  10.9*  14.2*  11.3*   RBC  3.15*  3.20*  3.27*   HEMOGLOBIN  8.7*  8.9*  9.0*   HEMATOCRIT  27.8*  28.5*  28.7*   MCV  88.3  89.1  87.8   MCH  27.6  27.8  27.5   RDW  58.4*  59.1*  56.8*   PLATELETCT  495*  512*  523*   MPV  9.6  9.3  9.8   NEUTSPOLYS  68.00  74.40*  75.10*   LYMPHOCYTES  23.30  18.50*  17.00*   MONOCYTES  5.70  5.00  5.60   EOSINOPHILS  0.60  0.80  1.10   BASOPHILS  0.20  0.10  0.20     Recent Labs      07/16/18 0415 07/17/18   0420 07/18/18   0424   SODIUM  146*  141  138   POTASSIUM  3.8  3.4*  3.6   CHLORIDE  116*  104  100   CO2  22  33  34*   GLUCOSE  120*  78  87   BUN  20  13  10     Recent Labs      07/16/18 0415 07/17/18   0420  07/18/18   0424   ALBUMIN  2.0*   --    --    TBILIRUBIN  0.2   --    --    ALKPHOSPHAT  51   --    --    TOTPROTEIN  5.6*   --    --    ALTSGPT  14   --    --    ASTSGOT  11*   --    --    CREATININE  0.34*  0.34*  0.33*       Imaging:  Dx-chest-portable (1 View)    Result Date: 7/16/2018 7/16/2018 4:16 AM HISTORY/REASON FOR EXAM:  For indication of respiratory failure TECHNIQUE/EXAM DESCRIPTION AND NUMBER OF VIEWS: Single portable view of the chest. COMPARISON: Yesterday FINDINGS: Hardware is stably positioned in its visualized extent. HEART: Stable size. LUNGS: BILATERAL pulmonary opacities are unchanged. PLEURA: Small LEFT pneumothorax is unchanged.     1.  Unchanged small LEFT pneumothorax with chest tubes in place 2.  Unchanged BILATERAL cavitary airspace disease    Dx-chest-portable (1  View)    Result Date: 7/15/2018    7/15/2018 4:29 AM HISTORY/REASON FOR EXAM: For indication of respiratory failure Line placement TECHNIQUE/EXAM DESCRIPTION:  Single AP view of the chest. COMPARISON: Yesterday FINDINGS: Position of medical devices appears stable. The cardiac silhouette appears within normal limits. The mediastinal contour appears within normal limits.  The central pulmonary vasculature appears normal. Hazy bilateral pulmonary opacities are seen. Residual left pneumothorax is seen similar to prior study. No significant pleural effusions are identified. The bony structures appear age-appropriate.  Soft tissue gas in the left chest wall is seen.     1.  Stable pulmonary infiltrates and/or edema with probable component of chronic underlying lung disease. 2.  Left pneumothorax, stable since prior with 2 thoracostomy tubes in place.     Dx-chest-portable (1 View)    Result Date: 7/14/2018 7/14/2018 5:13 AM HISTORY/REASON FOR EXAM: For indication of respiratory failure Line placement TECHNIQUE/EXAM DESCRIPTION:  Single AP view of the chest. COMPARISON: Yesterday FINDINGS: Position of medical devices appears stable. The cardiac silhouette appears within normal limits. The mediastinal contour appears within normal limits.  The central pulmonary vasculature appears normal. Increased opacification throughout the right hemithorax is seen. There is decreased opacification within the left hemithorax. Residual left pneumothorax is seen similar to prior study. No significant pleural effusions are identified. The bony structures appear age-appropriate.  Soft tissue gas in the left chest wall is seen.     1.  Increased opacification of the right hemithorax, compatible with new large pleural effusion and/or infiltrates and/or atelectasis. 2.  Left pneumothorax, stable since prior with 2 thoracostomy tubes in place.    Dx-chest-portable (1 View)    Result Date: 7/13/2018 7/13/2018 7:21 AM HISTORY/REASON FOR EXAM:   Respiratory failure. TECHNIQUE/EXAM DESCRIPTION AND NUMBER OF VIEWS: Single portable view of the chest. COMPARISON: 7/12/2018 FINDINGS: LUNGS: Multifocal patchy consolidations are reidentified. Stable cavity in the right lung apex, with improving opacities in the right perihilar and infrahilar regions. PNEUMOTHORAX: Stable left pneumothorax and subcutaneous emphysema of the left chest wall. LINES AND TUBES: Stable well-positioned ETT. Stable right IJ central catheter, tip in the superior cavoatrial junction. Stable left chest tubes. MEDIASTINUM: No cardiomegaly. MUSCULOSKELETAL STRUCTURES: No acute fracture. Skin staples in the left lateral chest wall.     1. Improving opacities in the right perihilar and infrahilar regions. Otherwise stable multifocal patchy consolidations. 2. Stable lines and tubes. 3. Stable left pneumothorax subcutaneous emphysema of the left chest wall.    Dx-chest-portable (1 View)    Result Date: 7/12/2018 7/12/2018 12:11 PM HISTORY/REASON FOR EXAM:  POST INTUBATION. TECHNIQUE/EXAM DESCRIPTION AND NUMBER OF VIEWS: Single portable view of the chest. COMPARISON: 7/12/2018 FINDINGS: Interval improvement of RIGHT pleural fluid collection and increased aeration of RIGHT lower lung. Patchy consolidation again seen in the RIGHT upper lung. Focal nodular densities are seen in the LEFT lung as well as consolidation at the mid lung level. Small LEFT pneumothorax is unchanged, with multiple LEFT chest tubes present. Increasing LEFT chest wall emphysema. Other supportive tubing is unchanged.     1.  Interval improved aeration of RIGHT lung base with improvement of RIGHT pleural effusion. 2.  Stable LEFT pneumothorax with chest tubes present. 3.  Patchy consolidation again seen in both lungs with basilar densities in the LEFT upper lung again noted. 4.  Increasing LEFT chest wall emphysema.    Dx-chest-portable (1 View)    Result Date: 7/12/2018 7/12/2018 3:16 AM HISTORY/REASON FOR EXAM: Line  placement TECHNIQUE/EXAM DESCRIPTION:  Single AP view of the chest. COMPARISON: July 10, 2018 FINDINGS: Right internal jugular central line is seen terminating at the right atriocaval junction.  To left thoracostomy tube is in place terminating at the left apex and left lung base. The cardiac silhouette appears within normal limits. The mediastinal contour appears within normal limits.  The central pulmonary vasculature appears normal. The lungs appear well expanded bilaterally.  Increased opacification throughout the right hemithorax is seen. There is decreased opacification within the left hemithorax. Residual left pneumothorax is seen similar to prior study. No significant pleural effusions are identified. The bony structures appear age-appropriate.  Soft tissue gas in the left chest wall is seen.     1.  Increased opacification of the right hemithorax, compatible with new large pleural effusion and/or infiltrates and/or atelectasis. 2.  Improved aeration of the left hemithorax status post thoracostomy tube placement. Residual infiltrates in the left lung are seen. 3.  Left pneumothorax, stable since prior with 2 thoracostomy tubes in place.    Dx-chest-portable (1 View)    Result Date: 7/10/2018  7/10/2018 6:14 PM HISTORY/REASON FOR EXAM:  Post thoracentesis left chest. TECHNIQUE/EXAM DESCRIPTION AND NUMBER OF VIEWS: Single portable view of the chest. COMPARISON: 7/10/2018 FINDINGS: The mediastinal and cardiac silhouette is partially obscured by the left-sided parenchymal opacity and pleural fluid The right pulmonary vascularity is within normal limits. Multiple lung masses are seen on the left. There is an airspace process in the left mid and lower hemithorax. There is ill-defined nodular parenchymal opacity in the right upper lobe with some hilar retraction. There is no significant pleural effusion. There was a previous hydropneumothorax seen on the outside prior chest x-ray. Pneumothorax is still seen on the  lateral aspect of the left chip-thorax. There are no acute bony abnormalities.     1.  There has been interval decrease in the left pleural effusion. 2.  There is still some component of LEFT pneumothorax although the hydropneumothorax air-fluid level is no longer evident. 3.  Multiple left bone or masses are again seen. 4.  Left lower lobe airspace disease is again seen. 5.  Ill-defined nodular and linear opacity in the right upper lobe with pleural thickening and hilar retraction is again seen.    Us-thoracentesis Puncture Left    Result Date: 7/10/2018  7/10/2018 4:01 PM HISTORY/REASON FOR EXAM: Pleural effusion TECHNIQUE/EXAM DESCRIPTION: Ultrasound-guided thoracentesis. Indication:  LEFT pleural fluid collection. COMPARISON:  None PROCEDURE:     Informed consent was obtained. A timeout was taken. A left pleural effusion was localized with real-time ultrasound guidance. The left posterior chest wall was prepped and draped in a sterile manner. Dr. Fine performed the procedure  with my guidance.  Following local anesthesia with 1% lidocaine, a 5 Syriac Yueh pigtail catheter was advanced into the pleural space with trocar technique and pleural fluid was drained. The patient tolerated the procedure well without evidence of complication. A post thoracentesis chest radiograph is forthcoming. FINDINGS: Highly complicated left large volume pleural fluid has extensive internal debris Fluid was  sent to the laboratory. Fluid character: purulent     1. Ultrasound guided left sided diagnostic thoracentesis. 2. 500 mL of fluid withdrawn. Additional fluid was not removed as the catheter became occluded by the thick liquid    Dx-abdomen For Tube Placement    Result Date: 7/14/2018 7/14/2018 12:59 PM HISTORY/REASON FOR EXAM:  Cortrak evaluation. TECHNIQUE/EXAM DESCRIPTION AND NUMBER OF VIEWS:  1 view(s) of the abdomen. COMPARISON:  None. FINDINGS: Cortrak feeding tube tip projects in the region of the gastric fundus.  "The tube coils back upon itself from the mid stomach. The thoracic course appears appropriate. The bowel gas pattern is within normal limits.     Cortrak feeding tube tip projects in the region of the gastric fundus.      Micro:  Results     Procedure Component Value Units Date/Time    BLOOD CULTURE [[id number]] Collected:  07/12/18 1046    Order Status:  Completed Specimen:  Blood from Peripheral Updated:  07/17/18 1300     Significant Indicator NEG     Source BLD     Site PERIPHERAL     Blood Culture No growth after 5 days of incubation.    Narrative:       [id number] MEGAN HERNANDEZ  Per Hospital Policy: Only change Specimen Src: to \"Line\" if  specified by physician order.    BLOOD CULTURE [[id number]] Collected:  07/12/18 1046    Order Status:  Completed Specimen:  Blood from Peripheral Updated:  07/17/18 1300     Significant Indicator NEG     Source BLD     Site PERIPHERAL     Blood Culture No growth after 5 days of incubation.    Narrative:       [id number] MEGAN HERNANDEZ  Per Hospital Policy: Only change Specimen Src: to \"Line\" if  specified by physician order.    CULTURE TISSUE W/ GRM STAIN [[id number]]  (Abnormal) Collected:  07/11/18 2033    Order Status:  Completed Specimen:  Tissue Updated:  07/14/18 1316     Significant Indicator POS (POS)     Source TISS     Site Left Lung Tissue     Tissue Culture Growth noted after further incubation, see below for  organism identification.   (A)     Gram Stain Result Few WBCs.  No organisms seen.       Tissue Culture Beta Streptococcus Group F  Light growth   (A)    ANAEROBIC CULTURE [[id number]] Collected:  07/11/18 2033    Order Status:  Completed Specimen:  Tissue Updated:  07/14/18 1316     Significant Indicator NEG     Source TISS     Site Left Lung Tissue     Anaerobic Culture, Culture Res No Anaerobes isolated.    AFB CULTURE [[id number]] Collected:  07/11/18 2033    Order Status:  Completed Specimen:  Tissue Updated:  07/14/18 1316     Significant " Indicator NEG     Source TISS     Site Left Lung Tissue     AFB Culture Culture in progress.     AFB Smear Results No acid fast bacilli seen.    FUNGAL CULTURE [[id number]] Collected:  07/11/18 2033    Order Status:  Completed Specimen:  Tissue Updated:  07/14/18 1316     Significant Indicator NEG     Source TISS     Site Left Lung Tissue     Fungal Culture Culture in progress.    AFB CULTURE [[id number]] Collected:  07/12/18 1130    Order Status:  Completed Specimen:  Respirate from Lung Updated:  07/14/18 1053     Significant Indicator NEG     Source RESP     Site Bronchoalveolar Lavage RLL     AFB Culture Culture in progress.     AFB Smear Results No acid fast bacilli seen.    Narrative:       [id number] MENDEZA-GasY  3rd specimin  Which Lobe (Bronch Only):->RLL    CULTURE RESPIRATORY W/ GRM STN [[id number]]  (Abnormal) Collected:  07/12/18 1130    Order Status:  Completed Specimen:  Respirate Updated:  07/14/18 1053     Gram Stain Result Rare WBCs.  No organisms seen.       Significant Indicator POS (POS)     Source RESP     Site Bronchoalveolar Lavage RLL     Respiratory Culture -- (A)      Beta Streptococcus Group F  Moderate growth   (A)    Narrative:       [id number] MENDEZ JOHN  3rd specimin  Which Lobe (Bronch Only):->RLL    FUNGAL CULTURE [[id number]] Collected:  07/12/18 1130    Order Status:  Completed Specimen:  Respirate Updated:  07/14/18 1053     Significant Indicator NEG     Source RESP     Site Bronchoalveolar Lavage RLL     Fungal Culture Culture in progress.    Narrative:       [id number] MENDEZA-GasY  3rd specimin  Which Lobe (Bronch Only):->RLL    GRAM STAIN [[id number]] Collected:  07/12/18 1130    Order Status:  Completed Specimen:  Respirate Updated:  07/12/18 2342     Significant Indicator .     Source RESP     Site Bronchoalveolar Lavage RLL     Gram Stain Result Rare WBCs.  No organisms seen.      Narrative:       [id number] MENDEZ JOHN  3rd specimin  Which Lobe (Bronch Only):->RLL     ACID FAST STAIN [[id number]] Collected:  07/12/18 1130    Order Status:  Completed Specimen:  Respirate Updated:  07/12/18 2342     Significant Indicator NEG     Source RESP     Site Bronchoalveolar Lavage RLL     AFB Smear Results No acid fast bacilli seen.    Narrative:       [id number] ANDREA LOPEZ  3rd specimin  Which Lobe (Bronch Only):->RLL    GRAM STAIN [[id number]] Collected:  07/11/18 2033    Order Status:  Completed Specimen:  Tissue Updated:  07/12/18 2336     Significant Indicator .     Source TISS     Site Left Lung Tissue     Gram Stain Result Few WBCs.  No organisms seen.      ACID FAST STAIN [[id number]] Collected:  07/11/18 2033    Order Status:  Completed Specimen:  Tissue Updated:  07/12/18 2336     Significant Indicator NEG     Source TISS     Site Left Lung Tissue     AFB Smear Results No acid fast bacilli seen.    ACID FAST STAIN [[id number]] Collected:  07/11/18 1848    Order Status:  Completed Specimen:  Respirate Updated:  07/12/18 2335     Significant Indicator NEG     Source RESP     Site SPUTUM     AFB Smear Results No acid fast bacilli seen.    Narrative:       [id number] NEYMAR LASHON R.    AFB CULTURE [[id number]] Collected:  07/11/18 1848    Order Status:  Completed Specimen:  Respirate from Sputum Updated:  07/12/18 2334     Significant Indicator NEG     Source RESP     Site SPUTUM     AFB Culture Culture in progress.     AFB Smear Results No acid fast bacilli seen.    Narrative:       [id number] NEYMAR LASHON R.    ACID FAST STAIN [[id number]] Collected:  07/11/18 1100    Order Status:  Completed Specimen:  Respirate Updated:  07/12/18 1427     Significant Indicator NEG     Source RESP     Site Sputum (coughed)     AFB Smear Results No acid fast bacilli seen.    Narrative:       [id number] NEYMAR LASHON R.    AFB CULTURE [[id number]] Collected:  07/11/18 1100    Order Status:  Completed Specimen:  Respirate from Bile Fluid Updated:  07/12/18 1427     Significant Indicator NEG      Source RESP     Site Sputum (coughed)     AFB Culture Culture in progress.     AFB Smear Results No acid fast bacilli seen.    Narrative:       [id number] NEYMAR CLEARY    ACID FAST STAIN [[id number]] Collected:  07/10/18 1635    Order Status:  Completed Specimen:  Body Fluid Updated:  07/12/18 1327     Significant Indicator NEG     Source BF     Site THORACENTESIS FLUID     AFB Smear Results No acid fast bacilli seen.    Narrative:       CALL  [place]  183 tel. [phone],  CALLED  183 tel. [phone] 07/11/2018, 12:41, RB PERF. RESULTS CALLED TO:  RN [id number]  src:pleural effusion    GRAM STAIN [[id number]]  (Abnormal) Collected:  07/10/18 1635    Order Status:  Completed Specimen:  Body Fluid Updated:  07/12/18 1327     Significant Indicator . (POS)     Source BF     Site THORACENTESIS FLUID     Gram Stain Result Many WBCs.  Many Gram positive cocci.   (A)    Narrative:       CALL  [place]  183 tel. [phone],  CALLED  183 tel. [phone] 07/11/2018, 12:41, RB PERF. RESULTS CALLED TO:  ROLANDO [id number]  src:pleural effusion    FLUID CULTURE W/GRAM STAIN [[id number]]  (Abnormal) Collected:  07/10/18 1635    Order Status:  Completed Specimen:  Body Fluid from Thoracentesis Fluid Updated:  07/12/18 1327     Significant Indicator POS (POS)     Source BF     Site THORACENTESIS FLUID     Culture Result Bdf -- (A)     Gram Stain Result Many WBCs.  Many Gram positive cocci.   (A)     Culture Result Bdf Beta Streptococcus Group F  Moderate growth   (A)    Narrative:       CALL  [place]  183 tel. [phone],  CALLED  183 tel. [phone] 07/11/2018, 12:41, RB PERF. RESULTS CALLED TO:  RN [id number]  src:pleural effusion    AFB CULTURE [[id number]] Collected:  07/10/18 1635    Order Status:  Completed Specimen:  Body Fluid from Thoracentesis Fluid Updated:  07/12/18 1327     Significant Indicator NEG     Source BF     Site THORACENTESIS FLUID     AFB Culture Culture in progress.     AFB Smear Results No acid fast bacilli seen.    Narrative:        CALL  [place]  183 tel. [phone],  CALLED  183 tel. [phone] 07/11/2018, 12:41, RB PERF. RESULTS CALLED TO:  ROLANDO [id number]  src:pleural effusion    FUNGAL CULTURE [[id number]] Collected:  07/10/18 1635    Order Status:  Completed Specimen:  Body Fluid from Thoracentesis Fluid Updated:  07/12/18 1327     Significant Indicator NEG     Source BF     Site THORACENTESIS FLUID     Fungal Culture Culture in progress.    Narrative:       CALL  [place]  183 tel. [phone],  CALLED  183 tel. [phone] 07/11/2018, 12:41, RB PERF. RESULTS CALLED TO:  ROLANDO [id number]  src:pleural effusion    AFB [[id number]] Collected:  07/12/18 1150    Order Status:  Canceled Specimen:  Sputum from Sputum     RESP CULTURE [[id number]] Collected:  07/12/18 1150    Order Status:  Canceled Specimen:  Sputum from Sputum     FUNGAL CULTURE [[id number]] Collected:  07/12/18 1150    Order Status:  Canceled Specimen:  Sputum from Sputum     AFB CULTURE [[id number]] Collected:  07/11/18 1848    Order Status:  Canceled Specimen:  Sputum from Sputum           Assessment:  Active Hospital Problems    Diagnosis   • *Empyema (HCC) [J86.9]   • Acute hypoxemic respiratory failure (HCC) [J96.01]   • Panlobular emphysema (HCC) [J43.1]   • Pulmonary parenchymal mass [R91.8]   • Pulmonary cachexia due to COPD (HCC) [J44.9, R64]   • Protein malnutrition (HCC) [E46]   • Leukocytosis [D72.829]   • Tobacco abuse [Z72.0]       Plan:  Empyema  Underwent thoracentesis on 7/10/2018  Underwent left thoracotomy decortication and evacuation of purulent empyema and decortication of lung and rib resection on 7/11/2018  The cultures have been group F strep  Continue Unasyn  ESR is 62 and CRP is 3.08  Aim for at least 2 weeks of IV antibiotics followed by at least 4 weeks of oral Augmentin    COPD  Patient has considerable changes on his right side of the lung as well    Leukocytosis    Tobacco abuse  Patient was smoking 1-1/2 pack per day    Prognosis  Guarded      Discussed with Dr. Mcintyre

## 2018-07-18 NOTE — CARE PLAN
Problem: Respiratory:  Goal: Respiratory status will improve    Intervention: Collaborate with respiratory therapist and Interdisciplinary Team on treatment measures to improve respiratory function  Respiratory status assessed via breath sounds, pulse oximetry and oxygen requirements. Chest tubes checked at start of shift and Q encounter for function and correct suction settings. RN and RT in collaboration at start of shift with patient regarding PEP and CPT therapy as part of overnight plan of care. Increased mobility encouraged and performed at start of shift, pt highly compliant and motivated with IS use.       Problem: Pain Management  Goal: Pain level will decrease to patient's comfort goal    Intervention: Follow pain managment plan developed in collaboration with patient and Interdisciplinary Team  Continual assessment of pain level using 0-10 scale in place. Pharmacologic and nonpharm measures in place to control pain with desired effect achieved.

## 2018-07-18 NOTE — CARE PLAN
Problem: Oxygenation:  Goal: Maintain adequate oxygenation dependent on patient condition  4L NC    Problem: Bronchopulmonary Hygiene:  Goal: Increase mobilization of retained secretions    Intervention: Perform bronchopulmonary therapy as indicated by assessment  PEP QID  Vest QID

## 2018-07-18 NOTE — PROGRESS NOTES
12-hour chart check, 2-RN skin check completed with Ibeth MARSHALL. Performed visualization of pt's dual chest tube dressings/sites. CDI.

## 2018-07-18 NOTE — PROGRESS NOTES
[facility] ICU Progress Note      Admit Date: 7/10/2018  ICU Day: 6    Resident(s): Sweta Stroud   Attending: [facility]/ Carlton Quiñonez    Date & Time:   7/18/2018   10:58 AM       Patient ID:    Name:             Donna Ceballos   YOB: 1960  Age:                 58 y.o.  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 He presented with dyspnea on exertion and production of sputum with cough associated with 10 pound weight loss since March. He was transferred from the outside facility due to findings of empyema left greater than right and multiloculated abscess versus cavitary masses. Patient was admitted to the ICU following his left thoracostomy with continued intubation and management of chest tubes.       Interval Events:  - No acute events overnight.  - Continue with productive cough of yellow/pus sputum, necrotic right upper lobe.   - Chest tube drain 330ml in past 24 hours. Air leak improving. Plan for increasing suction as per CTS.   - Leukocytosis trending down.   - LL edema slightly better with diuretics.   - Stable to transfer to surgical floor.      Review of Systems   Constitutional: Positive for malaise/fatigue. Negative for chills and fever.   HENT: Negative for congestion.    Respiratory: Positive for sputum production. Negative for cough, hemoptysis, shortness of breath and wheezing.    Cardiovascular: Positive for chest pain and leg swelling. Negative for palpitations and orthopnea.   Gastrointestinal: Negative for abdominal pain, constipation, diarrhea and heartburn.   Genitourinary: Negative for frequency and hematuria.   Musculoskeletal: Positive for back pain. Negative for myalgias.   Skin: Negative for  rash.   Neurological: Negative for dizziness, speech change, focal weakness and headaches.   Endo/Heme/Allergies: Does not bruise/bleed easily.   Psychiatric/Behavioral: Negative for depression and hallucinations.       VITALS:  Pulse: 73, Heart Rate (Monitored): 71  NIBP: 102/57       Temp  Av.9 °C (98.5 °F)  Min: 36.3 °C (97.4 °F)  Max: 37.4 °C (99.4 °F)         Physical Exam:  GEN: awake and alert.    HEENT: NC/AT.   CV: S1, S2, NSR, No m/r/g  RESP: Diminished air entry. Scattered Rhonchi. Chest tube x 2 in place with some airleak.    ABD: Soft, NT, ND; +BS  EXT: Bilateral lower extremity edema.No signs of cyanosis  NEURO: alert and awake. NFD.     Consultants:  General Surgery: Dr. Diego Martínez M.D  Infectious disease :Dr. Tessa Rich M.D.  PMA: Dr. Carlton Mcintyre    Procedures:  7/10-thoracentesis of large left pleural effusion with thick turbid creamy yellow pus.-Interventional radiology  -left thoracostomy, decortication, evacuation of purulent empyema, debridement of necrotic lung, and rib resection.-Dr. Diego Martínez  -Central line placement  -emergent diagnostic and therapeutic bronchoscopy with aspiration and bronchial alveolar lavage    Respiratory:     Respiration: (!) 24, Pulse Oximetry: 92 %, O2 Daily Delivery Respiratory : Silicone Nasal Cannula    Chest Tube Drains:    Recent Labs      18   0532   ISTATAPH  7.492   ISTATAPCO2  32.0   ISTATAPO2  69   ISTATATCO2  25   [id number]  95   ISTATARTHCO3  24.5   ISTATARTBE  1   ISTATTEMP  97.8 F   ISTATFIO2  30   ISTATSPEC  Arterial   ISTATAPHTC  7.499   [id number]  67       HemoDynamics:  Pulse: 73, Heart Rate (Monitored): 71 NIBP: 102/57      Neuro:      Fluids:  Intake/Output       18 0700 - 18 0659 18 07 - 18 0659 18 07 - 18 0659       Total  Total  Total       Intake    P.O.  600  600 1200  100  450 550  --  -- --    P.O. 600  600 1200 100 450 550 -- -- --    I.V.  736.3  320 1056.3  300  320 620  --  -- --    Propofol Volume 16.3 -- 16.3 -- -- -- -- -- --    IV Volume (TKO) -- -- -- -- 120 120 -- -- --    IV Volume (0.9% NS) 120 120 240 100 -- 100 -- -- --    IV Volume (Unasyn) 100 200 300 200 200 400 -- -- --    IV Volume (K Phos) 500 -- 500 -- -- -- -- -- --    Other  30  -- 30  --  300 300  --  -- --    Medications (P.O./ Enteral Liquids) 30 -- 30 -- 300 300 -- -- --    Enteral  90  -- 90  --  -- --  --  -- --    Free Water / Tube Flush 90 -- 90 -- -- -- -- -- --    Total Intake 1456.3 920 2376.3 400 1070 1470 -- -- --       Output    Urine  3850  2650 6500  2630  1350 3980  500  -- 500    Number of Times Voided -- -- -- -- 7 x 7 x -- -- --    Urine Void (mL) (non-catheter) 200 2650 2850 2630 1350 3980 500 -- 500    Output (mL) ([REMOVED] Urinary Catheter Indwelling Catheter 16) 3650 -- 3650 -- -- -- -- -- --    Emesis  --  -- --  --  0 0  --  -- --    Emesis -- -- -- -- 0 0 -- -- --    Stool  --  -- --  --  -- --  --  -- --    Number of Times Stooled -- -- -- -- 0 x 0 x -- -- --    Chest Tube  190  250 440  150  180 330  --  -- --    Output (mL) (Chest Tube Group 1 (A) Left straight 32) 40 0 40 50 0 50 -- -- --    Output (mL) (Chest Tube Group 2 (B) Left angled 32) 150 250 400 100 180 280 -- -- --    Total Output 4040 2900 6940 2780 1530 4310 500 -- 500       Net I/O     -2583.8 -1980 -4563.8 -2380 -460 -2840 -500 -- -500        Weight: 81 kg (178 lb 9.2 oz)  Recent Labs      07/16/18 0415 07/17/18 0420 07/18/18 0424   SODIUM  146*  141  138   POTASSIUM  3.8  3.4*  3.6   CHLORIDE  116*  104  100   CO2  22  33  34*   BUN  20  13  10   CREATININE  0.34*  0.34*  0.33*   MAGNESIUM  2.1  1.8  1.9   PHOSPHORUS  2.2*  3.6  3.1   CALCIUM  7.8*  7.7*  7.5*     Body mass index is 24.21 kg/m².    GI/Nutrition:  Recent Labs      07/16/18 0415 07/17/18 0420 07/18/18 0424   ALTSGPT  14   --    --    ASTSGOT  11*   --    --     ALKPHOSPHAT  51   --    --    TBILIRUBIN  0.2   --    --    PREALBUMIN  12.0*   --    --    GLUCOSE  120*  78  87       Heme:  Recent Labs      18   RBC  3.15*  3.20*  3.27*   HEMOGLOBIN  8.7*  8.9*  9.0*   HEMATOCRIT  27.8*  28.5*  28.7*   PLATELETCT  495*  512*  523*       Infectious Disease:  Temp  Av.9 °C (98.5 °F)  Min: 36.3 °C (97.4 °F)  Max: 37.4 °C (99.4 °F)  Recent Labs      18   WBC  10.9*  14.2*  11.3*   NEUTSPOLYS  68.00  74.40*  75.10*   LYMPHOCYTES  23.30  18.50*  17.00*   MONOCYTES  5.70  5.00  5.60   EOSINOPHILS  0.60  0.80  1.10   BASOPHILS  0.20  0.10  0.20   ASTSGOT  11*   --    --    ALTSGPT  14   --    --    ALKPHOSPHAT  51   --    --    TBILIRUBIN  0.2   --    --        Meds:  • potassium chloride SA  20 mEq     • magnesium sulfate  1 g     • hydrocortisone sodium succinate PF  50 mg     • enoxaparin (LOVENOX) injection  40 mg     • oxyCODONE immediate-release  5 mg      Or   • oxyCODONE immediate-release  10 mg     • Respiratory Care per Protocol       • senna-docusate  2 Tab      And   • polyethylene glycol/lytes  1 Packet      And   • magnesium hydroxide  30 mL      And   • bisacodyl  10 mg     • MD ALERT...Adult ICU Electrolyte Replacement per Pharmacy Protocol       • ipratropium-albuterol  3 mL     • NS   10 mL/hr at 07/15/18 1026   • ondansetron  4 mg     • ondansetron  4 mg     • promethazine  12.5-25 mg     • promethazine  12.5-25 mg     • prochlorperazine  5-10 mg     • acetaminophen  650 mg     • nicotine  21 mg      And   • nicotine polacrilex  2 mg     • ampicillin-sulbactam (UNASYN) IV  3 g Stopped (18 0607)        Imaging:  DX-CHEST-PORTABLE (1 VIEW)   Final Result         1. Left lateral pneumothorax is stable to minimally larger. Stable left chest tube position.   2. Stable lung findings.      CT-CHEST (THORAX) W/O   Final Result         1. There is small left anterior  pneumothorax, as seen on prior radiograph. 2 left chest tubes in place.      2. Multiple cavitary lesions with thick wall throughout the left upper lobe. There is large cavitation nearly replacing the entire right upper lobe. No air-fluid level. These findings could relate to cavitation secondary to lung infections. The    differential includes septic emboli, malignancy.      3. Focal nodular opacities in the left upper lobe and patchy groundglass opacities in the right middle lobe, right lower lobe and left lower lobe could relate to infection as well.      4. Small right pleural effusion.      DX-CHEST-LIMITED (1 VIEW)   Final Result         1.  Interval decrease in left lateral pneumothorax since previous exam.      2.  Right upper lobe and left lower lobe airspace opacities unchanged.      DX-CHEST-PORTABLE (1 VIEW)   Final Result      1.  Unchanged small left lateral pneumothorax with 2 chest tubes in place.      2.  Unchanging bilateral pulmonary opacities.      DX-CHEST-PORTABLE (1 VIEW)   Final Result      1.  Unchanged small LEFT pneumothorax with chest tubes in place   2.  Unchanged BILATERAL cavitary airspace disease      DX-CHEST-PORTABLE (1 VIEW)   Final Result         1.  Stable pulmonary infiltrates and/or edema with probable component of chronic underlying lung disease.   2.  Left pneumothorax, stable since prior with 2 thoracostomy tubes in place.         DX-ABDOMEN FOR TUBE PLACEMENT   Final Result      Cortrak feeding tube tip projects in the region of the gastric fundus.      DX-CHEST-PORTABLE (1 VIEW)   Final Result         1.  Increased opacification of the right hemithorax, compatible with new large pleural effusion and/or infiltrates and/or atelectasis.   2.  Left pneumothorax, stable since prior with 2 thoracostomy tubes in place.      DX-CHEST-PORTABLE (1 VIEW)   Final Result         1. Improving opacities in the right perihilar and infrahilar regions. Otherwise stable multifocal patchy  consolidations.   2. Stable lines and tubes.   3. Stable left pneumothorax subcutaneous emphysema of the left chest wall.      DX-CHEST-PORTABLE (1 VIEW)   Final Result      1.  Interval improved aeration of RIGHT lung base with improvement of RIGHT pleural effusion.   2.  Stable LEFT pneumothorax with chest tubes present.   3.  Patchy consolidation again seen in both lungs with basilar densities in the LEFT upper lung again noted.   4.  Increasing LEFT chest wall emphysema.      DX-CHEST-PORTABLE (1 VIEW)   Final Result         1.  Increased opacification of the right hemithorax, compatible with new large pleural effusion and/or infiltrates and/or atelectasis.   2.  Improved aeration of the left hemithorax status post thoracostomy tube placement. Residual infiltrates in the left lung are seen.   3.  Left pneumothorax, stable since prior with 2 thoracostomy tubes in place.      DX-CHEST-PORTABLE (1 VIEW)   Final Result      1.  There has been interval decrease in the left pleural effusion.   2.  There is still some component of LEFT pneumothorax although the hydropneumothorax air-fluid level is no longer evident.   3.  Multiple left bone or masses are again seen.   4.  Left lower lobe airspace disease is again seen.   5.  Ill-defined nodular and linear opacity in the right upper lobe with pleural thickening and hilar retraction is again seen.      US-THORACENTESIS PUNCTURE LEFT   Final Result      1. Ultrasound guided left sided diagnostic thoracentesis.      2. 500 mL of fluid withdrawn. Additional fluid was not removed as the catheter became occluded by the thick liquid      OUTSIDE IMAGES-CT CHEST/ABDOMEN/PELVIS   Final Result      OUTSIDE IMAGES-DX CHEST   Final Result            Assessment and plan    * Empyema (HCC)- (present on admission)   Overview    Cough, greenish and bloody sputum, dyspnea on exertion and orthopnea x 4 months.   Leukocytosis and lactic acidosis on admission.  CT chest: large empyema with  cavitary lesion/masses in the right upper lobe.  S/p Left thoracotomy, decortication, evacuation of purulent empyema, debridement of necrotic lung, rib resection by surgery, Dr. Diego Martínez 7/11/2018.  S/p Chest tube x 2.  Pleural fluid culture: Beta strep group F.      Assessment & Plan    - Improving status.  - Chest tube drain 330 ml last 24 hours. Air leak improving.   - Extubated 7/16.  - As per ID, Continue Unasyn. ABx duration is 4 weeks.   - Continue with Unasyn and chest tubes.   - Blood culture NTGD.   - CTS and gen surgery on board. Appreciate input.         Acute hypoxemic respiratory failure (HCC)   Assessment & Plan    - Resolved.   - Secondary to necrotizing pneumona/Empyema   - Intubated 7/11- 7/16.  - Continue Unasyn for total duration of 4 weeks.   - Continuous pulse ox.          Panlobular emphysema (HCC)- (present on admission)   Assessment & Plan    - Active everyday smoker.   - No evidence of acute exacerbation.  - Continue scheduled duonebs.  - RT protocol, Oxysgen, Cont pulse ox.  - Counseling.        Protein malnutrition (HCC)- (present on admission)   Assessment & Plan    - Patient appears malnourished.  - Lost around 10 lbs since March when the symptoms started.  - Continue tube feeds for now.         Pulmonary cachexia due to COPD (HCC)- (present on admission)   Assessment & Plan    - Likely secondary to emphysema and empyema  - Currently on tube feeds.   - Nutritional consult.   - Continue antibiotics.         Tobacco abuse- (present on admission)   Assessment & Plan    - Nicotine patches while IP.   - Tobacco cessation counseling          Leukocytosis- (present on admission)   Assessment & Plan    - WBC - 15.5 on admission.  - Resolving with antibiotics.             DISPO: ICU -> Surgical floor    CODE STATUS: Full code    Quality Measures:  Quality-Core Measures   Reviewed items::  Labs reviewed, Medications reviewed and Radiology images reviewed  Motley catheter::  No Motley  Central  line in place:  Need for access and Sepsis  DVT prophylaxis pharmacological::  Enoxaparin (Lovenox)  Ulcer Prophylaxis::  Yes  Antibiotics:  Treating active infection/contamination beyond 24 hours perioperative coverage

## 2018-07-18 NOTE — PROGRESS NOTES
Pt to S402-1 accompanied by CCT and ACLS RN. Transported on ICU monitor, then attached to overhead monitor, VSS at this time. Chart brought with pt to new room.    Chest tubes reattached to wall suction by RN and RT Otto at beside, functioning correctly.

## 2018-07-18 NOTE — PROGRESS NOTES
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 He presented with dyspnea on exertion and production of sputum with cough associated with 10 pound weight loss since March. He was transferred from the outside facility due to findings of empyema left greater than right and multiloculated abscess versus cavitary masses. Patient was admitted to the ICU following his left thoracostomy with continued intubation and management of chest tubes. He was extubated 7/16 however continues to have chest tubes managed by thoracic surgery    Important Events in the ICU:   - 7/10-thoracentesis of large left pleural effusion with thick turbid creamy yellow pus.-Interventional radiology  - 7/11-left thoracostomy, decortication, evacuation of purulent empyema, debridement of necrotic lung, and rib resection.-Dr. Diego Martínez  - 7/12-Central line placement  - 7/12-emergent diagnostic and therapeutic bronchoscopy with aspiration and bronchial alveolar lavage  - Pleural fluid culture beta strep group F  - Extubated 7/16        Labs and imaging studies to be continued with their indications:  - CBC:  Yes, white count, Hemoglobin  - CMP or BMP: electrolytes  - Magnesium: yes, alcoholic, malnutrition  - Phosphorus: yes, alcoholic malnutrition      Things to follow:   - Follow-up thoracic surgery recommendations. Thoracic surgery and general surgery on board  - ID on board: continue unasyn for 4 weeks

## 2018-07-18 NOTE — PROGRESS NOTES
Surgery     Awake alert  Feeling better  CT in place   Air leak persists   Persistent pneumothorax      S/P Decortication for empyema and pulmonary abscess     Continue CT drainage   Increase suction to [id number]    Follow       Diego Martínez MD

## 2018-07-18 NOTE — PROGRESS NOTES
Inquired as to whether pt would like this RN to place call to family/emergency contact to inform of room change, no need to do so at this jimmy per pt.

## 2018-07-19 ENCOUNTER — APPOINTMENT (OUTPATIENT)
Dept: RADIOLOGY | Facility: MEDICAL CENTER | Age: 58
DRG: 853 | End: 2018-07-19
Attending: INTERNAL MEDICINE

## 2018-07-19 PROBLEM — J93.9 PNEUMOTHORAX ON LEFT: Status: ACTIVE | Noted: 2018-07-19

## 2018-07-19 LAB
ANION GAP SERPL CALC-SCNC: 3 MMOL/L (ref 0–11.9)
BASOPHILS # BLD AUTO: 0.3 % (ref 0–1.8)
BASOPHILS # BLD: 0.04 K/UL (ref 0–0.12)
BUN SERPL-MCNC: 9 MG/DL (ref 8–22)
CALCIUM SERPL-MCNC: 7.8 MG/DL (ref 8.5–10.5)
CHLORIDE SERPL-SCNC: 100 MMOL/L (ref 96–112)
CO2 SERPL-SCNC: 34 MMOL/L (ref 20–33)
CREAT SERPL-MCNC: 0.36 MG/DL (ref 0.5–1.4)
EOSINOPHIL # BLD AUTO: 0.21 K/UL (ref 0–0.51)
EOSINOPHIL NFR BLD: 1.6 % (ref 0–6.9)
ERYTHROCYTE [DISTWIDTH] IN BLOOD BY AUTOMATED COUNT: 58.4 FL (ref 35.9–50)
GLUCOSE SERPL-MCNC: 98 MG/DL (ref 65–99)
HCT VFR BLD AUTO: 31.6 % (ref 42–52)
HGB BLD-MCNC: 9.8 G/DL (ref 14–18)
IMM GRANULOCYTES # BLD AUTO: 0.08 K/UL (ref 0–0.11)
IMM GRANULOCYTES NFR BLD AUTO: 0.6 % (ref 0–0.9)
LYMPHOCYTES # BLD AUTO: 2.4 K/UL (ref 1–4.8)
LYMPHOCYTES NFR BLD: 18.6 % (ref 22–41)
MAGNESIUM SERPL-MCNC: 2 MG/DL (ref 1.5–2.5)
MCH RBC QN AUTO: 27.3 PG (ref 27–33)
MCHC RBC AUTO-ENTMCNC: 31 G/DL (ref 33.7–35.3)
MCV RBC AUTO: 88 FL (ref 81.4–97.8)
MONOCYTES # BLD AUTO: 0.82 K/UL (ref 0–0.85)
MONOCYTES NFR BLD AUTO: 6.4 % (ref 0–13.4)
NEUTROPHILS # BLD AUTO: 9.32 K/UL (ref 1.82–7.42)
NEUTROPHILS NFR BLD: 72.5 % (ref 44–72)
NRBC # BLD AUTO: 0 K/UL
NRBC BLD-RTO: 0 /100 WBC
PHOSPHATE SERPL-MCNC: 2.9 MG/DL (ref 2.5–4.5)
PLATELET # BLD AUTO: 587 K/UL (ref 164–446)
PMV BLD AUTO: 9.3 FL (ref 9–12.9)
POTASSIUM SERPL-SCNC: 3.6 MMOL/L (ref 3.6–5.5)
RBC # BLD AUTO: 3.59 M/UL (ref 4.7–6.1)
SODIUM SERPL-SCNC: 137 MMOL/L (ref 135–145)
WBC # BLD AUTO: 12.9 K/UL (ref 4.8–10.8)

## 2018-07-19 PROCEDURE — 99233 SBSQ HOSP IP/OBS HIGH 50: CPT | Performed by: INTERNAL MEDICINE

## 2018-07-19 PROCEDURE — 700102 HCHG RX REV CODE 250 W/ 637 OVERRIDE(OP): Performed by: STUDENT IN AN ORGANIZED HEALTH CARE EDUCATION/TRAINING PROGRAM

## 2018-07-19 PROCEDURE — A9270 NON-COVERED ITEM OR SERVICE: HCPCS | Performed by: HOSPITALIST

## 2018-07-19 PROCEDURE — 700111 HCHG RX REV CODE 636 W/ 250 OVERRIDE (IP): Performed by: INTERNAL MEDICINE

## 2018-07-19 PROCEDURE — 700102 HCHG RX REV CODE 250 W/ 637 OVERRIDE(OP): Performed by: HOSPITALIST

## 2018-07-19 PROCEDURE — 700105 HCHG RX REV CODE 258

## 2018-07-19 PROCEDURE — 99232 SBSQ HOSP IP/OBS MODERATE 35: CPT | Performed by: INTERNAL MEDICINE

## 2018-07-19 PROCEDURE — A9270 NON-COVERED ITEM OR SERVICE: HCPCS | Performed by: STUDENT IN AN ORGANIZED HEALTH CARE EDUCATION/TRAINING PROGRAM

## 2018-07-19 PROCEDURE — 700102 HCHG RX REV CODE 250 W/ 637 OVERRIDE(OP): Performed by: INTERNAL MEDICINE

## 2018-07-19 PROCEDURE — 85025 COMPLETE CBC W/AUTO DIFF WBC: CPT

## 2018-07-19 PROCEDURE — 71045 X-RAY EXAM CHEST 1 VIEW: CPT

## 2018-07-19 PROCEDURE — A9270 NON-COVERED ITEM OR SERVICE: HCPCS | Performed by: INTERNAL MEDICINE

## 2018-07-19 PROCEDURE — 83735 ASSAY OF MAGNESIUM: CPT

## 2018-07-19 PROCEDURE — 94668 MNPJ CHEST WALL SBSQ: CPT

## 2018-07-19 PROCEDURE — 770006 HCHG ROOM/CARE - MED/SURG/GYN SEMI*

## 2018-07-19 PROCEDURE — 94760 N-INVAS EAR/PLS OXIMETRY 1: CPT

## 2018-07-19 PROCEDURE — 84100 ASSAY OF PHOSPHORUS: CPT

## 2018-07-19 PROCEDURE — 700105 HCHG RX REV CODE 258: Performed by: INTERNAL MEDICINE

## 2018-07-19 PROCEDURE — 80048 BASIC METABOLIC PNL TOTAL CA: CPT

## 2018-07-19 RX ORDER — SODIUM CHLORIDE 9 MG/ML
INJECTION, SOLUTION INTRAVENOUS
Status: COMPLETED
Start: 2018-07-19 | End: 2018-07-19

## 2018-07-19 RX ADMIN — NICOTINE 21 MG: 21 PATCH, EXTENDED RELEASE TRANSDERMAL at 04:52

## 2018-07-19 RX ADMIN — AMPICILLIN SODIUM AND SULBACTAM SODIUM 3 G: 2; 1 INJECTION, POWDER, FOR SOLUTION INTRAMUSCULAR; INTRAVENOUS at 13:33

## 2018-07-19 RX ADMIN — HYDROCORTISONE SODIUM SUCCINATE 50 MG: 100 INJECTION, POWDER, FOR SOLUTION INTRAMUSCULAR; INTRAVENOUS at 04:52

## 2018-07-19 RX ADMIN — POTASSIUM CHLORIDE 20 MEQ: 1500 TABLET, EXTENDED RELEASE ORAL at 04:53

## 2018-07-19 RX ADMIN — AMPICILLIN SODIUM AND SULBACTAM SODIUM 3 G: 2; 1 INJECTION, POWDER, FOR SOLUTION INTRAMUSCULAR; INTRAVENOUS at 17:32

## 2018-07-19 RX ADMIN — STANDARDIZED SENNA CONCENTRATE AND DOCUSATE SODIUM 2 TABLET: 8.6; 5 TABLET, FILM COATED ORAL at 04:53

## 2018-07-19 RX ADMIN — POTASSIUM CHLORIDE 20 MEQ: 1500 TABLET, EXTENDED RELEASE ORAL at 17:32

## 2018-07-19 RX ADMIN — AMPICILLIN SODIUM AND SULBACTAM SODIUM 3 G: 2; 1 INJECTION, POWDER, FOR SOLUTION INTRAMUSCULAR; INTRAVENOUS at 04:52

## 2018-07-19 RX ADMIN — AMPICILLIN SODIUM AND SULBACTAM SODIUM 3 G: 2; 1 INJECTION, POWDER, FOR SOLUTION INTRAMUSCULAR; INTRAVENOUS at 23:41

## 2018-07-19 RX ADMIN — ENOXAPARIN SODIUM 40 MG: 100 INJECTION SUBCUTANEOUS at 04:52

## 2018-07-19 RX ADMIN — SODIUM CHLORIDE 500 ML: 9 INJECTION, SOLUTION INTRAVENOUS at 13:34

## 2018-07-19 RX ADMIN — OXYCODONE HYDROCHLORIDE 5 MG: 5 TABLET ORAL at 04:12

## 2018-07-19 RX ADMIN — OXYCODONE HYDROCHLORIDE 5 MG: 5 TABLET ORAL at 21:29

## 2018-07-19 ASSESSMENT — ENCOUNTER SYMPTOMS
ABDOMINAL PAIN: 0
STRIDOR: 0
HEARTBURN: 0
DIZZINESS: 0
SENSORY CHANGE: 0
FEVER: 0
BRUISES/BLEEDS EASILY: 0
DOUBLE VISION: 0
MYALGIAS: 0
SPEECH CHANGE: 0
HEMOPTYSIS: 0
SPUTUM PRODUCTION: 1
COUGH: 1
CHILLS: 0
MYALGIAS: 1
HEADACHES: 0
VOMITING: 0
BLURRED VISION: 0
SHORTNESS OF BREATH: 0
WHEEZING: 0
SORE THROAT: 0
PALPITATIONS: 0
DEPRESSION: 0
NAUSEA: 0

## 2018-07-19 ASSESSMENT — PAIN SCALES - GENERAL
PAINLEVEL_OUTOF10: 5
PAINLEVEL_OUTOF10: 5

## 2018-07-19 ASSESSMENT — LIFESTYLE VARIABLES: EVER_SMOKED: YES

## 2018-07-19 NOTE — PROGRESS NOTES
Patient transferred to room 431-1 from PICU. VSS. Both chest tubes are covered and connected to continuous 40 suction. Call bell in reach. Denies pain. A&Ox4.

## 2018-07-19 NOTE — NON-PROVIDER
Internal Medicine Medical Student Note  Note Author: León Angeles, Student    Name Donna Ceballos 1960   Age/Sex 58 y.o. male   MRN [id number]   Code Status FULL             Reason for interval visit  (Principal Problem)   Empyema (HCC)    Interval Problem Daily Status Update  (problem status, last 24 hours, new history, new data )     Patient is transferred from the ICU to medical floor s/p decortication of empyema and pulmonary abscess. 2 chest tubes are in places to continue drainage and suction of the surgical site. Patient continued to have productive cough of yellow/white sputum. Leukocytosis continue to trend down and left lower extremity edema is still present. He is feeling a lot better today and he is able to sit up in bed and walk to and from the bathroom.     Review of Systems   Constitutional: Negative for chills and fever.   HENT: Negative for hearing loss and sore throat.    Eyes: Negative for blurred vision and double vision.   Respiratory: Positive for cough and sputum production. Negative for shortness of breath.    Cardiovascular: Negative for chest pain and palpitations.   Gastrointestinal: Negative for abdominal pain, heartburn, nausea and vomiting.   Genitourinary: Negative for dysuria.   Skin: Negative.    Neurological: Negative for dizziness and headaches.   Endo/Heme/Allergies: Does not bruise/bleed easily.   Psychiatric/Behavioral: Negative for depression and suicidal ideas.           Physical Exam       Vitals:    18 0755 18 1102 18 1159 18 1511   BP: 114/76  100/53    Pulse: 82 74 73 71   Resp:    Temp: 36.4 °C (97.6 °F)  36.7 °C (98.1 °F)    SpO2: 90% 92% 95% 94%   Weight:       Height:         Body mass index is 24.21 kg/m².    Oxygen Therapy:  Pulse Oximetry: 94 %, O2 (LPM): 3, O2 Delivery: Silicone Nasal Cannula    Physical Exam   Constitutional: He is oriented to person, place, and time and well-developed, well-nourished, and in  no distress.   HENT:   Head: Normocephalic and atraumatic.   Eyes: Conjunctivae are normal. Pupils are equal, round, and reactive to light.   Cardiovascular: Normal rate, regular rhythm and normal heart sounds.    Pulmonary/Chest: Effort normal. He has wheezes. He has rales.   Lung sounds were loud wheezing with crackles and wind like characteristics.    Musculoskeletal: He exhibits edema.   Left lower extremity 2+ pitting edema.    Neurological: He is alert and oriented to person, place, and time.   Psychiatric: Affect normal.             Assessment/Plan     Empyema  - Patient initially presented with cough, greenish bloody sputum, dyspnea and shortness of breath. He also had leukocytosis and lactic acidosis upon admission. Imaging showed large empyema with cavitary lesion/masses in the right upper lobe, pleural effusion and empyema was also described in the left lung. Status post left thoracotomy, decortication, evacuation of purulent empyema, debridement of necrotic lung, and rib resection by surgery with Dr. Diego Martínez. Patient still has two chest tubes and pleural fluid culture showed beta strep group F.  - He will continue to have the chest tube for adequate drainage of lung fluids.   - Unasyn will also run its full course and then Augmentin win be started thereafter.     Pneumothorax  - Pneumothorax present s/p chest tube insertion.   - Will monitor the size of the pneumothorax to be managed further by surgery    Tobacco use  - Patient is a long time smoker  - Consider discussing tobacco cessation upon discharge

## 2018-07-19 NOTE — CARE PLAN
Problem: Knowledge Deficit  Goal: Knowledge of disease process/condition, treatment plan, diagnostic tests, and medications will improve    Intervention: Assess knowledge level of disease process/condition, treatment plan, diagnostic tests, and medications  Discussed POC with pt and MD,  Encouraged pt to continue asking questions as needed      Problem: Respiratory:  Goal: Respiratory status will improve    Intervention: Administer and titrate oxygen therapy  Encouraging pulmonary hygiene, TCDB, ambulation etc

## 2018-07-19 NOTE — PROGRESS NOTES
Infectious Disease Progress Note    Author: Nikki Johnston M.D. Date & Time of service: 2018  12:21 PM    Chief Complaint:  Empyema     Interval History:  18- Tmax 99.3 WBC 10.9 creatinine 0.34 patient continues to remain significantly debilitated  2018 T-max 98.9 feels much better.  WBC 14.2 platelets 512 creatinine 0.34  2018 T-max 98.8.  Feels better.  WBC 11.3 platelets 520 sed rate 62 CRP 3   AF WBC 12.6 transferred out of ICU.  Feels better overall-IS about 1200 right now  Labs Reviewed, Medications Reviewed, Radiology Reviewed and Wound Reviewed.    Review of Systems:  Review of Systems   Constitutional: Positive for malaise/fatigue. Negative for fever.   HENT: Negative for hearing loss.    Respiratory: Positive for cough.         Chest pain improved   Cardiovascular: Negative for chest pain and leg swelling.   Gastrointestinal: Negative for abdominal pain, nausea and vomiting.   Genitourinary: Negative for dysuria.   Musculoskeletal: Positive for myalgias.   Neurological: Negative for sensory change and speech change.       Hemodynamics:  Temp (24hrs), Av.7 °C (98 °F), Min:36.3 °C (97.4 °F), Max:36.9 °C (98.5 °F)  Temperature: 36.7 °C (98.1 °F)  Pulse  Av  Min: 50  Max: 111Heart Rate (Monitored): 85  Blood Pressure: 100/53, NIBP: 104/61       Physical Exam:  Physical Exam   Constitutional: He is oriented to person, place, and time. No distress.   Thin male looking chronically ill   HENT:   Head: Normocephalic.   Poor dentition   Eyes: EOM are normal. Pupils are equal, round, and reactive to light.   Neck: Neck supple.   Cardiovascular: Normal rate.    Pulmonary/Chest: Effort normal. No respiratory distress. He has no wheezes.   Chest tubes X2 on left side; decreased BS   Abdominal: Soft. He exhibits no distension.   Musculoskeletal: He exhibits no edema.   Neurological: He is alert and oriented to person, place, and time.   Skin: No rash noted. He is not diaphoretic.    Nursing note and vitals reviewed.      Meds:    Current Facility-Administered Medications:   •  potassium chloride SA  •  ampicillin-sulbactam (UNASYN) IV  •  [START ON 7/24/2018] amoxicillin-clavulanate  •  hydrocortisone sodium succinate PF  •  enoxaparin (LOVENOX) injection  •  oxyCODONE immediate-release **OR** oxyCODONE immediate-release  •  Respiratory Care per Protocol  •  senna-docusate **AND** polyethylene glycol/lytes **AND** magnesium hydroxide **AND** bisacodyl  •  MD ALERT...Adult ICU Electrolyte Replacement per Pharmacy Protocol  •  ipratropium-albuterol  •  NS  •  ondansetron  •  ondansetron  •  promethazine  •  promethazine  •  prochlorperazine  •  acetaminophen  •  nicotine **AND** Nicotine Replacement Patient Education Materials **AND** nicotine polacrilex    Labs:  Recent Labs      07/17/18 0420 07/18/18 0424  07/19/18   0350   WBC  14.2*  11.3*  12.9*   RBC  3.20*  3.27*  3.59*   HEMOGLOBIN  8.9*  9.0*  9.8*   HEMATOCRIT  28.5*  28.7*  31.6*   MCV  89.1  87.8  88.0   MCH  27.8  27.5  27.3   RDW  59.1*  56.8*  58.4*   PLATELETCT  512*  523*  587*   MPV  9.3  9.8  9.3   NEUTSPOLYS  74.40*  75.10*  72.50*   LYMPHOCYTES  18.50*  17.00*  18.60*   MONOCYTES  5.00  5.60  6.40   EOSINOPHILS  0.80  1.10  1.60   BASOPHILS  0.10  0.20  0.30     Recent Labs      07/17/18 0420 07/18/18   0424  07/19/18   0350   SODIUM  141  138  137   POTASSIUM  3.4*  3.6  3.6   CHLORIDE  104  100  100   CO2  33  34*  34*   GLUCOSE  78  87  98   BUN  13  10  9     Recent Labs      07/17/18 0420  07/18/18   0424  07/19/18   0350   CREATININE  0.34*  0.33*  0.36*       Imaging:  Dx-chest-portable (1 View)    Result Date: 7/16/2018 7/16/2018 4:16 AM HISTORY/REASON FOR EXAM:  For indication of respiratory failure TECHNIQUE/EXAM DESCRIPTION AND NUMBER OF VIEWS: Single portable view of the chest. COMPARISON: Yesterday FINDINGS: Hardware is stably positioned in its visualized extent. HEART: Stable size. LUNGS: BILATERAL  pulmonary opacities are unchanged. PLEURA: Small LEFT pneumothorax is unchanged.     1.  Unchanged small LEFT pneumothorax with chest tubes in place 2.  Unchanged BILATERAL cavitary airspace disease    Dx-chest-portable (1 View)    Result Date: 7/15/2018    7/15/2018 4:29 AM HISTORY/REASON FOR EXAM: For indication of respiratory failure Line placement TECHNIQUE/EXAM DESCRIPTION:  Single AP view of the chest. COMPARISON: Yesterday FINDINGS: Position of medical devices appears stable. The cardiac silhouette appears within normal limits. The mediastinal contour appears within normal limits.  The central pulmonary vasculature appears normal. Hazy bilateral pulmonary opacities are seen. Residual left pneumothorax is seen similar to prior study. No significant pleural effusions are identified. The bony structures appear age-appropriate.  Soft tissue gas in the left chest wall is seen.     1.  Stable pulmonary infiltrates and/or edema with probable component of chronic underlying lung disease. 2.  Left pneumothorax, stable since prior with 2 thoracostomy tubes in place.     Dx-chest-portable (1 View)    Result Date: 7/14/2018 7/14/2018 5:13 AM HISTORY/REASON FOR EXAM: For indication of respiratory failure Line placement TECHNIQUE/EXAM DESCRIPTION:  Single AP view of the chest. COMPARISON: Yesterday FINDINGS: Position of medical devices appears stable. The cardiac silhouette appears within normal limits. The mediastinal contour appears within normal limits.  The central pulmonary vasculature appears normal. Increased opacification throughout the right hemithorax is seen. There is decreased opacification within the left hemithorax. Residual left pneumothorax is seen similar to prior study. No significant pleural effusions are identified. The bony structures appear age-appropriate.  Soft tissue gas in the left chest wall is seen.     1.  Increased opacification of the right hemithorax, compatible with new large pleural  effusion and/or infiltrates and/or atelectasis. 2.  Left pneumothorax, stable since prior with 2 thoracostomy tubes in place.    Dx-chest-portable (1 View)    Result Date: 7/13/2018 7/13/2018 7:21 AM HISTORY/REASON FOR EXAM:  Respiratory failure. TECHNIQUE/EXAM DESCRIPTION AND NUMBER OF VIEWS: Single portable view of the chest. COMPARISON: 7/12/2018 FINDINGS: LUNGS: Multifocal patchy consolidations are reidentified. Stable cavity in the right lung apex, with improving opacities in the right perihilar and infrahilar regions. PNEUMOTHORAX: Stable left pneumothorax and subcutaneous emphysema of the left chest wall. LINES AND TUBES: Stable well-positioned ETT. Stable right IJ central catheter, tip in the superior cavoatrial junction. Stable left chest tubes. MEDIASTINUM: No cardiomegaly. MUSCULOSKELETAL STRUCTURES: No acute fracture. Skin staples in the left lateral chest wall.     1. Improving opacities in the right perihilar and infrahilar regions. Otherwise stable multifocal patchy consolidations. 2. Stable lines and tubes. 3. Stable left pneumothorax subcutaneous emphysema of the left chest wall.    Dx-chest-portable (1 View)    Result Date: 7/12/2018 7/12/2018 12:11 PM HISTORY/REASON FOR EXAM:  POST INTUBATION. TECHNIQUE/EXAM DESCRIPTION AND NUMBER OF VIEWS: Single portable view of the chest. COMPARISON: 7/12/2018 FINDINGS: Interval improvement of RIGHT pleural fluid collection and increased aeration of RIGHT lower lung. Patchy consolidation again seen in the RIGHT upper lung. Focal nodular densities are seen in the LEFT lung as well as consolidation at the mid lung level. Small LEFT pneumothorax is unchanged, with multiple LEFT chest tubes present. Increasing LEFT chest wall emphysema. Other supportive tubing is unchanged.     1.  Interval improved aeration of RIGHT lung base with improvement of RIGHT pleural effusion. 2.  Stable LEFT pneumothorax with chest tubes present. 3.  Patchy consolidation again seen in  both lungs with basilar densities in the LEFT upper lung again noted. 4.  Increasing LEFT chest wall emphysema.    Dx-chest-portable (1 View)    Result Date: 7/12/2018 7/12/2018 3:16 AM HISTORY/REASON FOR EXAM: Line placement TECHNIQUE/EXAM DESCRIPTION:  Single AP view of the chest. COMPARISON: July 10, 2018 FINDINGS: Right internal jugular central line is seen terminating at the right atriocaval junction.  To left thoracostomy tube is in place terminating at the left apex and left lung base. The cardiac silhouette appears within normal limits. The mediastinal contour appears within normal limits.  The central pulmonary vasculature appears normal. The lungs appear well expanded bilaterally.  Increased opacification throughout the right hemithorax is seen. There is decreased opacification within the left hemithorax. Residual left pneumothorax is seen similar to prior study. No significant pleural effusions are identified. The bony structures appear age-appropriate.  Soft tissue gas in the left chest wall is seen.     1.  Increased opacification of the right hemithorax, compatible with new large pleural effusion and/or infiltrates and/or atelectasis. 2.  Improved aeration of the left hemithorax status post thoracostomy tube placement. Residual infiltrates in the left lung are seen. 3.  Left pneumothorax, stable since prior with 2 thoracostomy tubes in place.    Dx-chest-portable (1 View)    Result Date: 7/10/2018  7/10/2018 6:14 PM HISTORY/REASON FOR EXAM:  Post thoracentesis left chest. TECHNIQUE/EXAM DESCRIPTION AND NUMBER OF VIEWS: Single portable view of the chest. COMPARISON: 7/10/2018 FINDINGS: The mediastinal and cardiac silhouette is partially obscured by the left-sided parenchymal opacity and pleural fluid The right pulmonary vascularity is within normal limits. Multiple lung masses are seen on the left. There is an airspace process in the left mid and lower hemithorax. There is ill-defined nodular  parenchymal opacity in the right upper lobe with some hilar retraction. There is no significant pleural effusion. There was a previous hydropneumothorax seen on the outside prior chest x-ray. Pneumothorax is still seen on the lateral aspect of the left chip-thorax. There are no acute bony abnormalities.     1.  There has been interval decrease in the left pleural effusion. 2.  There is still some component of LEFT pneumothorax although the hydropneumothorax air-fluid level is no longer evident. 3.  Multiple left bone or masses are again seen. 4.  Left lower lobe airspace disease is again seen. 5.  Ill-defined nodular and linear opacity in the right upper lobe with pleural thickening and hilar retraction is again seen.    Us-thoracentesis Puncture Left    Result Date: 7/10/2018  7/10/2018 4:01 PM HISTORY/REASON FOR EXAM: Pleural effusion TECHNIQUE/EXAM DESCRIPTION: Ultrasound-guided thoracentesis. Indication:  LEFT pleural fluid collection. COMPARISON:  None PROCEDURE:     Informed consent was obtained. A timeout was taken. A left pleural effusion was localized with real-time ultrasound guidance. The left posterior chest wall was prepped and draped in a sterile manner. Dr. Fine performed the procedure  with my guidance.  Following local anesthesia with 1% lidocaine, a 5 Wolof Yueh pigtail catheter was advanced into the pleural space with trocar technique and pleural fluid was drained. The patient tolerated the procedure well without evidence of complication. A post thoracentesis chest radiograph is forthcoming. FINDINGS: Highly complicated left large volume pleural fluid has extensive internal debris Fluid was  sent to the laboratory. Fluid character: purulent     1. Ultrasound guided left sided diagnostic thoracentesis. 2. 500 mL of fluid withdrawn. Additional fluid was not removed as the catheter became occluded by the thick liquid    Dx-abdomen For Tube Placement    Result Date: 7/14/2018 7/14/2018 12:59  "PM HISTORY/REASON FOR EXAM:  Cortrak evaluation. TECHNIQUE/EXAM DESCRIPTION AND NUMBER OF VIEWS:  1 view(s) of the abdomen. COMPARISON:  None. FINDINGS: Cortrak feeding tube tip projects in the region of the gastric fundus. The tube coils back upon itself from the mid stomach. The thoracic course appears appropriate. The bowel gas pattern is within normal limits.     Cortrak feeding tube tip projects in the region of the gastric fundus.      Micro:  Results     Procedure Component Value Units Date/Time    BLOOD CULTURE [[id number]] Collected:  07/12/18 1046    Order Status:  Completed Specimen:  Blood from Peripheral Updated:  07/17/18 1300     Significant Indicator NEG     Source BLD     Site PERIPHERAL     Blood Culture No growth after 5 days of incubation.    Narrative:       [id number] MEGAN HERNANDEZ  Per Hospital Policy: Only change Specimen Src: to \"Line\" if  specified by physician order.    BLOOD CULTURE [[id number]] Collected:  07/12/18 1046    Order Status:  Completed Specimen:  Blood from Peripheral Updated:  07/17/18 1300     Significant Indicator NEG     Source BLD     Site PERIPHERAL     Blood Culture No growth after 5 days of incubation.    Narrative:       [id number] MEGAN HERNANDEZ  Per Hospital Policy: Only change Specimen Src: to \"Line\" if  specified by physician order.    CULTURE TISSUE W/ GRM STAIN [[id number]]  (Abnormal) Collected:  07/11/18 2033    Order Status:  Completed Specimen:  Tissue Updated:  07/14/18 1316     Significant Indicator POS (POS)     Source TISS     Site Left Lung Tissue     Tissue Culture Growth noted after further incubation, see below for  organism identification.   (A)     Gram Stain Result Few WBCs.  No organisms seen.       Tissue Culture Beta Streptococcus Group F  Light growth   (A)    ANAEROBIC CULTURE [[id number]] Collected:  07/11/18 2033    Order Status:  Completed Specimen:  Tissue Updated:  07/14/18 1316     Significant Indicator NEG     Source TISS     " Site Left Lung Tissue     Anaerobic Culture, Culture Res No Anaerobes isolated.    AFB CULTURE [[id number]] Collected:  07/11/18 2033    Order Status:  Completed Specimen:  Tissue Updated:  07/14/18 1316     Significant Indicator NEG     Source TISS     Site Left Lung Tissue     AFB Culture Culture in progress.     AFB Smear Results No acid fast bacilli seen.    FUNGAL CULTURE [[id number]] Collected:  07/11/18 2033    Order Status:  Completed Specimen:  Tissue Updated:  07/14/18 1316     Significant Indicator NEG     Source TISS     Site Left Lung Tissue     Fungal Culture Culture in progress.    AFB CULTURE [[id number]] Collected:  07/12/18 1130    Order Status:  Completed Specimen:  Respirate from Lung Updated:  07/14/18 1053     Significant Indicator NEG     Source RESP     Site Bronchoalveolar Lavage RLL     AFB Culture Culture in progress.     AFB Smear Results No acid fast bacilli seen.    Narrative:       [id number] [employer]  3rd specimin  Which Lobe (Bronch Only):->RLL    CULTURE RESPIRATORY W/ GRM STN [[id number]]  (Abnormal) Collected:  07/12/18 1130    Order Status:  Completed Specimen:  Respirate Updated:  07/14/18 1053     Gram Stain Result Rare WBCs.  No organisms seen.       Significant Indicator POS (POS)     Source RESP     Site Bronchoalveolar Lavage RLL     Respiratory Culture -- (A)      Beta Streptococcus Group F  Moderate growth   (A)    Narrative:       [id number] MENDEZKuaiyongY  3rd specimin  Which Lobe (Bronch Only):->RLL    FUNGAL CULTURE [[id number]] Collected:  07/12/18 1130    Order Status:  Completed Specimen:  Respirate Updated:  07/14/18 1053     Significant Indicator NEG     Source RESP     Site Bronchoalveolar Lavage RLL     Fungal Culture Culture in progress.    Narrative:       [id number] [employer]  3rd specimin  Which Lobe (Bronch Only):->RLL    GRAM STAIN [[id number]] Collected:  07/12/18 1130    Order Status:  Completed Specimen:  Respirate Updated:  07/12/18 9502      Significant Indicator .     Source RESP     Site Bronchoalveolar Lavage RLL     Gram Stain Result Rare WBCs.  No organisms seen.      Narrative:       [id number] MENDEZ JOHN  3rd specimin  Which Lobe (Bronch Only):->RLL    ACID FAST STAIN [[id number]] Collected:  07/12/18 1130    Order Status:  Completed Specimen:  Respirate Updated:  07/12/18 2342     Significant Indicator NEG     Source RESP     Site Bronchoalveolar Lavage RLL     AFB Smear Results No acid fast bacilli seen.    Narrative:       [id number] MENDEZ JOHN  3rd specimin  Which Lobe (Bronch Only):->RLL    GRAM STAIN [[id number]] Collected:  07/11/18 2033    Order Status:  Completed Specimen:  Tissue Updated:  07/12/18 2336     Significant Indicator .     Source TISS     Site Left Lung Tissue     Gram Stain Result Few WBCs.  No organisms seen.      ACID FAST STAIN [[id number]] Collected:  07/11/18 2033    Order Status:  Completed Specimen:  Tissue Updated:  07/12/18 2336     Significant Indicator NEG     Source TISS     Site Left Lung Tissue     AFB Smear Results No acid fast bacilli seen.    ACID FAST STAIN [[id number]] Collected:  07/11/18 1848    Order Status:  Completed Specimen:  Respirate Updated:  07/12/18 2335     Significant Indicator NEG     Source RESP     Site SPUTUM     AFB Smear Results No acid fast bacilli seen.    Narrative:       [id number] NEYMAR OATES R.    AFB CULTURE [[id number]] Collected:  07/11/18 1848    Order Status:  Completed Specimen:  Respirate from Sputum Updated:  07/12/18 2334     Significant Indicator NEG     Source RESP     Site SPUTUM     AFB Culture Culture in progress.     AFB Smear Results No acid fast bacilli seen.    Narrative:       [id number] NEYMAR OATES R.    ACID FAST STAIN [[id number]] Collected:  07/11/18 1100    Order Status:  Completed Specimen:  Respirate Updated:  07/12/18 1427     Significant Indicator NEG     Source RESP     Site Sputum (coughed)     AFB Smear Results No acid fast bacilli seen.     Narrative:       [id number] NEYMAR LASHON R.    AFB CULTURE [[id number]] Collected:  07/11/18 1100    Order Status:  Completed Specimen:  Respirate from Bile Fluid Updated:  07/12/18 1427     Significant Indicator NEG     Source RESP     Site Sputum (coughed)     AFB Culture Culture in progress.     AFB Smear Results No acid fast bacilli seen.    Narrative:       [id number] NEYMAR OATES R.    ACID FAST STAIN [[id number]] Collected:  07/10/18 1635    Order Status:  Completed Specimen:  Body Fluid Updated:  07/12/18 1327     Significant Indicator NEG     Source BF     Site THORACENTESIS FLUID     AFB Smear Results No acid fast bacilli seen.    Narrative:       CALL  [place]  183 tel. [phone],  CALLED  183 tel. [phone] 07/11/2018, 12:41, RB PERF. RESULTS CALLED TO:  RN [id number]  src:pleural effusion    GRAM STAIN [[id number]]  (Abnormal) Collected:  07/10/18 1635    Order Status:  Completed Specimen:  Body Fluid Updated:  07/12/18 1327     Significant Indicator . (POS)     Source BF     Site THORACENTESIS FLUID     Gram Stain Result Many WBCs.  Many Gram positive cocci.   (A)    Narrative:       CALL  [place]  183 tel. [phone],  CALLED  183 tel. [phone] 07/11/2018, 12:41, RB PERF. RESULTS CALLED TO:  RN [id number]  src:pleural effusion    FLUID CULTURE W/GRAM STAIN [[id number]]  (Abnormal) Collected:  07/10/18 1635    Order Status:  Completed Specimen:  Body Fluid from Thoracentesis Fluid Updated:  07/12/18 1327     Significant Indicator POS (POS)     Source BF     Site THORACENTESIS FLUID     Culture Result Bdf -- (A)     Gram Stain Result Many WBCs.  Many Gram positive cocci.   (A)     Culture Result Bdf Beta Streptococcus Group F  Moderate growth   (A)    Narrative:       CALL  [place]  183 tel. [phone],  CALLED  183 tel. [phone] 07/11/2018, 12:41, RB PERF. RESULTS CALLED TO:  RN [id number]  src:pleural effusion    AFB CULTURE [[id number]] Collected:  07/10/18 1635    Order Status:  Completed Specimen:  Body Fluid from  Thoracentesis Fluid Updated:  07/12/18 1327     Significant Indicator NEG     Source BF     Site THORACENTESIS FLUID     AFB Culture Culture in progress.     AFB Smear Results No acid fast bacilli seen.    Narrative:       CALL  [place]  183 tel. [phone],  CALLED  183 tel. [phone] 07/11/2018, 12:41, RB PERF. RESULTS CALLED TO:  ROLANDO [id number]  src:pleural effusion    FUNGAL CULTURE [[id number]] Collected:  07/10/18 1635    Order Status:  Completed Specimen:  Body Fluid from Thoracentesis Fluid Updated:  07/12/18 1327     Significant Indicator NEG     Source BF     Site THORACENTESIS FLUID     Fungal Culture Culture in progress.    Narrative:       CALL  [place]  183 tel. [phone],  CALLED  183 tel. [phone] 07/11/2018, 12:41, RB PERF. RESULTS CALLED TO:  ROLANDO [id number]  src:pleural effusion          Assessment:  Active Hospital Problems    Diagnosis   • *Empyema (HCC) [J86.9]   • Acute hypoxemic respiratory failure (HCC) [J96.01]   • Panlobular emphysema (HCC) [J43.1]   • Pulmonary parenchymal mass [R91.8]   • Pulmonary cachexia due to COPD (HCC) [J44.9, R64]   • Protein malnutrition (HCC) [E46]   • Leukocytosis [D72.829]   • Tobacco abuse [Z72.0]       Plan:  Empyema  Afebrile  +leukocytosis  s/p thoracentesis on 7/10/2018  s/p left thoracotomy decortication and evacuation of purulent empyema and decortication of lung and rib resection on 7/11/2018  The cultures + group F strep  Continue Unasyn  Continue for 2 weeks of IV antibiotics followed by at least 4 weeks of oral Augmentin  Stop date IV abx 7/24    Leukocytosis  Multifacorial  monitor  IS every hour while awake

## 2018-07-19 NOTE — PROGRESS NOTES
Pulmonary Progress note  Note Author: Erasmo Cueva M.D.     Name Donna Ceballos 1960   Age/Sex 58 y.o. male   MRN [id number]     Reason for interval visit  (Principal Problem)   Empyema (HCC)    ID:  57 yo male with no known past medical history, presented from outside hospital for dyspnea, productive cough, pleuritic chest pain.  Symptoms originally started in March, but then got bet, now worse.  CT showed right lung pneumonia with abscess and left lung empyema and multilouclated abscesses.  He is S/P left lung decortication with two chest tubes placed.  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    Interval Problem Daily Status Update  (24 hours)   18:  Patient feeling well.  Occasional productive cough.  He is afebrile, WBC 12.9.  On exam auscultation at chest tube site reveals air flow from fistula.    Review of Systems   Constitutional: Negative for chills and fever.   Respiratory: Positive for cough and sputum production. Negative for hemoptysis, shortness of breath and wheezing.      Quality Measures  Quality-Core Measures   Reviewed items::  Labs reviewed, Medications reviewed and Radiology images reviewed  DVT prophylaxis pharmacological::  Enoxaparin (Lovenox)  Antibiotics:  Treating active infection/contamination beyond 24 hours perioperative coverage      Physical Exam       Vitals:    18 0339 18 0755 18 1102 18 1159   BP: 106/64 114/76  100/53   Pulse: 77 82 74 73   Resp:    Temp: 36.3 °C (97.4 °F) 36.4 °C (97.6 °F)  36.7 °C (98.1 °F)   SpO2: 92% 90% 92% 95%   Weight:       Height:         Body mass index is 24.21 kg/m².    Oxygen Therapy:  Pulse Oximetry: 95 %, O2 (LPM): 4, O2 Delivery: Silicone Nasal Cannula    Physical Exam   Constitutional: He is well-developed, well-nourished, and in no distress. No distress.   HENT:   Head: Normocephalic and atraumatic.   Pulmonary/Chest: Effort normal. No respiratory distress. He has wheezes.   Coarse  breath sounds with expiratory wheezing diffusely. At left lung, at site of chest tube placement, high pitched air passage heard.   Musculoskeletal: He exhibits edema.   Skin: He is not diaphoretic.     Lab Data Review:     7/19/2018  1:24 PM    Recent Labs      07/17/18 0420 07/18/18   0424  07/19/18   0350   SODIUM  141  138  137   POTASSIUM  3.4*  3.6  3.6   CHLORIDE  104  100  100   CO2  33  34*  34*   BUN  13  10  9   CREATININE  0.34*  0.33*  0.36*   MAGNESIUM  1.8  1.9  2.0   PHOSPHORUS  3.6  3.1  2.9   CALCIUM  7.7*  7.5*  7.8*       Recent Labs      07/17/18 0420 07/18/18   0424  07/19/18   0350   GLUCOSE  78  87  98       Recent Labs      07/17/18 0420 07/18/18   0424  07/19/18   0350   RBC  3.20*  3.27*  3.59*   HEMOGLOBIN  8.9*  9.0*  9.8*   HEMATOCRIT  28.5*  28.7*  31.6*   PLATELETCT  512*  523*  587*       Recent Labs      07/17/18 0420 07/18/18 0424  07/19/18   0350   WBC  14.2*  11.3*  12.9*   NEUTSPOLYS  74.40*  75.10*  72.50*   LYMPHOCYTES  18.50*  17.00*  18.60*   MONOCYTES  5.00  5.60  6.40   EOSINOPHILS  0.80  1.10  1.60   BASOPHILS  0.10  0.20  0.30           Assessment/Plan     Principal Problem:    Empyema (HCC) POA: Yes      Overview: Cough, greenish and bloody sputum, dyspnea on exertion and orthopnea x 4       months.       Leukocytosis and lactic acidosis on admission.      CT chest: large empyema with cavitary lesion/masses in the right upper       lobe.      S/p Left thoracotomy, decortication, evacuation of purulent empyema,       debridement of necrotic lung, rib resection by surgery, Dr. Diego Martínez       7/11/2018.      S/p Chest tube x 2.      Pleural fluid culture: Beta strep group F.   Active Problems:    Panlobular emphysema (HCC) POA: Yes    Acute hypoxemic respiratory failure (HCC) POA: Unknown    Pulmonary cachexia due to COPD (HCC) POA: Yes    Protein malnutrition (HCC) POA: Yes    Leukocytosis POA: Yes    Tobacco abuse POA: Yes  Resolved Problems:    Lactic  acidosis POA: Yes    Empyema:  -Patient likely had pneumonia 4 months prior that was untreated and then progressed to the left.   -S/P left lung decortication with chest tube x2  -Now has left lung trapped lung, pneumothorax, bronchopleural fistula  -Pleural fluid culture grew group F strep.  -Recommendations:  Concur with surgical management.  Concur with ID ABX.  Patient will likely require long term ABX.    Panlobular emphysema  -active smoker  -Recommendations:  Concur with tashia, RT protocol, oxygen, continual pusle ox.  Cessation counseling

## 2018-07-19 NOTE — CARE PLAN
Problem: Oxygenation:  Goal: Maintain adequate oxygenation dependent on patient condition  Outcome: PROGRESSING AS EXPECTED    Intervention: Manage oxygen therapy by monitoring pulse oximetry and/or ABG values  Patient on 3 LPM NC sating 93%; will continue to monitor and titrate as needed. Baseline is room air.      Problem: Bronchopulmonary Hygiene:  Goal: Increase mobilization of retained secretions  Outcome: PROGRESSING AS EXPECTED    Intervention: Perform bronchopulmonary therapy as indicated by assessment  Patient has left sided chest tube x2. Doing CPT via Vest and PEP QID, patient is tolerating vest machine well and doing therapy for 15-20 minutes. Pulling 1000 on IS with 60% of 1980. Patient very compliant and extremely involved in his therapies.

## 2018-07-19 NOTE — PROGRESS NOTES
"Assumed care at 0700. Received report from night shift RN. Bedside report completed. AOx4.    States pain tolerable at this time.  Denies nausea.  Tolerating diet. +voiding. +large BM today  IVF infusing tko.  2 Left Chest tubes in place to 40 suction  4L O2 92% saturation   Ambulating with standby assist. Pt call light and belongings within reach, fall precautions in place.       Blood pressure 114/76, pulse 74, temperature 36.4 °C (97.6 °F), resp. rate 18, height 1.829 m (6' 0.01\"), weight 81 kg (178 lb 9.2 oz), SpO2 92 %.      "

## 2018-07-19 NOTE — PROGRESS NOTES
Pt transported to [facility] with RN and transport tech. Bedside report given to Susan MARSHALL.

## 2018-07-19 NOTE — PROGRESS NOTES
Bedside report complete, assumed care of pt. Lines, drips, and drains verified. Pt & family educated on POC and goals. Pt communication board updated in room. 2 RN skin assessment complete.

## 2018-07-20 ENCOUNTER — APPOINTMENT (OUTPATIENT)
Dept: RADIOLOGY | Facility: MEDICAL CENTER | Age: 58
DRG: 853 | End: 2018-07-20
Attending: INTERNAL MEDICINE

## 2018-07-20 LAB
ANION GAP SERPL CALC-SCNC: 5 MMOL/L (ref 0–11.9)
BASOPHILS # BLD AUTO: 0.5 % (ref 0–1.8)
BASOPHILS # BLD: 0.06 K/UL (ref 0–0.12)
BUN SERPL-MCNC: 9 MG/DL (ref 8–22)
CALCIUM SERPL-MCNC: 8 MG/DL (ref 8.5–10.5)
CHLORIDE SERPL-SCNC: 100 MMOL/L (ref 96–112)
CO2 SERPL-SCNC: 31 MMOL/L (ref 20–33)
CREAT SERPL-MCNC: 0.38 MG/DL (ref 0.5–1.4)
EOSINOPHIL # BLD AUTO: 0.24 K/UL (ref 0–0.51)
EOSINOPHIL NFR BLD: 2.1 % (ref 0–6.9)
ERYTHROCYTE [DISTWIDTH] IN BLOOD BY AUTOMATED COUNT: 59.6 FL (ref 35.9–50)
GLUCOSE SERPL-MCNC: 96 MG/DL (ref 65–99)
HCT VFR BLD AUTO: 32.1 % (ref 42–52)
HGB BLD-MCNC: 9.8 G/DL (ref 14–18)
IMM GRANULOCYTES # BLD AUTO: 0.06 K/UL (ref 0–0.11)
IMM GRANULOCYTES NFR BLD AUTO: 0.5 % (ref 0–0.9)
LYMPHOCYTES # BLD AUTO: 2.37 K/UL (ref 1–4.8)
LYMPHOCYTES NFR BLD: 20.5 % (ref 22–41)
MAGNESIUM SERPL-MCNC: 1.8 MG/DL (ref 1.5–2.5)
MCH RBC QN AUTO: 27.2 PG (ref 27–33)
MCHC RBC AUTO-ENTMCNC: 30.5 G/DL (ref 33.7–35.3)
MCV RBC AUTO: 89.2 FL (ref 81.4–97.8)
MONOCYTES # BLD AUTO: 0.77 K/UL (ref 0–0.85)
MONOCYTES NFR BLD AUTO: 6.7 % (ref 0–13.4)
NEUTROPHILS # BLD AUTO: 8.04 K/UL (ref 1.82–7.42)
NEUTROPHILS NFR BLD: 69.7 % (ref 44–72)
NRBC # BLD AUTO: 0 K/UL
NRBC BLD-RTO: 0 /100 WBC
PHOSPHATE SERPL-MCNC: 2.8 MG/DL (ref 2.5–4.5)
PLATELET # BLD AUTO: 637 K/UL (ref 164–446)
PMV BLD AUTO: 9.4 FL (ref 9–12.9)
POTASSIUM SERPL-SCNC: 3.6 MMOL/L (ref 3.6–5.5)
RBC # BLD AUTO: 3.6 M/UL (ref 4.7–6.1)
SODIUM SERPL-SCNC: 136 MMOL/L (ref 135–145)
WBC # BLD AUTO: 11.5 K/UL (ref 4.8–10.8)

## 2018-07-20 PROCEDURE — 700105 HCHG RX REV CODE 258: Performed by: INTERNAL MEDICINE

## 2018-07-20 PROCEDURE — A9270 NON-COVERED ITEM OR SERVICE: HCPCS | Performed by: HOSPITALIST

## 2018-07-20 PROCEDURE — 700102 HCHG RX REV CODE 250 W/ 637 OVERRIDE(OP): Performed by: STUDENT IN AN ORGANIZED HEALTH CARE EDUCATION/TRAINING PROGRAM

## 2018-07-20 PROCEDURE — 94668 MNPJ CHEST WALL SBSQ: CPT

## 2018-07-20 PROCEDURE — 71045 X-RAY EXAM CHEST 1 VIEW: CPT

## 2018-07-20 PROCEDURE — 83735 ASSAY OF MAGNESIUM: CPT

## 2018-07-20 PROCEDURE — 80048 BASIC METABOLIC PNL TOTAL CA: CPT

## 2018-07-20 PROCEDURE — 84100 ASSAY OF PHOSPHORUS: CPT

## 2018-07-20 PROCEDURE — 85025 COMPLETE CBC W/AUTO DIFF WBC: CPT

## 2018-07-20 PROCEDURE — A9270 NON-COVERED ITEM OR SERVICE: HCPCS | Performed by: STUDENT IN AN ORGANIZED HEALTH CARE EDUCATION/TRAINING PROGRAM

## 2018-07-20 PROCEDURE — A9270 NON-COVERED ITEM OR SERVICE: HCPCS | Performed by: INTERNAL MEDICINE

## 2018-07-20 PROCEDURE — 770006 HCHG ROOM/CARE - MED/SURG/GYN SEMI*

## 2018-07-20 PROCEDURE — 700111 HCHG RX REV CODE 636 W/ 250 OVERRIDE (IP): Performed by: INTERNAL MEDICINE

## 2018-07-20 PROCEDURE — 99233 SBSQ HOSP IP/OBS HIGH 50: CPT | Performed by: INTERNAL MEDICINE

## 2018-07-20 PROCEDURE — 99232 SBSQ HOSP IP/OBS MODERATE 35: CPT | Performed by: INTERNAL MEDICINE

## 2018-07-20 PROCEDURE — 700102 HCHG RX REV CODE 250 W/ 637 OVERRIDE(OP): Performed by: HOSPITALIST

## 2018-07-20 PROCEDURE — 700102 HCHG RX REV CODE 250 W/ 637 OVERRIDE(OP): Performed by: INTERNAL MEDICINE

## 2018-07-20 RX ADMIN — NICOTINE 21 MG: 21 PATCH, EXTENDED RELEASE TRANSDERMAL at 05:26

## 2018-07-20 RX ADMIN — AMPICILLIN SODIUM AND SULBACTAM SODIUM 3 G: 2; 1 INJECTION, POWDER, FOR SOLUTION INTRAMUSCULAR; INTRAVENOUS at 13:12

## 2018-07-20 RX ADMIN — HYDROCORTISONE SODIUM SUCCINATE 50 MG: 100 INJECTION, POWDER, FOR SOLUTION INTRAMUSCULAR; INTRAVENOUS at 05:24

## 2018-07-20 RX ADMIN — ENOXAPARIN SODIUM 40 MG: 100 INJECTION SUBCUTANEOUS at 05:25

## 2018-07-20 RX ADMIN — AMPICILLIN SODIUM AND SULBACTAM SODIUM 3 G: 2; 1 INJECTION, POWDER, FOR SOLUTION INTRAMUSCULAR; INTRAVENOUS at 07:05

## 2018-07-20 RX ADMIN — OXYCODONE HYDROCHLORIDE 10 MG: 10 TABLET ORAL at 21:23

## 2018-07-20 RX ADMIN — STANDARDIZED SENNA CONCENTRATE AND DOCUSATE SODIUM 2 TABLET: 8.6; 5 TABLET, FILM COATED ORAL at 18:13

## 2018-07-20 RX ADMIN — OXYCODONE HYDROCHLORIDE 5 MG: 5 TABLET ORAL at 01:33

## 2018-07-20 RX ADMIN — AMPICILLIN SODIUM AND SULBACTAM SODIUM 3 G: 2; 1 INJECTION, POWDER, FOR SOLUTION INTRAMUSCULAR; INTRAVENOUS at 18:11

## 2018-07-20 RX ADMIN — POTASSIUM CHLORIDE 20 MEQ: 1500 TABLET, EXTENDED RELEASE ORAL at 05:26

## 2018-07-20 ASSESSMENT — ENCOUNTER SYMPTOMS
BRUISES/BLEEDS EASILY: 0
HEARTBURN: 0
ABDOMINAL PAIN: 0
CHILLS: 0
VOMITING: 0
STRIDOR: 0
NAUSEA: 0
HEADACHES: 0
SENSORY CHANGE: 0
SPUTUM PRODUCTION: 1
BLOOD IN STOOL: 0
MYALGIAS: 1
COUGH: 1
DIZZINESS: 0
SHORTNESS OF BREATH: 0
WHEEZING: 0
MYALGIAS: 0
DEPRESSION: 0
BLURRED VISION: 0
HEMOPTYSIS: 0
DOUBLE VISION: 0
PALPITATIONS: 0
SPEECH CHANGE: 0
FEVER: 0

## 2018-07-20 ASSESSMENT — PAIN SCALES - GENERAL
PAINLEVEL_OUTOF10: 5
PAINLEVEL_OUTOF10: 4
PAINLEVEL_OUTOF10: 7
PAINLEVEL_OUTOF10: 1

## 2018-07-20 NOTE — PROGRESS NOTES
Surgery     Awake alert  Continues to improve clinically     Air leak slowing down   Persistent pneumothorax     S/P decortication and debridement of necrotic pulmonary abscess     Continue CT decompression     Follow along     Diego Martínez MD

## 2018-07-20 NOTE — ASSESSMENT & PLAN NOTE
Secondary to chest tubes placement  Will continue to monitor  CT surgery monitoring, plans to d/c with one tube in place with f/u office removal of remaining tube.

## 2018-07-20 NOTE — PROGRESS NOTES
Received report and assumed care of patient at 1900. Patient is alert and oriented x4. Reporting left sided chest pain at the site of the chest tubes and incision, medicated with 5mg of oxycodone at bedtime per patient request. 2 chest tubes present with air leaks. Lung sounds coarse and diminished in bases. Congested, productive cough, patient self suctioning as needed, brownish thick output. Patient on 2L nasal cannula, denies shortness of breath, on continuous pulse oximeter. O2 saturation around 95%. Slight edema in BLE, elevated on a pillow. R IJ present with TKO and intermittent IV antibiotics. Discussed plan of care and encouraged patient to make his needs known overnight. Bed locked and in lowest position, treaded socks in place, call light within reach. Hourly rounding in place.

## 2018-07-20 NOTE — NON-PROVIDER
"       Internal Medicine Medical Student Note  Note Author: León Angeles, Student    Name Donna Ceballos 1960   Age/Sex 58 y.o. male   MRN [id number]   Code Status FULL             Reason for interval visit  (Principal Problem)   Empyema (HCC)    Interval Problem Daily Status Update  (problem status, last 24 hours, new history, new data )     Chest tube drained 315 mL in the last 24 hour in canister #2. Patient did not feel too good when he was \"shook\" per respiratory therapy yesterday. He does feel better today with continuous coughing productive of sputum. He did NOT have any episode of SOB, headaches, lightheadedness, or nausea and vomiting. Pneumothorax did not show expansion. Patient remains on anti-biotics, and chest tubes remains attached to patients.    Review of Systems   Constitutional: Negative for chills and fever.   HENT: Negative for hearing loss and tinnitus.    Eyes: Negative for blurred vision and double vision.   Respiratory: Positive for cough and sputum production. Negative for shortness of breath.    Cardiovascular: Negative for chest pain and palpitations.   Gastrointestinal: Negative for abdominal pain, blood in stool, heartburn, melena, nausea and vomiting.   Genitourinary: Negative for dysuria.   Musculoskeletal: Negative for myalgias.   Skin: Negative for rash.   Neurological: Negative for dizziness and headaches.   Endo/Heme/Allergies: Does not bruise/bleed easily.   Psychiatric/Behavioral: Negative for depression and suicidal ideas.         Physical Exam       Vitals:    18 1110 18 1127 18 1129 18 1311   BP:  (!) 84/48 (!) 92/58 106/64   Pulse: 84 78  74   Resp: 16 16     Temp:  36.7 °C (98 °F)     SpO2: 97% 98%     Weight:       Height:         Body mass index is 24.21 kg/m².    Oxygen Therapy:  Pulse Oximetry: 98 %, O2 (LPM): 3, O2 Delivery: Nasal Cannula    Physical Exam   Constitutional: He is oriented to person, place, and time and well-developed, " well-nourished, and in no distress.   HENT:   Head: Normocephalic and atraumatic.   Eyes: Conjunctivae are normal. Pupils are equal, round, and reactive to light.   Cardiovascular: Normal rate, regular rhythm and normal heart sounds.    Pulmonary/Chest: Effort normal. He has wheezes. He has rales. He exhibits tenderness.   Tenderness on the left lungs due to chest tube placement, and lung sounds are very prominent and noisy.    Musculoskeletal: He exhibits edema.   Patient demonstrated bilateral pitting edema of lower extremities. Pitting edema is 3+ on left ankle, 2+ on right ankle.    Neurological: He is alert and oriented to person, place, and time.   Skin: Skin is warm.         Assessment/Plan     Empyema  - Patient initially presented with cough, greenish bloody sputum, dyspnea and shortness of breath. He also had leukocytosis and lactic acidosis upon admission. Imaging showed large empyema with cavitary lesion/masses in the right upper lobe, pleural effusion and empyema was also described in the left lung. Status post left thoracotomy, decortication, evacuation of purulent empyema, debridement of necrotic lung, and rib resection by surgery with Dr. Diego Martínez. Patient still has two chest tubes and pleural fluid culture showed beta strep group F.  - He will continue to have the chest tube for adequate drainage of lung fluids.   - Unasyn will also run its full course and then Augmentin win be started thereafter.      Pneumothorax  - Pneumothorax present s/p chest tube insertion.   - Respiratory therapy team suggested that they may be a leakage in the chest tubes, surgery will be consulted to manage the mechanical concerns.      Tobacco use  - Patient is a long time smoker  - Consider discussing tobacco cessation upon discharge

## 2018-07-20 NOTE — CARE PLAN
Problem: Safety  Goal: Will remain free from falls  Outcome: PROGRESSING AS EXPECTED  Patient ambulates with assist of one, steady on feet. Several lines present, patient understands to use call light before attempting to get out of bed. Bed alarm not indicated.     Problem: Infection  Goal: Will remain free from infection  Outcome: PROGRESSING AS EXPECTED  Administered antibiotics as ordered, monitored for signs and symptoms of infection.

## 2018-07-20 NOTE — PROGRESS NOTES
Internal Medicine Interval Note  Note Author: Yogi Jesus M.D.     Name Donna Ceballos 1960   Age/Sex 58 y.o. male   MRN [id number]   Code Status FULL     After 5PM or if no immediate response to page, please call for cross-coverage  Attending/Team: Dr. Yu/Natanael See Patient List for primary contact information  Call [phone] to page    1st Call - Day Intern (R1):   Dr. Jesus 2nd Call - Day Sr. Resident (R2/R3):   Dr. Jin         Reason for interval visit  (Principal Problem)   Large left-sided empyema       Interval Problem Daily Status Update  (24 hours, problem oriented, brief subjective history, new lab/imaging data pertinent to that problem)   Patient was alert and oriented x 4. He was sitting up in bed and seems and feels better. He is able to ambulate.  Transferred to the ICU from floor after left thoracostomy, decortication, evacuation of purulent empyema,debridement of necrotic lung and rib resection and then transferred to floor yesterday.  Two chest tubes are in place.  Patient still has cough with yellowish sputum production. No chest pain.  Vitals are stable.  CXR - Stable bilateral opacities and partial left pneumothorax.      Review of Systems   Constitutional: Negative for chills and fever.   HENT: Negative for hearing loss.    Respiratory: Positive for cough and sputum production. Negative for hemoptysis and stridor.    Cardiovascular: Negative for chest pain.   Gastrointestinal: Negative for abdominal pain, nausea and vomiting.   Genitourinary: Negative for dysuria and urgency.   Musculoskeletal: Negative for joint pain and myalgias.   Skin: Negative for rash.   Neurological: Negative for dizziness and headaches.   Psychiatric/Behavioral: Negative for depression.       Disposition/Barriers to discharge:   IV antibiotics    Consultants/Specialty  Dr. Martínez/ Surgery ; Dr Mcintyre/Pulmonary  PCP: Pt States None        Quality Measures  Quality-Core  Measures   Reviewed items::  Labs reviewed, EKG reviewed, Medications reviewed and Radiology images reviewed  Motley catheter::  No Motley          Physical Exam       Vitals:    07/19/18 1159 07/19/18 1511 07/19/18 1557 07/19/18 1800   BP: 100/53  100/62    Pulse: 73 71 83    Resp: 18 17 17    Temp: 36.7 °C (98.1 °F)  36.3 °C (97.4 °F)    SpO2: 95% 94% 97% 95%   Weight:       Height:         Body mass index is 24.21 kg/m².    Oxygen Therapy:  Pulse Oximetry: 95 %, O2 (LPM): 2, O2 Delivery: Silicone Nasal Cannula    Physical Exam   Constitutional: He is oriented to person, place, and time. He appears malnourished.   HENT:   Head: Normocephalic.   Eyes: Pupils are equal, round, and reactive to light.   Neck: Normal range of motion.   Cardiovascular: Normal rate and normal heart sounds.    Pulmonary/Chest: He has wheezes. He has rales.   Abdominal: Soft. Bowel sounds are normal.   Musculoskeletal: Normal range of motion.   Neurological: He is alert and oriented to person, place, and time.   Skin: Skin is warm.             Assessment/Plan     * Empyema (HCC)- (present on admission)   Assessment & Plan    -Improving  -Imaging showed large empyema with cavitary lesion/masses in the right upper lobe, large empyema in the left lung. Had left thoracotomy, decortication, evacuation of purulent empyema, debridement of necrotic lung, and rib resection (Dr. Diego Martínez)  -Two chest tubes placed for drainage  -Pleural fluid culture showed beta strep group F  -Continue Unasyn and Augmentin thereafter            Acute hypoxemic respiratory failure (HCC)   Assessment & Plan    - Resolved.             Panlobular emphysema (HCC)- (present on admission)   Assessment & Plan    Improving  - Continue scheduled duonebs.  - RT protocol, Oxysgen, Cont pulse ox.  - Counseling.        Pneumothorax on left   Assessment & Plan    Likely due to chest tubes placement  Will continue to monitor  Will be managed by surgery        Protein malnutrition  (HCC)- (present on admission)   Assessment & Plan    - Patient appears malnourished.  - Lost around 10 lbs since March when the symptoms started.  - Nutrition consult        Pulmonary cachexia due to COPD (HCC)- (present on admission)   Assessment & Plan    - Likely secondary to emphysema and empyema  - Nutritional consult.   - Continue antibiotics         Tobacco abuse- (present on admission)   Assessment & Plan    - Nicotine patches while IP.   - Tobacco cessation counseling          Leukocytosis- (present on admission)   Assessment & Plan    - Trending down  - Resolving with antibiotics.

## 2018-07-20 NOTE — PROGRESS NOTES
Assumed care at 0700. Received report from night shift RN. Bedside report completed. AOx4.    States pain tolerable at this time.  Denies nausea.  Tolerating diet. +voiding. LBM 7/19.  IVF infusing tko.  2 Left Chest tubes in place to 40 suction, air leak present in CT#2  3L O2 93% saturation   Ambulating with standby assist. Pt call light and belongings within reach, fall precautions in place

## 2018-07-20 NOTE — PROGRESS NOTES
Infectious Disease Progress Note    Author: Nikki Johnston M.D. Date & Time of service: 2018  3:36 PM    Chief Complaint:  Empyema     Interval History:  18- Tmax 99.3 WBC 10.9 creatinine 0.34 patient continues to remain significantly debilitated  2018 T-max 98.9 feels much better.  WBC 14.2 platelets 512 creatinine 0.34  2018 T-max 98.8.  Feels better.  WBC 11.3 platelets 520 sed rate 62 CRP 3   AF WBC 12.6 transferred out of ICU.  Feels better overall-IS about 1200 right now   AF WBC 11.5 tolerating IV abx-has not attempted to walk much yet  Labs Reviewed, Medications Reviewed, Radiology Reviewed and Wound Reviewed.    Review of Systems:  Review of Systems   Constitutional: Positive for malaise/fatigue. Negative for fever.   HENT: Negative for hearing loss.    Respiratory: Positive for cough.         Chest pain improved   Cardiovascular: Negative for chest pain and leg swelling.   Gastrointestinal: Negative for abdominal pain, nausea and vomiting.   Genitourinary: Negative for dysuria.   Musculoskeletal: Positive for myalgias.   Neurological: Negative for sensory change and speech change.       Hemodynamics:  Temp (24hrs), Av.6 °C (97.9 °F), Min:36.3 °C (97.4 °F), Max:36.8 °C (98.3 °F)  Temperature: 36.7 °C (98 °F)  Pulse  Av  Min: 50  Max: 111   Blood Pressure: 106/64       Physical Exam:  Physical Exam   Constitutional: He is oriented to person, place, and time. He appears well-developed.   Thin male looking chronically ill   HENT:   Head: Normocephalic and atraumatic.   Mouth/Throat: No oropharyngeal exudate.   Poor dentition   Eyes: Conjunctivae are normal. No scleral icterus.   Neck: Neck supple.   Cardiovascular: Normal heart sounds.    Pulmonary/Chest: He has wheezes. He has rales.   Chest tubes X2 on left side; decreased BS   Abdominal: Bowel sounds are normal. There is no tenderness. There is no rebound and no guarding.   Musculoskeletal: He exhibits no edema.    Neurological: He is alert and oriented to person, place, and time.   Skin: No rash noted.   Nursing note and vitals reviewed.      Meds:    Current Facility-Administered Medications:   •  ampicillin-sulbactam (UNASYN) IV  •  [START ON 7/24/2018] amoxicillin-clavulanate  •  hydrocortisone sodium succinate PF  •  enoxaparin (LOVENOX) injection  •  oxyCODONE immediate-release **OR** oxyCODONE immediate-release  •  Respiratory Care per Protocol  •  senna-docusate **AND** polyethylene glycol/lytes **AND** magnesium hydroxide **AND** bisacodyl  •  MD ALERT...Adult ICU Electrolyte Replacement per Pharmacy Protocol  •  ipratropium-albuterol  •  NS  •  ondansetron  •  ondansetron  •  promethazine  •  promethazine  •  prochlorperazine  •  acetaminophen  •  nicotine **AND** Nicotine Replacement Patient Education Materials **AND** nicotine polacrilex    Labs:  Recent Labs      07/18/18 0424 07/19/18   0350  07/20/18   0325   WBC  11.3*  12.9*  11.5*   RBC  3.27*  3.59*  3.60*   HEMOGLOBIN  9.0*  9.8*  9.8*   HEMATOCRIT  28.7*  31.6*  32.1*   MCV  87.8  88.0  89.2   MCH  27.5  27.3  27.2   RDW  56.8*  58.4*  59.6*   PLATELETCT  523*  587*  637*   MPV  9.8  9.3  9.4   NEUTSPOLYS  75.10*  72.50*  69.70   LYMPHOCYTES  17.00*  18.60*  20.50*   MONOCYTES  5.60  6.40  6.70   EOSINOPHILS  1.10  1.60  2.10   BASOPHILS  0.20  0.30  0.50     Recent Labs      07/18/18 0424 07/19/18   0350  07/20/18   0325   SODIUM  138  137  136   POTASSIUM  3.6  3.6  3.6   CHLORIDE  100  100  100   CO2  34*  34*  31   GLUCOSE  87  98  96   BUN  10  9  9     Recent Labs      07/18/18   0424  07/19/18   0350  07/20/18   0325   CREATININE  0.33*  0.36*  0.38*       Imaging:  Dx-chest-portable (1 View)    Result Date: 7/16/2018 7/16/2018 4:16 AM HISTORY/REASON FOR EXAM:  For indication of respiratory failure TECHNIQUE/EXAM DESCRIPTION AND NUMBER OF VIEWS: Single portable view of the chest. COMPARISON: Yesterday FINDINGS: Hardware is stably positioned  in its visualized extent. HEART: Stable size. LUNGS: BILATERAL pulmonary opacities are unchanged. PLEURA: Small LEFT pneumothorax is unchanged.     1.  Unchanged small LEFT pneumothorax with chest tubes in place 2.  Unchanged BILATERAL cavitary airspace disease    Dx-chest-portable (1 View)    Result Date: 7/15/2018    7/15/2018 4:29 AM HISTORY/REASON FOR EXAM: For indication of respiratory failure Line placement TECHNIQUE/EXAM DESCRIPTION:  Single AP view of the chest. COMPARISON: Yesterday FINDINGS: Position of medical devices appears stable. The cardiac silhouette appears within normal limits. The mediastinal contour appears within normal limits.  The central pulmonary vasculature appears normal. Hazy bilateral pulmonary opacities are seen. Residual left pneumothorax is seen similar to prior study. No significant pleural effusions are identified. The bony structures appear age-appropriate.  Soft tissue gas in the left chest wall is seen.     1.  Stable pulmonary infiltrates and/or edema with probable component of chronic underlying lung disease. 2.  Left pneumothorax, stable since prior with 2 thoracostomy tubes in place.     Dx-chest-portable (1 View)    Result Date: 7/14/2018 7/14/2018 5:13 AM HISTORY/REASON FOR EXAM: For indication of respiratory failure Line placement TECHNIQUE/EXAM DESCRIPTION:  Single AP view of the chest. COMPARISON: Yesterday FINDINGS: Position of medical devices appears stable. The cardiac silhouette appears within normal limits. The mediastinal contour appears within normal limits.  The central pulmonary vasculature appears normal. Increased opacification throughout the right hemithorax is seen. There is decreased opacification within the left hemithorax. Residual left pneumothorax is seen similar to prior study. No significant pleural effusions are identified. The bony structures appear age-appropriate.  Soft tissue gas in the left chest wall is seen.     1.  Increased opacification  of the right hemithorax, compatible with new large pleural effusion and/or infiltrates and/or atelectasis. 2.  Left pneumothorax, stable since prior with 2 thoracostomy tubes in place.    Dx-chest-portable (1 View)    Result Date: 7/13/2018 7/13/2018 7:21 AM HISTORY/REASON FOR EXAM:  Respiratory failure. TECHNIQUE/EXAM DESCRIPTION AND NUMBER OF VIEWS: Single portable view of the chest. COMPARISON: 7/12/2018 FINDINGS: LUNGS: Multifocal patchy consolidations are reidentified. Stable cavity in the right lung apex, with improving opacities in the right perihilar and infrahilar regions. PNEUMOTHORAX: Stable left pneumothorax and subcutaneous emphysema of the left chest wall. LINES AND TUBES: Stable well-positioned ETT. Stable right IJ central catheter, tip in the superior cavoatrial junction. Stable left chest tubes. MEDIASTINUM: No cardiomegaly. MUSCULOSKELETAL STRUCTURES: No acute fracture. Skin staples in the left lateral chest wall.     1. Improving opacities in the right perihilar and infrahilar regions. Otherwise stable multifocal patchy consolidations. 2. Stable lines and tubes. 3. Stable left pneumothorax subcutaneous emphysema of the left chest wall.    Dx-chest-portable (1 View)    Result Date: 7/12/2018 7/12/2018 12:11 PM HISTORY/REASON FOR EXAM:  POST INTUBATION. TECHNIQUE/EXAM DESCRIPTION AND NUMBER OF VIEWS: Single portable view of the chest. COMPARISON: 7/12/2018 FINDINGS: Interval improvement of RIGHT pleural fluid collection and increased aeration of RIGHT lower lung. Patchy consolidation again seen in the RIGHT upper lung. Focal nodular densities are seen in the LEFT lung as well as consolidation at the mid lung level. Small LEFT pneumothorax is unchanged, with multiple LEFT chest tubes present. Increasing LEFT chest wall emphysema. Other supportive tubing is unchanged.     1.  Interval improved aeration of RIGHT lung base with improvement of RIGHT pleural effusion. 2.  Stable LEFT pneumothorax with  chest tubes present. 3.  Patchy consolidation again seen in both lungs with basilar densities in the LEFT upper lung again noted. 4.  Increasing LEFT chest wall emphysema.    Dx-chest-portable (1 View)    Result Date: 7/12/2018 7/12/2018 3:16 AM HISTORY/REASON FOR EXAM: Line placement TECHNIQUE/EXAM DESCRIPTION:  Single AP view of the chest. COMPARISON: July 10, 2018 FINDINGS: Right internal jugular central line is seen terminating at the right atriocaval junction.  To left thoracostomy tube is in place terminating at the left apex and left lung base. The cardiac silhouette appears within normal limits. The mediastinal contour appears within normal limits.  The central pulmonary vasculature appears normal. The lungs appear well expanded bilaterally.  Increased opacification throughout the right hemithorax is seen. There is decreased opacification within the left hemithorax. Residual left pneumothorax is seen similar to prior study. No significant pleural effusions are identified. The bony structures appear age-appropriate.  Soft tissue gas in the left chest wall is seen.     1.  Increased opacification of the right hemithorax, compatible with new large pleural effusion and/or infiltrates and/or atelectasis. 2.  Improved aeration of the left hemithorax status post thoracostomy tube placement. Residual infiltrates in the left lung are seen. 3.  Left pneumothorax, stable since prior with 2 thoracostomy tubes in place.    Dx-chest-portable (1 View)    Result Date: 7/10/2018  7/10/2018 6:14 PM HISTORY/REASON FOR EXAM:  Post thoracentesis left chest. TECHNIQUE/EXAM DESCRIPTION AND NUMBER OF VIEWS: Single portable view of the chest. COMPARISON: 7/10/2018 FINDINGS: The mediastinal and cardiac silhouette is partially obscured by the left-sided parenchymal opacity and pleural fluid The right pulmonary vascularity is within normal limits. Multiple lung masses are seen on the left. There is an airspace process in the left  mid and lower hemithorax. There is ill-defined nodular parenchymal opacity in the right upper lobe with some hilar retraction. There is no significant pleural effusion. There was a previous hydropneumothorax seen on the outside prior chest x-ray. Pneumothorax is still seen on the lateral aspect of the left chip-thorax. There are no acute bony abnormalities.     1.  There has been interval decrease in the left pleural effusion. 2.  There is still some component of LEFT pneumothorax although the hydropneumothorax air-fluid level is no longer evident. 3.  Multiple left bone or masses are again seen. 4.  Left lower lobe airspace disease is again seen. 5.  Ill-defined nodular and linear opacity in the right upper lobe with pleural thickening and hilar retraction is again seen.    Us-thoracentesis Puncture Left    Result Date: 7/10/2018  7/10/2018 4:01 PM HISTORY/REASON FOR EXAM: Pleural effusion TECHNIQUE/EXAM DESCRIPTION: Ultrasound-guided thoracentesis. Indication:  LEFT pleural fluid collection. COMPARISON:  None PROCEDURE:     Informed consent was obtained. A timeout was taken. A left pleural effusion was localized with real-time ultrasound guidance. The left posterior chest wall was prepped and draped in a sterile manner. Dr. Fine performed the procedure  with my guidance.  Following local anesthesia with 1% lidocaine, a 5 [place] Yueh pigtail catheter was advanced into the pleural space with trocar technique and pleural fluid was drained. The patient tolerated the procedure well without evidence of complication. A post thoracentesis chest radiograph is forthcoming. FINDINGS: Highly complicated left large volume pleural fluid has extensive internal debris Fluid was  sent to the laboratory. Fluid character: purulent     1. Ultrasound guided left sided diagnostic thoracentesis. 2. 500 mL of fluid withdrawn. Additional fluid was not removed as the catheter became occluded by the thick liquid    Dx-abdomen For  "Tube Placement    Result Date: 7/14/2018 7/14/2018 12:59 PM HISTORY/REASON FOR EXAM:  Cortrak evaluation. TECHNIQUE/EXAM DESCRIPTION AND NUMBER OF VIEWS:  1 view(s) of the abdomen. COMPARISON:  None. FINDINGS: Cortrak feeding tube tip projects in the region of the gastric fundus. The tube coils back upon itself from the mid stomach. The thoracic course appears appropriate. The bowel gas pattern is within normal limits.     Cortrak feeding tube tip projects in the region of the gastric fundus.      Micro:  Results     Procedure Component Value Units Date/Time    BLOOD CULTURE [[id number]] Collected:  07/12/18 1046    Order Status:  Completed Specimen:  Blood from Peripheral Updated:  07/17/18 1300     Significant Indicator NEG     Source BLD     Site PERIPHERAL     Blood Culture No growth after 5 days of incubation.    Narrative:       [id number] MEGAN HERNANDEZ  Per Hospital Policy: Only change Specimen Src: to \"Line\" if  specified by physician order.    BLOOD CULTURE [[id number]] Collected:  07/12/18 1046    Order Status:  Completed Specimen:  Blood from Peripheral Updated:  07/17/18 1300     Significant Indicator NEG     Source BLD     Site PERIPHERAL     Blood Culture No growth after 5 days of incubation.    Narrative:       [id number] MEGAN HERNANDEZ  Per Hospital Policy: Only change Specimen Src: to \"Line\" if  specified by physician order.    CULTURE TISSUE W/ GRM STAIN [[id number]]  (Abnormal) Collected:  07/11/18 2033    Order Status:  Completed Specimen:  Tissue Updated:  07/14/18 1316     Significant Indicator POS (POS)     Source TISS     Site Left Lung Tissue     Tissue Culture Growth noted after further incubation, see below for  organism identification.   (A)     Gram Stain Result Few WBCs.  No organisms seen.       Tissue Culture Beta Streptococcus Group F  Light growth   (A)    ANAEROBIC CULTURE [[id number]] Collected:  07/11/18 2033    Order Status:  Completed Specimen:  Tissue Updated:  " 07/14/18 1316     Significant Indicator NEG     Source TISS     Site Left Lung Tissue     Anaerobic Culture, Culture Res No Anaerobes isolated.    AFB CULTURE [[id number]] Collected:  07/11/18 2033    Order Status:  Completed Specimen:  Tissue Updated:  07/14/18 1316     Significant Indicator NEG     Source TISS     Site Left Lung Tissue     AFB Culture Culture in progress.     AFB Smear Results No acid fast bacilli seen.    FUNGAL CULTURE [[id number]] Collected:  07/11/18 2033    Order Status:  Completed Specimen:  Tissue Updated:  07/14/18 1316     Significant Indicator NEG     Source TISS     Site Left Lung Tissue     Fungal Culture Culture in progress.    AFB CULTURE [[id number]] Collected:  07/12/18 1130    Order Status:  Completed Specimen:  Respirate from Lung Updated:  07/14/18 1053     Significant Indicator NEG     Source RESP     Site Bronchoalveolar Lavage RLL     AFB Culture Culture in progress.     AFB Smear Results No acid fast bacilli seen.    Narrative:       [id number] [employer]  3rd specimin  Which Lobe (Bronch Only):->RLL    CULTURE RESPIRATORY W/ GRM STN [[id number]]  (Abnormal) Collected:  07/12/18 1130    Order Status:  Completed Specimen:  Respirate Updated:  07/14/18 1053     Gram Stain Result Rare WBCs.  No organisms seen.       Significant Indicator POS (POS)     Source RESP     Site Bronchoalveolar Lavage RLL     Respiratory Culture -- (A)      Beta Streptococcus Group F  Moderate growth   (A)    Narrative:       [id number] [employer]  3rd specimin  Which Lobe (Bronch Only):->RLL    FUNGAL CULTURE [[id number]] Collected:  07/12/18 1130    Order Status:  Completed Specimen:  Respirate Updated:  07/14/18 1053     Significant Indicator NEG     Source RESP     Site Bronchoalveolar Lavage RLL     Fungal Culture Culture in progress.    Narrative:       [id number] [employer]  3rd specimin  Which Lobe (Bronch Only):->RLL          Assessment:  Active Hospital Problems    Diagnosis   •  *Empyema (HCC) [J86.9]   • Acute hypoxemic respiratory failure (HCC) [J96.01]   • Panlobular emphysema (HCC) [J43.1]   • Pulmonary parenchymal mass [R91.8]   • Pulmonary cachexia due to COPD (HCC) [J44.9, R64]   • Protein malnutrition (HCC) [E46]   • Leukocytosis [D72.829]   • Tobacco abuse [Z72.0]       Plan:  Empyema  Afebrile  +leukocytosis  s/p thoracentesis on 7/10/2018  s/p left thoracotomy decortication and evacuation of purulent empyema and decortication of lung and rib resection on 7/11/2018  The cultures + group F strep  Continue Unasyn  Continue for 2 weeks of IV antibiotics followed by at least 4 weeks of oral Augmentin  Stop date IV abx 7/24    Leukocytosis  Multifacorial  monitor  IS every hour while awake

## 2018-07-20 NOTE — CARE PLAN
Problem: Skin Integrity  Goal: Risk for impaired skin integrity will decrease    Intervention: Assess risk factors for impaired skin integrity and/or pressure ulcers  Educated and encouraged turning frequently in bed and ambulation. Will continue to monitor skin for signs of breakdown      Problem: Pain Management  Goal: Pain level will decrease to patient's comfort goal    Intervention: Follow pain managment plan developed in collaboration with patient and Interdisciplinary Team  Medicating for pain PRN per MAR. No c/o pain at this time. Pt aware of medication availability.

## 2018-07-20 NOTE — PROGRESS NOTES
Pulmonary Progress note  Note Author: Erasmo Cueva M.D.     Name Donna Ceballos 1960   Age/Sex 58 y.o. male   MRN [id number]     Reason for interval visit  (Principal Problem)   Empyema (HCC)    ID:  57 yo male with no known past medical history, presented from outside hospital for dyspnea, productive cough, pleuritic chest pain.  Symptoms originally started in March, but then got bet, now worse.  CT showed right lung pneumonia with abscess and left lung empyema and multilouclated abscesses.  He is S/P left lung decortication with two chest tubes placed.  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    Interval Problem Daily Status Update  (24 hours)   18:  Patient doing well, in good spirits.  He still has rare cough, afebrile, WBC improving, now 11.5.  On exam he has high pitch air flow with bubbling at the site of chest tube.    18:  Patient feeling well.  Occasional productive cough.  He is afebrile, WBC 12.9.  On exam auscultation at chest tube site reveals air flow from fistula.    Review of Systems   Constitutional: Negative for chills and fever.   Respiratory: Positive for cough and sputum production. Negative for hemoptysis, shortness of breath and wheezing.      Quality Measures  Quality-Core Measures   Reviewed items::  Labs reviewed, Medications reviewed and Radiology images reviewed  DVT prophylaxis pharmacological::  Enoxaparin (Lovenox)  Antibiotics:  Treating active infection/contamination beyond 24 hours perioperative coverage      Physical Exam       Vitals:    18 1110 18 1127 18 1129 18 1311   BP:  (!) 84/48 (!) 92/58 106/64   Pulse: 84 78  74   Resp: 16 16     Temp:  36.7 °C (98 °F)     SpO2: 97% 98%     Weight:       Height:         Body mass index is 24.21 kg/m².    Oxygen Therapy:  Pulse Oximetry: 98 %, O2 (LPM): 3, O2 Delivery: Nasal Cannula    Physical Exam   Constitutional: He is well-developed, well-nourished, and in no distress. No  distress.   HENT:   Head: Normocephalic and atraumatic.   Pulmonary/Chest: Effort normal. No respiratory distress. He has wheezes.   Coarse breath sounds with expiratory wheezing diffusely. Left lung, at site of chest tube placement, high pitched air passage with bubbling heard.   Musculoskeletal: He exhibits edema.   Skin: He is not diaphoretic.     Lab Data Review:     7/19/2018  1:24 PM    Recent Labs      07/18/18   0424  07/19/18   0350  07/20/18   0325   SODIUM  138  137  136   POTASSIUM  3.6  3.6  3.6   CHLORIDE  100  100  100   CO2  34*  34*  31   BUN  10  9  9   CREATININE  0.33*  0.36*  0.38*   MAGNESIUM  1.9  2.0  1.8   PHOSPHORUS  3.1  2.9  2.8   CALCIUM  7.5*  7.8*  8.0*       Recent Labs      07/18/18   0424  07/19/18   0350  07/20/18   0325   GLUCOSE  87  98  96       Recent Labs      07/18/18 0424  07/19/18   0350  07/20/18   0325   RBC  3.27*  3.59*  3.60*   HEMOGLOBIN  9.0*  9.8*  9.8*   HEMATOCRIT  28.7*  31.6*  32.1*   PLATELETCT  523*  587*  637*       Recent Labs      07/18/18 0424 07/19/18   0350  07/20/18   0325   WBC  11.3*  12.9*  11.5*   NEUTSPOLYS  75.10*  72.50*  69.70   LYMPHOCYTES  17.00*  18.60*  20.50*   MONOCYTES  5.60  6.40  6.70   EOSINOPHILS  1.10  1.60  2.10   BASOPHILS  0.20  0.30  0.50           Assessment/Plan     Principal Problem:    Empyema (HCC) POA: Yes      Overview:    Active Problems:    Panlobular emphysema (HCC) POA: Yes    Acute hypoxemic respiratory failure (HCC) POA: Unknown    Pulmonary cachexia due to COPD (HCC) POA: Yes    Protein malnutrition (HCC) POA: Yes    Pneumothorax on left POA: Unknown    Leukocytosis POA: Yes    Tobacco abuse POA: Yes  Resolved Problems:    Lactic acidosis POA: Yes    Empyema:  -Patient likely had pneumonia 4 months prior that was untreated and then progressed to the left.   -S/P left lung decortication with chest tube x2  -Now has left lung trapped lung, pneumothorax, bronchopleural fistula  -Pleural fluid culture grew group F  strep.  -Recommendations:  Concur with surgical monitoring.  Concur with ID ABX.  Patient will likely require long term ABX.    Panlobular emphysema  -active smoker  -Recommendations:  Concur with duonexu, RT protocol, oxygen, continual pusle ox, cessation counseling

## 2018-07-21 ENCOUNTER — APPOINTMENT (OUTPATIENT)
Dept: RADIOLOGY | Facility: MEDICAL CENTER | Age: 58
DRG: 853 | End: 2018-07-21
Attending: INTERNAL MEDICINE

## 2018-07-21 LAB
ANION GAP SERPL CALC-SCNC: 4 MMOL/L (ref 0–11.9)
BASOPHILS # BLD AUTO: 0.3 % (ref 0–1.8)
BASOPHILS # BLD: 0.03 K/UL (ref 0–0.12)
BUN SERPL-MCNC: 9 MG/DL (ref 8–22)
CALCIUM SERPL-MCNC: 8.1 MG/DL (ref 8.5–10.5)
CHLORIDE SERPL-SCNC: 100 MMOL/L (ref 96–112)
CO2 SERPL-SCNC: 31 MMOL/L (ref 20–33)
CREAT SERPL-MCNC: 0.37 MG/DL (ref 0.5–1.4)
EOSINOPHIL # BLD AUTO: 0.39 K/UL (ref 0–0.51)
EOSINOPHIL NFR BLD: 3.4 % (ref 0–6.9)
ERYTHROCYTE [DISTWIDTH] IN BLOOD BY AUTOMATED COUNT: 58.3 FL (ref 35.9–50)
GLUCOSE SERPL-MCNC: 91 MG/DL (ref 65–99)
HCT VFR BLD AUTO: 31 % (ref 42–52)
HGB BLD-MCNC: 9.7 G/DL (ref 14–18)
IMM GRANULOCYTES # BLD AUTO: 0.06 K/UL (ref 0–0.11)
IMM GRANULOCYTES NFR BLD AUTO: 0.5 % (ref 0–0.9)
LYMPHOCYTES # BLD AUTO: 2.13 K/UL (ref 1–4.8)
LYMPHOCYTES NFR BLD: 18.3 % (ref 22–41)
MAGNESIUM SERPL-MCNC: 1.8 MG/DL (ref 1.5–2.5)
MCH RBC QN AUTO: 27.5 PG (ref 27–33)
MCHC RBC AUTO-ENTMCNC: 31.3 G/DL (ref 33.7–35.3)
MCV RBC AUTO: 87.8 FL (ref 81.4–97.8)
MONOCYTES # BLD AUTO: 0.81 K/UL (ref 0–0.85)
MONOCYTES NFR BLD AUTO: 7 % (ref 0–13.4)
NEUTROPHILS # BLD AUTO: 8.19 K/UL (ref 1.82–7.42)
NEUTROPHILS NFR BLD: 70.5 % (ref 44–72)
NRBC # BLD AUTO: 0 K/UL
NRBC BLD-RTO: 0 /100 WBC
PHOSPHATE SERPL-MCNC: 2.9 MG/DL (ref 2.5–4.5)
PLATELET # BLD AUTO: 618 K/UL (ref 164–446)
PMV BLD AUTO: 9.3 FL (ref 9–12.9)
POTASSIUM SERPL-SCNC: 3.8 MMOL/L (ref 3.6–5.5)
RBC # BLD AUTO: 3.53 M/UL (ref 4.7–6.1)
SODIUM SERPL-SCNC: 135 MMOL/L (ref 135–145)
WBC # BLD AUTO: 11.6 K/UL (ref 4.8–10.8)

## 2018-07-21 PROCEDURE — 84100 ASSAY OF PHOSPHORUS: CPT

## 2018-07-21 PROCEDURE — 700105 HCHG RX REV CODE 258

## 2018-07-21 PROCEDURE — 99232 SBSQ HOSP IP/OBS MODERATE 35: CPT | Performed by: INTERNAL MEDICINE

## 2018-07-21 PROCEDURE — A9270 NON-COVERED ITEM OR SERVICE: HCPCS | Performed by: INTERNAL MEDICINE

## 2018-07-21 PROCEDURE — 36415 COLL VENOUS BLD VENIPUNCTURE: CPT

## 2018-07-21 PROCEDURE — 94668 MNPJ CHEST WALL SBSQ: CPT

## 2018-07-21 PROCEDURE — 71045 X-RAY EXAM CHEST 1 VIEW: CPT

## 2018-07-21 PROCEDURE — A9270 NON-COVERED ITEM OR SERVICE: HCPCS | Performed by: STUDENT IN AN ORGANIZED HEALTH CARE EDUCATION/TRAINING PROGRAM

## 2018-07-21 PROCEDURE — 99233 SBSQ HOSP IP/OBS HIGH 50: CPT | Performed by: INTERNAL MEDICINE

## 2018-07-21 PROCEDURE — 700105 HCHG RX REV CODE 258: Performed by: INTERNAL MEDICINE

## 2018-07-21 PROCEDURE — 85025 COMPLETE CBC W/AUTO DIFF WBC: CPT

## 2018-07-21 PROCEDURE — 83735 ASSAY OF MAGNESIUM: CPT

## 2018-07-21 PROCEDURE — 80048 BASIC METABOLIC PNL TOTAL CA: CPT

## 2018-07-21 PROCEDURE — 770006 HCHG ROOM/CARE - MED/SURG/GYN SEMI*

## 2018-07-21 PROCEDURE — 700102 HCHG RX REV CODE 250 W/ 637 OVERRIDE(OP): Performed by: STUDENT IN AN ORGANIZED HEALTH CARE EDUCATION/TRAINING PROGRAM

## 2018-07-21 PROCEDURE — 700111 HCHG RX REV CODE 636 W/ 250 OVERRIDE (IP): Performed by: INTERNAL MEDICINE

## 2018-07-21 PROCEDURE — 700102 HCHG RX REV CODE 250 W/ 637 OVERRIDE(OP): Performed by: INTERNAL MEDICINE

## 2018-07-21 RX ORDER — SODIUM CHLORIDE 9 MG/ML
INJECTION, SOLUTION INTRAVENOUS
Status: COMPLETED
Start: 2018-07-21 | End: 2018-07-21

## 2018-07-21 RX ADMIN — OXYCODONE HYDROCHLORIDE 10 MG: 10 TABLET ORAL at 21:37

## 2018-07-21 RX ADMIN — SODIUM CHLORIDE 500 ML: 9 INJECTION, SOLUTION INTRAVENOUS at 00:10

## 2018-07-21 RX ADMIN — AMPICILLIN SODIUM AND SULBACTAM SODIUM 3 G: 2; 1 INJECTION, POWDER, FOR SOLUTION INTRAMUSCULAR; INTRAVENOUS at 05:51

## 2018-07-21 RX ADMIN — AMPICILLIN SODIUM AND SULBACTAM SODIUM 3 G: 2; 1 INJECTION, POWDER, FOR SOLUTION INTRAMUSCULAR; INTRAVENOUS at 00:09

## 2018-07-21 RX ADMIN — ENOXAPARIN SODIUM 40 MG: 100 INJECTION SUBCUTANEOUS at 05:51

## 2018-07-21 RX ADMIN — AMPICILLIN SODIUM AND SULBACTAM SODIUM 3 G: 2; 1 INJECTION, POWDER, FOR SOLUTION INTRAMUSCULAR; INTRAVENOUS at 18:06

## 2018-07-21 RX ADMIN — STANDARDIZED SENNA CONCENTRATE AND DOCUSATE SODIUM 2 TABLET: 8.6; 5 TABLET, FILM COATED ORAL at 20:04

## 2018-07-21 RX ADMIN — AMPICILLIN SODIUM AND SULBACTAM SODIUM 3 G: 2; 1 INJECTION, POWDER, FOR SOLUTION INTRAMUSCULAR; INTRAVENOUS at 12:02

## 2018-07-21 RX ADMIN — NICOTINE 21 MG: 21 PATCH, EXTENDED RELEASE TRANSDERMAL at 05:51

## 2018-07-21 RX ADMIN — HYDROCORTISONE SODIUM SUCCINATE 50 MG: 100 INJECTION, POWDER, FOR SOLUTION INTRAMUSCULAR; INTRAVENOUS at 05:51

## 2018-07-21 ASSESSMENT — ENCOUNTER SYMPTOMS
HEMOPTYSIS: 0
FEVER: 0
SPUTUM PRODUCTION: 1
DEPRESSION: 0
CHILLS: 0
SPEECH CHANGE: 0
HEADACHES: 0
SENSORY CHANGE: 0
NAUSEA: 0
DIZZINESS: 0
STRIDOR: 0
ABDOMINAL PAIN: 0
COUGH: 1
VOMITING: 0
MYALGIAS: 0
MYALGIAS: 1

## 2018-07-21 ASSESSMENT — PAIN SCALES - GENERAL
PAINLEVEL_OUTOF10: 6
PAINLEVEL_OUTOF10: 2

## 2018-07-21 NOTE — PROGRESS NOTES
Surgery     Persistent pneumothorax after decortication and debridement of pulmonary abscess     Continue CT decompression     Will discuss with thoracic surgery     Diego Martínez MD

## 2018-07-21 NOTE — PROGRESS NOTES
Pt alert and oriented  No complaints of pain at this time  Iv patent to left forearm  hrr, 1+ edema ble, pulses 2+  Lungs coarse and diminished on the right lobes  Lungs coarse with exp and insp wheezing throughout on left lobes  02 at 3 liters, cont ox in place, encouraged use of IS, pulls 750-1000  Bs+, no n/v, +flatus, +bm  Voiding q.s.  Left lateral mid axilla incision with staples azra  Left mid axilla chest tubes x 2 to 20 cm suction, #2 with airleak, no crepitus noted, dressing cdi, moderate serosang drainage in pleuravac  mepilex in place  Tolerating diet  Up with standby assist   VSS

## 2018-07-21 NOTE — PROGRESS NOTES
Received report from day shift RN and assumed care.  Patient is A+Ox4, pleasant and cooperative.  He is a standby assist out of bed, wears 2-4 LNC.  He has 2 left sided chest tubes to suction, needs to stay on portable suction while ambulating.  Lung sounds are diminished with wheezes on the right and wheezes with ronchi on the left.  He has a productive cough which he uses the yankeur at the bedside for.  He is on a regular diet, tolerating well.   Patient with PIV to left hand with TKO fluids running & ABX.  No other concerns at this time.  Bed is locked and in lowest position, treaded socks on.  Call light and belongings within reach.  Hourly rounding in place.

## 2018-07-21 NOTE — PROGRESS NOTES
"       Internal Medicine Interval Note  Note Author: Yogi Jesus M.D.     Name Donna Ceballos     1960   Age/Sex 58 y.o. male   MRN [id number]   Code Status FULL     After 5PM or if no immediate response to page, please call for cross-coverage  Attending/Team: Dr. Yu/Natanael See Patient List for primary contact information  Call [phone] to page    1st Call - Day Intern (R1):   Dr. Jesus 2nd Call - Day Sr. Resident (R2/R3):   Dr. Jin         Reason for interval visit  (Principal Problem)   Large left-sided empyema       Interval Problem Daily Status Update  (24 hours, problem oriented, brief subjective history, new lab/imaging data pertinent to that problem)   Patient was alert and oriented x 4. He was sitting up in bed and seems and feels better. He is able to ambulate. Feels tired after the \"shaking\" by respiratory therapy.  His cough is better and reduced sputum production.  No chest pain or SOB.  Fluid drained per chest tube - 315 ml in the last 24 hours.  Vitals are stable.  CXR - Airspace opacities persist in the left mid and lower lung field and to lesser extent, right lung base. There is persistent right upper lobe consolidation. The partial left pneumothorax, despite the ongoing presence of chest tubes on that side. Right   central venous line is unchanged in position.        Review of Systems   Constitutional: Negative for chills and fever.   HENT: Negative for hearing loss.    Respiratory: Positive for cough and sputum production. Negative for hemoptysis and stridor.    Cardiovascular: Negative for chest pain.   Gastrointestinal: Negative for abdominal pain, nausea and vomiting.   Genitourinary: Negative for dysuria and urgency.   Musculoskeletal: Negative for joint pain and myalgias.   Skin: Negative for rash.   Neurological: Negative for dizziness and headaches.   Psychiatric/Behavioral: Negative for depression.       Disposition/Barriers to discharge:   IV " antibiotics    Consultants/Specialty  Dr. Martínez/ Surgery ; Dr Mcintyre/Pulmonary  PCP: Pt States None        Quality Measures  Quality-Core Measures   Reviewed items::  Labs reviewed, EKG reviewed, Medications reviewed and Radiology images reviewed  Motley catheter::  No Motley          Physical Exam       Vitals:    07/20/18 1127 07/20/18 1129 07/20/18 1311 07/20/18 1600   BP: (!) 84/48 (!) 92/58 106/64 102/70   Pulse: 78  74 90   Resp: 16   16   Temp: 36.7 °C (98 °F)   36.7 °C (98 °F)   SpO2: 98%   96%   Weight:       Height:         Body mass index is 24.21 kg/m².    Oxygen Therapy:  Pulse Oximetry: 96 %, O2 (LPM): 3, O2 Delivery: Nasal Cannula    Physical Exam   Constitutional: He is oriented to person, place, and time. He appears malnourished.   HENT:   Head: Normocephalic.   Eyes: Pupils are equal, round, and reactive to light.   Neck: Normal range of motion.   Cardiovascular: Normal rate and normal heart sounds.    Pulmonary/Chest: He has wheezes. He has rales.   Abdominal: Soft. Bowel sounds are normal.   Musculoskeletal: Normal range of motion.   Neurological: He is alert and oriented to person, place, and time.   Skin: Skin is warm.             Assessment/Plan     * Empyema (HCC)- (present on admission)   Assessment & Plan    Improving  CXR - Airspace opacities persist in the left mid and lower lung field and to lesser extent, right lung base. There is persistent right upper lobe consolidation. The partial left pneumothorax, despite the ongoing presence of chest tubes on that side. Right   central venous line is unchanged in position    Continue Unasyn and Augmentin thereafter  Chest physiotherapy  Chest tube            Acute hypoxemic respiratory failure (HCC)   Assessment & Plan    - Resolved.             Panlobular emphysema (HCC)- (present on admission)   Assessment & Plan    Improving  - Continue scheduled duonebs.  - RT protocol, Oxysgen, Cont pulse ox.  - Counseling.        Pneumothorax on left    Assessment & Plan    Likely due to chest tubes placement  Will continue to monitor  Will be managed by surgery        Protein malnutrition (HCC)- (present on admission)   Assessment & Plan    - Patient appears malnourished.  - Lost around 10 lbs since March when the symptoms started.  - Nutrition consult        Pulmonary cachexia due to COPD (HCC)- (present on admission)   Assessment & Plan    - Likely secondary to emphysema and empyema  - Nutritional consult.   - Continue antibiotics         Tobacco abuse- (present on admission)   Assessment & Plan    - Nicotine patches while IP.   - Tobacco cessation counseling          Leukocytosis- (present on admission)   Assessment & Plan    - Trending down  - Resolving with antibiotics.

## 2018-07-21 NOTE — PROGRESS NOTES
"Per RT, pt declining to do CPT d/t pain. RT asked RN to alert MD to worsening xrays.    Paged [facility] white.  MD came to bedside. Ordering repeat xray for morning.  Pt stable. Denies SOB or increased WOB.    Hourly rounding in place. Call light within reach.  Will continue to monitor.  Blood pressure 102/70, pulse 90, temperature 36.7 °C (98 °F), resp. rate 16, height 1.829 m (6' 0.01\"), weight 81 kg (178 lb 9.2 oz), SpO2 96 %.      "

## 2018-07-21 NOTE — CARE PLAN
Problem: Infection  Goal: Will remain free from infection  Outcome: PROGRESSING AS EXPECTED  pattient on IV ABX to help improve lung infection    Problem: Respiratory:  Goal: Respiratory status will improve  Outcome: PROGRESSING AS EXPECTED  Patient on  with 2-4LNC as needed

## 2018-07-21 NOTE — PROGRESS NOTES
Patient up to bathroom and had a bm  Ambulated in the galvez with fww and cont gomco suction to chest tubes  Bottom red but blanchable, small area with scratch, picture taken, placed mepilex, pt refused waffle overlay  scd's placed

## 2018-07-21 NOTE — PROGRESS NOTES
"Pulmonary Progress Note    Patient ID:   Name:             Donna Ceballos   YOB: 1960  Age:                 58 y.o.  male   MRN:               [id number]                                                  Subjective:  Denies resp difficulty    Interval Changes:Chest tube cont leaking    Objective:   Vitals/ General Appearance:   Weight/BMI: Body mass index is 24.21 kg/m².  Blood pressure 100/61, pulse 91, temperature 36.7 °C (98.1 °F), resp. rate 18, height 1.829 m (6' 0.01\"), weight 81 kg (178 lb 9.2 oz), SpO2 98 %.  Vitals:    07/21/18 0305 07/21/18 0703 07/21/18 0747 07/21/18 1025   BP: (!) 98/54 100/61     Pulse: 88 67 70 91   Resp: 16 18 18 18   Temp: 36.4 °C (97.5 °F) 36.7 °C (98.1 °F)     SpO2: 94% 97% 97% 98%   Weight:       Height:         Oxygen Therapy:  Pulse Oximetry: 98 %, O2 (LPM): 3, O2 Delivery: Nasal Cannula    Constitutional:   Well developed, Well nourished, No acute distress  HENMT:  Normocephalic, Atraumatic, Oropharynx moist mucous membranes, No oral exudates, Nose normal.  No thyromegaly.  Eyes:  EOMI, Conjunctiva normal, No discharge.  Neck:  Normal range of motion, No cervical tenderness,  no JVD.  Cardiovascular:  Normal heart rate, Normal rhythm, No murmurs, No rubs, No gallops.   Extremitites with intact distal pulses, no cyanosis, or edema.  Lungs:  Normal breath sounds, gushing air leak on the RT lungs to auscultation , few crackles, no wheezing.   Abdomen: Bowel sounds normal, Soft, No tenderness, No guarding, No rebound, No masses, No hepatosplenomegaly.  Skin: Warm, Dry, No erythema, No rash, no induration.      Labs:  Recent Labs      07/19/18   0350  07/20/18   0325  07/21/18   0455   WBC  12.9*  11.5*  11.6*   RBC  3.59*  3.60*  3.53*   HEMOGLOBIN  9.8*  9.8*  9.7*   HEMATOCRIT  31.6*  32.1*  31.0*   MCV  88.0  89.2  87.8   MCH  27.3  27.2  27.5   MCHC  31.0*  30.5*  31.3*   RDW  58.4*  59.6*  58.3*   PLATELETCT  587*  637*  618*   MPV  9.3  9.4  9.3               "   Recent Labs      07/19/18   0350  07/20/18   0325  07/21/18   0455   SODIUM  137  136  135   POTASSIUM  3.6  3.6  3.8   CHLORIDE  100  100  100   CO2  34*  31  31   GLUCOSE  98  96  91   BUN  9  9  9     Recent Labs      07/19/18   0350  07/20/18   0325  07/21/18   0455   SODIUM  137  136  135   POTASSIUM  3.6  3.6  3.8   CHLORIDE  100  100  100   CO2  34*  31  31   BUN  9  9  9   CREATININE  0.36*  0.38*  0.37*   MAGNESIUM  2.0  1.8  1.8   PHOSPHORUS  2.9  2.8  2.9   CALCIUM  7.8*  8.0*  8.1*     No results found for this or any previous visit.      Imaging:   DX-CHEST-PORTABLE (1 VIEW)   Final Result      Stable chest findings compared with 7/20.      DX-CHEST-PORTABLE (1 VIEW)   Final Result      Stable chest findings compared with 7/19.      DX-CHEST-PORTABLE (1 VIEW)   Final Result      Stable bilateral opacities and partial left pneumothorax.      DX-CHEST-PORTABLE (1 VIEW)   Final Result         1. Left lateral pneumothorax is stable to minimally larger. Stable left chest tube position.   2. Stable lung findings.      CT-CHEST (THORAX) W/O   Final Result         1. There is small left anterior pneumothorax, as seen on prior radiograph. 2 left chest tubes in place.      2. Multiple cavitary lesions with thick wall throughout the left upper lobe. There is large cavitation nearly replacing the entire right upper lobe. No air-fluid level. These findings could relate to cavitation secondary to lung infections. The    differential includes septic emboli, malignancy.      3. Focal nodular opacities in the left upper lobe and patchy groundglass opacities in the right middle lobe, right lower lobe and left lower lobe could relate to infection as well.      4. Small right pleural effusion.      DX-CHEST-LIMITED (1 VIEW)   Final Result         1.  Interval decrease in left lateral pneumothorax since previous exam.      2.  Right upper lobe and left lower lobe airspace opacities unchanged.      DX-CHEST-PORTABLE (1  VIEW)   Final Result      1.  Unchanged small left lateral pneumothorax with 2 chest tubes in place.      2.  Unchanging bilateral pulmonary opacities.      DX-CHEST-PORTABLE (1 VIEW)   Final Result      1.  Unchanged small LEFT pneumothorax with chest tubes in place   2.  Unchanged BILATERAL cavitary airspace disease      DX-CHEST-PORTABLE (1 VIEW)   Final Result         1.  Stable pulmonary infiltrates and/or edema with probable component of chronic underlying lung disease.   2.  Left pneumothorax, stable since prior with 2 thoracostomy tubes in place.         DX-ABDOMEN FOR TUBE PLACEMENT   Final Result      Cortrak feeding tube tip projects in the region of the gastric fundus.      DX-CHEST-PORTABLE (1 VIEW)   Final Result         1.  Increased opacification of the right hemithorax, compatible with new large pleural effusion and/or infiltrates and/or atelectasis.   2.  Left pneumothorax, stable since prior with 2 thoracostomy tubes in place.      DX-CHEST-PORTABLE (1 VIEW)   Final Result         1. Improving opacities in the right perihilar and infrahilar regions. Otherwise stable multifocal patchy consolidations.   2. Stable lines and tubes.   3. Stable left pneumothorax subcutaneous emphysema of the left chest wall.      DX-CHEST-PORTABLE (1 VIEW)   Final Result      1.  Interval improved aeration of RIGHT lung base with improvement of RIGHT pleural effusion.   2.  Stable LEFT pneumothorax with chest tubes present.   3.  Patchy consolidation again seen in both lungs with basilar densities in the LEFT upper lung again noted.   4.  Increasing LEFT chest wall emphysema.      DX-CHEST-PORTABLE (1 VIEW)   Final Result         1.  Increased opacification of the right hemithorax, compatible with new large pleural effusion and/or infiltrates and/or atelectasis.   2.  Improved aeration of the left hemithorax status post thoracostomy tube placement. Residual infiltrates in the left lung are seen.   3.  Left  pneumothorax, stable since prior with 2 thoracostomy tubes in place.      DX-CHEST-PORTABLE (1 VIEW)   Final Result      1.  There has been interval decrease in the left pleural effusion.   2.  There is still some component of LEFT pneumothorax although the hydropneumothorax air-fluid level is no longer evident.   3.  Multiple left bone or masses are again seen.   4.  Left lower lobe airspace disease is again seen.   5.  Ill-defined nodular and linear opacity in the right upper lobe with pleural thickening and hilar retraction is again seen.      US-THORACENTESIS PUNCTURE LEFT   Final Result      1. Ultrasound guided left sided diagnostic thoracentesis.      2. 500 mL of fluid withdrawn. Additional fluid was not removed as the catheter became occluded by the thick liquid      OUTSIDE IMAGES-CT CHEST/ABDOMEN/PELVIS   Final Result      OUTSIDE IMAGES-DX CHEST   Final Result          Hospital Medications:    Current Facility-Administered Medications:   •  ampicillin/sulbactam (UNASYN) 3 g in  mL IVPB, 3 g, Intravenous, Q6HRS, Laina Lopez M.D., Stopped at 07/21/18 1232  •  [START ON 7/24/2018] amoxicillin-clavulanate (AUGMENTIN) 875-125 MG per tablet 1 Tab, 1 Tab, Oral, Q12HRS, Laina Lopez M.D.  •  hydrocortisone sodium succinate PF (SOLU-CORTEF) 100 MG injection 50 mg, 50 mg, Intravenous, DAILY, Carlton Mcintyre M.D., 50 mg at 07/21/18 0551  •  enoxaparin (LOVENOX) inj 40 mg, 40 mg, Subcutaneous, DAILY, Carlton Mcintyre M.D., 40 mg at 07/21/18 0551  •  oxyCODONE immediate-release (ROXICODONE) tablet 5 mg, 5 mg, Oral, Q4HRS PRN, 5 mg at 07/20/18 0133 **OR** oxyCODONE immediate release (ROXICODONE) tablet 10 mg, 10 mg, Oral, Q4HRS PRN, Carlton Mcintyre M.D., 10 mg at 07/20/18 2123  •  Respiratory Care per Protocol, , Nebulization, Continuous RT, Carlton Mcintyre M.D.  •  senna-docusate (PERICOLACE or SENOKOT S) 8.6-50 MG per tablet 2 Tab, 2 Tab, Oral, BID, 2 Tab at 07/20/18 0536 **AND** polyethylene  glycol/lytes (MIRALAX) PACKET 1 Packet, 1 Packet, Oral, QDAY PRN, 1 Packet at 07/15/18 0835 **AND** magnesium hydroxide (MILK OF MAGNESIA) suspension 30 mL, 30 mL, Oral, QDAY PRN, 30 mL at 07/16/18 0514 **AND** bisacodyl (DULCOLAX) suppository 10 mg, 10 mg, Rectal, QDAY PRN, Carlton Mcintyre M.D., 10 mg at 07/16/18 1610  •  MD ALERT...Adult ICU Electrolyte Replacement per Pharmacy Protocol, , Other, pharmacy to dose, Carlton Mcintyre M.D.  •  ipratropium-albuterol (DUONEB) nebulizer solution, 3 mL, Nebulization, Q2HRS PRN (RT), Carlton Mcintyre M.D.  •  NS infusion, , Intravenous, Continuous, Jose Bourne M.D., Last Rate: 10 mL/hr at 07/15/18 1026  •  ondansetron (ZOFRAN) syringe/vial injection 4 mg, 4 mg, Intravenous, Q4HRS PRN, Shruthi Phan M.D.  •  ondansetron (ZOFRAN ODT) dispertab 4 mg, 4 mg, Oral, Q4HRS PRN, Shruthi Phan M.D.  •  promethazine (PHENERGAN) tablet 12.5-25 mg, 12.5-25 mg, Oral, Q4HRS PRN, Shruthi Phan M.D.  •  promethazine (PHENERGAN) suppository 12.5-25 mg, 12.5-25 mg, Rectal, Q4HRS PRN, Shruthi Phan M.D.  •  prochlorperazine (COMPAZINE) injection 5-10 mg, 5-10 mg, Intravenous, Q4HRS PRN, Shruthi Phan M.D.  •  acetaminophen (TYLENOL) tablet 650 mg, 650 mg, Oral, Q6HRS PRN, Shruthi Phan M.D., 650 mg at 07/16/18 1359  •  nicotine (NICODERM) 21 MG/24HR 21 mg, 21 mg, Transdermal, Daily-0600, 21 mg at 07/21/18 0551 **AND** Nicotine Replacement Patient Education Materials, , , Once **AND** nicotine polacrilex (NICORETTE) 2 MG piece 2 mg, 2 mg, Oral, Q HOUR PRN, Shruthi Phan M.D.    Current Outpatient Medications:  Prescriptions Prior to Admission   Medication Sig Dispense Refill Last Dose   • aspirin (ASA) 325 MG Tab Take 650 mg by mouth every 6 hours as needed.   7/10/2018 at am   • therapeutic multivitamin-minerals (THERAGRAN-M) Tab Take 1 Tab by mouth every day.   7/10/2018 at am       Medication Allergy:  No Known Allergies    Assessment and Plan:  Principal Problem:    Empyema (HCC)  POA: Yes   Bronchopleural fistula  Cont Chest tube drainage  Cont antibiotics: Unasym  Active Problems:    Panlobular emphysema (HCC) POA: Yes    Acute hypoxemic respiratory failure (HCC) POA: Unknown    Pulmonary cachexia due to COPD (HCC) POA: Yes    Protein malnutrition (HCC) POA: Yes    Pneumothorax on left POA: Unknown    Leukocytosis POA: Yes    Tobacco abuse POA: Yes  Resolved Problems:    Lactic acidosis POA: Yes    Quality Measures  Quality-Core Measures   Reviewed items::  Labs reviewed, Medications reviewed and Radiology images reviewed  DVT prophylaxis pharmacological::  Enoxaparin (Lovenox)  Antibiotics:  Treating active infection/contamination beyond 24 hours perioperative coverage

## 2018-07-21 NOTE — CARE PLAN
Problem: Safety  Goal: Will remain free from injury  Up with standby assist, calls for assistance    Problem: Infection  Goal: Will remain free from infection  Iv antibx in use    Problem: Respiratory:  Goal: Respiratory status will improve  02 at 3 liters, cont ox in pratima ce    Problem: Pain Management  Goal: Pain level will decrease to patient's comfort goal  No complaints of pain at this time

## 2018-07-21 NOTE — PROGRESS NOTES
Internal Medicine Interval Note  Note Author: Yogi Jesus M.D.     Name Donna Ceballos 1960   Age/Sex 58 y.o. male   MRN [id number]   Code Status FULL     After 5PM or if no immediate response to page, please call for cross-coverage  Attending/Team: Dr. Yu/Natanael See Patient List for primary contact information  Call [phone] to page    1st Call - Day Intern (R1):   Dr. Jesus 2nd Call - Day Sr. Resident (R2/R3):   Dr. Jin         Reason for interval visit  (Principal Problem)   Large left-sided empyema       Interval Problem Daily Status Update  (24 hours, problem oriented, brief subjective history, new lab/imaging data pertinent to that problem)   Patient was alert and oriented x 4. He was sitting up in bed and seems and feels better.   Continuing to ambulate.  His cough is better and reduced sputum production.  No chest pain or SOB.  Fluid drained per chest tube - 225 ml in the last 24 hours.  Vitals are stable.        Review of Systems   Constitutional: Negative for chills and fever.   HENT: Negative for hearing loss.    Respiratory: Positive for cough and sputum production. Negative for hemoptysis and stridor.    Cardiovascular: Negative for chest pain.   Gastrointestinal: Negative for abdominal pain, nausea and vomiting.   Genitourinary: Negative for dysuria and urgency.   Musculoskeletal: Negative for joint pain and myalgias.   Skin: Negative for rash.   Neurological: Negative for dizziness and headaches.   Psychiatric/Behavioral: Negative for depression.       Disposition/Barriers to discharge:   Chest tubes    Consultants/Specialty  Dr. Martínez/ Surgery ; Dr Mcintyre/Pulmonary  PCP: Pt States None        Quality Measures  Quality-Core Measures   Reviewed items::  Labs reviewed, EKG reviewed, Medications reviewed and Radiology images reviewed  Motley catheter::  No Motley          Physical Exam       Vitals:    18 0305 18 0703 18 0747 18 1025    BP: (!) 98/54 100/61     Pulse: 88 67 70 91   Resp: 16 18 18 18   Temp: 36.4 °C (97.5 °F) 36.7 °C (98.1 °F)     SpO2: 94% 97% 97% 98%   Weight:       Height:         Body mass index is 24.21 kg/m².    Oxygen Therapy:  Pulse Oximetry: 98 %, O2 (LPM): 3, O2 Delivery: Nasal Cannula    Physical Exam   Constitutional: He is oriented to person, place, and time. He appears malnourished.   HENT:   Head: Normocephalic.   Eyes: Pupils are equal, round, and reactive to light.   Neck: Normal range of motion.   Cardiovascular: Normal rate and normal heart sounds.    Pulmonary/Chest: He has wheezes. He has rales.   Abdominal: Soft. Bowel sounds are normal.   Musculoskeletal: Normal range of motion.   Neurological: He is alert and oriented to person, place, and time.   Skin: Skin is warm.             Assessment/Plan     * Empyema (HCC)- (present on admission)   Assessment & Plan    Improving  Continue Unasyn and Augmentin thereafter  Chest physiotherapy  Chest tubes in place            Acute hypoxemic respiratory failure (HCC)   Assessment & Plan    - Resolved.             Panlobular emphysema (HCC)- (present on admission)   Assessment & Plan    Improving  - Continue scheduled duonebs.  - RT protocol, Oxysgen, Cont pulse ox.  - Counseling.        Pneumothorax on left   Assessment & Plan    Likely due to chest tubes placement  Will continue to monitor  CT surgery monitoring        Protein malnutrition (HCC)- (present on admission)   Assessment & Plan    - Patient appears malnourished.  - Lost around 10 lbs since March when the symptoms started.  - Nutrition consult        Pulmonary cachexia due to COPD (HCC)- (present on admission)   Assessment & Plan    - Likely secondary to emphysema and empyema  - Nutritional consult.   - Continue antibiotics         Tobacco abuse- (present on admission)   Assessment & Plan    - Nicotine patches while IP.   - Tobacco cessation counseling          Leukocytosis- (present on admission)    Assessment & Plan    - Trending down  - Resolving with antibiotics.

## 2018-07-22 ENCOUNTER — APPOINTMENT (OUTPATIENT)
Dept: RADIOLOGY | Facility: MEDICAL CENTER | Age: 58
DRG: 853 | End: 2018-07-22
Attending: INTERNAL MEDICINE

## 2018-07-22 LAB
ANION GAP SERPL CALC-SCNC: 5 MMOL/L (ref 0–11.9)
BASOPHILS # BLD AUTO: 0.7 % (ref 0–1.8)
BASOPHILS # BLD: 0.07 K/UL (ref 0–0.12)
BUN SERPL-MCNC: 9 MG/DL (ref 8–22)
CALCIUM SERPL-MCNC: 8.7 MG/DL (ref 8.5–10.5)
CHLORIDE SERPL-SCNC: 97 MMOL/L (ref 96–112)
CO2 SERPL-SCNC: 33 MMOL/L (ref 20–33)
CREAT SERPL-MCNC: 0.42 MG/DL (ref 0.5–1.4)
EOSINOPHIL # BLD AUTO: 0.37 K/UL (ref 0–0.51)
EOSINOPHIL NFR BLD: 3.5 % (ref 0–6.9)
ERYTHROCYTE [DISTWIDTH] IN BLOOD BY AUTOMATED COUNT: 60.4 FL (ref 35.9–50)
GLUCOSE SERPL-MCNC: 84 MG/DL (ref 65–99)
HCT VFR BLD AUTO: 34.2 % (ref 42–52)
HGB BLD-MCNC: 10.7 G/DL (ref 14–18)
IMM GRANULOCYTES # BLD AUTO: 0.05 K/UL (ref 0–0.11)
IMM GRANULOCYTES NFR BLD AUTO: 0.5 % (ref 0–0.9)
LYMPHOCYTES # BLD AUTO: 2.44 K/UL (ref 1–4.8)
LYMPHOCYTES NFR BLD: 22.8 % (ref 22–41)
MAGNESIUM SERPL-MCNC: 1.9 MG/DL (ref 1.5–2.5)
MCH RBC QN AUTO: 27.9 PG (ref 27–33)
MCHC RBC AUTO-ENTMCNC: 31.3 G/DL (ref 33.7–35.3)
MCV RBC AUTO: 89.1 FL (ref 81.4–97.8)
MONOCYTES # BLD AUTO: 0.79 K/UL (ref 0–0.85)
MONOCYTES NFR BLD AUTO: 7.4 % (ref 0–13.4)
NEUTROPHILS # BLD AUTO: 6.99 K/UL (ref 1.82–7.42)
NEUTROPHILS NFR BLD: 65.1 % (ref 44–72)
NRBC # BLD AUTO: 0 K/UL
NRBC BLD-RTO: 0 /100 WBC
PHOSPHATE SERPL-MCNC: 3.5 MG/DL (ref 2.5–4.5)
PLATELET # BLD AUTO: 690 K/UL (ref 164–446)
PMV BLD AUTO: 9.3 FL (ref 9–12.9)
POTASSIUM SERPL-SCNC: 3.7 MMOL/L (ref 3.6–5.5)
RBC # BLD AUTO: 3.84 M/UL (ref 4.7–6.1)
SODIUM SERPL-SCNC: 135 MMOL/L (ref 135–145)
WBC # BLD AUTO: 10.7 K/UL (ref 4.8–10.8)

## 2018-07-22 PROCEDURE — 94640 AIRWAY INHALATION TREATMENT: CPT

## 2018-07-22 PROCEDURE — A9270 NON-COVERED ITEM OR SERVICE: HCPCS | Performed by: INTERNAL MEDICINE

## 2018-07-22 PROCEDURE — 700111 HCHG RX REV CODE 636 W/ 250 OVERRIDE (IP): Performed by: INTERNAL MEDICINE

## 2018-07-22 PROCEDURE — 36415 COLL VENOUS BLD VENIPUNCTURE: CPT

## 2018-07-22 PROCEDURE — 80048 BASIC METABOLIC PNL TOTAL CA: CPT

## 2018-07-22 PROCEDURE — 84100 ASSAY OF PHOSPHORUS: CPT

## 2018-07-22 PROCEDURE — 71045 X-RAY EXAM CHEST 1 VIEW: CPT

## 2018-07-22 PROCEDURE — 770006 HCHG ROOM/CARE - MED/SURG/GYN SEMI*

## 2018-07-22 PROCEDURE — C1729 CATH, DRAINAGE: HCPCS | Performed by: SURGERY

## 2018-07-22 PROCEDURE — 700102 HCHG RX REV CODE 250 W/ 637 OVERRIDE(OP): Performed by: STUDENT IN AN ORGANIZED HEALTH CARE EDUCATION/TRAINING PROGRAM

## 2018-07-22 PROCEDURE — 700105 HCHG RX REV CODE 258: Performed by: STUDENT IN AN ORGANIZED HEALTH CARE EDUCATION/TRAINING PROGRAM

## 2018-07-22 PROCEDURE — 700101 HCHG RX REV CODE 250: Performed by: INTERNAL MEDICINE

## 2018-07-22 PROCEDURE — 94668 MNPJ CHEST WALL SBSQ: CPT

## 2018-07-22 PROCEDURE — 85025 COMPLETE CBC W/AUTO DIFF WBC: CPT

## 2018-07-22 PROCEDURE — 99232 SBSQ HOSP IP/OBS MODERATE 35: CPT | Performed by: INTERNAL MEDICINE

## 2018-07-22 PROCEDURE — A9270 NON-COVERED ITEM OR SERVICE: HCPCS | Performed by: STUDENT IN AN ORGANIZED HEALTH CARE EDUCATION/TRAINING PROGRAM

## 2018-07-22 PROCEDURE — 83735 ASSAY OF MAGNESIUM: CPT

## 2018-07-22 PROCEDURE — 700102 HCHG RX REV CODE 250 W/ 637 OVERRIDE(OP): Performed by: INTERNAL MEDICINE

## 2018-07-22 PROCEDURE — 700105 HCHG RX REV CODE 258: Performed by: INTERNAL MEDICINE

## 2018-07-22 PROCEDURE — 99233 SBSQ HOSP IP/OBS HIGH 50: CPT | Performed by: INTERNAL MEDICINE

## 2018-07-22 RX ORDER — IPRATROPIUM BROMIDE AND ALBUTEROL SULFATE 2.5; .5 MG/3ML; MG/3ML
3 SOLUTION RESPIRATORY (INHALATION)
Status: DISCONTINUED | OUTPATIENT
Start: 2018-07-22 | End: 2018-07-25 | Stop reason: ALTCHOICE

## 2018-07-22 RX ORDER — PREDNISONE 10 MG/1
30 TABLET ORAL DAILY
Status: DISCONTINUED | OUTPATIENT
Start: 2018-07-22 | End: 2018-07-25

## 2018-07-22 RX ORDER — SODIUM CHLORIDE 9 MG/ML
INJECTION, SOLUTION INTRAVENOUS
Status: COMPLETED
Start: 2018-07-22 | End: 2018-07-22

## 2018-07-22 RX ADMIN — IPRATROPIUM BROMIDE AND ALBUTEROL SULFATE 3 ML: .5; 3 SOLUTION RESPIRATORY (INHALATION) at 14:34

## 2018-07-22 RX ADMIN — AMPICILLIN SODIUM AND SULBACTAM SODIUM 3 G: 2; 1 INJECTION, POWDER, FOR SOLUTION INTRAMUSCULAR; INTRAVENOUS at 18:29

## 2018-07-22 RX ADMIN — SODIUM CHLORIDE: 9 INJECTION, SOLUTION INTRAVENOUS at 11:10

## 2018-07-22 RX ADMIN — NICOTINE 21 MG: 21 PATCH, EXTENDED RELEASE TRANSDERMAL at 06:10

## 2018-07-22 RX ADMIN — HYDROCORTISONE SODIUM SUCCINATE 50 MG: 100 INJECTION, POWDER, FOR SOLUTION INTRAMUSCULAR; INTRAVENOUS at 06:10

## 2018-07-22 RX ADMIN — STANDARDIZED SENNA CONCENTRATE AND DOCUSATE SODIUM 2 TABLET: 8.6; 5 TABLET, FILM COATED ORAL at 18:29

## 2018-07-22 RX ADMIN — AMPICILLIN SODIUM AND SULBACTAM SODIUM 3 G: 2; 1 INJECTION, POWDER, FOR SOLUTION INTRAMUSCULAR; INTRAVENOUS at 11:09

## 2018-07-22 RX ADMIN — STANDARDIZED SENNA CONCENTRATE AND DOCUSATE SODIUM 2 TABLET: 8.6; 5 TABLET, FILM COATED ORAL at 06:10

## 2018-07-22 RX ADMIN — PREDNISONE 30 MG: 20 TABLET ORAL at 14:15

## 2018-07-22 RX ADMIN — AMPICILLIN SODIUM AND SULBACTAM SODIUM 3 G: 2; 1 INJECTION, POWDER, FOR SOLUTION INTRAMUSCULAR; INTRAVENOUS at 06:10

## 2018-07-22 RX ADMIN — IPRATROPIUM BROMIDE AND ALBUTEROL SULFATE 3 ML: .5; 3 SOLUTION RESPIRATORY (INHALATION) at 22:42

## 2018-07-22 RX ADMIN — AMPICILLIN SODIUM AND SULBACTAM SODIUM 3 G: 2; 1 INJECTION, POWDER, FOR SOLUTION INTRAMUSCULAR; INTRAVENOUS at 00:06

## 2018-07-22 RX ADMIN — ENOXAPARIN SODIUM 40 MG: 100 INJECTION SUBCUTANEOUS at 06:10

## 2018-07-22 RX ADMIN — OXYCODONE HYDROCHLORIDE 10 MG: 10 TABLET ORAL at 20:41

## 2018-07-22 ASSESSMENT — ENCOUNTER SYMPTOMS
CHILLS: 0
DOUBLE VISION: 0
MYALGIAS: 0
COUGH: 1
SPUTUM PRODUCTION: 1
HEARTBURN: 0
VOMITING: 0
STRIDOR: 0
SENSORY CHANGE: 0
HEADACHES: 0
HEMOPTYSIS: 0
SHORTNESS OF BREATH: 0
SORE THROAT: 0
DIZZINESS: 0
SPEECH CHANGE: 0
BLURRED VISION: 0
MYALGIAS: 1
BRUISES/BLEEDS EASILY: 0
NAUSEA: 0
DEPRESSION: 0
ABDOMINAL PAIN: 0
PALPITATIONS: 0
FEVER: 0

## 2018-07-22 ASSESSMENT — PAIN SCALES - GENERAL: PAINLEVEL_OUTOF10: 5

## 2018-07-22 NOTE — PROGRESS NOTES
Infectious Disease Progress Note    Author: Nikki Johnston M.D. Date & Time of service: 2018  7:26 PM    Chief Complaint:  Empyema     Interval History:  18- Tmax 99.3 WBC 10.9 creatinine 0.34 patient continues to remain significantly debilitated  2018 T-max 98.9 feels much better.  WBC 14.2 platelets 512 creatinine 0.34  2018 T-max 98.8.  Feels better.  WBC 11.3 platelets 520 sed rate 62 CRP 3   AF WBC 12.6 transferred out of ICU.  Feels better overall-IS about 1200 right now   AF WBC 11.5 tolerating IV abx-has not attempted to walk much yet   AF feels stronger-eating OK. Denies SE abx Still has both CT  Labs Reviewed, Medications Reviewed, Radiology Reviewed and Wound Reviewed.    Review of Systems:  Review of Systems   Constitutional: Negative for fever.   HENT: Negative for hearing loss.    Respiratory: Positive for cough.         Chest pain improved   Cardiovascular: Negative for chest pain and leg swelling.   Gastrointestinal: Negative for abdominal pain, nausea and vomiting.   Genitourinary: Negative for dysuria.   Musculoskeletal: Positive for myalgias.   Neurological: Negative for sensory change and speech change.       Hemodynamics:  Temp (24hrs), Av.5 °C (97.7 °F), Min:36.1 °C (97 °F), Max:36.8 °C (98.3 °F)  Temperature: 36.8 °C (98.3 °F)  Pulse  Av  Min: 50  Max: 111   Blood Pressure: 105/57       Physical Exam:  Physical Exam   Constitutional: He is oriented to person, place, and time. He appears well-developed. No distress.   Thin male looking chronically ill   HENT:   Head: Normocephalic and atraumatic.   Mouth/Throat: No oropharyngeal exudate.   Poor dentition   Eyes: Conjunctivae are normal. No scleral icterus.   Neck: Neck supple.   Cardiovascular: Normal rate.    Pulmonary/Chest: Effort normal. No respiratory distress. He has rales.   Chest tubes X2 on left side; decreased BS   Abdominal: Soft. Bowel sounds are normal. He exhibits no distension.    Musculoskeletal: He exhibits no edema.   Neurological: He is alert and oriented to person, place, and time.   Skin: No rash noted.   Nursing note and vitals reviewed.      Meds:    Current Facility-Administered Medications:   •  ampicillin-sulbactam (UNASYN) IV  •  [START ON 7/24/2018] amoxicillin-clavulanate  •  hydrocortisone sodium succinate PF  •  enoxaparin (LOVENOX) injection  •  oxyCODONE immediate-release **OR** oxyCODONE immediate-release  •  Respiratory Care per Protocol  •  senna-docusate **AND** polyethylene glycol/lytes **AND** magnesium hydroxide **AND** bisacodyl  •  MD ALERT...Adult ICU Electrolyte Replacement per Pharmacy Protocol  •  ipratropium-albuterol  •  NS  •  ondansetron  •  ondansetron  •  promethazine  •  promethazine  •  prochlorperazine  •  acetaminophen  •  nicotine **AND** Nicotine Replacement Patient Education Materials **AND** nicotine polacrilex    Labs:  Recent Labs      07/19/18 0350 07/20/18 0325 07/21/18   0455   WBC  12.9*  11.5*  11.6*   RBC  3.59*  3.60*  3.53*   HEMOGLOBIN  9.8*  9.8*  9.7*   HEMATOCRIT  31.6*  32.1*  31.0*   MCV  88.0  89.2  87.8   MCH  27.3  27.2  27.5   RDW  58.4*  59.6*  58.3*   PLATELETCT  587*  637*  618*   MPV  9.3  9.4  9.3   NEUTSPOLYS  72.50*  69.70  70.50   LYMPHOCYTES  18.60*  20.50*  18.30*   MONOCYTES  6.40  6.70  7.00   EOSINOPHILS  1.60  2.10  3.40   BASOPHILS  0.30  0.50  0.30     Recent Labs      07/19/18 0350 07/20/18 0325 07/21/18   0455   SODIUM  137  136  135   POTASSIUM  3.6  3.6  3.8   CHLORIDE  100  100  100   CO2  34*  31  31   GLUCOSE  98  96  91   BUN  9  9  9     Recent Labs      07/19/18 0350 07/20/18 0325 07/21/18   0455   CREATININE  0.36*  0.38*  0.37*       Imaging:  Dx-chest-portable (1 View)    Result Date: 7/16/2018 7/16/2018 4:16 AM HISTORY/REASON FOR EXAM:  For indication of respiratory failure TECHNIQUE/EXAM DESCRIPTION AND NUMBER OF VIEWS: Single portable view of the chest. COMPARISON: Yesterday  FINDINGS: Hardware is stably positioned in its visualized extent. HEART: Stable size. LUNGS: BILATERAL pulmonary opacities are unchanged. PLEURA: Small LEFT pneumothorax is unchanged.     1.  Unchanged small LEFT pneumothorax with chest tubes in place 2.  Unchanged BILATERAL cavitary airspace disease    Dx-chest-portable (1 View)    Result Date: 7/15/2018    7/15/2018 4:29 AM HISTORY/REASON FOR EXAM: For indication of respiratory failure Line placement TECHNIQUE/EXAM DESCRIPTION:  Single AP view of the chest. COMPARISON: Yesterday FINDINGS: Position of medical devices appears stable. The cardiac silhouette appears within normal limits. The mediastinal contour appears within normal limits.  The central pulmonary vasculature appears normal. Hazy bilateral pulmonary opacities are seen. Residual left pneumothorax is seen similar to prior study. No significant pleural effusions are identified. The bony structures appear age-appropriate.  Soft tissue gas in the left chest wall is seen.     1.  Stable pulmonary infiltrates and/or edema with probable component of chronic underlying lung disease. 2.  Left pneumothorax, stable since prior with 2 thoracostomy tubes in place.     Dx-chest-portable (1 View)    Result Date: 7/14/2018 7/14/2018 5:13 AM HISTORY/REASON FOR EXAM: For indication of respiratory failure Line placement TECHNIQUE/EXAM DESCRIPTION:  Single AP view of the chest. COMPARISON: Yesterday FINDINGS: Position of medical devices appears stable. The cardiac silhouette appears within normal limits. The mediastinal contour appears within normal limits.  The central pulmonary vasculature appears normal. Increased opacification throughout the right hemithorax is seen. There is decreased opacification within the left hemithorax. Residual left pneumothorax is seen similar to prior study. No significant pleural effusions are identified. The bony structures appear age-appropriate.  Soft tissue gas in the left chest wall  is seen.     1.  Increased opacification of the right hemithorax, compatible with new large pleural effusion and/or infiltrates and/or atelectasis. 2.  Left pneumothorax, stable since prior with 2 thoracostomy tubes in place.    Dx-chest-portable (1 View)    Result Date: 7/13/2018 7/13/2018 7:21 AM HISTORY/REASON FOR EXAM:  Respiratory failure. TECHNIQUE/EXAM DESCRIPTION AND NUMBER OF VIEWS: Single portable view of the chest. COMPARISON: 7/12/2018 FINDINGS: LUNGS: Multifocal patchy consolidations are reidentified. Stable cavity in the right lung apex, with improving opacities in the right perihilar and infrahilar regions. PNEUMOTHORAX: Stable left pneumothorax and subcutaneous emphysema of the left chest wall. LINES AND TUBES: Stable well-positioned ETT. Stable right IJ central catheter, tip in the superior cavoatrial junction. Stable left chest tubes. MEDIASTINUM: No cardiomegaly. MUSCULOSKELETAL STRUCTURES: No acute fracture. Skin staples in the left lateral chest wall.     1. Improving opacities in the right perihilar and infrahilar regions. Otherwise stable multifocal patchy consolidations. 2. Stable lines and tubes. 3. Stable left pneumothorax subcutaneous emphysema of the left chest wall.    Dx-chest-portable (1 View)    Result Date: 7/12/2018 7/12/2018 12:11 PM HISTORY/REASON FOR EXAM:  POST INTUBATION. TECHNIQUE/EXAM DESCRIPTION AND NUMBER OF VIEWS: Single portable view of the chest. COMPARISON: 7/12/2018 FINDINGS: Interval improvement of RIGHT pleural fluid collection and increased aeration of RIGHT lower lung. Patchy consolidation again seen in the RIGHT upper lung. Focal nodular densities are seen in the LEFT lung as well as consolidation at the mid lung level. Small LEFT pneumothorax is unchanged, with multiple LEFT chest tubes present. Increasing LEFT chest wall emphysema. Other supportive tubing is unchanged.     1.  Interval improved aeration of RIGHT lung base with improvement of RIGHT pleural  effusion. 2.  Stable LEFT pneumothorax with chest tubes present. 3.  Patchy consolidation again seen in both lungs with basilar densities in the LEFT upper lung again noted. 4.  Increasing LEFT chest wall emphysema.    Dx-chest-portable (1 View)    Result Date: 7/12/2018 7/12/2018 3:16 AM HISTORY/REASON FOR EXAM: Line placement TECHNIQUE/EXAM DESCRIPTION:  Single AP view of the chest. COMPARISON: July 10, 2018 FINDINGS: Right internal jugular central line is seen terminating at the right atriocaval junction.  To left thoracostomy tube is in place terminating at the left apex and left lung base. The cardiac silhouette appears within normal limits. The mediastinal contour appears within normal limits.  The central pulmonary vasculature appears normal. The lungs appear well expanded bilaterally.  Increased opacification throughout the right hemithorax is seen. There is decreased opacification within the left hemithorax. Residual left pneumothorax is seen similar to prior study. No significant pleural effusions are identified. The bony structures appear age-appropriate.  Soft tissue gas in the left chest wall is seen.     1.  Increased opacification of the right hemithorax, compatible with new large pleural effusion and/or infiltrates and/or atelectasis. 2.  Improved aeration of the left hemithorax status post thoracostomy tube placement. Residual infiltrates in the left lung are seen. 3.  Left pneumothorax, stable since prior with 2 thoracostomy tubes in place.    Dx-chest-portable (1 View)    Result Date: 7/10/2018  7/10/2018 6:14 PM HISTORY/REASON FOR EXAM:  Post thoracentesis left chest. TECHNIQUE/EXAM DESCRIPTION AND NUMBER OF VIEWS: Single portable view of the chest. COMPARISON: 7/10/2018 FINDINGS: The mediastinal and cardiac silhouette is partially obscured by the left-sided parenchymal opacity and pleural fluid The right pulmonary vascularity is within normal limits. Multiple lung masses are seen on the left.  There is an airspace process in the left mid and lower hemithorax. There is ill-defined nodular parenchymal opacity in the right upper lobe with some hilar retraction. There is no significant pleural effusion. There was a previous hydropneumothorax seen on the outside prior chest x-ray. Pneumothorax is still seen on the lateral aspect of the left chip-thorax. There are no acute bony abnormalities.     1.  There has been interval decrease in the left pleural effusion. 2.  There is still some component of LEFT pneumothorax although the hydropneumothorax air-fluid level is no longer evident. 3.  Multiple left bone or masses are again seen. 4.  Left lower lobe airspace disease is again seen. 5.  Ill-defined nodular and linear opacity in the right upper lobe with pleural thickening and hilar retraction is again seen.    Us-thoracentesis Puncture Left    Result Date: 7/10/2018  7/10/2018 4:01 PM HISTORY/REASON FOR EXAM: Pleural effusion TECHNIQUE/EXAM DESCRIPTION: Ultrasound-guided thoracentesis. Indication:  LEFT pleural fluid collection. COMPARISON:  None PROCEDURE:     Informed consent was obtained. A timeout was taken. A left pleural effusion was localized with real-time ultrasound guidance. The left posterior chest wall was prepped and draped in a sterile manner. Dr. Fine performed the procedure  with my guidance.  Following local anesthesia with 1% lidocaine, a 5 [place] Yueh pigtail catheter was advanced into the pleural space with trocar technique and pleural fluid was drained. The patient tolerated the procedure well without evidence of complication. A post thoracentesis chest radiograph is forthcoming. FINDINGS: Highly complicated left large volume pleural fluid has extensive internal debris Fluid was  sent to the laboratory. Fluid character: purulent     1. Ultrasound guided left sided diagnostic thoracentesis. 2. 500 mL of fluid withdrawn. Additional fluid was not removed as the catheter became occluded  "by the thick liquid    Dx-abdomen For Tube Placement    Result Date: 7/14/2018 7/14/2018 12:59 PM HISTORY/REASON FOR EXAM:  Cortrak evaluation. TECHNIQUE/EXAM DESCRIPTION AND NUMBER OF VIEWS:  1 view(s) of the abdomen. COMPARISON:  None. FINDINGS: Cortrak feeding tube tip projects in the region of the gastric fundus. The tube coils back upon itself from the mid stomach. The thoracic course appears appropriate. The bowel gas pattern is within normal limits.     Cortrak feeding tube tip projects in the region of the gastric fundus.      Micro:  Results     Procedure Component Value Units Date/Time    BLOOD CULTURE [[id number]] Collected:  07/12/18 1046    Order Status:  Completed Specimen:  Blood from Peripheral Updated:  07/17/18 1300     Significant Indicator NEG     Source BLD     Site PERIPHERAL     Blood Culture No growth after 5 days of incubation.    Narrative:       [id number] MEGAN HERNANDEZ  Per Hospital Policy: Only change Specimen Src: to \"Line\" if  specified by physician order.    BLOOD CULTURE [[id number]] Collected:  07/12/18 1046    Order Status:  Completed Specimen:  Blood from Peripheral Updated:  07/17/18 1300     Significant Indicator NEG     Source BLD     Site PERIPHERAL     Blood Culture No growth after 5 days of incubation.    Narrative:       [id number] MEGAN HERNANDEZ  Per Hospital Policy: Only change Specimen Src: to \"Line\" if  specified by physician order.          Assessment:  Active Hospital Problems    Diagnosis   • *Empyema (HCC) [J86.9]   • Acute hypoxemic respiratory failure ([facility]) [J96.01]   • Panlobular emphysema ([facility]) [J43.1]   • Pulmonary parenchymal mass [R91.8]   • Pulmonary cachexia due to COPD ([facility]) [J44.9, R64]   • Protein malnutrition ([facility]) [E46]   • Leukocytosis [D72.829]   • Tobacco abuse [Z72.0]       Plan:  Empyema  Afebrile  Decreased leukocytosis  s/p thoracentesis on 7/10/2018  s/p left thoracotomy decortication and evacuation of purulent empyema and " decortication of lung and rib resection on 7/11/2018  The cultures + group F strep  Continue Unasyn  Continue for 2 weeks of IV antibiotics followed by at least 4 weeks of oral Augmentin  Stop date IV abx 7/24    Leukocytosis  Multifactorial  monitor  IS every hour while awake

## 2018-07-22 NOTE — PROGRESS NOTES
pleuravac container full, changed out to new pleuravac. airleak still present but now continuous. Will cont to monitor.

## 2018-07-22 NOTE — PROGRESS NOTES
"Pulmonary Progress Note    Patient ID:   Name:             Donna Ceballos   YOB: 1960  Age:                 58 y.o.  male   MRN:               [id number]                                                  Subjective:  No resp dificulty; on 3 L O2    Interval Changes: afebrile ; cont to have persistent air leak from CT    Objective:   Vitals/ General Appearance:   Weight/BMI: Body mass index is 24.21 kg/m².  Blood pressure (!) 91/54, pulse 76, temperature 36.7 °C (98 °F), resp. rate 17, height 1.829 m (6' 0.01\"), weight 81 kg (178 lb 9.2 oz), SpO2 95 %.  Vitals:    07/22/18 0700 07/22/18 0716 07/22/18 1046 07/22/18 1129   BP:  118/69  (!) 91/54   Pulse: 84 75 77 76   Resp: 16 17 18 17   Temp:  36.7 °C (98.1 °F)  36.7 °C (98 °F)   SpO2: 95% 98% 97% 95%   Weight:       Height:         Oxygen Therapy:  Pulse Oximetry: 95 %, O2 (LPM): 3, O2 Delivery: Nasal Cannula    Constitutional:   Well developed, Well nourished, No acute distress  HENMT:  Normocephalic, Atraumatic, Oropharynx moist mucous membranes, No oral exudates, Nose normal.  No thyromegaly.  Eyes:  EOMI, Conjunctiva normal, No discharge.  Neck:  Normal range of motion, No cervical tenderness,  no JVD.  Cardiovascular:  Normal heart rate, Normal rhythm, No murmurs, No rubs, No gallops.   Extremitites with intact distal pulses, no cyanosis, or edema.  Lungs:  Normal breath sounds, gushing whistling sound at the Lt chest with the chest tube to auscultation ;  no crackles, no wheezing.   Abdomen: Bowel sounds normal, Soft, No tenderness, No guarding, No rebound, No masses, No hepatosplenomegaly.  Skin: Warm, Dry, No erythema, No rash, no induration.    Labs:  Recent Labs      07/20/18   0325  07/21/18   0455  07/22/18   0535   WBC  11.5*  11.6*  10.7   RBC  3.60*  3.53*  3.84*   HEMOGLOBIN  9.8*  9.7*  10.7*   HEMATOCRIT  32.1*  31.0*  34.2*   MCV  89.2  87.8  89.1   MCH  27.2  27.5  27.9   MCHC  30.5*  31.3*  31.3*   RDW  59.6*  58.3*  60.4* "   PLATELETCT  637*  618*  690*   MPV  9.4  9.3  9.3                 Recent Labs      07/20/18   0325  07/21/18   0455  07/22/18   0535   SODIUM  136  135  135   POTASSIUM  3.6  3.8  3.7   CHLORIDE  100  100  97   CO2  31  31  33   GLUCOSE  96  91  84   BUN  9  9  9     Recent Labs      07/20/18   0325  07/21/18   0455  07/22/18   0535   SODIUM  136  135  135   POTASSIUM  3.6  3.8  3.7   CHLORIDE  100  100  97   CO2  31  31  33   BUN  9  9  9   CREATININE  0.38*  0.37*  0.42*   MAGNESIUM  1.8  1.8  1.9   PHOSPHORUS  2.8  2.9  3.5   CALCIUM  8.0*  8.1*  8.7     No results found for this or any previous visit.      Imaging:   DX-CHEST-PORTABLE (1 VIEW)   Final Result      Somewhat decreased loculated pneumothorax on the left compared with 7/21. No change otherwise.      DX-CHEST-PORTABLE (1 VIEW)   Final Result      Stable chest findings compared with 7/20.      DX-CHEST-PORTABLE (1 VIEW)   Final Result      Stable chest findings compared with 7/19.      DX-CHEST-PORTABLE (1 VIEW)   Final Result      Stable bilateral opacities and partial left pneumothorax.      DX-CHEST-PORTABLE (1 VIEW)   Final Result         1. Left lateral pneumothorax is stable to minimally larger. Stable left chest tube position.   2. Stable lung findings.      CT-CHEST (THORAX) W/O   Final Result         1. There is small left anterior pneumothorax, as seen on prior radiograph. 2 left chest tubes in place.      2. Multiple cavitary lesions with thick wall throughout the left upper lobe. There is large cavitation nearly replacing the entire right upper lobe. No air-fluid level. These findings could relate to cavitation secondary to lung infections. The    differential includes septic emboli, malignancy.      3. Focal nodular opacities in the left upper lobe and patchy groundglass opacities in the right middle lobe, right lower lobe and left lower lobe could relate to infection as well.      4. Small right pleural effusion.      DX-CHEST-LIMITED  (1 VIEW)   Final Result         1.  Interval decrease in left lateral pneumothorax since previous exam.      2.  Right upper lobe and left lower lobe airspace opacities unchanged.      DX-CHEST-PORTABLE (1 VIEW)   Final Result      1.  Unchanged small left lateral pneumothorax with 2 chest tubes in place.      2.  Unchanging bilateral pulmonary opacities.      DX-CHEST-PORTABLE (1 VIEW)   Final Result      1.  Unchanged small LEFT pneumothorax with chest tubes in place   2.  Unchanged BILATERAL cavitary airspace disease      DX-CHEST-PORTABLE (1 VIEW)   Final Result         1.  Stable pulmonary infiltrates and/or edema with probable component of chronic underlying lung disease.   2.  Left pneumothorax, stable since prior with 2 thoracostomy tubes in place.         DX-ABDOMEN FOR TUBE PLACEMENT   Final Result      Cortrak feeding tube tip projects in the region of the gastric fundus.      DX-CHEST-PORTABLE (1 VIEW)   Final Result         1.  Increased opacification of the right hemithorax, compatible with new large pleural effusion and/or infiltrates and/or atelectasis.   2.  Left pneumothorax, stable since prior with 2 thoracostomy tubes in place.      DX-CHEST-PORTABLE (1 VIEW)   Final Result         1. Improving opacities in the right perihilar and infrahilar regions. Otherwise stable multifocal patchy consolidations.   2. Stable lines and tubes.   3. Stable left pneumothorax subcutaneous emphysema of the left chest wall.      DX-CHEST-PORTABLE (1 VIEW)   Final Result      1.  Interval improved aeration of RIGHT lung base with improvement of RIGHT pleural effusion.   2.  Stable LEFT pneumothorax with chest tubes present.   3.  Patchy consolidation again seen in both lungs with basilar densities in the LEFT upper lung again noted.   4.  Increasing LEFT chest wall emphysema.      DX-CHEST-PORTABLE (1 VIEW)   Final Result         1.  Increased opacification of the right hemithorax, compatible with new large pleural  effusion and/or infiltrates and/or atelectasis.   2.  Improved aeration of the left hemithorax status post thoracostomy tube placement. Residual infiltrates in the left lung are seen.   3.  Left pneumothorax, stable since prior with 2 thoracostomy tubes in place.      DX-CHEST-PORTABLE (1 VIEW)   Final Result      1.  There has been interval decrease in the left pleural effusion.   2.  There is still some component of LEFT pneumothorax although the hydropneumothorax air-fluid level is no longer evident.   3.  Multiple left bone or masses are again seen.   4.  Left lower lobe airspace disease is again seen.   5.  Ill-defined nodular and linear opacity in the right upper lobe with pleural thickening and hilar retraction is again seen.      US-THORACENTESIS PUNCTURE LEFT   Final Result      1. Ultrasound guided left sided diagnostic thoracentesis.      2. 500 mL of fluid withdrawn. Additional fluid was not removed as the catheter became occluded by the thick liquid      OUTSIDE IMAGES-CT CHEST/ABDOMEN/PELVIS   Final Result      OUTSIDE IMAGES-DX CHEST   Final Result          Hospital Medications:    Current Facility-Administered Medications:   •  SODIUM CHLORIDE 0.9 % IV SOLN, , , ,   •  ampicillin/sulbactam (UNASYN) 3 g in  mL IVPB, 3 g, Intravenous, Q6HRS, Laina Lopez M.D., Last Rate: 200 mL/hr at 07/22/18 1109, 3 g at 07/22/18 1109  •  [START ON 7/24/2018] amoxicillin-clavulanate (AUGMENTIN) 875-125 MG per tablet 1 Tab, 1 Tab, Oral, Q12HRS, Laina Lopez M.D.  •  hydrocortisone sodium succinate PF (SOLU-CORTEF) 100 MG injection 50 mg, 50 mg, Intravenous, DAILY, Carlton Mcintyre M.D., 50 mg at 07/22/18 0610  •  enoxaparin (LOVENOX) inj 40 mg, 40 mg, Subcutaneous, DAILY, Carlton Mcintyre M.D., 40 mg at 07/22/18 0610  •  oxyCODONE immediate-release (ROXICODONE) tablet 5 mg, 5 mg, Oral, Q4HRS PRN, 5 mg at 07/20/18 0133 **OR** oxyCODONE immediate release (ROXICODONE) tablet 10 mg, 10 mg, Oral, Q4HRS PRN,  Carlton Mcintyre M.D., 10 mg at 07/21/18 2137  •  Respiratory Care per Protocol, , Nebulization, Continuous RT, Carlton Mcintyre M.D.  •  senna-docusate (PERICOLACE or SENOKOT S) 8.6-50 MG per tablet 2 Tab, 2 Tab, Oral, BID, 2 Tab at 07/22/18 0610 **AND** polyethylene glycol/lytes (MIRALAX) PACKET 1 Packet, 1 Packet, Oral, QDAY PRN, 1 Packet at 07/15/18 0835 **AND** magnesium hydroxide (MILK OF MAGNESIA) suspension 30 mL, 30 mL, Oral, QDAY PRN, 30 mL at 07/16/18 0514 **AND** bisacodyl (DULCOLAX) suppository 10 mg, 10 mg, Rectal, QDAY PRN, Carlton Mcintyre M.D., 10 mg at 07/16/18 1610  •  MD ALERT...Adult ICU Electrolyte Replacement per Pharmacy Protocol, , Other, pharmacy to dose, Carlton Mcintyre M.D.  •  ipratropium-albuterol (DUONEB) nebulizer solution, 3 mL, Nebulization, Q2HRS PRN (RT), Carlton Mcintyre M.D.  •  NS infusion, , Intravenous, Continuous, Jose Bourne M.D., Last Rate: 10 mL/hr at 07/22/18 1110  •  ondansetron (ZOFRAN) syringe/vial injection 4 mg, 4 mg, Intravenous, Q4HRS PRN, Shruthi Phan M.D.  •  ondansetron (ZOFRAN ODT) dispertab 4 mg, 4 mg, Oral, Q4HRS PRN, Shruthi Phan M.D.  •  promethazine (PHENERGAN) tablet 12.5-25 mg, 12.5-25 mg, Oral, Q4HRS PRN, Shruthi Phan M.D.  •  promethazine (PHENERGAN) suppository 12.5-25 mg, 12.5-25 mg, Rectal, Q4HRS PRN, Shruthi Phan M.D.  •  prochlorperazine (COMPAZINE) injection 5-10 mg, 5-10 mg, Intravenous, Q4HRS PRN, Shruthi Phan M.D.  •  acetaminophen (TYLENOL) tablet 650 mg, 650 mg, Oral, Q6HRS PRN, Shruthi Phan M.D., 650 mg at 07/16/18 1359  •  nicotine (NICODERM) 21 MG/24HR 21 mg, 21 mg, Transdermal, Daily-0600, 21 mg at 07/22/18 0610 **AND** Nicotine Replacement Patient Education Materials, , , Once **AND** nicotine polacrilex (NICORETTE) 2 MG piece 2 mg, 2 mg, Oral, Q HOUR PRN, Shruthi Phan M.D.    Current Outpatient Medications:  Prescriptions Prior to Admission   Medication Sig Dispense Refill Last Dose   • aspirin (ASA) 325 MG Tab Take  650 mg by mouth every 6 hours as needed.   7/10/2018 at am   • therapeutic multivitamin-minerals (THERAGRAN-M) Tab Take 1 Tab by mouth every day.   7/10/2018 at am       Medication Allergy:  No Known Allergies    Assessment and Plan:  Principal Problem:    Empyema (HCC) POA: Yes   Bronchopleural fistula  Cont Chest tube drainage  Cont antibiotics: Unasym      COPD and Panlobular emphysema (HCC) POA: Yes  DC hydrocortisone and start Prednisone  Duonebs Q 4 hrs      Acute hypoxemic respiratory failure (HCC) POA: Unknown  Improved and tolerates O2 at 2 L per canula      Pulmonary cachexia due to COPD (HCC) POA: Yes    Protein malnutrition (HCC) POA: Yes      Pneumothorax on left POA: Unknown  Persistent secondary to trapped lung and bronchopleural fistula  Cardiothoracic surgery following      Leukocytosis POA: Yes    Tobacco abuse POA: Yes  Resolved Problems:    Lactic acidosis POA: Yes     Quality Measures  Quality-Core Measures   Reviewed items::  Labs reviewed, Medications reviewed and Radiology images reviewed  DVT prophylaxis pharmacological::  Enoxaparin (Lovenox)  Antibiotics:  Treating active infection/contamination beyond 24 hours perioperative coverage

## 2018-07-22 NOTE — CARE PLAN
Problem: Safety  Goal: Will remain free from injury  Up with standby assist, calls for assistance as needed    Problem: Infection  Goal: Will remain free from infection  Iv antibx in use    Problem: Respiratory:  Goal: Respiratory status will improve  02 at 3 liters, cont ox in place, encouraged use of IS    Problem: Pain Management  Goal: Pain level will decrease to patient's comfort goal  No complaints of pain at this time

## 2018-07-22 NOTE — NON-PROVIDER
Internal Medicine Medical Student Note  Note Author: León Angeles, Student    Name Donna Ceballos 1960   Age/Sex 58 y.o. male   MRN [id number]   Code Status FULL             Reason for interval visit  (Principal Problem)   Empyema (HCC)    Interval Problem Daily Status Update  (problem status, last 24 hours, new history, new data )   Empyema - WBC continues to decline, and patient progressively is feeling better. He still coughs productive of sputum, but no shortness of breath, fevers, or chills. Chest tube to be monitored for air leak by surgery. Patient is otherwise stable  Pneumothorax - X-ray from this morning was compared to last x-ray and pneumothorax has decreased in size slight. He is otherwise stable.   Panlobular emphysema - stable on respiratory medication    Review of Systems   Constitutional: Negative for chills and fever.   HENT: Negative for hearing loss and sore throat.    Eyes: Negative for blurred vision and double vision.   Respiratory: Positive for cough and sputum production. Negative for hemoptysis and shortness of breath.    Cardiovascular: Negative for chest pain and palpitations.   Gastrointestinal: Negative for heartburn, nausea and vomiting.   Genitourinary: Negative for dysuria and urgency.   Musculoskeletal: Negative for myalgias.   Skin: Negative.    Neurological: Negative for dizziness and headaches.   Endo/Heme/Allergies: Does not bruise/bleed easily.   Psychiatric/Behavioral: Negative for depression and suicidal ideas.         Physical Exam       Vitals:    18 0716 18 1046 18 1129 18 1436   BP: 118/69  (!) 91/54    Pulse: 75 77 76 74   Resp: 17 18 17 16   Temp: 36.7 °C (98.1 °F)  36.7 °C (98 °F)    SpO2: 98% 97% 95% 96%   Weight:       Height:         Body mass index is 24.21 kg/m².    Oxygen Therapy:  Pulse Oximetry: 96 %, O2 (LPM): 3, O2 Delivery: Nasal Cannula    Physical Exam   Constitutional: He is oriented to person, place, and time  and well-developed, well-nourished, and in no distress.   HENT:   Head: Normocephalic and atraumatic.   Mouth/Throat: Oropharynx is clear and moist.   Eyes: Pupils are equal, round, and reactive to light.   Cardiovascular: Normal rate, regular rhythm and normal heart sounds.  Exam reveals no gallop and no friction rub.    No murmur heard.  Pulmonary/Chest: Effort normal.   Loud whistling hear over lower left lung lobe, loud air movement heard bilaterally upon auscultation.    Musculoskeletal: He exhibits edema.   Pitting edema on lower extremities bilaterally. Right ankle 1+ pitting and left ankle 3+ pitting edema.    Neurological: He is alert and oriented to person, place, and time.   Psychiatric: Affect normal.     Recent Labs      07/20/18 0325 07/21/18 0455 07/22/18   0535   WBC  11.5*  11.6*  10.7   RBC  3.60*  3.53*  3.84*   HEMOGLOBIN  9.8*  9.7*  10.7*   HEMATOCRIT  32.1*  31.0*  34.2*   MCV  89.2  87.8  89.1   MCH  27.2  27.5  27.9   RDW  59.6*  58.3*  60.4*   PLATELETCT  637*  618*  690*   MPV  9.4  9.3  9.3   NEUTSPOLYS  69.70  70.50  65.10   LYMPHOCYTES  20.50*  18.30*  22.80   MONOCYTES  6.70  7.00  7.40   EOSINOPHILS  2.10  3.40  3.50   BASOPHILS  0.50  0.30  0.70     Recent Labs      07/20/18 0325 07/21/18 0455  07/22/18   0535   SODIUM  136  135  135   POTASSIUM  3.6  3.8  3.7   CHLORIDE  100  100  97   CO2  31  31  33   GLUCOSE  96  91  84   BUN  9  9  9           Assessment/Plan     Empyema  - Patient initially presented with cough, greenish bloody sputum, dyspnea and shortness of breath. He also had leukocytosis and lactic acidosis upon admission. Imaging showed large empyema with cavitary lesion/masses in the right upper lobe, pleural effusion and empyema was also described in the left lung. Status post left thoracotomy, decortication, evacuation of purulent empyema, debridement of necrotic lung, and rib resection by surgery with Dr. Diego Martínez. Patient still has two chest tubes and  pleural fluid culture showed beta strep group F.  - He will continue to have the chest tube for adequate drainage of lung fluids.   - Unasyn will also run its full course and then Augmentin win be started thereafter.      Pneumothorax  - Pneumothorax present s/p chest tube insertion.   - Respiratory therapy team suggested that they may be a leakage in the chest tubes, surgery will be consulted to manage the mechanical concerns.   - X-ray this morning compared to x-ray yesterday demonstrate slight decrease in size of PTX.   - Continue chest tube with suction and RT, O2, and Duoneb     Panlobular emphysema  - Due to long history of smoking and lung damage  - He will continue RT, O2, and Duoneb  - Patient is also given incentive spirometry    Tobacco use  - Patient is a long time smoker  - Consider discussing tobacco education and cessation upon discharge

## 2018-07-22 NOTE — PROGRESS NOTES
Surgery     Doing well   Feels great     Small air leak from chest tube   Persistent pneumothorax     Will be seen by thoracic surgeon tomorrow     Diego Martínez MD

## 2018-07-22 NOTE — PROGRESS NOTES
[facility] ATTENDING NOTE:   Fariha Yu MD    Date & Time note created:   7/22/2018   12:23 PM     Visit Time:   Attending/resident bedside rounds 9-11:30 AM    The patient was evaluated with the resident staff.  I reviewed the resident's note and agree with the resident's findings and plan as documented in the resident's note except as documented in the attending note. Please reference resident daily note for complete information.    The chart was reviewed and summarized.  Available labs, imaging, O2 sats ,  EKGs were reviewed. Available nursing, consultant, and resident notes were reviewed. I am actively involved in the patient's care.     Additional attending notes:                                                                          Medically doing well, however loculations and PTX persist, despite chest tubes with ongoing output. Remains on abx. Agree with consult from CT surgery for evaluation for more definitive treatment for his ongoing infection.     Fariha Yu MD   [facility] Hospitalist

## 2018-07-22 NOTE — PROGRESS NOTES
Alert and oriented  No complaints of pain at this time  Iv patent to left forearm  hrr, edema not present today to ble, pulses 2+  Lungs slightly coarse and diminished to right lobes  Lungs very coarse with insp and exp wheezes to left lobes  Productive cough, chioma at bedside  02 at 3 liters, cont ox in place, encouraged use of IS  Bs+, no n/v, +flatus, no bm today  Voiding q.s. Per urinal  Left mid axilla incision with staples azra  Left chest tubes x 2 to 20 cm suction, #1 with scant serosang drainage, #2 with moderate serosang drainage and airleak present  Dressings cdi, no crepitus noted  Coccyx red but blanchable, mepilex in place  Up with standby assist, steady on feet  Tolerating diet  VSS

## 2018-07-22 NOTE — PROGRESS NOTES
Infectious Disease Progress Note    Author: Nikki Johnston M.D. Date & Time of service: 2018  3:24 PM    Chief Complaint:  Empyema     Interval History:  18- Tmax 99.3 WBC 10.9 creatinine 0.34 patient continues to remain significantly debilitated  2018 T-max 98.9 feels much better.  WBC 14.2 platelets 512 creatinine 0.34  2018 T-max 98.8.  Feels better.  WBC 11.3 platelets 520 sed rate 62 CRP 3   AF WBC 12.6 transferred out of ICU.  Feels better overall-IS about 1200 right now   AF WBC 11.5 tolerating IV abx-has not attempted to walk much yet   AF feels stronger-eating OK. Denies SE abx Still has both CT   AF WBC 10.7 up to about 1500 on IS-still has both CT +air leak  Labs Reviewed, Medications Reviewed, Radiology Reviewed and Wound Reviewed.    Review of Systems:  Review of Systems   Constitutional: Negative for fever.   HENT: Negative for hearing loss.    Respiratory: Positive for cough.         Chest pain improved   Cardiovascular: Negative for chest pain and leg swelling.   Gastrointestinal: Negative for abdominal pain, nausea and vomiting.   Genitourinary: Negative for dysuria.   Musculoskeletal: Positive for myalgias.   Neurological: Negative for sensory change and speech change.       Hemodynamics:  Temp (24hrs), Av.6 °C (97.9 °F), Min:36.4 °C (97.5 °F), Max:36.8 °C (98.3 °F)  Temperature: 36.7 °C (98 °F)  Pulse  Av  Min: 50  Max: 111   Blood Pressure: (!) 91/54 (rn notified)       Physical Exam:  Physical Exam   Constitutional: He is oriented to person, place, and time. He appears well-developed. No distress.   Thin male looking chronically ill   HENT:   Head: Normocephalic and atraumatic.   Mouth/Throat: No oropharyngeal exudate.   Poor dentition   Eyes: Conjunctivae and EOM are normal. Pupils are equal, round, and reactive to light.   Neck: Neck supple.   Cardiovascular: Normal rate.    No murmur heard.  Pulmonary/Chest: Effort normal. No respiratory  distress. He has rales. He exhibits tenderness.   Chest tubes X2 on left side; decreased BS   Abdominal: Soft. Bowel sounds are normal. He exhibits no distension. There is no tenderness. There is no rebound.   Musculoskeletal: He exhibits no edema.   Neurological: He is alert and oriented to person, place, and time.   Skin: Skin is warm. No rash noted.   Nursing note and vitals reviewed.      Meds:    Current Facility-Administered Medications:   •  predniSONE  •  ipratropium-albuterol  •  ampicillin-sulbactam (UNASYN) IV  •  [START ON 7/24/2018] amoxicillin-clavulanate  •  enoxaparin (LOVENOX) injection  •  oxyCODONE immediate-release **OR** oxyCODONE immediate-release  •  Respiratory Care per Protocol  •  senna-docusate **AND** polyethylene glycol/lytes **AND** magnesium hydroxide **AND** bisacodyl  •  MD ALERT...Adult ICU Electrolyte Replacement per Pharmacy Protocol  •  NS  •  ondansetron  •  ondansetron  •  promethazine  •  promethazine  •  prochlorperazine  •  acetaminophen  •  nicotine **AND** Nicotine Replacement Patient Education Materials **AND** nicotine polacrilex    Labs:  Recent Labs      07/20/18 0325 07/21/18   0455  07/22/18   0535   WBC  11.5*  11.6*  10.7   RBC  3.60*  3.53*  3.84*   HEMOGLOBIN  9.8*  9.7*  10.7*   HEMATOCRIT  32.1*  31.0*  34.2*   MCV  89.2  87.8  89.1   MCH  27.2  27.5  27.9   RDW  59.6*  58.3*  60.4*   PLATELETCT  637*  618*  690*   MPV  9.4  9.3  9.3   NEUTSPOLYS  69.70  70.50  65.10   LYMPHOCYTES  20.50*  18.30*  22.80   MONOCYTES  6.70  7.00  7.40   EOSINOPHILS  2.10  3.40  3.50   BASOPHILS  0.50  0.30  0.70     Recent Labs      07/20/18   0325  07/21/18   0455  07/22/18   0535   SODIUM  136  135  135   POTASSIUM  3.6  3.8  3.7   CHLORIDE  100  100  97   CO2  31  31  33   GLUCOSE  96  91  84   BUN  9  9  9     Recent Labs      07/20/18   0325  07/21/18   0455  07/22/18   0535   CREATININE  0.38*  0.37*  0.42*       Imaging:  Dx-chest-portable (1 View)    Result Date:  7/16/2018 7/16/2018 4:16 AM HISTORY/REASON FOR EXAM:  For indication of respiratory failure TECHNIQUE/EXAM DESCRIPTION AND NUMBER OF VIEWS: Single portable view of the chest. COMPARISON: Yesterday FINDINGS: Hardware is stably positioned in its visualized extent. HEART: Stable size. LUNGS: BILATERAL pulmonary opacities are unchanged. PLEURA: Small LEFT pneumothorax is unchanged.     1.  Unchanged small LEFT pneumothorax with chest tubes in place 2.  Unchanged BILATERAL cavitary airspace disease    Dx-chest-portable (1 View)    Result Date: 7/15/2018    7/15/2018 4:29 AM HISTORY/REASON FOR EXAM: For indication of respiratory failure Line placement TECHNIQUE/EXAM DESCRIPTION:  Single AP view of the chest. COMPARISON: Yesterday FINDINGS: Position of medical devices appears stable. The cardiac silhouette appears within normal limits. The mediastinal contour appears within normal limits.  The central pulmonary vasculature appears normal. Hazy bilateral pulmonary opacities are seen. Residual left pneumothorax is seen similar to prior study. No significant pleural effusions are identified. The bony structures appear age-appropriate.  Soft tissue gas in the left chest wall is seen.     1.  Stable pulmonary infiltrates and/or edema with probable component of chronic underlying lung disease. 2.  Left pneumothorax, stable since prior with 2 thoracostomy tubes in place.     Dx-chest-portable (1 View)    Result Date: 7/14/2018 7/14/2018 5:13 AM HISTORY/REASON FOR EXAM: For indication of respiratory failure Line placement TECHNIQUE/EXAM DESCRIPTION:  Single AP view of the chest. COMPARISON: Yesterday FINDINGS: Position of medical devices appears stable. The cardiac silhouette appears within normal limits. The mediastinal contour appears within normal limits.  The central pulmonary vasculature appears normal. Increased opacification throughout the right hemithorax is seen. There is decreased opacification within the left  hemithorax. Residual left pneumothorax is seen similar to prior study. No significant pleural effusions are identified. The bony structures appear age-appropriate.  Soft tissue gas in the left chest wall is seen.     1.  Increased opacification of the right hemithorax, compatible with new large pleural effusion and/or infiltrates and/or atelectasis. 2.  Left pneumothorax, stable since prior with 2 thoracostomy tubes in place.    Dx-chest-portable (1 View)    Result Date: 7/13/2018 7/13/2018 7:21 AM HISTORY/REASON FOR EXAM:  Respiratory failure. TECHNIQUE/EXAM DESCRIPTION AND NUMBER OF VIEWS: Single portable view of the chest. COMPARISON: 7/12/2018 FINDINGS: LUNGS: Multifocal patchy consolidations are reidentified. Stable cavity in the right lung apex, with improving opacities in the right perihilar and infrahilar regions. PNEUMOTHORAX: Stable left pneumothorax and subcutaneous emphysema of the left chest wall. LINES AND TUBES: Stable well-positioned ETT. Stable right IJ central catheter, tip in the superior cavoatrial junction. Stable left chest tubes. MEDIASTINUM: No cardiomegaly. MUSCULOSKELETAL STRUCTURES: No acute fracture. Skin staples in the left lateral chest wall.     1. Improving opacities in the right perihilar and infrahilar regions. Otherwise stable multifocal patchy consolidations. 2. Stable lines and tubes. 3. Stable left pneumothorax subcutaneous emphysema of the left chest wall.    Dx-chest-portable (1 View)    Result Date: 7/12/2018 7/12/2018 12:11 PM HISTORY/REASON FOR EXAM:  POST INTUBATION. TECHNIQUE/EXAM DESCRIPTION AND NUMBER OF VIEWS: Single portable view of the chest. COMPARISON: 7/12/2018 FINDINGS: Interval improvement of RIGHT pleural fluid collection and increased aeration of RIGHT lower lung. Patchy consolidation again seen in the RIGHT upper lung. Focal nodular densities are seen in the LEFT lung as well as consolidation at the mid lung level. Small LEFT pneumothorax is unchanged, with  multiple LEFT chest tubes present. Increasing LEFT chest wall emphysema. Other supportive tubing is unchanged.     1.  Interval improved aeration of RIGHT lung base with improvement of RIGHT pleural effusion. 2.  Stable LEFT pneumothorax with chest tubes present. 3.  Patchy consolidation again seen in both lungs with basilar densities in the LEFT upper lung again noted. 4.  Increasing LEFT chest wall emphysema.    Dx-chest-portable (1 View)    Result Date: 7/12/2018 7/12/2018 3:16 AM HISTORY/REASON FOR EXAM: Line placement TECHNIQUE/EXAM DESCRIPTION:  Single AP view of the chest. COMPARISON: July 10, 2018 FINDINGS: Right internal jugular central line is seen terminating at the right atriocaval junction.  To left thoracostomy tube is in place terminating at the left apex and left lung base. The cardiac silhouette appears within normal limits. The mediastinal contour appears within normal limits.  The central pulmonary vasculature appears normal. The lungs appear well expanded bilaterally.  Increased opacification throughout the right hemithorax is seen. There is decreased opacification within the left hemithorax. Residual left pneumothorax is seen similar to prior study. No significant pleural effusions are identified. The bony structures appear age-appropriate.  Soft tissue gas in the left chest wall is seen.     1.  Increased opacification of the right hemithorax, compatible with new large pleural effusion and/or infiltrates and/or atelectasis. 2.  Improved aeration of the left hemithorax status post thoracostomy tube placement. Residual infiltrates in the left lung are seen. 3.  Left pneumothorax, stable since prior with 2 thoracostomy tubes in place.    Dx-chest-portable (1 View)    Result Date: 7/10/2018  7/10/2018 6:14 PM HISTORY/REASON FOR EXAM:  Post thoracentesis left chest. TECHNIQUE/EXAM DESCRIPTION AND NUMBER OF VIEWS: Single portable view of the chest. COMPARISON: 7/10/2018 FINDINGS: The mediastinal  and cardiac silhouette is partially obscured by the left-sided parenchymal opacity and pleural fluid The right pulmonary vascularity is within normal limits. Multiple lung masses are seen on the left. There is an airspace process in the left mid and lower hemithorax. There is ill-defined nodular parenchymal opacity in the right upper lobe with some hilar retraction. There is no significant pleural effusion. There was a previous hydropneumothorax seen on the outside prior chest x-ray. Pneumothorax is still seen on the lateral aspect of the left chip-thorax. There are no acute bony abnormalities.     1.  There has been interval decrease in the left pleural effusion. 2.  There is still some component of LEFT pneumothorax although the hydropneumothorax air-fluid level is no longer evident. 3.  Multiple left bone or masses are again seen. 4.  Left lower lobe airspace disease is again seen. 5.  Ill-defined nodular and linear opacity in the right upper lobe with pleural thickening and hilar retraction is again seen.    Us-thoracentesis Puncture Left    Result Date: 7/10/2018  7/10/2018 4:01 PM HISTORY/REASON FOR EXAM: Pleural effusion TECHNIQUE/EXAM DESCRIPTION: Ultrasound-guided thoracentesis. Indication:  LEFT pleural fluid collection. COMPARISON:  None PROCEDURE:     Informed consent was obtained. A timeout was taken. A left pleural effusion was localized with real-time ultrasound guidance. The left posterior chest wall was prepped and draped in a sterile manner. Dr. Fine performed the procedure  with my guidance.  Following local anesthesia with 1% lidocaine, a 5 Pashto Yueh pigtail catheter was advanced into the pleural space with trocar technique and pleural fluid was drained. The patient tolerated the procedure well without evidence of complication. A post thoracentesis chest radiograph is forthcoming. FINDINGS: Highly complicated left large volume pleural fluid has extensive internal debris Fluid was  sent  "to the laboratory. Fluid character: purulent     1. Ultrasound guided left sided diagnostic thoracentesis. 2. 500 mL of fluid withdrawn. Additional fluid was not removed as the catheter became occluded by the thick liquid    Dx-abdomen For Tube Placement    Result Date: 7/14/2018 7/14/2018 12:59 PM HISTORY/REASON FOR EXAM:  Cortrak evaluation. TECHNIQUE/EXAM DESCRIPTION AND NUMBER OF VIEWS:  1 view(s) of the abdomen. COMPARISON:  None. FINDINGS: Cortrak feeding tube tip projects in the region of the gastric fundus. The tube coils back upon itself from the mid stomach. The thoracic course appears appropriate. The bowel gas pattern is within normal limits.     Cortrak feeding tube tip projects in the region of the gastric fundus.      Micro:  Results     Procedure Component Value Units Date/Time    BLOOD CULTURE [[id number]] Collected:  07/12/18 1046    Order Status:  Completed Specimen:  Blood from Peripheral Updated:  07/17/18 1300     Significant Indicator NEG     Source BLD     Site PERIPHERAL     Blood Culture No growth after 5 days of incubation.    Narrative:       [id number] MEGAN HERNANDEZ  Per Hospital Policy: Only change Specimen Src: to \"Line\" if  specified by physician order.    BLOOD CULTURE [[id number]] Collected:  07/12/18 1046    Order Status:  Completed Specimen:  Blood from Peripheral Updated:  07/17/18 1300     Significant Indicator NEG     Source BLD     Site PERIPHERAL     Blood Culture No growth after 5 days of incubation.    Narrative:       [id number] MEGAN HERNANDEZ  Per Hospital Policy: Only change Specimen Src: to \"Line\" if  specified by physician order.          Assessment:  Active Hospital Problems    Diagnosis   • *Empyema (HCC) [J86.9]   • Acute hypoxemic respiratory failure (HCC) [J96.01]   • Panlobular emphysema (HCC) [J43.1]   • Pulmonary parenchymal mass [R91.8]   • Pulmonary cachexia due to COPD (HCC) [J44.9, R64]   • Protein malnutrition (HCC) [E46]   • Leukocytosis " [D72.829]   • Tobacco abuse [Z72.0]       Plan:  Empyema  Afebrile  s/p thoracentesis on 7/10/2018  s/p left thoracotomy decortication and evacuation of purulent empyema and decortication of lung and rib resection on 7/11/2018  The cultures + group F strep  Removal CT per CTS  Continue Unasyn  Continue for 2 weeks of IV antibiotics followed by at least 4 weeks of oral Augmentin  Stop date IV abx 7/24    Leukocytosis, resolved today  Multifactorial  monitor  IS every hour while awake

## 2018-07-22 NOTE — PROGRESS NOTES
Internal Medicine Interval Note  Note Author: Yogi Jesus M.D.     Name Donna Ceballos 1960   Age/Sex 58 y.o. male   MRN [id number]   Code Status FULL     After 5PM or if no immediate response to page, please call for cross-coverage  Attending/Team: Dr. Yu/Natanael See Patient List for primary contact information  Call [phone] to page    1st Call - Day Intern (R1):   Dr. Jesus 2nd Call - Day Sr. Resident (R2/R3):   Dr. Jin         Reason for interval visit  (Principal Problem)   Large left-sided empyema       Interval Problem Daily Status Update  (24 hours, problem oriented, brief subjective history, new lab/imaging data pertinent to that problem)   Patient was alert and oriented x 4. He was sitting up in bed and seems and feels better.   Continuing to ambulate.  Using the incentive spirometer - Between 1000 - 1500  24 hour output on chest drain is around 170 ml.  His cough is better and reduced sputum production.  No fever, chest pain or SOB.  Vitals are stable.          Review of Systems   Constitutional: Negative for chills and fever.   HENT: Negative for hearing loss.    Respiratory: Positive for cough and sputum production. Negative for hemoptysis and stridor.    Cardiovascular: Negative for chest pain.   Gastrointestinal: Negative for abdominal pain, nausea and vomiting.   Genitourinary: Negative for dysuria and urgency.   Musculoskeletal: Negative for joint pain and myalgias.   Skin: Negative for rash.   Neurological: Negative for dizziness and headaches.   Psychiatric/Behavioral: Negative for depression.       Disposition/Barriers to discharge:   Chest tubes    Consultants/Specialty  Dr. Martínez/ Surgery ; Dr Mcintyre/Pulmonary  PCP: Pt States None        Quality Measures  Quality-Core Measures   Reviewed items::  Labs reviewed, EKG reviewed, Medications reviewed and Radiology images reviewed  Motley catheter::  No Motley          Physical Exam       Vitals:     07/22/18 1046 07/22/18 1129 07/22/18 1436 07/22/18 1600   BP:  (!) 91/54  (!) 97/54   Pulse: 77 76 74 83   Resp: 18 17 16 18   Temp:  36.7 °C (98 °F)  36.6 °C (97.8 °F)   SpO2: 97% 95% 96% 98%   Weight:       Height:         Body mass index is 24.21 kg/m².    Oxygen Therapy:  Pulse Oximetry: 98 %, O2 (LPM): 3, O2 Delivery: Nasal Cannula    Physical Exam   Constitutional: He is oriented to person, place, and time. He appears malnourished.   HENT:   Head: Normocephalic.   Eyes: Pupils are equal, round, and reactive to light.   Neck: Normal range of motion.   Cardiovascular: Normal rate and normal heart sounds.    Pulmonary/Chest: He has wheezes. He has rales.   Abdominal: Soft. Bowel sounds are normal.   Musculoskeletal: Normal range of motion.   Neurological: He is alert and oriented to person, place, and time.   Skin: Skin is warm.             Assessment/Plan     * Empyema (HCC)- (present on admission)   Assessment & Plan    Improving  Continue Unasyn and Augmentin thereafter  Chest physiotherapy  Chest tubes in place            Acute hypoxemic respiratory failure (HCC)   Assessment & Plan    - Resolved.             Panlobular emphysema (HCC)- (present on admission)   Assessment & Plan    Improving  - Continue scheduled duonebs.  - RT protocol, Oxysgen, Cont pulse ox.  - Counseling.        Pneumothorax on left   Assessment & Plan    Likely due to chest tubes placement  Will continue to monitor  CT surgery monitoring        Protein malnutrition (HCC)- (present on admission)   Assessment & Plan    - Patient appears malnourished.  - Lost around 10 lbs since March when the symptoms started.  - Nutrition consult        Pulmonary cachexia due to COPD (HCC)- (present on admission)   Assessment & Plan    - Likely secondary to emphysema and empyema  - Nutritional consult.   - Continue antibiotics         Tobacco abuse- (present on admission)   Assessment & Plan    - Nicotine patches while IP.   - Tobacco cessation  counseling          Leukocytosis- (present on admission)   Assessment & Plan    - Resolved  - Continue antibiotics.

## 2018-07-22 NOTE — PROGRESS NOTES
"/61   Pulse 62   Temp 36.5 °C (97.7 °F)   Resp 16   Ht 1.829 m (6' 0.01\")   Wt 81 kg (178 lb 9.2 oz)   SpO2 97%   BMI 24.21 kg/m²     Patient is A&Ox4.   Reports pain to left rib cage, medicated per NOE KNIGHT, CMS intact, denies numbness and tingling.   Mobilizes with x 1 assist, educated to call for assistance.   On 3L O2 NC, mild SOB, reports chest pain around CT insertion site. Achieves 1000 on IS.   Chest tube x 2 in place, connected to 20 cm wall suction. Air leak present to CT 2, MD aware. Dressing intact.   Left rib cage incision approximated, intact.   Normoactive BS x 4. Tolerating diet. Denies nausea/vomiting.   + flatus, LBM 7/21. Pt is voiding adequately via bedside urinal.   PIV SL.   Updated on POC. Belongings and call light within reach. All needs met at this time.     "

## 2018-07-23 ENCOUNTER — APPOINTMENT (OUTPATIENT)
Dept: RADIOLOGY | Facility: MEDICAL CENTER | Age: 58
DRG: 853 | End: 2018-07-23
Attending: INTERNAL MEDICINE

## 2018-07-23 LAB
ALBUMIN SERPL BCP-MCNC: 2.5 G/DL (ref 3.2–4.9)
ALBUMIN/GLOB SERPL: 0.6 G/DL
ALP SERPL-CCNC: 52 U/L (ref 30–99)
ALT SERPL-CCNC: 30 U/L (ref 2–50)
ANION GAP SERPL CALC-SCNC: 3 MMOL/L (ref 0–11.9)
ANION GAP SERPL CALC-SCNC: 6 MMOL/L (ref 0–11.9)
AST SERPL-CCNC: 21 U/L (ref 12–45)
BASOPHILS # BLD AUTO: 0.7 % (ref 0–1.8)
BASOPHILS # BLD: 0.07 K/UL (ref 0–0.12)
BILIRUB SERPL-MCNC: 0.2 MG/DL (ref 0.1–1.5)
BUN SERPL-MCNC: 10 MG/DL (ref 8–22)
BUN SERPL-MCNC: 10 MG/DL (ref 8–22)
CALCIUM SERPL-MCNC: 8.4 MG/DL (ref 8.5–10.5)
CALCIUM SERPL-MCNC: 8.4 MG/DL (ref 8.5–10.5)
CHLORIDE SERPL-SCNC: 100 MMOL/L (ref 96–112)
CHLORIDE SERPL-SCNC: 100 MMOL/L (ref 96–112)
CO2 SERPL-SCNC: 32 MMOL/L (ref 20–33)
CO2 SERPL-SCNC: 32 MMOL/L (ref 20–33)
CREAT SERPL-MCNC: 0.5 MG/DL (ref 0.5–1.4)
CREAT SERPL-MCNC: 0.51 MG/DL (ref 0.5–1.4)
EOSINOPHIL # BLD AUTO: 0.08 K/UL (ref 0–0.51)
EOSINOPHIL NFR BLD: 0.8 % (ref 0–6.9)
ERYTHROCYTE [DISTWIDTH] IN BLOOD BY AUTOMATED COUNT: 59.2 FL (ref 35.9–50)
GLOBULIN SER CALC-MCNC: 3.9 G/DL (ref 1.9–3.5)
GLUCOSE SERPL-MCNC: 123 MG/DL (ref 65–99)
GLUCOSE SERPL-MCNC: 126 MG/DL (ref 65–99)
HCT VFR BLD AUTO: 30.7 % (ref 42–52)
HGB BLD-MCNC: 9.6 G/DL (ref 14–18)
IMM GRANULOCYTES # BLD AUTO: 0.04 K/UL (ref 0–0.11)
IMM GRANULOCYTES NFR BLD AUTO: 0.4 % (ref 0–0.9)
LYMPHOCYTES # BLD AUTO: 1.96 K/UL (ref 1–4.8)
LYMPHOCYTES NFR BLD: 20.2 % (ref 22–41)
MAGNESIUM SERPL-MCNC: 1.9 MG/DL (ref 1.5–2.5)
MCH RBC QN AUTO: 27.7 PG (ref 27–33)
MCHC RBC AUTO-ENTMCNC: 31.3 G/DL (ref 33.7–35.3)
MCV RBC AUTO: 88.7 FL (ref 81.4–97.8)
MONOCYTES # BLD AUTO: 0.6 K/UL (ref 0–0.85)
MONOCYTES NFR BLD AUTO: 6.2 % (ref 0–13.4)
NEUTROPHILS # BLD AUTO: 6.94 K/UL (ref 1.82–7.42)
NEUTROPHILS NFR BLD: 71.7 % (ref 44–72)
NRBC # BLD AUTO: 0 K/UL
NRBC BLD-RTO: 0 /100 WBC
PHOSPHATE SERPL-MCNC: 3.4 MG/DL (ref 2.5–4.5)
PLATELET # BLD AUTO: 666 K/UL (ref 164–446)
PMV BLD AUTO: 9.3 FL (ref 9–12.9)
POTASSIUM SERPL-SCNC: 4.2 MMOL/L (ref 3.6–5.5)
POTASSIUM SERPL-SCNC: 4.2 MMOL/L (ref 3.6–5.5)
PROT SERPL-MCNC: 6.4 G/DL (ref 6–8.2)
RBC # BLD AUTO: 3.46 M/UL (ref 4.7–6.1)
SODIUM SERPL-SCNC: 135 MMOL/L (ref 135–145)
SODIUM SERPL-SCNC: 138 MMOL/L (ref 135–145)
WBC # BLD AUTO: 9.7 K/UL (ref 4.8–10.8)

## 2018-07-23 PROCEDURE — 97535 SELF CARE MNGMENT TRAINING: CPT

## 2018-07-23 PROCEDURE — 80053 COMPREHEN METABOLIC PANEL: CPT

## 2018-07-23 PROCEDURE — 700111 HCHG RX REV CODE 636 W/ 250 OVERRIDE (IP): Performed by: INTERNAL MEDICINE

## 2018-07-23 PROCEDURE — 770006 HCHG ROOM/CARE - MED/SURG/GYN SEMI*

## 2018-07-23 PROCEDURE — 97530 THERAPEUTIC ACTIVITIES: CPT

## 2018-07-23 PROCEDURE — 700102 HCHG RX REV CODE 250 W/ 637 OVERRIDE(OP): Performed by: INTERNAL MEDICINE

## 2018-07-23 PROCEDURE — 99232 SBSQ HOSP IP/OBS MODERATE 35: CPT | Mod: GC | Performed by: HOSPITALIST

## 2018-07-23 PROCEDURE — 71045 X-RAY EXAM CHEST 1 VIEW: CPT

## 2018-07-23 PROCEDURE — 99232 SBSQ HOSP IP/OBS MODERATE 35: CPT | Performed by: INTERNAL MEDICINE

## 2018-07-23 PROCEDURE — 36415 COLL VENOUS BLD VENIPUNCTURE: CPT

## 2018-07-23 PROCEDURE — A9270 NON-COVERED ITEM OR SERVICE: HCPCS | Performed by: INTERNAL MEDICINE

## 2018-07-23 PROCEDURE — 700101 HCHG RX REV CODE 250: Performed by: INTERNAL MEDICINE

## 2018-07-23 PROCEDURE — 700105 HCHG RX REV CODE 258: Performed by: INTERNAL MEDICINE

## 2018-07-23 PROCEDURE — 83735 ASSAY OF MAGNESIUM: CPT

## 2018-07-23 PROCEDURE — 84100 ASSAY OF PHOSPHORUS: CPT

## 2018-07-23 PROCEDURE — 700102 HCHG RX REV CODE 250 W/ 637 OVERRIDE(OP): Performed by: STUDENT IN AN ORGANIZED HEALTH CARE EDUCATION/TRAINING PROGRAM

## 2018-07-23 PROCEDURE — 85025 COMPLETE CBC W/AUTO DIFF WBC: CPT

## 2018-07-23 PROCEDURE — 94640 AIRWAY INHALATION TREATMENT: CPT

## 2018-07-23 PROCEDURE — A9270 NON-COVERED ITEM OR SERVICE: HCPCS | Performed by: STUDENT IN AN ORGANIZED HEALTH CARE EDUCATION/TRAINING PROGRAM

## 2018-07-23 PROCEDURE — 80048 BASIC METABOLIC PNL TOTAL CA: CPT

## 2018-07-23 RX ORDER — AMOXICILLIN AND CLAVULANATE POTASSIUM 875; 125 MG/1; MG/1
1 TABLET, FILM COATED ORAL EVERY 12 HOURS
Status: DISCONTINUED | OUTPATIENT
Start: 2018-07-25 | End: 2018-07-25 | Stop reason: HOSPADM

## 2018-07-23 RX ADMIN — AMPICILLIN SODIUM AND SULBACTAM SODIUM 3 G: 2; 1 INJECTION, POWDER, FOR SOLUTION INTRAMUSCULAR; INTRAVENOUS at 17:46

## 2018-07-23 RX ADMIN — AMPICILLIN SODIUM AND SULBACTAM SODIUM 3 G: 2; 1 INJECTION, POWDER, FOR SOLUTION INTRAMUSCULAR; INTRAVENOUS at 11:40

## 2018-07-23 RX ADMIN — STANDARDIZED SENNA CONCENTRATE AND DOCUSATE SODIUM 2 TABLET: 8.6; 5 TABLET, FILM COATED ORAL at 17:46

## 2018-07-23 RX ADMIN — NICOTINE 21 MG: 21 PATCH, EXTENDED RELEASE TRANSDERMAL at 06:30

## 2018-07-23 RX ADMIN — AMPICILLIN SODIUM AND SULBACTAM SODIUM 3 G: 2; 1 INJECTION, POWDER, FOR SOLUTION INTRAMUSCULAR; INTRAVENOUS at 00:36

## 2018-07-23 RX ADMIN — IPRATROPIUM BROMIDE AND ALBUTEROL SULFATE 3 ML: .5; 3 SOLUTION RESPIRATORY (INHALATION) at 15:10

## 2018-07-23 RX ADMIN — IPRATROPIUM BROMIDE AND ALBUTEROL SULFATE 3 ML: .5; 3 SOLUTION RESPIRATORY (INHALATION) at 02:24

## 2018-07-23 RX ADMIN — STANDARDIZED SENNA CONCENTRATE AND DOCUSATE SODIUM 2 TABLET: 8.6; 5 TABLET, FILM COATED ORAL at 06:30

## 2018-07-23 RX ADMIN — IPRATROPIUM BROMIDE AND ALBUTEROL SULFATE 3 ML: .5; 3 SOLUTION RESPIRATORY (INHALATION) at 07:07

## 2018-07-23 RX ADMIN — IPRATROPIUM BROMIDE AND ALBUTEROL SULFATE 3 ML: .5; 3 SOLUTION RESPIRATORY (INHALATION) at 19:39

## 2018-07-23 RX ADMIN — ENOXAPARIN SODIUM 40 MG: 100 INJECTION SUBCUTANEOUS at 06:30

## 2018-07-23 RX ADMIN — PREDNISONE 30 MG: 20 TABLET ORAL at 06:30

## 2018-07-23 RX ADMIN — OXYCODONE HYDROCHLORIDE 10 MG: 10 TABLET ORAL at 22:04

## 2018-07-23 RX ADMIN — AMPICILLIN SODIUM AND SULBACTAM SODIUM 3 G: 2; 1 INJECTION, POWDER, FOR SOLUTION INTRAMUSCULAR; INTRAVENOUS at 06:30

## 2018-07-23 RX ADMIN — OXYCODONE HYDROCHLORIDE 5 MG: 5 TABLET ORAL at 11:43

## 2018-07-23 RX ADMIN — BISACODYL 10 MG: 10 SUPPOSITORY RECTAL at 21:00

## 2018-07-23 ASSESSMENT — ENCOUNTER SYMPTOMS
ABDOMINAL PAIN: 0
SORE THROAT: 0
NAUSEA: 0
BLURRED VISION: 0
HEADACHES: 0
SHORTNESS OF BREATH: 0
MYALGIAS: 1
HEARTBURN: 0
MYALGIAS: 0
FEVER: 0
CHILLS: 0
DEPRESSION: 0
HEMOPTYSIS: 0
SPEECH CHANGE: 0
COUGH: 1
PALPITATIONS: 0
DIZZINESS: 0
DOUBLE VISION: 0
VOMITING: 0
STRIDOR: 0
BRUISES/BLEEDS EASILY: 0
SPUTUM PRODUCTION: 1
SENSORY CHANGE: 0

## 2018-07-23 ASSESSMENT — PAIN SCALES - GENERAL
PAINLEVEL_OUTOF10: 0
PAINLEVEL_OUTOF10: 5
PAINLEVEL_OUTOF10: 8
PAINLEVEL_OUTOF10: 2
PAINLEVEL_OUTOF10: 3

## 2018-07-23 ASSESSMENT — COGNITIVE AND FUNCTIONAL STATUS - GENERAL
DAILY ACTIVITIY SCORE: 23
SUGGESTED CMS G CODE MODIFIER DAILY ACTIVITY: CI
HELP NEEDED FOR BATHING: A LITTLE

## 2018-07-23 NOTE — CARE PLAN
Problem: Safety  Goal: Will remain free from injury  Updated about POC. Reinforce call light use. Pt acknowledged understanding    Problem: Infection  Goal: Will remain free from infection  Iv abx continued.     Problem: Respiratory:  Goal: Respiratory status will improve  Encouraged IS use. Pulls about 500-1000ml

## 2018-07-23 NOTE — PROGRESS NOTES
"Progress Note:  7/23/2018, 9:58 AM    S: No acute events. Breathing feels fine, pain controlled.    O:  Blood pressure 116/65, pulse 66, temperature 37.1 °C (98.8 °F), resp. rate 17, height 1.829 m (6' 0.01\"), weight 81 kg (178 lb 9.2 oz), SpO2 98 %.    NAD, awake and alert, sitting up in bed  Breathing is nonlabored  2 left chest tubes in place to -40 suction via Pleur-evac. Chest tube #1 has no air leak, chest tube #2 has a small intermittent leak.   Thoracotomy incision is intact with staples in place, no redness or drainage      A:   Active Hospital Problems    Diagnosis   • Acute hypoxemic respiratory failure (HCC) [J96.01]     Priority: High   • Panlobular emphysema (HCC) [J43.1]     Priority: High   • Empyema (HCC) [J86.9]     Priority: High           • Pneumothorax on left [J93.9]     Priority: Medium   • Pulmonary cachexia due to COPD (HCC) [J44.9, R64]     Priority: Medium   • Protein malnutrition (HCC) [E46]     Priority: Medium   • Leukocytosis [D72.829]     Priority: Low   • Tobacco abuse [Z72.0]     Priority: Low         P:   -Recommend continuing both chest tubes to -40 section for today  -I will likely remove chest tube #1 tomorrow if there is still no air leak  -If chest tube #2 has a persistent air leak over several days, likely recommend the patient to go home with the tube to a pneumostatic valve, and then evaluate him after 2 weeks for removal of the chest tube in the office  -Ambulate 4 times a day, may place chest tube to water seal for this  -Incentive spirometry 10 times per hour  -Further management per primary medical service  -I will continue to follow    Pb Gaviria M.D.  [facility]  [phone]    "

## 2018-07-23 NOTE — PROGRESS NOTES
Infectious Disease Progress Note    Author: Tessa Rich M.D. Date & Time of service: 2018  2:12 PM    Chief Complaint:  Empyema     Interval History:  18- Tmax 99.3 WBC 10.9 creatinine 0.34 patient continues to remain significantly debilitated  2018 T-max 98.9 feels much better.  WBC 14.2 platelets 512 creatinine 0.34  2018 T-max 98.8.  Feels better.  WBC 11.3 platelets 520 sed rate 62 CRP 3   AF WBC 12.6 transferred out of ICU.  Feels better overall-IS about 1200 right now   AF WBC 11.5 tolerating IV abx-has not attempted to walk much yet   AF feels stronger-eating OK. Denies SE abx Still has both CT   AF WBC 10.7 up to about 1500 on IS-still has both CT +air leak   AF W BC 9.7 patient is breathing very well with decreased cough, currently on 3 L nasal cannula, tolerating antibiotics without any side effects  Labs Reviewed, Medications Reviewed, Radiology Reviewed and Wound Reviewed.    Review of Systems:  Review of Systems   Constitutional: Negative for fever.   HENT: Negative for hearing loss.    Respiratory: Positive for cough.         Chest pain improved   Cardiovascular: Negative for chest pain and leg swelling.   Gastrointestinal: Negative for abdominal pain, nausea and vomiting.   Genitourinary: Negative for dysuria.   Musculoskeletal: Positive for myalgias.   Neurological: Negative for sensory change and speech change.       Hemodynamics:  Temp (24hrs), Av.7 °C (98 °F), Min:36.4 °C (97.5 °F), Max:37.1 °C (98.8 °F)  Temperature: 37.1 °C (98.8 °F)  Pulse  Av.9  Min: 50  Max: 111   Blood Pressure: 116/65       Physical Exam:  Physical Exam   Constitutional: He is oriented to person, place, and time. He appears well-developed. No distress.   Thin male looking chronically ill   HENT:   Head: Normocephalic and atraumatic.   Mouth/Throat: No oropharyngeal exudate.   Poor dentition   Eyes: Conjunctivae and EOM are normal. Pupils are equal, round, and reactive  to light.   Neck: Neck supple.   Cardiovascular: Normal rate.    No murmur heard.  Pulmonary/Chest: Effort normal. No respiratory distress. He has rales. He exhibits tenderness.   Chest tubes X2 on left side; decreased BS   Abdominal: Soft. Bowel sounds are normal. He exhibits no distension. There is no tenderness. There is no rebound.   Musculoskeletal: He exhibits no edema.   Neurological: He is alert and oriented to person, place, and time.   Skin: Skin is warm. No rash noted.   Nursing note and vitals reviewed.      Meds:    Current Facility-Administered Medications:   •  predniSONE  •  ipratropium-albuterol  •  ampicillin-sulbactam (UNASYN) IV  •  [START ON 7/24/2018] amoxicillin-clavulanate  •  enoxaparin (LOVENOX) injection  •  oxyCODONE immediate-release **OR** oxyCODONE immediate-release  •  Respiratory Care per Protocol  •  senna-docusate **AND** polyethylene glycol/lytes **AND** magnesium hydroxide **AND** bisacodyl  •  MD ALERT...Adult ICU Electrolyte Replacement per Pharmacy Protocol  •  NS  •  ondansetron  •  ondansetron  •  promethazine  •  promethazine  •  prochlorperazine  •  acetaminophen  •  nicotine **AND** Nicotine Replacement Patient Education Materials **AND** nicotine polacrilex    Labs:  Recent Labs      07/21/18 0455 07/22/18 0535  07/23/18   0311   WBC  11.6*  10.7  9.7   RBC  3.53*  3.84*  3.46*   HEMOGLOBIN  9.7*  10.7*  9.6*   HEMATOCRIT  31.0*  34.2*  30.7*   MCV  87.8  89.1  88.7   MCH  27.5  27.9  27.7   RDW  58.3*  60.4*  59.2*   PLATELETCT  618*  690*  666*   MPV  9.3  9.3  9.3   NEUTSPOLYS  70.50  65.10  71.70   LYMPHOCYTES  18.30*  22.80  20.20*   MONOCYTES  7.00  7.40  6.20   EOSINOPHILS  3.40  3.50  0.80   BASOPHILS  0.30  0.70  0.70     Recent Labs      07/21/18 0455 07/22/18   0535  07/23/18   0311   SODIUM  135  135  135  138   POTASSIUM  3.8  3.7  4.2  4.2   CHLORIDE  100  97  100  100   CO2  31  33  32  32   GLUCOSE  91  84  126*  123*   BUN  9  9  10  10      Recent Labs      07/21/18   0455  07/22/18   0535  07/23/18   0311   ALBUMIN   --    --   2.5*   TBILIRUBIN   --    --   0.2   ALKPHOSPHAT   --    --   52   TOTPROTEIN   --    --   6.4   ALTSGPT   --    --   30   ASTSGOT   --    --   21   CREATININE  0.37*  0.42*  0.51  0.50       Imaging:  Dx-chest-portable (1 View)    Result Date: 7/16/2018 7/16/2018 4:16 AM HISTORY/REASON FOR EXAM:  For indication of respiratory failure TECHNIQUE/EXAM DESCRIPTION AND NUMBER OF VIEWS: Single portable view of the chest. COMPARISON: Yesterday FINDINGS: Hardware is stably positioned in its visualized extent. HEART: Stable size. LUNGS: BILATERAL pulmonary opacities are unchanged. PLEURA: Small LEFT pneumothorax is unchanged.     1.  Unchanged small LEFT pneumothorax with chest tubes in place 2.  Unchanged BILATERAL cavitary airspace disease    Dx-chest-portable (1 View)    Result Date: 7/15/2018    7/15/2018 4:29 AM HISTORY/REASON FOR EXAM: For indication of respiratory failure Line placement TECHNIQUE/EXAM DESCRIPTION:  Single AP view of the chest. COMPARISON: Yesterday FINDINGS: Position of medical devices appears stable. The cardiac silhouette appears within normal limits. The mediastinal contour appears within normal limits.  The central pulmonary vasculature appears normal. Hazy bilateral pulmonary opacities are seen. Residual left pneumothorax is seen similar to prior study. No significant pleural effusions are identified. The bony structures appear age-appropriate.  Soft tissue gas in the left chest wall is seen.     1.  Stable pulmonary infiltrates and/or edema with probable component of chronic underlying lung disease. 2.  Left pneumothorax, stable since prior with 2 thoracostomy tubes in place.     Dx-chest-portable (1 View)    Result Date: 7/14/2018 7/14/2018 5:13 AM HISTORY/REASON FOR EXAM: For indication of respiratory failure Line placement TECHNIQUE/EXAM DESCRIPTION:  Single AP view of the chest. COMPARISON:  Yesterday FINDINGS: Position of medical devices appears stable. The cardiac silhouette appears within normal limits. The mediastinal contour appears within normal limits.  The central pulmonary vasculature appears normal. Increased opacification throughout the right hemithorax is seen. There is decreased opacification within the left hemithorax. Residual left pneumothorax is seen similar to prior study. No significant pleural effusions are identified. The bony structures appear age-appropriate.  Soft tissue gas in the left chest wall is seen.     1.  Increased opacification of the right hemithorax, compatible with new large pleural effusion and/or infiltrates and/or atelectasis. 2.  Left pneumothorax, stable since prior with 2 thoracostomy tubes in place.    Dx-chest-portable (1 View)    Result Date: 7/13/2018 7/13/2018 7:21 AM HISTORY/REASON FOR EXAM:  Respiratory failure. TECHNIQUE/EXAM DESCRIPTION AND NUMBER OF VIEWS: Single portable view of the chest. COMPARISON: 7/12/2018 FINDINGS: LUNGS: Multifocal patchy consolidations are reidentified. Stable cavity in the right lung apex, with improving opacities in the right perihilar and infrahilar regions. PNEUMOTHORAX: Stable left pneumothorax and subcutaneous emphysema of the left chest wall. LINES AND TUBES: Stable well-positioned ETT. Stable right IJ central catheter, tip in the superior cavoatrial junction. Stable left chest tubes. MEDIASTINUM: No cardiomegaly. MUSCULOSKELETAL STRUCTURES: No acute fracture. Skin staples in the left lateral chest wall.     1. Improving opacities in the right perihilar and infrahilar regions. Otherwise stable multifocal patchy consolidations. 2. Stable lines and tubes. 3. Stable left pneumothorax subcutaneous emphysema of the left chest wall.    Dx-chest-portable (1 View)    Result Date: 7/12/2018 7/12/2018 12:11 PM HISTORY/REASON FOR EXAM:  POST INTUBATION. TECHNIQUE/EXAM DESCRIPTION AND NUMBER OF VIEWS: Single portable view of the  chest. COMPARISON: 7/12/2018 FINDINGS: Interval improvement of RIGHT pleural fluid collection and increased aeration of RIGHT lower lung. Patchy consolidation again seen in the RIGHT upper lung. Focal nodular densities are seen in the LEFT lung as well as consolidation at the mid lung level. Small LEFT pneumothorax is unchanged, with multiple LEFT chest tubes present. Increasing LEFT chest wall emphysema. Other supportive tubing is unchanged.     1.  Interval improved aeration of RIGHT lung base with improvement of RIGHT pleural effusion. 2.  Stable LEFT pneumothorax with chest tubes present. 3.  Patchy consolidation again seen in both lungs with basilar densities in the LEFT upper lung again noted. 4.  Increasing LEFT chest wall emphysema.    Dx-chest-portable (1 View)    Result Date: 7/12/2018 7/12/2018 3:16 AM HISTORY/REASON FOR EXAM: Line placement TECHNIQUE/EXAM DESCRIPTION:  Single AP view of the chest. COMPARISON: July 10, 2018 FINDINGS: Right internal jugular central line is seen terminating at the right atriocaval junction.  To left thoracostomy tube is in place terminating at the left apex and left lung base. The cardiac silhouette appears within normal limits. The mediastinal contour appears within normal limits.  The central pulmonary vasculature appears normal. The lungs appear well expanded bilaterally.  Increased opacification throughout the right hemithorax is seen. There is decreased opacification within the left hemithorax. Residual left pneumothorax is seen similar to prior study. No significant pleural effusions are identified. The bony structures appear age-appropriate.  Soft tissue gas in the left chest wall is seen.     1.  Increased opacification of the right hemithorax, compatible with new large pleural effusion and/or infiltrates and/or atelectasis. 2.  Improved aeration of the left hemithorax status post thoracostomy tube placement. Residual infiltrates in the left lung are seen. 3.   Left pneumothorax, stable since prior with 2 thoracostomy tubes in place.    Dx-chest-portable (1 View)    Result Date: 7/10/2018  7/10/2018 6:14 PM HISTORY/REASON FOR EXAM:  Post thoracentesis left chest. TECHNIQUE/EXAM DESCRIPTION AND NUMBER OF VIEWS: Single portable view of the chest. COMPARISON: 7/10/2018 FINDINGS: The mediastinal and cardiac silhouette is partially obscured by the left-sided parenchymal opacity and pleural fluid The right pulmonary vascularity is within normal limits. Multiple lung masses are seen on the left. There is an airspace process in the left mid and lower hemithorax. There is ill-defined nodular parenchymal opacity in the right upper lobe with some hilar retraction. There is no significant pleural effusion. There was a previous hydropneumothorax seen on the outside prior chest x-ray. Pneumothorax is still seen on the lateral aspect of the left chip-thorax. There are no acute bony abnormalities.     1.  There has been interval decrease in the left pleural effusion. 2.  There is still some component of LEFT pneumothorax although the hydropneumothorax air-fluid level is no longer evident. 3.  Multiple left bone or masses are again seen. 4.  Left lower lobe airspace disease is again seen. 5.  Ill-defined nodular and linear opacity in the right upper lobe with pleural thickening and hilar retraction is again seen.    Us-thoracentesis Puncture Left    Result Date: 7/10/2018  7/10/2018 4:01 PM HISTORY/REASON FOR EXAM: Pleural effusion TECHNIQUE/EXAM DESCRIPTION: Ultrasound-guided thoracentesis. Indication:  LEFT pleural fluid collection. COMPARISON:  None PROCEDURE:     Informed consent was obtained. A timeout was taken. A left pleural effusion was localized with real-time ultrasound guidance. The left posterior chest wall was prepped and draped in a sterile manner. Dr. Fine performed the procedure  with my guidance.  Following local anesthesia with 1% lidocaine, a 5 [place]h  "pigtail catheter was advanced into the pleural space with trocar technique and pleural fluid was drained. The patient tolerated the procedure well without evidence of complication. A post thoracentesis chest radiograph is forthcoming. FINDINGS: Highly complicated left large volume pleural fluid has extensive internal debris Fluid was  sent to the laboratory. Fluid character: purulent     1. Ultrasound guided left sided diagnostic thoracentesis. 2. 500 mL of fluid withdrawn. Additional fluid was not removed as the catheter became occluded by the thick liquid    Dx-abdomen For Tube Placement    Result Date: 7/14/2018 7/14/2018 12:59 PM HISTORY/REASON FOR EXAM:  Cortrak evaluation. TECHNIQUE/EXAM DESCRIPTION AND NUMBER OF VIEWS:  1 view(s) of the abdomen. COMPARISON:  None. FINDINGS: Cortrak feeding tube tip projects in the region of the gastric fundus. The tube coils back upon itself from the mid stomach. The thoracic course appears appropriate. The bowel gas pattern is within normal limits.     Cortrak feeding tube tip projects in the region of the gastric fundus.      Micro:  Results     Procedure Component Value Units Date/Time    BLOOD CULTURE [[id number]] Collected:  07/12/18 1046    Order Status:  Completed Specimen:  Blood from Peripheral Updated:  07/17/18 1300     Significant Indicator NEG     Source BLD     Site PERIPHERAL     Blood Culture No growth after 5 days of incubation.    Narrative:       [id number] MEGAN HERNANDEZ  Per Hospital Policy: Only change Specimen Src: to \"Line\" if  specified by physician order.    BLOOD CULTURE [[id number]] Collected:  07/12/18 1046    Order Status:  Completed Specimen:  Blood from Peripheral Updated:  07/17/18 1300     Significant Indicator NEG     Source BLD     Site PERIPHERAL     Blood Culture No growth after 5 days of incubation.    Narrative:       [id number] MEGAN HERNANDEZ  Per Hospital Policy: Only change Specimen Src: to \"Line\" if  specified by " physician order.          Assessment:  Active Hospital Problems    Diagnosis   • *Empyema (HCC) [J86.9]   • Acute hypoxemic respiratory failure (HCC) [J96.01]   • Panlobular emphysema (HCC) [J43.1]   • Pulmonary parenchymal mass [R91.8]   • Pulmonary cachexia due to COPD (HCC) [J44.9, R64]   • Protein malnutrition (HCC) [E46]   • Leukocytosis [D72.829]   • Tobacco abuse [Z72.0]       Plan:  Empyema  Afebrile  s/p thoracentesis on 7/10/2018  s/p left thoracotomy decortication and evacuation of purulent empyema and decortication of lung and rib resection on 7/11/2018  The cultures + group F strep  Removal CT per CTS  Continue Unasyn  Continue for 2 weeks of IV antibiotics  Stop date IV abx 7/24/18 followed by 2-4 weeks of p.o. Augmentin    Leukocytosis, resolved   Multifactorial  monitor  IS every hour while awake

## 2018-07-23 NOTE — PROGRESS NOTES
Assumed care at 1845. Pt resting in bed. A&ox 4  Ambulating with standby assist  Ct to left side x 2. Both on -40cm suction  CT # 2 with persistent airleak  [place] to left side.   Pain controlled with oxycodone  Prod cough. O2 @ 3LNC. Respiratory protocol in place  Voiding well. Tolerating diet. +BM yesterday  Call light within reach. Hourly rounding in place

## 2018-07-23 NOTE — NON-PROVIDER
Internal Medicine Medical Student Note  Note Author: León Angeles, Student    Name Donna Ceballos 1960   Age/Sex 58 y.o. male   MRN [id number]   Code Status FULL             Reason for interval visit  (Principal Problem)   Empyema (HCC)    Interval Problem Daily Status Update  (problem status, last 24 hours, new history, new data )   Empyema - WBC continues to decline. It is in normal range and patient progressively is feeling better. He still coughs productive of sputum, but no shortness of breath, fevers, or chills. Chest tube to be monitored for air leak by surgery. Patient is otherwise stable  Pneumothorax - X-ray from this morning was compared to last x-ray and pneumothorax has decreased in size slightly. He is otherwise stable.   Panlobular emphysema - stable on respiratory medication.    Review of Systems   Constitutional: Negative for chills and fever.   HENT: Negative for hearing loss and sore throat.    Eyes: Negative for blurred vision and double vision.   Respiratory: Positive for cough and sputum production. Negative for shortness of breath and stridor.    Cardiovascular: Negative for chest pain and palpitations.   Gastrointestinal: Negative for heartburn, nausea and vomiting.   Genitourinary: Negative for dysuria and urgency.   Musculoskeletal: Negative for myalgias.   Skin: Negative for itching and rash.   Neurological: Negative for dizziness and headaches.   Endo/Heme/Allergies: Does not bruise/bleed easily.   Psychiatric/Behavioral: Negative for depression and suicidal ideas.         Physical Exam       Vitals:    18 0225 18 0340 18 0709 18 0735   BP:  111/63  116/65   Pulse: 83 75 84 66   Resp: 17 16 16 17   Temp:  36.4 °C (97.5 °F)  37.1 °C (98.8 °F)   SpO2: 98% 100% 98% 98%   Weight:       Height:         Body mass index is 24.21 kg/m².    Oxygen Therapy:  Pulse Oximetry: 98 %, O2 (LPM): 3, FiO2%: 32 %, O2 Delivery: Silicone Nasal Cannula    Physical  Exam   Constitutional: He is well-developed, well-nourished, and in no distress.   HENT:   Head: Normocephalic and atraumatic.   Cardiovascular: Normal rate, regular rhythm and normal heart sounds.  Exam reveals no gallop and no friction rub.    No murmur heard.  Pulmonary/Chest: Effort normal.   Loud whistling auscultated bilaterally, more pronounced on left lung in the lower lobe.    Musculoskeletal:        Right shoulder: He exhibits swelling.   Distal left lower extremity on the ankle showed pitting edema 2+     Recent Labs      07/21/18 0455  07/22/18   0535  07/23/18   0311   WBC  11.6*  10.7  9.7   RBC  3.53*  3.84*  3.46*   HEMOGLOBIN  9.7*  10.7*  9.6*   HEMATOCRIT  31.0*  34.2*  30.7*   MCV  87.8  89.1  88.7   MCH  27.5  27.9  27.7   RDW  58.3*  60.4*  59.2*   PLATELETCT  618*  690*  666*   MPV  9.3  9.3  9.3   NEUTSPOLYS  70.50  65.10  71.70   LYMPHOCYTES  18.30*  22.80  20.20*   MONOCYTES  7.00  7.40  6.20   EOSINOPHILS  3.40  3.50  0.80   BASOPHILS  0.30  0.70  0.70     Recent Labs      07/21/18 0455 07/22/18   0535  07/23/18   0311   SODIUM  135  135  135  138   POTASSIUM  3.8  3.7  4.2  4.2   CHLORIDE  100  97  100  100   CO2  31  33  32  32   GLUCOSE  91  84  126*  123*   BUN  9  9  10  10           Assessment/Plan     Empyema  - Patient initially presented with cough, greenish bloody sputum, dyspnea and shortness of breath. He also had leukocytosis and lactic acidosis upon admission. Imaging showed large empyema with cavitary lesion/masses in the right upper lobe, pleural effusion and empyema was also described in the left lung. Status post left thoracotomy, decortication, evacuation of purulent empyema, debridement of necrotic lung, and rib resection by surgery with Dr. Diego Martínez. Patient still has two chest tubes and pleural fluid culture showed beta strep group F.  - He will continue to have the chest tube for adequate drainage of lung fluids. Per surgery, chest tube #1 to be removed  if there is no persistent air leak. If persistent air leak on chest tube #2 over several days then he may be discharged with a pneumostatic valve. Surgery will continue to follow.   - Unasyn will also run its full course last dose tomorrow and then continue with Augmentin thereafter.      Pneumothorax  - Pneumothorax present s/p chest tube insertion.   - Respiratory therapy team suggested that they may be a leakage in the chest tubes, surgery will be consulted to manage the mechanical concerns.   - X-ray this morning compared to x-ray yesterday demonstrate slight decrease in size of PTX.   - Continue chest tube with suction and RT, O2, and Duoneb     Panlobular emphysema  - Due to long history of smoking and lung damage  - He will continue RT, O2, and Duoneb  - Patient is also given incentive spirometry     Tobacco use  - Patient is a long time smoker  - Consider discussing tobacco education and cessation upon discharge

## 2018-07-23 NOTE — THERAPY
"Occupational Therapy Treatment completed with focus on ADLs, ADL transfers and patient education.  Functional Status: Supv LB dressing without AE, supv transfers without AD   Plan of Care: Will benefit from Occupational Therapy 1 times per week  Discharge Recommendations:  Equipment Will Continue to Assess for Equipment Needs. Post-acute therapy: Currently anticipate no further skilled therapy needs once patient is discharged from the inpatient setting.    See \"Rehab Therapy-Acute\" Patient Summary Report for complete documentation.     Pt seen for OT tx. Reports feeling much better. Still has CT x 2, but cleared for water seal to mobilize. Pt completed: bed mobility, LB dressing without AE, stand-pivot to bedside chair. Pt declined oral care despite education on PNA risk. Declined amb to bathroom due to having just gone with nursing. Pt states he was able to perform bowel hygiene and toilet transfer without assist. Pt demos improved functional mobility and LB ADL. Will follow remotely to monitor for consistent functional performance, no further decline and for DC recs.     "

## 2018-07-23 NOTE — CARE PLAN
Problem: Safety  Goal: Will remain free from injury  Outcome: PROGRESSING AS EXPECTED  Patient educated not to get out of bed without staff present, 2 chest tubes and multiple lines.    Problem: Respiratory:  Goal: Respiratory status will improve  Outcome: PROGRESSING AS EXPECTED  Encouraged frequent IS use.  Titrating oxygen down as tolerated.    Problem: Pain Management  Goal: Pain level will decrease to patient's comfort goal  Outcome: PROGRESSING AS EXPECTED  Will medicate per MAR prn

## 2018-07-23 NOTE — PROGRESS NOTES
"Assumed care of patient from night shift RN.  Patient is alert and oriented times 4, denies pain at this time.  VSS /65   Pulse 66   Temp 37.1 °C (98.8 °F)   Resp 17   Ht 1.829 m (6' 0.01\")   Wt 81 kg (178 lb 9.2 oz)   SpO2 98%   BMI 24.21 kg/m²   PIV in the L forearm, patent and running NS TKO.  On 3L with saturations in the high 90s.  Will trial titration today.  IS at 1500, RT on board.  Last BM 7/21, urinating without difficulty.  Regular diet, tolerating well.  2 chest tubes to the L chest, air leak noted on #2.  MD aware.  Both to 40cm of suction.  Staples to the L chest, well approximated and SANTIAGO.  Mepliex to the sacrum.  Patient is a standby assist, needs to be on portable suction to ambulate.    POC discussed for the day, bed is locked and in the lowest position, call light is within reach.  All needs are met at this time, hourly rounding is in place.    "

## 2018-07-24 ENCOUNTER — APPOINTMENT (OUTPATIENT)
Dept: RADIOLOGY | Facility: MEDICAL CENTER | Age: 58
DRG: 853 | End: 2018-07-24
Attending: INTERNAL MEDICINE

## 2018-07-24 ENCOUNTER — PATIENT OUTREACH (OUTPATIENT)
Dept: HEALTH INFORMATION MANAGEMENT | Facility: OTHER | Age: 58
End: 2018-07-24

## 2018-07-24 LAB
ANION GAP SERPL CALC-SCNC: 5 MMOL/L (ref 0–11.9)
BASOPHILS # BLD AUTO: 0.8 % (ref 0–1.8)
BASOPHILS # BLD: 0.08 K/UL (ref 0–0.12)
BUN SERPL-MCNC: 11 MG/DL (ref 8–22)
CALCIUM SERPL-MCNC: 9 MG/DL (ref 8.5–10.5)
CHLORIDE SERPL-SCNC: 98 MMOL/L (ref 96–112)
CO2 SERPL-SCNC: 33 MMOL/L (ref 20–33)
CREAT SERPL-MCNC: 0.49 MG/DL (ref 0.5–1.4)
EOSINOPHIL # BLD AUTO: 0.33 K/UL (ref 0–0.51)
EOSINOPHIL NFR BLD: 3.2 % (ref 0–6.9)
ERYTHROCYTE [DISTWIDTH] IN BLOOD BY AUTOMATED COUNT: 60.9 FL (ref 35.9–50)
GLUCOSE SERPL-MCNC: 90 MG/DL (ref 65–99)
HCT VFR BLD AUTO: 31.2 % (ref 42–52)
HGB BLD-MCNC: 9.7 G/DL (ref 14–18)
IMM GRANULOCYTES # BLD AUTO: 0.06 K/UL (ref 0–0.11)
IMM GRANULOCYTES NFR BLD AUTO: 0.6 % (ref 0–0.9)
LYMPHOCYTES # BLD AUTO: 2.47 K/UL (ref 1–4.8)
LYMPHOCYTES NFR BLD: 24.1 % (ref 22–41)
MAGNESIUM SERPL-MCNC: 2 MG/DL (ref 1.5–2.5)
MCH RBC QN AUTO: 27.6 PG (ref 27–33)
MCHC RBC AUTO-ENTMCNC: 31.1 G/DL (ref 33.7–35.3)
MCV RBC AUTO: 88.6 FL (ref 81.4–97.8)
MONOCYTES # BLD AUTO: 0.87 K/UL (ref 0–0.85)
MONOCYTES NFR BLD AUTO: 8.5 % (ref 0–13.4)
NEUTROPHILS # BLD AUTO: 6.46 K/UL (ref 1.82–7.42)
NEUTROPHILS NFR BLD: 62.8 % (ref 44–72)
NRBC # BLD AUTO: 0 K/UL
NRBC BLD-RTO: 0 /100 WBC
PHOSPHATE SERPL-MCNC: 4 MG/DL (ref 2.5–4.5)
PLATELET # BLD AUTO: 698 K/UL (ref 164–446)
PMV BLD AUTO: 9.2 FL (ref 9–12.9)
POTASSIUM SERPL-SCNC: 4.3 MMOL/L (ref 3.6–5.5)
RBC # BLD AUTO: 3.52 M/UL (ref 4.7–6.1)
SODIUM SERPL-SCNC: 136 MMOL/L (ref 135–145)
WBC # BLD AUTO: 10.3 K/UL (ref 4.8–10.8)

## 2018-07-24 PROCEDURE — 99232 SBSQ HOSP IP/OBS MODERATE 35: CPT | Mod: GC | Performed by: HOSPITALIST

## 2018-07-24 PROCEDURE — 700111 HCHG RX REV CODE 636 W/ 250 OVERRIDE (IP): Performed by: INTERNAL MEDICINE

## 2018-07-24 PROCEDURE — 71045 X-RAY EXAM CHEST 1 VIEW: CPT

## 2018-07-24 PROCEDURE — 700101 HCHG RX REV CODE 250: Performed by: INTERNAL MEDICINE

## 2018-07-24 PROCEDURE — 99232 SBSQ HOSP IP/OBS MODERATE 35: CPT | Performed by: INTERNAL MEDICINE

## 2018-07-24 PROCEDURE — 97530 THERAPEUTIC ACTIVITIES: CPT

## 2018-07-24 PROCEDURE — G8998 SWALLOW D/C STATUS: HCPCS | Mod: CH

## 2018-07-24 PROCEDURE — G8979 MOBILITY GOAL STATUS: HCPCS | Mod: CI

## 2018-07-24 PROCEDURE — A9270 NON-COVERED ITEM OR SERVICE: HCPCS | Performed by: INTERNAL MEDICINE

## 2018-07-24 PROCEDURE — 700102 HCHG RX REV CODE 250 W/ 637 OVERRIDE(OP): Performed by: INTERNAL MEDICINE

## 2018-07-24 PROCEDURE — 700105 HCHG RX REV CODE 258: Performed by: STUDENT IN AN ORGANIZED HEALTH CARE EDUCATION/TRAINING PROGRAM

## 2018-07-24 PROCEDURE — 83735 ASSAY OF MAGNESIUM: CPT

## 2018-07-24 PROCEDURE — A9270 NON-COVERED ITEM OR SERVICE: HCPCS | Performed by: STUDENT IN AN ORGANIZED HEALTH CARE EDUCATION/TRAINING PROGRAM

## 2018-07-24 PROCEDURE — 80048 BASIC METABOLIC PNL TOTAL CA: CPT

## 2018-07-24 PROCEDURE — 36415 COLL VENOUS BLD VENIPUNCTURE: CPT

## 2018-07-24 PROCEDURE — 94668 MNPJ CHEST WALL SBSQ: CPT

## 2018-07-24 PROCEDURE — 85025 COMPLETE CBC W/AUTO DIFF WBC: CPT

## 2018-07-24 PROCEDURE — 94640 AIRWAY INHALATION TREATMENT: CPT

## 2018-07-24 PROCEDURE — 700102 HCHG RX REV CODE 250 W/ 637 OVERRIDE(OP): Performed by: STUDENT IN AN ORGANIZED HEALTH CARE EDUCATION/TRAINING PROGRAM

## 2018-07-24 PROCEDURE — G8980 MOBILITY D/C STATUS: HCPCS | Mod: CI

## 2018-07-24 PROCEDURE — 770006 HCHG ROOM/CARE - MED/SURG/GYN SEMI*

## 2018-07-24 PROCEDURE — 700105 HCHG RX REV CODE 258: Performed by: INTERNAL MEDICINE

## 2018-07-24 PROCEDURE — G8997 SWALLOW GOAL STATUS: HCPCS | Mod: CH

## 2018-07-24 PROCEDURE — 84100 ASSAY OF PHOSPHORUS: CPT

## 2018-07-24 PROCEDURE — 92526 ORAL FUNCTION THERAPY: CPT

## 2018-07-24 PROCEDURE — 97116 GAIT TRAINING THERAPY: CPT

## 2018-07-24 RX ADMIN — IPRATROPIUM BROMIDE AND ALBUTEROL SULFATE 3 ML: .5; 3 SOLUTION RESPIRATORY (INHALATION) at 15:22

## 2018-07-24 RX ADMIN — AMPICILLIN SODIUM AND SULBACTAM SODIUM 3 G: 2; 1 INJECTION, POWDER, FOR SOLUTION INTRAMUSCULAR; INTRAVENOUS at 12:02

## 2018-07-24 RX ADMIN — IPRATROPIUM BROMIDE AND ALBUTEROL SULFATE 3 ML: .5; 3 SOLUTION RESPIRATORY (INHALATION) at 10:45

## 2018-07-24 RX ADMIN — PREDNISONE 30 MG: 20 TABLET ORAL at 05:43

## 2018-07-24 RX ADMIN — IPRATROPIUM BROMIDE AND ALBUTEROL SULFATE 3 ML: .5; 3 SOLUTION RESPIRATORY (INHALATION) at 19:06

## 2018-07-24 RX ADMIN — AMPICILLIN SODIUM AND SULBACTAM SODIUM 3 G: 2; 1 INJECTION, POWDER, FOR SOLUTION INTRAMUSCULAR; INTRAVENOUS at 01:00

## 2018-07-24 RX ADMIN — NICOTINE 21 MG: 21 PATCH, EXTENDED RELEASE TRANSDERMAL at 05:43

## 2018-07-24 RX ADMIN — STANDARDIZED SENNA CONCENTRATE AND DOCUSATE SODIUM 2 TABLET: 8.6; 5 TABLET, FILM COATED ORAL at 05:43

## 2018-07-24 RX ADMIN — STANDARDIZED SENNA CONCENTRATE AND DOCUSATE SODIUM 2 TABLET: 8.6; 5 TABLET, FILM COATED ORAL at 17:38

## 2018-07-24 RX ADMIN — AMPICILLIN SODIUM AND SULBACTAM SODIUM 3 G: 2; 1 INJECTION, POWDER, FOR SOLUTION INTRAMUSCULAR; INTRAVENOUS at 05:43

## 2018-07-24 RX ADMIN — IPRATROPIUM BROMIDE AND ALBUTEROL SULFATE 3 ML: .5; 3 SOLUTION RESPIRATORY (INHALATION) at 22:43

## 2018-07-24 RX ADMIN — AMPICILLIN SODIUM AND SULBACTAM SODIUM 3 G: 2; 1 INJECTION, POWDER, FOR SOLUTION INTRAMUSCULAR; INTRAVENOUS at 17:38

## 2018-07-24 RX ADMIN — OXYCODONE HYDROCHLORIDE 10 MG: 10 TABLET ORAL at 21:01

## 2018-07-24 RX ADMIN — SODIUM CHLORIDE: 9 INJECTION, SOLUTION INTRAVENOUS at 05:44

## 2018-07-24 RX ADMIN — ENOXAPARIN SODIUM 40 MG: 100 INJECTION SUBCUTANEOUS at 05:44

## 2018-07-24 ASSESSMENT — COGNITIVE AND FUNCTIONAL STATUS - GENERAL
MOBILITY SCORE: 24
SUGGESTED CMS G CODE MODIFIER MOBILITY: CH

## 2018-07-24 ASSESSMENT — GAIT ASSESSMENTS
DISTANCE (FEET): 300
GAIT LEVEL OF ASSIST: SUPERVISED
ASSISTIVE DEVICE: FRONT WHEEL WALKER

## 2018-07-24 ASSESSMENT — ENCOUNTER SYMPTOMS
FEVER: 0
PHOTOPHOBIA: 0
HEADACHES: 0
STRIDOR: 0
HEARTBURN: 0
NAUSEA: 0
SINUS PAIN: 0
SPEECH CHANGE: 0
BRUISES/BLEEDS EASILY: 0
MYALGIAS: 1
SPUTUM PRODUCTION: 0
DEPRESSION: 0
CHILLS: 0
SPUTUM PRODUCTION: 1
COUGH: 0
DIZZINESS: 0
SENSORY CHANGE: 0
COUGH: 1
MYALGIAS: 0
SHORTNESS OF BREATH: 0
DOUBLE VISION: 0
PALPITATIONS: 0
VOMITING: 0
HEMOPTYSIS: 0
BLURRED VISION: 0
ABDOMINAL PAIN: 0

## 2018-07-24 ASSESSMENT — PAIN SCALES - GENERAL
PAINLEVEL_OUTOF10: 2
PAINLEVEL_OUTOF10: 4
PAINLEVEL_OUTOF10: 0
PAINLEVEL_OUTOF10: 6
PAINLEVEL_OUTOF10: 0

## 2018-07-24 NOTE — PROGRESS NOTES
Infectious Disease Progress Note    Author: Tessa Rich M.D. Date & Time of service: 2018  1:17 PM    Chief Complaint:  Empyema     Interval History:  18- Tmax 99.3 WBC 10.9 creatinine 0.34 patient continues to remain significantly debilitated  2018 T-max 98.9 feels much better.  WBC 14.2 platelets 512 creatinine 0.34  2018 T-max 98.8.  Feels better.  WBC 11.3 platelets 520 sed rate 62 CRP 3   AF WBC 12.6 transferred out of ICU.  Feels better overall-IS about 1200 right now   AF WBC 11.5 tolerating IV abx-has not attempted to walk much yet   AF feels stronger-eating OK. Denies SE abx Still has both CT   AF WBC 10.7 up to about 1500 on IS-still has both CT +air leak   AF W BC 9.7 patient is breathing very well with decreased cough, currently on 3 L nasal cannula, tolerating antibiotics without any side effects   AF WBC 10.3 eating lunch, states one chest tube planned for removal today, no new issues to report  Labs Reviewed, Medications Reviewed, Radiology Reviewed and Wound Reviewed.    Review of Systems:  Review of Systems   Constitutional: Negative for chills and fever.   HENT: Negative for hearing loss.    Respiratory: Positive for cough.         Decreased   Cardiovascular: Negative for chest pain and leg swelling.   Gastrointestinal: Negative for abdominal pain, nausea and vomiting.   Genitourinary: Negative for dysuria.   Musculoskeletal: Positive for myalgias.   Skin: Negative for rash.   Neurological: Negative for sensory change and speech change.       Hemodynamics:  Temp (24hrs), Av.7 °C (98 °F), Min:36.3 °C (97.4 °F), Max:36.9 °C (98.4 °F)  Temperature: 36.9 °C (98.4 °F)  Pulse  Av  Min: 50  Max: 111   Blood Pressure: (!) 92/59 (RN notified)       Physical Exam:  Physical Exam   Constitutional: He is oriented to person, place, and time. He appears well-developed. No distress.   Thin male looking chronically ill   HENT:   Head: Normocephalic and  atraumatic.   Mouth/Throat: No oropharyngeal exudate.   Poor dentition   Eyes: Conjunctivae and EOM are normal. Pupils are equal, round, and reactive to light.   Neck: Neck supple.   Cardiovascular: Normal rate.    No murmur heard.  Pulmonary/Chest: Effort normal. No respiratory distress. He has rales. He exhibits tenderness.   Chest tubes X2 on left side; decreased BS   Abdominal: Soft. Bowel sounds are normal. He exhibits no distension. There is no tenderness. There is no rebound.   Musculoskeletal: He exhibits no edema.   Neurological: He is alert and oriented to person, place, and time.   Skin: Skin is warm. No rash noted.   Nursing note and vitals reviewed.      Meds:    Current Facility-Administered Medications:   •  [START ON 7/25/2018] amoxicillin-clavulanate  •  predniSONE  •  ipratropium-albuterol  •  ampicillin-sulbactam (UNASYN) IV  •  enoxaparin (LOVENOX) injection  •  oxyCODONE immediate-release **OR** oxyCODONE immediate-release  •  Respiratory Care per Protocol  •  senna-docusate **AND** polyethylene glycol/lytes **AND** magnesium hydroxide **AND** bisacodyl  •  MD ALERT...Adult ICU Electrolyte Replacement per Pharmacy Protocol  •  NS  •  ondansetron  •  ondansetron  •  promethazine  •  promethazine  •  prochlorperazine  •  acetaminophen  •  nicotine **AND** Nicotine Replacement Patient Education Materials **AND** nicotine polacrilex    Labs:  Recent Labs      07/22/18   0535  07/23/18   0311  07/24/18   0502   WBC  10.7  9.7  10.3   RBC  3.84*  3.46*  3.52*   HEMOGLOBIN  10.7*  9.6*  9.7*   HEMATOCRIT  34.2*  30.7*  31.2*   MCV  89.1  88.7  88.6   MCH  27.9  27.7  27.6   RDW  60.4*  59.2*  60.9*   PLATELETCT  690*  666*  698*   MPV  9.3  9.3  9.2   NEUTSPOLYS  65.10  71.70  62.80   LYMPHOCYTES  22.80  20.20*  24.10   MONOCYTES  7.40  6.20  8.50   EOSINOPHILS  3.50  0.80  3.20   BASOPHILS  0.70  0.70  0.80     Recent Labs      07/22/18   0535  07/23/18   0311  07/24/18   0501   SODIUM  135  135  138   136   POTASSIUM  3.7  4.2  4.2  4.3   CHLORIDE  97  100  100  98   CO2  33  32  32  33   GLUCOSE  84  126*  123*  90   BUN  9  10  10  11     Recent Labs      07/22/18   0535  07/23/18   0311  07/24/18   0501   ALBUMIN   --   2.5*   --    TBILIRUBIN   --   0.2   --    ALKPHOSPHAT   --   52   --    TOTPROTEIN   --   6.4   --    ALTSGPT   --   30   --    ASTSGOT   --   21   --    CREATININE  0.42*  0.51  0.50  0.49*       Imaging:  Dx-chest-portable (1 View)    Result Date: 7/16/2018 7/16/2018 4:16 AM HISTORY/REASON FOR EXAM:  For indication of respiratory failure TECHNIQUE/EXAM DESCRIPTION AND NUMBER OF VIEWS: Single portable view of the chest. COMPARISON: Yesterday FINDINGS: Hardware is stably positioned in its visualized extent. HEART: Stable size. LUNGS: BILATERAL pulmonary opacities are unchanged. PLEURA: Small LEFT pneumothorax is unchanged.     1.  Unchanged small LEFT pneumothorax with chest tubes in place 2.  Unchanged BILATERAL cavitary airspace disease    Dx-chest-portable (1 View)    Result Date: 7/15/2018    7/15/2018 4:29 AM HISTORY/REASON FOR EXAM: For indication of respiratory failure Line placement TECHNIQUE/EXAM DESCRIPTION:  Single AP view of the chest. COMPARISON: Yesterday FINDINGS: Position of medical devices appears stable. The cardiac silhouette appears within normal limits. The mediastinal contour appears within normal limits.  The central pulmonary vasculature appears normal. Hazy bilateral pulmonary opacities are seen. Residual left pneumothorax is seen similar to prior study. No significant pleural effusions are identified. The bony structures appear age-appropriate.  Soft tissue gas in the left chest wall is seen.     1.  Stable pulmonary infiltrates and/or edema with probable component of chronic underlying lung disease. 2.  Left pneumothorax, stable since prior with 2 thoracostomy tubes in place.     Dx-chest-portable (1 View)    Result Date: 7/14/2018 7/14/2018 5:13 AM  HISTORY/REASON FOR EXAM: For indication of respiratory failure Line placement TECHNIQUE/EXAM DESCRIPTION:  Single AP view of the chest. COMPARISON: Yesterday FINDINGS: Position of medical devices appears stable. The cardiac silhouette appears within normal limits. The mediastinal contour appears within normal limits.  The central pulmonary vasculature appears normal. Increased opacification throughout the right hemithorax is seen. There is decreased opacification within the left hemithorax. Residual left pneumothorax is seen similar to prior study. No significant pleural effusions are identified. The bony structures appear age-appropriate.  Soft tissue gas in the left chest wall is seen.     1.  Increased opacification of the right hemithorax, compatible with new large pleural effusion and/or infiltrates and/or atelectasis. 2.  Left pneumothorax, stable since prior with 2 thoracostomy tubes in place.    Dx-chest-portable (1 View)    Result Date: 7/13/2018 7/13/2018 7:21 AM HISTORY/REASON FOR EXAM:  Respiratory failure. TECHNIQUE/EXAM DESCRIPTION AND NUMBER OF VIEWS: Single portable view of the chest. COMPARISON: 7/12/2018 FINDINGS: LUNGS: Multifocal patchy consolidations are reidentified. Stable cavity in the right lung apex, with improving opacities in the right perihilar and infrahilar regions. PNEUMOTHORAX: Stable left pneumothorax and subcutaneous emphysema of the left chest wall. LINES AND TUBES: Stable well-positioned ETT. Stable right IJ central catheter, tip in the superior cavoatrial junction. Stable left chest tubes. MEDIASTINUM: No cardiomegaly. MUSCULOSKELETAL STRUCTURES: No acute fracture. Skin staples in the left lateral chest wall.     1. Improving opacities in the right perihilar and infrahilar regions. Otherwise stable multifocal patchy consolidations. 2. Stable lines and tubes. 3. Stable left pneumothorax subcutaneous emphysema of the left chest wall.    Dx-chest-portable (1 View)    Result Date:  7/12/2018 7/12/2018 12:11 PM HISTORY/REASON FOR EXAM:  POST INTUBATION. TECHNIQUE/EXAM DESCRIPTION AND NUMBER OF VIEWS: Single portable view of the chest. COMPARISON: 7/12/2018 FINDINGS: Interval improvement of RIGHT pleural fluid collection and increased aeration of RIGHT lower lung. Patchy consolidation again seen in the RIGHT upper lung. Focal nodular densities are seen in the LEFT lung as well as consolidation at the mid lung level. Small LEFT pneumothorax is unchanged, with multiple LEFT chest tubes present. Increasing LEFT chest wall emphysema. Other supportive tubing is unchanged.     1.  Interval improved aeration of RIGHT lung base with improvement of RIGHT pleural effusion. 2.  Stable LEFT pneumothorax with chest tubes present. 3.  Patchy consolidation again seen in both lungs with basilar densities in the LEFT upper lung again noted. 4.  Increasing LEFT chest wall emphysema.    Dx-chest-portable (1 View)    Result Date: 7/12/2018 7/12/2018 3:16 AM HISTORY/REASON FOR EXAM: Line placement TECHNIQUE/EXAM DESCRIPTION:  Single AP view of the chest. COMPARISON: July 10, 2018 FINDINGS: Right internal jugular central line is seen terminating at the right atriocaval junction.  To left thoracostomy tube is in place terminating at the left apex and left lung base. The cardiac silhouette appears within normal limits. The mediastinal contour appears within normal limits.  The central pulmonary vasculature appears normal. The lungs appear well expanded bilaterally.  Increased opacification throughout the right hemithorax is seen. There is decreased opacification within the left hemithorax. Residual left pneumothorax is seen similar to prior study. No significant pleural effusions are identified. The bony structures appear age-appropriate.  Soft tissue gas in the left chest wall is seen.     1.  Increased opacification of the right hemithorax, compatible with new large pleural effusion and/or infiltrates and/or  atelectasis. 2.  Improved aeration of the left hemithorax status post thoracostomy tube placement. Residual infiltrates in the left lung are seen. 3.  Left pneumothorax, stable since prior with 2 thoracostomy tubes in place.    Dx-chest-portable (1 View)    Result Date: 7/10/2018  7/10/2018 6:14 PM HISTORY/REASON FOR EXAM:  Post thoracentesis left chest. TECHNIQUE/EXAM DESCRIPTION AND NUMBER OF VIEWS: Single portable view of the chest. COMPARISON: 7/10/2018 FINDINGS: The mediastinal and cardiac silhouette is partially obscured by the left-sided parenchymal opacity and pleural fluid The right pulmonary vascularity is within normal limits. Multiple lung masses are seen on the left. There is an airspace process in the left mid and lower hemithorax. There is ill-defined nodular parenchymal opacity in the right upper lobe with some hilar retraction. There is no significant pleural effusion. There was a previous hydropneumothorax seen on the outside prior chest x-ray. Pneumothorax is still seen on the lateral aspect of the left chip-thorax. There are no acute bony abnormalities.     1.  There has been interval decrease in the left pleural effusion. 2.  There is still some component of LEFT pneumothorax although the hydropneumothorax air-fluid level is no longer evident. 3.  Multiple left bone or masses are again seen. 4.  Left lower lobe airspace disease is again seen. 5.  Ill-defined nodular and linear opacity in the right upper lobe with pleural thickening and hilar retraction is again seen.    Us-thoracentesis Puncture Left    Result Date: 7/10/2018  7/10/2018 4:01 PM HISTORY/REASON FOR EXAM: Pleural effusion TECHNIQUE/EXAM DESCRIPTION: Ultrasound-guided thoracentesis. Indication:  LEFT pleural fluid collection. COMPARISON:  None PROCEDURE:     Informed consent was obtained. A timeout was taken. A left pleural effusion was localized with real-time ultrasound guidance. The left posterior chest wall was prepped and  draped in a sterile manner. Dr. Fine performed the procedure  with my guidance.  Following local anesthesia with 1% lidocaine, a 5 [place] Yueh pigtail catheter was advanced into the pleural space with trocar technique and pleural fluid was drained. The patient tolerated the procedure well without evidence of complication. A post thoracentesis chest radiograph is forthcoming. FINDINGS: Highly complicated left large volume pleural fluid has extensive internal debris Fluid was  sent to the laboratory. Fluid character: purulent     1. Ultrasound guided left sided diagnostic thoracentesis. 2. 500 mL of fluid withdrawn. Additional fluid was not removed as the catheter became occluded by the thick liquid    Dx-abdomen For Tube Placement    Result Date: 7/14/2018 7/14/2018 12:59 PM HISTORY/REASON FOR EXAM:  Cortrak evaluation. TECHNIQUE/EXAM DESCRIPTION AND NUMBER OF VIEWS:  1 view(s) of the abdomen. COMPARISON:  None. FINDINGS: Cortrak feeding tube tip projects in the region of the gastric fundus. The tube coils back upon itself from the mid stomach. The thoracic course appears appropriate. The bowel gas pattern is within normal limits.     Cortrak feeding tube tip projects in the region of the gastric fundus.      Micro:  Results     ** No results found for the last 168 hours. **          Assessment:  Active Hospital Problems    Diagnosis   • *Empyema (HCC) [J86.9]   • Acute hypoxemic respiratory failure (HCC) [J96.01]   • Panlobular emphysema (HCC) [J43.1]   • Pulmonary parenchymal mass [R91.8]   • Pulmonary cachexia due to COPD (HCC) [J44.9, R64]   • Protein malnutrition (HCC) [E46]   • Leukocytosis [D72.829]   • Tobacco abuse [Z72.0]       Plan:  Empyema  Afebrile  s/p thoracentesis on 7/10/2018  s/p left thoracotomy decortication and evacuation of purulent empyema and decortication of lung and rib resection on 7/11/2018  The cultures + group F strep  Removal CT per CTS  Continue Unasyn  Continue for 2 weeks of  IV antibiotics  Stop date IV abx 7/24/18 today followed by 2-4 weeks of p.o. Augmentin

## 2018-07-24 NOTE — DISCHARGE PLANNING
Anticipated Discharge Disposition: HHC and O2.    Action: MD informed LSW of incoming order for HHC and O2.  LSW met with patient at bedside.  Patient states he does not have health insurance.  Patient states he lives alone in [place]; however, he has enough support from friends and neighbors to discharge home and does not need home health, MD made aware by this LSW.    Patient states he is able to pay for the oxygen if its less than $200.00.  Patient provided verbal consent to send the referral to [employer] or whichever company provides oxygen at a low price.  LSW provide DME choice form to [facility].  Per [facility], the Oxygen Order and F2F have not been received, RN notified via phone.    Barriers to Discharge: Portable Oxygen Tank.    Plan: Home with DME, pending DME Order, F2F, and the delivery of the oxygen tank.

## 2018-07-24 NOTE — DISCHARGE PLANNING
Anticipated Discharge Disposition: Oxygen    Action: [facility] reports, [employer] monthly charge for oxygen is $140.00.  LSW met with patient at bedside and explained the cost of the oxygen, patient agrees to pay the oxygen out of pocket, patient is aware that [employer] will contact him for payment arrangements.    Barriers to Discharge: Portable Oxygen Tank.    Plan: Home with Oxygen, pending the delivery of the portable oxygen tank.

## 2018-07-24 NOTE — FACE TO FACE
Face to Face Note  -  Durable Medical Equipment    David Foy M.D. - NPI: [id number]  I certify that this patient is under my care and that they had a durable medical equipment(DME)face to face encounter by myself that meets the physician DME face-to-face encounter requirements with this patient on:    Date of encounter:   Patient:                    MRN:                       YOB: 2016  Donna Ceballos  [id number]  1960     The encounter with the patient was in whole, or in part, for the following medical condition, which is the primary reason for durable medical equipment:  COPD    I certify that, based on my findings, the following durable medical equipment is medically necessary:  Oxygen.    HOME O2 Saturation Measurements:(Values must be present for Home Oxygen orders)         ,     ,         My Clinical findings support the need for the above equipment due to:  Hypoxia    Supporting Symptoms: dyspnea and hypoxia due to severe COPD

## 2018-07-24 NOTE — NON-PROVIDER
Internal Medicine Medical Student Note  Note Author: León Angeles, Student    Name Donna Ceballos 1960   Age/Sex 58 y.o. male   MRN [id number]   Code Status FULL             Reason for interval visit  (Principal Problem)   Empyema (HCC)    Interval Problem Daily Status Update  (problem status, last 24 hours, new history, new data )   Empyema - WBC continues to decline. WBC = 10.3 and patient progressively is feeling better today . He still coughs productive of sputum, but no shortness of breath, fevers, or chills. One chest tube is removed by surgery. Patient is otherwise stable  Pneumothorax - X-ray from this morning was compared to last x-ray and pneumothorax has decreased in size slightly. CXR ordered for tomorrow to compare. He is otherwise stable.   Panlobular emphysema - stable on respiratory medication.    Review of Systems   Constitutional: Negative for chills and fever.   HENT: Negative for ear pain, hearing loss and sinus pain.    Eyes: Negative for blurred vision, double vision and photophobia.   Respiratory: Positive for cough and sputum production. Negative for hemoptysis and shortness of breath.    Cardiovascular: Negative for chest pain and palpitations.   Gastrointestinal: Negative for heartburn, nausea and vomiting.   Genitourinary: Negative for dysuria and urgency.   Musculoskeletal: Negative for myalgias.   Skin: Negative for itching and rash.   Neurological: Negative for dizziness and headaches.   Endo/Heme/Allergies: Does not bruise/bleed easily.   Psychiatric/Behavioral: Negative for depression and suicidal ideas.       Physical Exam       Vitals:    18 1046 18 1200 18 1523 18 1551   BP:  (!) 92/59  106/62   Pulse: 78 (!) 109 100 (!) 102   Resp:  16   Temp:  36.9 °C (98.4 °F)  36.8 °C (98.3 °F)   SpO2: 98% 93% 96% 96%   Weight:       Height:         Body mass index is 24.21 kg/m².    Oxygen Therapy:  Pulse Oximetry: 96 %, O2 (LPM): 2, FiO2%: 28  %, O2 Delivery: Silicone Nasal Cannula    Physical Exam   Constitutional: He is oriented to person, place, and time and well-developed, well-nourished, and in no distress. No distress.   HENT:   Head: Normocephalic and atraumatic.   Mouth/Throat: Oropharynx is clear and moist.   Eyes: Pupils are equal, round, and reactive to light.   Cardiovascular: Normal rate, regular rhythm and normal heart sounds.  Exam reveals no gallop and no friction rub.    No murmur heard.  Pulmonary/Chest: Effort normal. No respiratory distress. He has wheezes. He has rales. He exhibits no tenderness.   Musculoskeletal: He exhibits edema.   Lower left extremity 1+ pitting edema in the ankle.   Neurological: He is alert and oriented to person, place, and time.   Skin: Skin is warm. He is not diaphoretic.   Psychiatric: Affect and judgment normal.       Recent Labs      07/22/18   0535  07/23/18   0311  07/24/18   0502   WBC  10.7  9.7  10.3   RBC  3.84*  3.46*  3.52*   HEMOGLOBIN  10.7*  9.6*  9.7*   HEMATOCRIT  34.2*  30.7*  31.2*   MCV  89.1  88.7  88.6   MCH  27.9  27.7  27.6   RDW  60.4*  59.2*  60.9*   PLATELETCT  690*  666*  698*   MPV  9.3  9.3  9.2   NEUTSPOLYS  65.10  71.70  62.80   LYMPHOCYTES  22.80  20.20*  24.10   MONOCYTES  7.40  6.20  8.50   EOSINOPHILS  3.50  0.80  3.20   BASOPHILS  0.70  0.70  0.80         Assessment/Plan   Empyema  - Patient initially presented with cough, greenish bloody sputum, dyspnea and shortness of breath. He also had leukocytosis and lactic acidosis upon admission. Imaging showed large empyema with cavitary lesion/masses in the right upper lobe, pleural effusion and empyema was also described in the left lung. Status post left thoracotomy, decortication, evacuation of purulent empyema, debridement of necrotic lung, and rib resection by surgery with Dr. Diego Martínez. Patient still has two chest tubes and pleural fluid culture showed beta strep group F.  - He will continue to have the chest tube for  adequate drainage of lung fluids. Per surgery, chest tube #1 removed and repeat CXR ordered for comparison.  Chest tube #2 is still in place, may be discharged with a pneumostatic valve tomorrow. Surgery will continue to follow.   - Unasyn last dose today, Augmentin oral dosage start tomorrow.       Pneumothorax  - Pneumothorax present s/p chest tube insertion.   - Respiratory therapy team suggested that they may be a leakage in the chest tubes, surgery will be consulted to manage the mechanical concerns.   - X-ray this morning compared to x-ray yesterday demonstrate slight decrease in size of PTX.   - Chest tube #1 removed today, continue chest tube #2 with suction and RT, O2, and Duoneb. CXR ordered for tomorrow morning to compare. Surgery continue to follow.      Panlobular emphysema  - Due to long history of smoking and lung damage  - He will continue RT, O2, and Duoneb  - Patient is also given incentive spirometry     Tobacco use  - Patient is a long time smoker  - Consider discussing tobacco education and cessation upon discharge

## 2018-07-24 NOTE — THERAPY
"Physical Therapy Treatment completed.   Bed Mobility:  Supine to Sit: Supervised  Transfers: Sit to Stand: Supervised  Gait: Level Of Assist: Supervised with Front-Wheel Walker       Plan of Care: Patient with no further skilled PT needs in the acute care setting at this time  Discharge Recommendations: Equipment: Front-Wheel Walker.    See \"Rehab Therapy-Acute\" Patient Summary Report for complete documentation.       "

## 2018-07-24 NOTE — PROGRESS NOTES
Pulmonary Progress Note    PATIENT: Donna Ceballos  MRN: [id number]  : 1960    Admission date: 7/10/2018    ASSESSMENT/PLAN:  Principal Problem:    Empyema (HCC) POA: Yes      Overview:    Active Problems:    Panlobular emphysema (HCC) POA: Yes    Acute hypoxemic respiratory failure (HCC) POA: Unknown    Pulmonary cachexia due to COPD (HCC) POA: Yes    Protein malnutrition (HCC) POA: Yes    Pneumothorax on left POA: Unknown    Leukocytosis POA: Yes    Tobacco abuse POA: Yes    Empyema (HCC)  Pt reports easy work of breathing  Afebrile  s/p thoracentesis on 7/10  s/p left thoracotomy decortication and evacuation of purulent empyema and decortication of lung and rib resection on   The cultures + group F strep  Continue Unasyn X 2 wks and then Augmentin on d/c  Chest physiotherapy  Chest tubes in place    Tobacco abuse  Nicotine patches while IP.   Tobacco cessation counseling    Chest tube management per thoracic surgery   Would continue to decrease steroids    SUBJECTIVE:  Feeling better, minimal pain from his chest tube    MEDICATIONS:    Current Facility-Administered Medications:   •  [START ON 2018] amoxicillin-clavulanate (AUGMENTIN) 875-125 MG per tablet 1 Tab, 1 Tab, Oral, Q12HRS, Tessa Rich M.D.  •  predniSONE (DELTASONE) tablet 30 mg, 30 mg, Oral, DAILY, Vinay Rivera M.D., 30 mg at 18 0543  •  ipratropium-albuterol (DUONEB) nebulizer solution, 3 mL, Nebulization, Q4HRS (RT), Vinay Rivera M.D., 3 mL at 18 1045  •  ampicillin/sulbactam (UNASYN) 3 g in  mL IVPB, 3 g, Intravenous, Q6HRS, Tessa Rich M.D., Last Rate: 200 mL/hr at 18 1202, 3 g at 18 1202  •  enoxaparin (LOVENOX) inj 40 mg, 40 mg, Subcutaneous, DAILY, Carlton Mcintyre M.D., 40 mg at 18 0544  •  oxyCODONE immediate-release (ROXICODONE) tablet 5 mg, 5 mg, Oral, Q4HRS PRN, 5 mg at 18 1143 **OR** oxyCODONE immediate release (ROXICODONE) tablet 10 mg, 10 mg, Oral, Q4HRS PRN,  "Carlton Mcintyre M.D., 10 mg at 07/23/18 2204  •  Respiratory Care per Protocol, , Nebulization, Continuous RT, Carlton Mcintyre M.D.  •  senna-docusate (PERICOLACE or SENOKOT S) 8.6-50 MG per tablet 2 Tab, 2 Tab, Oral, BID, 2 Tab at 07/24/18 0543 **AND** polyethylene glycol/lytes (MIRALAX) PACKET 1 Packet, 1 Packet, Oral, QDAY PRN, 1 Packet at 07/15/18 0835 **AND** magnesium hydroxide (MILK OF MAGNESIA) suspension 30 mL, 30 mL, Oral, QDAY PRN, 30 mL at 07/16/18 0514 **AND** bisacodyl (DULCOLAX) suppository 10 mg, 10 mg, Rectal, QDAY PRN, Carlton Mcintyre M.D., 10 mg at 07/23/18 2100  •  MD ALERT...Adult ICU Electrolyte Replacement per Pharmacy Protocol, , Other, pharmacy to dose, Carlton Mcintyre M.D.  •  NS infusion, , Intravenous, Continuous, Jose Bourne M.D., Last Rate: 10 mL/hr at 07/24/18 0544  •  ondansetron (ZOFRAN) syringe/vial injection 4 mg, 4 mg, Intravenous, Q4HRS PRN, Shruthi Phan M.D.  •  ondansetron (ZOFRAN ODT) dispertab 4 mg, 4 mg, Oral, Q4HRS PRN, Shruthi Phan M.D.  •  promethazine (PHENERGAN) tablet 12.5-25 mg, 12.5-25 mg, Oral, Q4HRS PRN, Shruthi Phan M.D.  •  promethazine (PHENERGAN) suppository 12.5-25 mg, 12.5-25 mg, Rectal, Q4HRS PRN, Shruthi Phan M.D.  •  prochlorperazine (COMPAZINE) injection 5-10 mg, 5-10 mg, Intravenous, Q4HRS PRN, Shruthi Phan M.D.  •  acetaminophen (TYLENOL) tablet 650 mg, 650 mg, Oral, Q6HRS PRN, Shruthi Phan M.D., 650 mg at 07/16/18 1359  •  nicotine (NICODERM) 21 MG/24HR 21 mg, 21 mg, Transdermal, Daily-0600, 21 mg at 07/24/18 0543 **AND** Nicotine Replacement Patient Education Materials, , , Once **AND** nicotine polacrilex (NICORETTE) 2 MG piece 2 mg, 2 mg, Oral, Q HOUR PRN, Shruthi Phan M.D.    OBJECTIVE:    BP (!) 92/59 Comment: RN notified  Pulse (!) 109 Comment: RN Notified  Temp 36.9 °C (98.4 °F)   Resp 18   Ht 1.829 m (6' 0.01\")   Wt 81 kg (178 lb 9.2 oz)   SpO2 93%   BMI 24.21 kg/m²   General: alert, communicative  HEENT: Sclera clear, " conjunctiva pink, oropharynx clear, mucus membranes moist  Heart: regular  Lungs: coarse breath sounds  Abdomen: soft  Extremities: no clubbing cyanosis or edema  Neuro: intact  Skin: intact    DATA REVIEW:  LABORATORY DATA:    Recent Labs      07/22/18   0535  07/23/18   0311  07/24/18   0502   WBC  10.7  9.7  10.3   RBC  3.84*  3.46*  3.52*   HEMOGLOBIN  10.7*  9.6*  9.7*   HEMATOCRIT  34.2*  30.7*  31.2*   MCV  89.1  88.7  88.6   MCH  27.9  27.7  27.6   MCHC  31.3*  31.3*  31.1*   RDW  60.4*  59.2*  60.9*   PLATELETCT  690*  666*  698*   MPV  9.3  9.3  9.2     Recent Labs      07/22/18   0535  07/23/18   0311  07/24/18   0501   SODIUM  135  135  138  136   POTASSIUM  3.7  4.2  4.2  4.3   CHLORIDE  97  100  100  98   CO2  33  32  32  33   GLUCOSE  84  126*  123*  90   BUN  9  10  10  11   CREATININE  0.42*  0.51  0.50  0.49*   CALCIUM  8.7  8.4*  8.4*  9.0         Recent Labs      07/22/18   0535  07/23/18   0311  07/24/18   0501   SODIUM  135  135  138  136   POTASSIUM  3.7  4.2  4.2  4.3   CHLORIDE  97  100  100  98   CO2  33  32  32  33   GLUCOSE  84  126*  123*  90   BUN  9  10  10  11        beta strep BAL 7/12 and biopsy  IMAGING:   CXR (personally reviewed) - emphysematous changes, CTs in place, loculated pneumothorax left with infiltrates RUL      Assessment and plan as above  Time 35 minutes    Poli Dinero M.D.

## 2018-07-24 NOTE — PROGRESS NOTES
Assumed care at 1845. Pt resting in bed. A&ox 4  Ambulating with standby assist  Ct to left side x 2. Both on -40cm suction  CT # 2 with persistent airleak  Arvonia to left side SANTIAGO  Ok to ambulate with CT to water seal.  Pain controlled with oxycodone  Constipated tonight. Suppository given with result.  Prod cough. O2 @ 2LNC. Respiratory protocol in place  Voiding well. Tolerating diet.   Iv abx continued.   Call light within reach. Hourly rounding in place

## 2018-07-24 NOTE — DISCHARGE PLANNING
[facility] to [facility] for a monthly out of pocket rental cost for 02.     Spoke To: [place]   Outcome: Per [place], It is 140/month and includes : Home Machine, Regulator, and 8 tanks for the month.     DEBORA Blanca has been updated.

## 2018-07-24 NOTE — PROGRESS NOTES
Pt AAOx4.  No c/o pain.  Staples to left chest.  CT x2 to left chest, #1 minimal output, #2 with air leak, MD aware.  Sacrum red, blanching, mepilex in place.   Regular diet in place, no c/o n/v.  LBM 7/23, + flatus, + void.   POC reviewed with pt.  Pt agitated this morning about belongings being lost prior to arriving on [facility], supervisor currently looking for them around hospital.   Call light within reach, pt educated to call for assistance.  Hourly rounding in place.

## 2018-07-24 NOTE — PROGRESS NOTES
"Progress Note:  7/24/2018, 1:27 PM    S: No acute events.  Breathing stable. CT#2 with persistent expiratory air leak.    O:  Blood pressure (!) 92/59, pulse (!) 109, temperature 36.9 °C (98.4 °F), resp. rate 18, height 1.829 m (6' 0.01\"), weight 81 kg (178 lb 9.2 oz), SpO2 93 %.    NAD, awake and alert  Breathing is nonlabored, but requires supplemental O2  L CT#1 without air leak. L CT #2 with persistent expiratory leak.  Minimal output      A:   Active Hospital Problems    Diagnosis   • Acute hypoxemic respiratory failure (HCC) [J96.01]     Priority: High   • Panlobular emphysema (HCC) [J43.1]     Priority: High   • Empyema (HCC) [J86.9]     Priority: High           • Pneumothorax on left [J93.9]     Priority: Medium   • Pulmonary cachexia due to COPD (HCC) [J44.9, R64]     Priority: Medium   • Protein malnutrition (HCC) [E46]     Priority: Medium   • Leukocytosis [D72.829]     Priority: Low   • Tobacco abuse [Z72.0]     Priority: Low         P:   -CT #1 removed today  -Leave CT #2 to water seal, if no change on CXR tomorrow AM, then can set up for discharge with chest tube to pneumostat drain  -Not read for d/c today because we don't know if lung will collapse while on water seal  -Aggressive pulm hygiene  -ambulate QID    Pb Gaviria M.D.  [facility]  [phone]    "

## 2018-07-24 NOTE — CARE PLAN
Problem: Discharge Barriers/Planning  Goal: Patient's continuum of care needs will be met  Outcome: PROGRESSING AS EXPECTED  Home oxygen ordered for pt upon discharge. Per MD discharge disposition to be reevaluated tomorrow after morning CXR.     Problem: Respiratory:  Goal: Respiratory status will improve  Outcome: PROGRESSING AS EXPECTED  Chest tube in place to left chest. Pt requiring 2L of oxygen, home O2 ordered. Wheezing and rhonchi noted throughout lung lobes. Pt self motivated on IS. TCDB in place.

## 2018-07-24 NOTE — DISCHARGE PLANNING
Agency/Facility Name: [facility]   Spoke To: Nathaly   Outcome: Per Nathaly, referral was received but patient contact had not been made. CCA relayed that patient will be paying for monthly rental fee out of pocket. Facility will place call to patients room and follow up with this CCA once payment has been made.     [facility] is awaiting eta for equipment and will follow up with LSW.

## 2018-07-24 NOTE — PROGRESS NOTES
Pt mentioned that his belongings were missing. He stated there was a gray t-shirt, brown Dr. Ruddy michele, $500 bifocal glasses and his LG cell phone. I checked his room and patient cabinet. I contacted safe keeping. I then looked up his event management to see where he was in the hospital. He started as a transfer from [facility] to the ER in G25, then to T809, then to OR, then to S118, then to PICU, then to us. I contacted every unit he was on as well as pre-op and pacu, and no belongings could be found. I contacted Nilda with Guest Relations and she said she would look into it. The patient himself said that he had them on T8 but not when he went to SICU. There is a note from 7/10 at 2100 that he had his glasses. There is a note from SICU also looking for his belongings. I then went to T8 myself to look for the belongings with no success. I have left a message with Mili Bunch in service excellence.

## 2018-07-24 NOTE — CARE PLAN
Problem: Safety  Goal: Will remain free from injury  Updated about POC. Reinforce call light use. Pt acknowledged understanding     Problem: Infection  Goal: Will remain free from infection  Iv abx continued. Switched to po abx on 7/24     Problem: Respiratory:  Goal: Respiratory status will improve  Encouraged IS use. Pulls about 500-1000ml

## 2018-07-24 NOTE — THERAPY
"Speech Language Therapy dysphagia treatment completed.   Functional Status:  The patient was seen for dysphagia therapy this date. The patient was awake, alert and sitting in bedside chair. The patient denied any difficulty with PO intake, coughing or choking. The patient consumed regular/thin liquid snack with no overt s/s of aspiration/penetration or oropharyngeal dysphagia. Patient verbalized clear understanding of swallow strategies to minimize aspiration risk given poor dentition.     Recommendations:1) Continue regular diet textures. 2)  No further skilled SLP services indicated at this time. Please re-consult SLP with any SLP needs.     Plan of Care: Patient with no further skilled SLP needs in the acute care setting at this time.    Post-Acute Therapy: Currently anticipate no further skilled therapy needs once patient is discharged from the inpatient setting.    See \"Rehab Therapy-Acute\" Patient Summary Report for complete documentation.     "

## 2018-07-24 NOTE — PROGRESS NOTES
Internal Medicine Interval Note  Note Author: David Foy M.D.     Name Donna Ceballos 1960   Age/Sex 58 y.o. male   MRN [id number]   Code Status FULL     After 5PM or if no immediate response to page, please call for cross-coverage  Attending/Team: Dr. Kelsey/Natanael See Patient List for primary contact information  Call [phone] to page    1st Call - Day Intern (R1):   Dr. Foy 2nd Call - Day Sr. Resident (R3):   Dr. Jin         Reason for interval visit  (Principal Problem)   Large left-sided empyema       Interval Problem Daily Status Update  (24 hours, problem oriented, brief subjective history, new lab/imaging data pertinent to that problem)   Pt says this is the best he has felt since his illness began in .   He continues to ambulate.  Using the incentive spirometer.    24 hour output on chest drain is around 195 ml.  His cough is better and reduced sputum production. Clear.       Review of Systems   Constitutional: Negative for chills and fever.   HENT: Negative for hearing loss.    Respiratory: Positive for cough and sputum production. Negative for hemoptysis and stridor.    Cardiovascular: Negative for chest pain.   Gastrointestinal: Negative for abdominal pain, nausea and vomiting.   Genitourinary: Negative for dysuria and urgency.   Musculoskeletal: Negative for joint pain and myalgias.   Skin: Negative for rash.   Neurological: Negative for dizziness and headaches.   Psychiatric/Behavioral: Negative for depression.       Disposition/Barriers to discharge:   Chest tubes    Consultants/Specialty  Dr. Martínez/ Surgery ; Dr Mcintyre/Pulmonary  PCP: Pt States None        Quality Measures  Quality-Core Measures   Reviewed items::  Labs reviewed, EKG reviewed, Medications reviewed and Radiology images reviewed  Motley catheter::  No Motley          Physical Exam       Vitals:    18 1510 18 1600 18 1939 18   BP:  (!) 94/56  104/64    Pulse: 82 86 82 91   Resp: 18 17 19 17   Temp:  36.6 °C (97.8 °F)  36.6 °C (97.9 °F)   SpO2: 98% 97% 98% 95%   Weight:       Height:         Body mass index is 24.21 kg/m².    Oxygen Therapy:  Pulse Oximetry: 95 %, O2 (LPM): 2, FiO2%: 28 %, O2 Delivery: Nasal Cannula    Physical Exam   Constitutional: He is oriented to person, place, and time. He appears malnourished.   HENT:   Head: Normocephalic.   Eyes: Pupils are equal, round, and reactive to light.   Neck: Normal range of motion.   Cardiovascular: Normal rate and normal heart sounds.    Pulmonary/Chest: He has wheezes. He has rales.   Diffuse crackles/rales/whhezes on all lung fields.   Abdominal: Soft. Bowel sounds are normal.   Musculoskeletal: Normal range of motion.   Neurological: He is alert and oriented to person, place, and time.   Skin: Skin is warm.             Assessment/Plan     * Empyema (HCC)- (present on admission)   Assessment & Plan    Pt reports easy work of breathing  Afebrile  s/p thoracentesis on 7/10  s/p left thoracotomy decortication and evacuation of purulent empyema and decortication of lung and rib resection on 7/11  The cultures + group F strep  Continue Unasyn X 2 wks and then Augmentin on d/c  Chest physiotherapy  Chest tubes in place          Acute hypoxemic respiratory failure (HCC)   Assessment & Plan    Resolved.             Panlobular emphysema (HCC)- (present on admission)   Assessment & Plan    Improving Resp status  Continue scheduled duonebs.  RT protocol, Oxygen, Cont pulse ox.  IS 10 X q hr.         Pneumothorax on left   Assessment & Plan    Secondary to chest tubes placement  Will continue to monitor  CT surgery monitoring, plans to d/c with one tube in place with f/u office removal of remaining tube.         Protein malnutrition (HCC)- (present on admission)   Assessment & Plan    - Patient appears cachectic.  - Lost around 10 lbs since March when the symptoms started.  - Nutrition consult        Pulmonary cachexia  due to COPD (HCC)- (present on admission)   Assessment & Plan    - Secondary to emphysema and empyema  - Nutritional consult.   - Continue antibiotics         Tobacco abuse- (present on admission)   Assessment & Plan    Nicotine patches while IP.   Tobacco cessation counseling          Leukocytosis- (present on admission)   Assessment & Plan    Resolved  Continue antibiotics.   IS 10 X q hr.

## 2018-07-25 ENCOUNTER — APPOINTMENT (OUTPATIENT)
Dept: RADIOLOGY | Facility: MEDICAL CENTER | Age: 58
DRG: 853 | End: 2018-07-25
Attending: INTERNAL MEDICINE

## 2018-07-25 VITALS
TEMPERATURE: 97.5 F | OXYGEN SATURATION: 95 % | HEIGHT: 72 IN | WEIGHT: 178.57 LBS | HEART RATE: 90 BPM | BODY MASS INDEX: 24.19 KG/M2 | DIASTOLIC BLOOD PRESSURE: 71 MMHG | RESPIRATION RATE: 16 BRPM | SYSTOLIC BLOOD PRESSURE: 108 MMHG

## 2018-07-25 LAB
ANION GAP SERPL CALC-SCNC: 6 MMOL/L (ref 0–11.9)
BASOPHILS # BLD AUTO: 1.5 % (ref 0–1.8)
BASOPHILS # BLD: 0.15 K/UL (ref 0–0.12)
BUN SERPL-MCNC: 13 MG/DL (ref 8–22)
CALCIUM SERPL-MCNC: 9.1 MG/DL (ref 8.5–10.5)
CHLORIDE SERPL-SCNC: 98 MMOL/L (ref 96–112)
CO2 SERPL-SCNC: 32 MMOL/L (ref 20–33)
CREAT SERPL-MCNC: 0.57 MG/DL (ref 0.5–1.4)
EOSINOPHIL # BLD AUTO: 0.37 K/UL (ref 0–0.51)
EOSINOPHIL NFR BLD: 3.7 % (ref 0–6.9)
ERYTHROCYTE [DISTWIDTH] IN BLOOD BY AUTOMATED COUNT: 62.4 FL (ref 35.9–50)
GLUCOSE SERPL-MCNC: 85 MG/DL (ref 65–99)
HCT VFR BLD AUTO: 32.9 % (ref 42–52)
HGB BLD-MCNC: 10 G/DL (ref 14–18)
IMM GRANULOCYTES # BLD AUTO: 0.04 K/UL (ref 0–0.11)
IMM GRANULOCYTES NFR BLD AUTO: 0.4 % (ref 0–0.9)
LYMPHOCYTES # BLD AUTO: 2.93 K/UL (ref 1–4.8)
LYMPHOCYTES NFR BLD: 29.3 % (ref 22–41)
MAGNESIUM SERPL-MCNC: 2 MG/DL (ref 1.5–2.5)
MCH RBC QN AUTO: 27.2 PG (ref 27–33)
MCHC RBC AUTO-ENTMCNC: 30.4 G/DL (ref 33.7–35.3)
MCV RBC AUTO: 89.6 FL (ref 81.4–97.8)
MONOCYTES # BLD AUTO: 0.86 K/UL (ref 0–0.85)
MONOCYTES NFR BLD AUTO: 8.6 % (ref 0–13.4)
NEUTROPHILS # BLD AUTO: 5.66 K/UL (ref 1.82–7.42)
NEUTROPHILS NFR BLD: 56.5 % (ref 44–72)
NRBC # BLD AUTO: 0 K/UL
NRBC BLD-RTO: 0 /100 WBC
PHOSPHATE SERPL-MCNC: 4.3 MG/DL (ref 2.5–4.5)
PLATELET # BLD AUTO: 783 K/UL (ref 164–446)
PMV BLD AUTO: 9 FL (ref 9–12.9)
POTASSIUM SERPL-SCNC: 4.2 MMOL/L (ref 3.6–5.5)
RBC # BLD AUTO: 3.67 M/UL (ref 4.7–6.1)
SODIUM SERPL-SCNC: 136 MMOL/L (ref 135–145)
WBC # BLD AUTO: 10 K/UL (ref 4.8–10.8)

## 2018-07-25 PROCEDURE — A9270 NON-COVERED ITEM OR SERVICE: HCPCS | Performed by: INTERNAL MEDICINE

## 2018-07-25 PROCEDURE — 700105 HCHG RX REV CODE 258: Performed by: STUDENT IN AN ORGANIZED HEALTH CARE EDUCATION/TRAINING PROGRAM

## 2018-07-25 PROCEDURE — 700111 HCHG RX REV CODE 636 W/ 250 OVERRIDE (IP): Performed by: INTERNAL MEDICINE

## 2018-07-25 PROCEDURE — 80048 BASIC METABOLIC PNL TOTAL CA: CPT

## 2018-07-25 PROCEDURE — 71045 X-RAY EXAM CHEST 1 VIEW: CPT

## 2018-07-25 PROCEDURE — A9270 NON-COVERED ITEM OR SERVICE: HCPCS | Performed by: STUDENT IN AN ORGANIZED HEALTH CARE EDUCATION/TRAINING PROGRAM

## 2018-07-25 PROCEDURE — 700102 HCHG RX REV CODE 250 W/ 637 OVERRIDE(OP): Performed by: INTERNAL MEDICINE

## 2018-07-25 PROCEDURE — 36415 COLL VENOUS BLD VENIPUNCTURE: CPT

## 2018-07-25 PROCEDURE — 83735 ASSAY OF MAGNESIUM: CPT

## 2018-07-25 PROCEDURE — 99239 HOSP IP/OBS DSCHRG MGMT >30: CPT | Mod: GC | Performed by: HOSPITALIST

## 2018-07-25 PROCEDURE — 85025 COMPLETE CBC W/AUTO DIFF WBC: CPT

## 2018-07-25 PROCEDURE — 84100 ASSAY OF PHOSPHORUS: CPT

## 2018-07-25 PROCEDURE — 99232 SBSQ HOSP IP/OBS MODERATE 35: CPT | Performed by: INTERNAL MEDICINE

## 2018-07-25 PROCEDURE — 700102 HCHG RX REV CODE 250 W/ 637 OVERRIDE(OP): Performed by: STUDENT IN AN ORGANIZED HEALTH CARE EDUCATION/TRAINING PROGRAM

## 2018-07-25 RX ORDER — OXYCODONE HYDROCHLORIDE 5 MG/1
5 TABLET ORAL EVERY 8 HOURS PRN
Qty: 15 TAB | Refills: 0 | Status: SHIPPED | OUTPATIENT
Start: 2018-07-25 | End: 2018-07-30

## 2018-07-25 RX ORDER — AMOXICILLIN AND CLAVULANATE POTASSIUM 875; 125 MG/1; MG/1
1 TABLET, FILM COATED ORAL EVERY 12 HOURS
Qty: 26 TAB | Refills: 0 | Status: SHIPPED | OUTPATIENT
Start: 2018-07-25 | End: 2018-08-07

## 2018-07-25 RX ORDER — PREDNISONE 20 MG/1
20 TABLET ORAL DAILY
Status: DISCONTINUED | OUTPATIENT
Start: 2018-07-25 | End: 2018-07-25 | Stop reason: HOSPADM

## 2018-07-25 RX ORDER — SODIUM CHLORIDE 9 MG/ML
INJECTION, SOLUTION INTRAVENOUS
Status: DISCONTINUED
Start: 2018-07-25 | End: 2018-07-25 | Stop reason: HOSPADM

## 2018-07-25 RX ORDER — OXYCODONE HYDROCHLORIDE 5 MG/1
5 TABLET ORAL EVERY 4 HOURS PRN
Qty: 30 TAB | Refills: 0 | Status: SHIPPED | OUTPATIENT
Start: 2018-07-25 | End: 2018-07-25

## 2018-07-25 RX ADMIN — NICOTINE 21 MG: 21 PATCH, EXTENDED RELEASE TRANSDERMAL at 06:48

## 2018-07-25 RX ADMIN — SODIUM CHLORIDE: 9 INJECTION, SOLUTION INTRAVENOUS at 03:15

## 2018-07-25 RX ADMIN — ENOXAPARIN SODIUM 40 MG: 100 INJECTION SUBCUTANEOUS at 06:47

## 2018-07-25 RX ADMIN — STANDARDIZED SENNA CONCENTRATE AND DOCUSATE SODIUM 2 TABLET: 8.6; 5 TABLET, FILM COATED ORAL at 06:47

## 2018-07-25 RX ADMIN — OXYCODONE HYDROCHLORIDE 5 MG: 5 TABLET ORAL at 06:52

## 2018-07-25 RX ADMIN — AMOXICILLIN AND CLAVULANATE POTASSIUM 1 TABLET: 875; 125 TABLET, FILM COATED ORAL at 06:48

## 2018-07-25 RX ADMIN — PREDNISONE 30 MG: 20 TABLET ORAL at 07:33

## 2018-07-25 ASSESSMENT — ENCOUNTER SYMPTOMS
NAUSEA: 0
SPEECH CHANGE: 0
SENSORY CHANGE: 0
CHILLS: 0
COUGH: 1
VOMITING: 0
ABDOMINAL PAIN: 0
FEVER: 0
MYALGIAS: 1

## 2018-07-25 ASSESSMENT — PAIN SCALES - GENERAL
PAINLEVEL_OUTOF10: 5
PAINLEVEL_OUTOF10: 2

## 2018-07-25 NOTE — DISCHARGE PLANNING
Anticipated Discharge Disposition: Oxygen.    Action: Per RN Arina, patient declined the oxygen and was discharge home.    Barriers to Discharge: None.    Plan: No further assistance is need it at this time.

## 2018-07-25 NOTE — DISCHARGE SUMMARY
Internal Medicine Discharge Summary  Note Author: Deanne Jin M.D.       Name Donna Ceballos 1960   Age/Sex 58 y.o. male   MRN [id number]         Admit Date:  7/10/2018       Discharge Date:   2018    Service:   [facility] Internal Medicine white Team  Attending Physician(s):   Dr Yu/Dr Kelsey       Senior Resident(s):   Dr Jin  Gavin Resident(s):   Dr Calix/Dr Hernandez   PCP: Pcp Pt States None      Primary Diagnosis:   Empyema (HCC)    Acute respiratory failure due to empyema and pneumonia due to  Beta Streptococcus Group F.        Secondary Diagnoses:                Principal Problem:    Empyema (HCC) POA: Yes      Overview:    Active Problems:    Panlobular emphysema (HCC) POA: Yes    Acute hypoxemic respiratory failure (HCC) POA: Unknown    Pulmonary cachexia due to COPD (HCC) POA: Yes    Protein malnutrition (HCC) POA: Yes    Pneumothorax on left POA: Unknown    Leukocytosis POA: Yes    Tobacco abuse POA: Yes  Resolved Problems:    Lactic acidosis POA: Yes      Hospital Summary (Brief Narrative):           58-year-old gentleman with hx of smoking and alcoholism, came from from [facility] for dyspnea and coughing with purulent sputum, and he had weight loss no fever, chills, or sweats, on admission the patient was ill with severe sepsis and lactic acidosis CXR showed left pneumothorax although the hydropneumothorax air-fluid level, CT scan showed a significant left greater than right changes with suggestive empyema as well as multiloculated abscess versus cavitary masses,  thoracentesis was done on admission and fluid analysis showed empyema (WBC: 466602 and PH 7.2, Glu<10) , then he underwent  Left thoracotomy, decortication, evacuation of purulent empyema, debridement of necrotic lung and rib resection and also he had bronchoscopy which showed the airway mucosa was mildly inflamed, right main was occluded with dark, thick, creamy secretions which were all flushed and  aspirated, pleural fluid culture and lung tissue culture showed Beta Streptococcus Group F, the patient was treated in ICU with Unasyn for 2 weeks (7/10 till 7/24)and switched to Augmentin per ID till 8/7/2018, he did need intubation from 7/11>>7/16, the infection  had improved and no fever or dyspnea, so he was transferred to floor to continue the treatment, the patient also has pneumothorax on addition to pleural effusion and he was treated with chest tube and was followed by surgery team Dr Gaviria and on discharge Helmilich valve was placed and discharged him on a stable situation with the chest tube in place and follow up with surgery clinic in 10 days, CXR on discharge left pneumothorax, somewhat increased in size status post removal of superiormost thoracostomy tube, the patient was cleared by sturgeon for discharge to follow up with the surgery clinic in 10 days.     The patient has significant hx of smoking and drinking, he was educated and encouraged to quit.       Consultants:     Surgery     Procedures:        -7/10-thoracentesis of large left pleural effusion with thick turbid creamy yellow pus.-Interventional radiology  - 7/11-left thoracostomy, decortication, evacuation of purulent empyema, debridement of necrotic lung, and rib resection.-Dr. Diego Martínez  - 7/12-Central line placement  - 7/12-emergent diagnostic and therapeutic bronchoscopy with aspiration and bronchial alveolar lavage  - Pleural fluid culture beta strep group F  - intubated 7/11-16    Imaging/ Testing:      DX-CHEST-PORTABLE (1 VIEW)   Final Result         1.  Stable pulmonary infiltrates and/or edema with probable component of chronic underlying lung disease.   2.  Left pneumothorax, somewhat increased in size status post removal of superiormost thoracostomy tube..                  DX-CHEST-PORTABLE (1 VIEW)   Final Result         1.  Stable pulmonary infiltrates and/or edema with probable component of chronic underlying lung  disease.   2.  Left pneumothorax, stable since prior with 2 thoracostomy tubes in place.               DX-CHEST-PORTABLE (1 VIEW)   Final Result         1.  Stable pulmonary infiltrates and/or edema with probable component of chronic underlying lung disease.   2.  Left pneumothorax, stable since prior with 2 thoracostomy tubes in place.            DX-CHEST-PORTABLE (1 VIEW)   Final Result      Somewhat decreased loculated pneumothorax on the left compared with 7/21. No change otherwise.      DX-CHEST-PORTABLE (1 VIEW)   Final Result      Stable chest findings compared with 7/20.      DX-CHEST-PORTABLE (1 VIEW)   Final Result      Stable chest findings compared with 7/19.      DX-CHEST-PORTABLE (1 VIEW)   Final Result      Stable bilateral opacities and partial left pneumothorax.      DX-CHEST-PORTABLE (1 VIEW)   Final Result         1. Left lateral pneumothorax is stable to minimally larger. Stable left chest tube position.   2. Stable lung findings.      CT-CHEST (THORAX) W/O   Final Result         1. There is small left anterior pneumothorax, as seen on prior radiograph. 2 left chest tubes in place.      2. Multiple cavitary lesions with thick wall throughout the left upper lobe. There is large cavitation nearly replacing the entire right upper lobe. No air-fluid level. These findings could relate to cavitation secondary to lung infections. The    differential includes septic emboli, malignancy.      3. Focal nodular opacities in the left upper lobe and patchy groundglass opacities in the right middle lobe, right lower lobe and left lower lobe could relate to infection as well.      4. Small right pleural effusion.      DX-CHEST-LIMITED (1 VIEW)   Final Result         1.  Interval decrease in left lateral pneumothorax since previous exam.      2.  Right upper lobe and left lower lobe airspace opacities unchanged.      DX-CHEST-PORTABLE (1 VIEW)   Final Result      1.  Unchanged small left lateral pneumothorax with  2 chest tubes in place.      2.  Unchanging bilateral pulmonary opacities.      DX-CHEST-PORTABLE (1 VIEW)   Final Result      1.  Unchanged small LEFT pneumothorax with chest tubes in place   2.  Unchanged BILATERAL cavitary airspace disease      DX-CHEST-PORTABLE (1 VIEW)   Final Result         1.  Stable pulmonary infiltrates and/or edema with probable component of chronic underlying lung disease.   2.  Left pneumothorax, stable since prior with 2 thoracostomy tubes in place.         DX-ABDOMEN FOR TUBE PLACEMENT   Final Result      Cortrak feeding tube tip projects in the region of the gastric fundus.      DX-CHEST-PORTABLE (1 VIEW)   Final Result         1.  Increased opacification of the right hemithorax, compatible with new large pleural effusion and/or infiltrates and/or atelectasis.   2.  Left pneumothorax, stable since prior with 2 thoracostomy tubes in place.      DX-CHEST-PORTABLE (1 VIEW)   Final Result         1. Improving opacities in the right perihilar and infrahilar regions. Otherwise stable multifocal patchy consolidations.   2. Stable lines and tubes.   3. Stable left pneumothorax subcutaneous emphysema of the left chest wall.      DX-CHEST-PORTABLE (1 VIEW)   Final Result      1.  Interval improved aeration of RIGHT lung base with improvement of RIGHT pleural effusion.   2.  Stable LEFT pneumothorax with chest tubes present.   3.  Patchy consolidation again seen in both lungs with basilar densities in the LEFT upper lung again noted.   4.  Increasing LEFT chest wall emphysema.      DX-CHEST-PORTABLE (1 VIEW)   Final Result         1.  Increased opacification of the right hemithorax, compatible with new large pleural effusion and/or infiltrates and/or atelectasis.   2.  Improved aeration of the left hemithorax status post thoracostomy tube placement. Residual infiltrates in the left lung are seen.   3.  Left pneumothorax, stable since prior with 2 thoracostomy tubes in place.       DX-CHEST-PORTABLE (1 VIEW)   Final Result      1.  There has been interval decrease in the left pleural effusion.   2.  There is still some component of LEFT pneumothorax although the hydropneumothorax air-fluid level is no longer evident.   3.  Multiple left bone or masses are again seen.   4.  Left lower lobe airspace disease is again seen.   5.  Ill-defined nodular and linear opacity in the right upper lobe with pleural thickening and hilar retraction is again seen.      US-THORACENTESIS PUNCTURE LEFT   Final Result      1. Ultrasound guided left sided diagnostic thoracentesis.      2. 500 mL of fluid withdrawn. Additional fluid was not removed as the catheter became occluded by the thick liquid      OUTSIDE IMAGES-CT CHEST/ABDOMEN/PELVIS   Final Result      OUTSIDE IMAGES-DX CHEST   Final Result      DX-CHEST-PORTABLE (1 VIEW)    (Results Pending)         Discharge Medications:         Medication Reconciliation: Completed       Medication List      START taking these medications      Instructions   amoxicillin-clavulanate 875-125 MG Tabs  Commonly known as:  AUGMENTIN   Take 1 Tab by mouth every 12 hours for 13 days.  Dose:  1 Tab     oxyCODONE immediate-release 5 MG Tabs  Commonly known as:  ROXICODONE   Take 1 Tab by mouth every 8 hours as needed for up to 5 days.  Dose:  5 mg        CONTINUE taking these medications      Instructions   aspirin 325 MG Tabs  Commonly known as:  ASA   Take 650 mg by mouth every 6 hours as needed.  Dose:  650 mg     therapeutic multivitamin-minerals Tabs   Take 1 Tab by mouth every day.  Dose:  1 Tab                  Disposition:   Home     Diet:   Healthy     Activity:   As tolerated     Instructions:       The patient was instructed to return to the ER in the event of worsening symptoms. I have counseled the patient on the importance of compliance and the patient has agreed to proceed with all medical recommendations and follow up plan indicated above.   The patient  understands that all medications come with benefits and risks. Risks may include permanent injury or death and these risks can be minimized with close reassessment and monitoring.        Primary Care Provider:      Discharge summary faxed to primary care provider:  Completed  Copy of discharge summary given to the patient: Completed      Follow up appointment details :          Pending Studies:        Aug 08, 2018 10:00 AM \[facility] [place] [facility]\""  Hospital Follow Up with SHERRY Pate  [facility] (--) [address]  [address]  [place] [id number] [phone]         Time spent on discharge day patient visit, preparing discharge paperwork and arranging for patient follow up.    Summary of follow up issues:   Follow up with surgery clinic to remove the tube   Follow up with PCP to repeat CT if needed.       Discharge Time (Minutes) :    43 min  Hospital Course Type:  Inpatient Stay >2 midnights      Condition on Discharge    ______________________________________________________________________    Interval history/exam for day of discharge:     The patient feels ok no fever or chills and he is ok for discharge, the chest tube in place to follow up with clinic for removal.       Physical Exam   Constitutional: He appears well-developed.   Malnourished appearing   Cardiovascular: Normal rate and regular rhythm.    Pulmonary/Chest: He has no wheezes. He has no rales.   Diminished on the L side.   Abdominal: Soft. He exhibits no distension. There is no tenderness.   Musculoskeletal: He exhibits no edema.   Neurological: No cranial nerve deficit.            Most Recent Labs:    Lab Results   Component Value Date/Time    WBC 10.0 07/25/2018 04:12 AM    RBC 3.67 (L) 07/25/2018 04:12 AM    HEMOGLOBIN 10.0 (L) 07/25/2018 04:12 AM    HEMATOCRIT 32.9 (L) 07/25/2018 04:12 AM    MCV 89.6 07/25/2018 04:12 AM    MCH 27.2 07/25/2018 04:12 AM    MCHC 30.4 (L) 07/25/2018 04:12 AM    MPV 9.0 07/25/2018 04:12 AM     NEUTSPOLYS 56.50 07/25/2018 04:12 AM    LYMPHOCYTES 29.30 07/25/2018 04:12 AM    MONOCYTES 8.60 07/25/2018 04:12 AM    EOSINOPHILS 3.70 07/25/2018 04:12 AM    BASOPHILS 1.50 07/25/2018 04:12 AM    HYPOCHROMIA 1+ 07/15/2018 04:10 AM    ANISOCYTOSIS 1+ 07/15/2018 04:10 AM      Lab Results   Component Value Date/Time    SODIUM 136 07/25/2018 04:12 AM    POTASSIUM 4.2 07/25/2018 04:12 AM    CHLORIDE 98 07/25/2018 04:12 AM    CO2 32 07/25/2018 04:12 AM    GLUCOSE 85 07/25/2018 04:12 AM    BUN 13 07/25/2018 04:12 AM    CREATININE 0.57 07/25/2018 04:12 AM      Lab Results   Component Value Date/Time    ALTSGPT 30 07/23/2018 03:11 AM    ASTSGOT 21 07/23/2018 03:11 AM    ALKPHOSPHAT 52 07/23/2018 03:11 AM    TBILIRUBIN 0.2 07/23/2018 03:11 AM    ALBUMIN 2.5 (L) 07/23/2018 03:11 AM    GLOBULIN 3.9 (H) 07/23/2018 03:11 AM    PREALBUMIN 12.0 (L) 07/16/2018 04:15 AM    INR 1.42 (H) 07/10/2018 02:50 PM    MACROCYTOSIS 1+ 07/15/2018 04:10 AM     Lab Results   Component Value Date/Time    PROTHROMBTM 17.0 (H) 07/10/2018 02:50 PM    INR 1.42 (H) 07/10/2018 02:50 PM

## 2018-07-25 NOTE — CARE PLAN
Problem: Pain Management  Goal: Pain level will decrease to patient's comfort goal  Outcome: PROGRESSING AS EXPECTED  Will medicate per MAR prn    Problem: Mobility  Goal: Risk for activity intolerance will decrease  Outcome: PROGRESSING AS EXPECTED  Encouraged patient to ambulate as tolerated.

## 2018-07-25 NOTE — PROGRESS NOTES
"Progress Note:  7/25/2018, 12:55 PM    S: No acute events. Trial of waterseal shows very slight increase in the left pneumothorax but the patient is breathing significantly better and is now on room air. Minimal drainage from the tube. Afebrile, eating, ambulatory    O:  Blood pressure 108/71, pulse 90, temperature 36.4 °C (97.5 °F), resp. rate 16, height 1.829 m (6' 0.01\"), weight 81 kg (178 lb 9.2 oz), SpO2 95 %.    NAD, awake and alert  Breathing is nonlabored  Left chest tube in place, to waterseal, persistent respiratory airleak which is unchanged      A:   Active Hospital Problems    Diagnosis   • Acute hypoxemic respiratory failure (HCC) [J96.01]     Priority: High   • Panlobular emphysema (HCC) [J43.1]     Priority: High   • Empyema (HCC) [J86.9]     Priority: High           • Pneumothorax on left [J93.9]     Priority: Medium   • Pulmonary cachexia due to COPD (HCC) [J44.9, R64]     Priority: Medium   • Protein malnutrition (HCC) [E46]     Priority: Medium   • Leukocytosis [D72.829]     Priority: Low   • Tobacco abuse [Z72.0]     Priority: Low         P:   -Recommend placement of chest tube to Heimlich valve. Patient may then be discharged and I will see him in 10 days for evaluation for tube removal. I discussed care of the tube in detail with the patient, and he understands and wants to go home  -Discharge home with incentive spirometer  -Patient may call the office any time with questions or concerns related to the chest tube      Pb Gaviria M.D.  [facility]  [phone]    "

## 2018-07-25 NOTE — CARE PLAN
Problem: Infection  Goal: Will remain free from infection  Outcome: PROGRESSING AS EXPECTED  Pt continues on ATBX therapy, IV ATBX stopped today and PO will begin tomorrow, WBCs remains WDL and pt is afebrile, will continue to monitor.    Problem: Fluid Volume:  Goal: Will maintain balanced intake and output  Outcome: PROGRESSING AS EXPECTED  Pt's IVF are running TKO, good PO intake, good output, no concerns noted.

## 2018-07-25 NOTE — PROGRESS NOTES
Internal Medicine Interval Note  Note Author: David Foy M.D.     Name Donna Ceballos 1960   Age/Sex 58 y.o. male   MRN [id number]   Code Status FULL     After 5PM or if no immediate response to page, please call for cross-coverage  Attending/Team: Dr. Kelsey/Natanael See Patient List for primary contact information  Call [phone] to page    1st Call - Day Intern (R1):   Dr. Foy 2nd Call - Day Sr. Resident (R3):   Dr. Jin         Reason for interval visit  (Principal Problem)   Large left-sided empyema       Interval Problem Daily Status Update  (24 hours, problem oriented, brief subjective history, new lab/imaging data pertinent to that problem)     Pt is feeling better today although frustrated by prolonged hospital stay and longs to go home. 1 Chest tube removed today. Will d/c home tomorrow following CXR and valve placement by CT surgery.     Review of Systems   Constitutional: Negative for chills and fever.   HENT: Negative for hearing loss.    Respiratory: Negative for cough, hemoptysis, sputum production and stridor.    Cardiovascular: Negative for chest pain.   Gastrointestinal: Negative for abdominal pain, nausea and vomiting.   Genitourinary: Negative for dysuria and urgency.   Musculoskeletal: Negative for joint pain and myalgias.   Skin: Negative for rash.   Neurological: Negative for dizziness and headaches.   Psychiatric/Behavioral: Negative for depression.       Disposition/Barriers to discharge:   Chest tubes    Consultants/Specialty  Dr. Martínez/ Surgery ; Dr Mcintyre/Pulmonary  PCP: Pt States None        Quality Measures  Quality-Core Measures   Reviewed items::  Labs reviewed, EKG reviewed, Medications reviewed and Radiology images reviewed  Motley catheter::  No Motley          Physical Exam       Vitals:    18 1046 18 1200 18 1523 18 1551   BP:  (!) 92/59  106/62   Pulse: 78 (!) 109 100 (!) 102   Resp: 16 18 18 16   Temp:   36.9 °C (98.4 °F)  36.8 °C (98.3 °F)   SpO2: 98% 93% 96% 96%   Weight:       Height:         Body mass index is 24.21 kg/m².    Oxygen Therapy:  Pulse Oximetry: 96 %, O2 (LPM): 2, FiO2%: 28 %, O2 Delivery: Silicone Nasal Cannula    Physical Exam   Constitutional: He is oriented to person, place, and time. He appears malnourished.   HENT:   Head: Normocephalic.   Eyes: Pupils are equal, round, and reactive to light.   Neck: Normal range of motion.   Cardiovascular: Normal rate and normal heart sounds.    Pulmonary/Chest: He has wheezes. He has rales.   Diffuse crackles/rales/whhezes on all lung fields.   Abdominal: Soft. Bowel sounds are normal.   Musculoskeletal: Normal range of motion.   Neurological: He is alert and oriented to person, place, and time.   Skin: Skin is warm.             Assessment/Plan     * Empyema (HCC)- (present on admission)   Assessment & Plan    Pt reports easy work of breathing  Afebrile  s/p thoracentesis on 7/10  s/p left thoracotomy decortication and evacuation of purulent empyema and decortication of lung and rib resection on 7/11  The cultures + group F strep  Continue Unasyn X 2 wks, complete tomorrow and then Augmentin on d/c  Chest physiotherapy.   Chest tubes in place: 1 removed today, CXR in the AM, d/c home if stable.   OP apt for pulm, CT surg and PCP in place.           Acute hypoxemic respiratory failure (HCC)   Assessment & Plan    Resolved.             Panlobular emphysema (HCC)- (present on admission)   Assessment & Plan    Improving Resp status  Continue scheduled duonebs.  RT protocol, Oxygen, Cont pulse ox.  IS 10 X q hr.         Pneumothorax on left   Assessment & Plan    Secondary to chest tubes placement  Will continue to monitor  CT surgery monitoring, plans to d/c with one tube in place with f/u office removal of remaining tube.         Protein malnutrition (HCC)- (present on admission)   Assessment & Plan    - Patient appears cachectic.  - Lost around 10 lbs since  March when the symptoms started.  - Nutrition consult        Pulmonary cachexia due to COPD (HCC)- (present on admission)   Assessment & Plan    - Secondary to emphysema and empyema  - Nutritional consult.   - Continue antibiotics         Tobacco abuse- (present on admission)   Assessment & Plan    Nicotine patches while IP.   Tobacco cessation counseling          Leukocytosis- (present on admission)   Assessment & Plan    Resolved  Continue antibiotics.   IS 10 X q hr.

## 2018-07-25 NOTE — PROGRESS NOTES
"Assumed care of patient from night shift RN.  Patient is alert and oriented times 4, states pain of 2/10, declines intervention at this time.  VSS /66   Pulse 74   Temp 36.5 °C (97.7 °F)   Resp 16   Ht 1.829 m (6' 0.01\")   Wt 81 kg (178 lb 9.2 oz)   SpO2 93%   BMI 24.21 kg/m²   PIV in the L forearm, patent and saline locked.  Currently on 0.5L oxygen with saturations at 97%.  Will take oxygen off and trial room air.   in use  Last BM 7/24, bowel protocol in place.  Urinating without difficulty.  Regular diet, tolerating well.  Chest tube to the L chest to water seal, air leak noted, MD aware.  Minimal output overnight.  Incision to the L chest, well approximated with staples and SANTIAGO.  Mepilex in place to the buttocks.  Patient is a standby assist, demonstrates steady gait, minimal assistance needed.  POC discussed for the day, bed is locked and in the lowest position, call light is within reach.  All needs are met at this time, hourly rounding is in place.  "

## 2018-07-25 NOTE — PROGRESS NOTES
"Discharging Patient home per physician order.  Discharged with friend.  Demonstrated understanding of discharge instructions, follow up appointments, home medications, prescriptions, home care for surgical wound, and nursing care instructions for thoracotomy with chest tube.  Ambulating without assistance, voiding without difficulty, pain well controlled, tolerating oral medications, oxygen saturation greater than 90% , tolerating diet.   Educational handouts given and discussed.  Verbalized understanding of discharge instructions and educational handouts.  All questions answered.  Belongings with patient at time of discharge.    Patient on RA prior to discharge but offered home oxygen.  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  Educated patient on the possible need since he is going home with a Heimlich valve chest tube.  Continues to refuse despite education.  MD aware.  SW notified.  "

## 2018-07-25 NOTE — DISCHARGE INSTRUCTIONS
Discharge Instructions    Discharged to home by car with friend. Discharged via walking, hospital escort: Refused.  Special equipment needed: Not Applicable    Be sure to schedule a follow-up appointment with your primary care doctor or any specialists as instructed.     Discharge Plan:   Smoking Cessation Offered: Patient Counseled  Pneumococcal Vaccine Administered/Refused: Not given - Patient refused pneumococcal vaccine  Influenza Vaccine Indication: Indicated: Not available from distributor/    I understand that a diet low in cholesterol, fat, and sodium is recommended for good health. Unless I have been given specific instructions below for another diet, I accept this instruction as my diet prescription.   Other diet: regular    Special Instructions: None    · Is patient discharged on Warfarin / Coumadin?   No     Depression / Suicide Risk    As you are discharged from this [facility] facility, it is important to learn how to keep safe from harming yourself.    Recognize the warning signs:  · Abrupt changes in personality, positive or negative- including increase in energy   · Giving away possessions  · Change in eating patterns- significant weight changes-  positive or negative  · Change in sleeping patterns- unable to sleep or sleeping all the time   · Unwillingness or inability to communicate  · Depression  · Unusual sadness, discouragement and loneliness  · Talk of wanting to die  · Neglect of personal appearance   · Rebelliousness- reckless behavior  · Withdrawal from people/activities they love  · Confusion- inability to concentrate     If you or a loved one observes any of these behaviors or has concerns about self-harm, here's what you can do:  · Talk about it- your feelings and reasons for harming yourself  · Remove any means that you might use to hurt yourself (examples: pills, rope, extension cords, firearm)  · Get professional help from the community (Mental Health, Substance Abuse,  psychological counseling)  · Do not be alone:Call your Safe Contact- someone whom you trust who will be there for you.  · Call your local CRISIS HOTLINE 249-2847 or 508-404-3662  · Call your local Children's Mobile Crisis Response Team [place] (266) 209-3766 or www.[employer]  · Call the toll free National Suicide Prevention Hotlines   · National Suicide Prevention Lifeline 299-266-VBAP (5392)  · [place] [facility] 800-SUICIDE (466-6104)      Chest Tube Insertion, Adult  A chest tube is a thin, flexible tube that is inserted into the space between your lung and your chest wall. You may need a chest tube if you have a collapsed lung from illness or a severe injury. A collapsed lung can be caused by:  · An air leak (pneumothorax).  · Blood collection (hemothorax).  · Fluid buildup from an infection (empyema).  The chest tube drains the fluid or air from your lung. It may be attached to a suction device to help with drainage. You will need to stay in the hospital while the chest tube is in place.  Tell a health care provider about:  · Any allergies you have.  · All medicines you are taking, including vitamins, herbs, eye drops, creams, and over-the-counter medicines.  · Any problems you or family members have had with anesthetic medicines.  · Any blood disorders you have.  · Any surgeries you have had.  · Any medical conditions you have, including any recent fever or cold symptoms.  · Whether you are pregnant or may be pregnant.  What are the risks?  Generally, this is a safe procedure. However, problems may occur, including:  · Bleeding.  · Infection.  · Allergic reaction to medicines.  · Lung damage.  · Damage to the blood vessels or nerves near the lung.  · Failure of the chest tube to work properly.  What happens before the procedure?  Staying hydrated   Follow instructions from your health care provider about hydration, which may include:  · Up to 2 hours before the procedure - you may continue  to drink clear liquids, such as water, clear fruit juice, black coffee, and plain tea.  Eating and drinking restrictions   Follow instructions from your health care provider about eating and drinking, which may include:  · 8 hours before the procedure - stop eating heavy meals or foods such as meat, fried foods, or fatty foods.  · 6 hours before the procedure - stop eating light meals or foods, such as toast or cereal.  · 6 hours before the procedure - stop drinking milk or drinks that contain milk.  · 2 hours before the procedure - stop drinking clear liquids.  Medicines  · Ask your health care provider about:  ¨ Changing or stopping your regular medicines. This is especially important if you are taking diabetes medicines or blood thinners.  ¨ Taking medicines such as aspirin and ibuprofen. These medicines can thin your blood. Do not take these medicines before your procedure if your health care provider instructs you not to.  General instructions  · You will have a chest X-ray or other imaging studies of the lung.  · Plan to have someone take you home from the hospital or clinic.  · If you will be going home right after the procedure, plan to have someone with you for 24 hours.  What happens during the procedure?  · To reduce your risk of infection:  ¨ Your health care team will wash or sanitize their hands.  ¨ Your skin will be washed with soap.  ¨ Hair may be removed from the surgical area.  · An IV tube will be inserted into one of your veins.  · You will be given one or more of the following:  ¨ A medicine to help you relax (sedative).  ¨ A medicine to numb the area (local anesthetic).  · You may be given antibiotic and pain medicines through the IV tube.  · The surgeon will make a small incision in a space between your ribs.  · The chest tube will be placed through the incision into the space between the lung and your chest wall.  · Stitches (sutures) will be used to close the incision around the tube.  · The  chest tube may be attached to a suction device.  · The incision site will be covered with an airtight bandage (dressing).  · Another chest X-ray will be done to check the position of the tube.  The procedure may vary among health care providers and hospitals.  What happens after the procedure?  · Your blood pressure, heart rate, breathing rate, and blood oxygen level will be monitored until the medicines you were given have worn off.  · You may continue to get pain medicine or antibiotics through the IV tube.  · The tube and dressing will be checked regularly.  · You will be encouraged to cough and take deep breaths.  · You may be given oxygen to breathe.  · Chest X-rays will be done to find out if the lung is inflating. After the lung is inflated:  ¨ Another chest X-ray may be done.  ¨ The chest tube can be removed after the lung remains inflated and you are breathing easily.  ¨ A new dressing will be put on.  · Do not drive for 24 hours if you were given a sedative.  This information is not intended to replace advice given to you by your health care provider. Make sure you discuss any questions you have with your health care provider.  Document Released: 03/27/2008 Document Revised: 07/07/2017 Document Reviewed: 05/31/2017  [employer] Interactive Patient Education © 2017 [employer]      Pleural Effusion  You have a pleural effusion. This means you have fluid in the space between your lung and your chest wall. This condition may result from many different problems including:  · Pleurisy or inflammation of the lining of the lung.   · Lung infections such as pneumonia.   · Blood clots, heart disease, chest injuries, cancer, and other medical problems.   A pleural effusion may not cause any symptoms. If it is large, however, it can make you short of breath. Other symptoms include cough, chest pain, or fever. The diagnosis of a pleural effusion is most often made by chest X-ray, CT, or ultrasound studies. A sample of  the fluid can be taken using local anesthetic and a needle or catheter placed between the ribs. This procedure may be needed to determine the cause of the effusion, or to treat a large effusion.   Treatment is specific to the cause, such as antibiotics for infections. Mild pain or anti-inflammatory medicines may be helpful in relieving symptoms. Chronic effusions often have to be periodically drained. It is very important that you see your doctor or a specialist to be sure the effusion is not from a serious medical condition. A follow-up chest X-ray is usually needed.   SEEK IMMEDIATE MEDICAL CARE IF:  · You develop shortness of breath.   · You develop increased pain.   · You develop a high fever.   Document Released: 01/25/2006 Document Revised: 03/11/2013 Document Reviewed: 12/18/2006  [employer]® Patient Information ©2013 [employer].    Thoracotomy, Care After  Refer to this sheet in the next few weeks. These instructions provide you with information on caring for yourself after your procedure. Your health care provider may also give you more specific instructions. Your treatment has been planned according to current medical practices, but problems sometimes occur. Call your health care provider if you have any problems or questions after your procedure.  WHAT TO EXPECT AFTER YOUR PROCEDURE  After your procedure, it is typical to have the following sensations:  · You may feel pain at the incision site.  · You may be constipated from the pain medicine given and the change in your level of activity.  · You may feel extremely tired.  HOME CARE INSTRUCTIONS  · Take over-the-counter or prescription medicines for pain, discomfort, or fever only as directed by your health care provider. It is very important to take pain relieving medicine before your pain becomes severe. You will be able to breathe and cough more comfortably if your pain is well controlled.  · Take deep breaths. Deep breathing helps to keep your lungs  inflated and protects against a lung infection (pneumonia).  · Cough frequently. Even though coughing may cause discomfort, coughing is important to clear mucus (phlegm) and expand your lungs. Coughing helps prevent pneumonia. If it hurts to cough, hold a pillow against your chest when you cough. This may help with the discomfort.  · Continue to use an incentive spirometer as directed. The use of an incentive spirometer helps to keep your lungs inflated and protects against pneumonia.  · Change the bandages over your incision as needed or as directed by your health care provider.  · Remove the bandages over your chest tube site as directed by your health care provider.  · Resume your normal diet as directed. It is important to have adequate protein, calories, vitamins, and minerals to promote healing.  · Prevent constipation.  ¨ Eat high-fiber foods such as whole grain cereals and breads, brown rice, beans, and fresh fruits and vegetables.  ¨ Drink enough water and fluids to keep your urine clear or pale yellow. Avoid drinking beverages containing caffeine. Beverages containing caffeine can cause dehydration and harden your stool.  ¨ Talk to your health care provider about taking a stool softener or laxative.  · Avoid lifting until you are instructed otherwise.  · Do not drive until directed by your health care provider.  Do not drive while taking pain medicines (narcotics).  · Do not bathe, swim, or use a hot tub until directed by your health care provider. You may shower instead. Gently wash the area of your incision with water and soap as directed. Do not use anything else to clean your incision except as directed by your health care provider.  · Do not use any tobacco products including cigarettes, chewing tobacco, or electronic cigarettes.  · Avoid secondhand smoke.  · Schedule an appointment for stitch (suture) or staple removal as directed.  · Schedule and attend all follow-up visits as directed by your  health care provider. It is important to keep all your appointments.  · Participate in pulmonary rehabilitation as directed by your health care provider.  · Do not travel by airplane for 2 weeks after your chest tube is removed.  SEEK MEDICAL CARE IF:  · You are bleeding from your wounds.  · Your heartbeat seems irregular.  · You have redness, swelling, or increasing pain in the wounds.  · There is pus coming from your wounds.  · There is a bad smell coming from the wound or dressing.  · You have a fever or chills.  · You have nausea or are vomiting.  · You have muscle aches.  SEEK IMMEDIATE MEDICAL CARE IF:  · You have a rash.  · You have difficulty breathing.  · You have a reaction or side effect to medicines given.  · You have persistent nausea.  · You have lightheadedness or feel faint.  · You have shortness of breath or chest pain.  · You have persistent pain.     This information is not intended to replace advice given to you by your health care provider. Make sure you discuss any questions you have with your health care provider.     Document Released: 06/01/2012 Document Revised: 05/03/2016 Document Reviewed: 08/06/2014  NumberPicture Interactive Patient Education ©2016 [employer].

## 2018-07-31 NOTE — DOCUMENTATION QUERY
DOCUMENTATION QUERY    PROVIDERS: Please select “Cosign w/ note” to reply to query.    To better represent the severity of illness of your patient, please review the following information and exercise your independent professional judgment in responding to this query.     The patient was documented to have Severe sepsis POA in the Discharge summary and Acute hypoxic respiratory failure and hypotension are documented in the progress notes, consult notes, and discharge summary.      Per coding guidelines, the clinical indicator for severe sepsis is a major organ dysfunction/failure/shock DUE to Sepsis. Based upon the clinical findings, risk factors, and treatment, can the relationship between these two conditions be further specified?    • Acute hypoxic respiratory failure is related to sepsis  • Acute hypoxic respiratory failure is not related to sepsis  • Other explanation of clinical findings (please document)  • Unable to determine        The medical record reflects the following:   Clinical Findings  Necrotic Pneumonia   Group F Strep pneumonia   Hypotension   Acute respiratory failure  Lactic acidosis   Treatment  IV Pressors   Intubation   IV antiobiotics   Risk Factors     Location within medical record  Progress Notes     Thank you,   Elizabeth Sawallisch

## 2018-08-08 LAB
FUNGUS SPEC CULT: NORMAL
SIGNIFICANT IND 70042: NORMAL
SITE SITE: NORMAL
SOURCE SOURCE: NORMAL

## 2018-08-14 NOTE — ADDENDUM NOTE
Encounter addended by: ALMA ROSA Kelsey M.D. on: 8/14/2018  2:26 PM<BR>    Actions taken: Edit attestation on clinical note

## 2018-09-05 LAB
MYCOBACTERIUM SPEC CULT: NORMAL
MYCOBACTERIUM SPEC CULT: NORMAL
RHODAMINE-AURAMINE STN SPEC: NORMAL
RHODAMINE-AURAMINE STN SPEC: NORMAL
SIGNIFICANT IND 70042: NORMAL
SIGNIFICANT IND 70042: NORMAL
SITE SITE: NORMAL
SITE SITE: NORMAL
SOURCE SOURCE: NORMAL
SOURCE SOURCE: NORMAL

## 2018-09-06 LAB
MYCOBACTERIUM SPEC CULT: NORMAL
RHODAMINE-AURAMINE STN SPEC: NORMAL
SIGNIFICANT IND 70042: NORMAL
SITE SITE: NORMAL
SOURCE SOURCE: NORMAL

## 2018-09-18 ENCOUNTER — HOSPITAL ENCOUNTER (INPATIENT)
Facility: MEDICAL CENTER | Age: 58
LOS: 22 days | DRG: 853 | End: 2018-10-10
Attending: EMERGENCY MEDICINE | Admitting: INTERNAL MEDICINE

## 2018-09-18 ENCOUNTER — APPOINTMENT (OUTPATIENT)
Dept: RADIOLOGY | Facility: MEDICAL CENTER | Age: 58
DRG: 853 | End: 2018-09-18
Attending: EMERGENCY MEDICINE

## 2018-09-18 DIAGNOSIS — J18.9 PNEUMONIA OF LEFT LUNG DUE TO INFECTIOUS ORGANISM, UNSPECIFIED PART OF LUNG: ICD-10-CM

## 2018-09-18 DIAGNOSIS — Z78.9: Primary | ICD-10-CM

## 2018-09-18 PROBLEM — A41.9 SEPSIS (HCC): Status: ACTIVE | Noted: 2018-09-18

## 2018-09-18 LAB
ALBUMIN SERPL BCP-MCNC: 3 G/DL (ref 3.2–4.9)
ALBUMIN/GLOB SERPL: 0.6 G/DL
ALP SERPL-CCNC: 67 U/L (ref 30–99)
ALT SERPL-CCNC: 22 U/L (ref 2–50)
ANION GAP SERPL CALC-SCNC: 11 MMOL/L (ref 0–11.9)
ANISOCYTOSIS BLD QL SMEAR: ABNORMAL
APTT PPP: 29.7 SEC (ref 24.7–36)
AST SERPL-CCNC: 20 U/L (ref 12–45)
BASOPHILS # BLD AUTO: 1.7 % (ref 0–1.8)
BASOPHILS # BLD: 0.27 K/UL (ref 0–0.12)
BILIRUB SERPL-MCNC: 0.3 MG/DL (ref 0.1–1.5)
BUN SERPL-MCNC: 6 MG/DL (ref 8–22)
CALCIUM SERPL-MCNC: 9.3 MG/DL (ref 8.5–10.5)
CHLORIDE SERPL-SCNC: 95 MMOL/L (ref 96–112)
CO2 SERPL-SCNC: 31 MMOL/L (ref 20–33)
CREAT SERPL-MCNC: 0.6 MG/DL (ref 0.5–1.4)
EKG IMPRESSION: NORMAL
EOSINOPHIL # BLD AUTO: 0.55 K/UL (ref 0–0.51)
EOSINOPHIL NFR BLD: 3.5 % (ref 0–6.9)
ERYTHROCYTE [DISTWIDTH] IN BLOOD BY AUTOMATED COUNT: 50.8 FL (ref 35.9–50)
GLOBULIN SER CALC-MCNC: 4.8 G/DL (ref 1.9–3.5)
GLUCOSE SERPL-MCNC: 109 MG/DL (ref 65–99)
HCT VFR BLD AUTO: 42.6 % (ref 42–52)
HGB BLD-MCNC: 13.2 G/DL (ref 14–18)
INR PPP: 1.2 (ref 0.87–1.13)
LACTATE BLD-SCNC: 2 MMOL/L (ref 0.5–2)
LYMPHOCYTES # BLD AUTO: 2.05 K/UL (ref 1–4.8)
LYMPHOCYTES NFR BLD: 13 % (ref 22–41)
MACROCYTES BLD QL SMEAR: ABNORMAL
MANUAL DIFF BLD: NORMAL
MCH RBC QN AUTO: 26.1 PG (ref 27–33)
MCHC RBC AUTO-ENTMCNC: 31 G/DL (ref 33.7–35.3)
MCV RBC AUTO: 84.4 FL (ref 81.4–97.8)
MONOCYTES # BLD AUTO: 0.7 K/UL (ref 0–0.85)
MONOCYTES NFR BLD AUTO: 4.4 % (ref 0–13.4)
MORPHOLOGY BLD-IMP: NORMAL
NEUTROPHILS # BLD AUTO: 12.23 K/UL (ref 1.82–7.42)
NEUTROPHILS NFR BLD: 73.9 % (ref 44–72)
NEUTS BAND NFR BLD MANUAL: 3.5 % (ref 0–10)
NRBC # BLD AUTO: 0 K/UL
NRBC BLD-RTO: 0 /100 WBC
PLATELET # BLD AUTO: 781 K/UL (ref 164–446)
PLATELET BLD QL SMEAR: NORMAL
PMV BLD AUTO: 9.1 FL (ref 9–12.9)
POTASSIUM SERPL-SCNC: 3.7 MMOL/L (ref 3.6–5.5)
PROT SERPL-MCNC: 7.8 G/DL (ref 6–8.2)
PROTHROMBIN TIME: 14.9 SEC (ref 12–14.6)
RBC # BLD AUTO: 5.05 M/UL (ref 4.7–6.1)
RBC BLD AUTO: PRESENT
SODIUM SERPL-SCNC: 137 MMOL/L (ref 135–145)
TOXIC GRANULES BLD QL SMEAR: NORMAL
WBC # BLD AUTO: 15.8 K/UL (ref 4.8–10.8)

## 2018-09-18 PROCEDURE — 96366 THER/PROPH/DIAG IV INF ADDON: CPT

## 2018-09-18 PROCEDURE — 304561 HCHG STAT O2

## 2018-09-18 PROCEDURE — 71045 X-RAY EXAM CHEST 1 VIEW: CPT

## 2018-09-18 PROCEDURE — 99285 EMERGENCY DEPT VISIT HI MDM: CPT

## 2018-09-18 PROCEDURE — A9270 NON-COVERED ITEM OR SERVICE: HCPCS | Performed by: INTERNAL MEDICINE

## 2018-09-18 PROCEDURE — 85610 PROTHROMBIN TIME: CPT

## 2018-09-18 PROCEDURE — 700102 HCHG RX REV CODE 250 W/ 637 OVERRIDE(OP): Performed by: INTERNAL MEDICINE

## 2018-09-18 PROCEDURE — 96365 THER/PROPH/DIAG IV INF INIT: CPT

## 2018-09-18 PROCEDURE — 96367 TX/PROPH/DG ADDL SEQ IV INF: CPT

## 2018-09-18 PROCEDURE — 700105 HCHG RX REV CODE 258: Performed by: EMERGENCY MEDICINE

## 2018-09-18 PROCEDURE — 71260 CT THORAX DX C+: CPT

## 2018-09-18 PROCEDURE — 83605 ASSAY OF LACTIC ACID: CPT

## 2018-09-18 PROCEDURE — 85730 THROMBOPLASTIN TIME PARTIAL: CPT

## 2018-09-18 PROCEDURE — 99406 BEHAV CHNG SMOKING 3-10 MIN: CPT | Performed by: INTERNAL MEDICINE

## 2018-09-18 PROCEDURE — 99223 1ST HOSP IP/OBS HIGH 75: CPT | Mod: AI | Performed by: INTERNAL MEDICINE

## 2018-09-18 PROCEDURE — 770020 HCHG ROOM/CARE - TELE (206)

## 2018-09-18 PROCEDURE — 80053 COMPREHEN METABOLIC PANEL: CPT

## 2018-09-18 PROCEDURE — 700105 HCHG RX REV CODE 258: Performed by: INTERNAL MEDICINE

## 2018-09-18 PROCEDURE — 87040 BLOOD CULTURE FOR BACTERIA: CPT

## 2018-09-18 PROCEDURE — 94760 N-INVAS EAR/PLS OXIMETRY 1: CPT

## 2018-09-18 PROCEDURE — 85007 BL SMEAR W/DIFF WBC COUNT: CPT

## 2018-09-18 PROCEDURE — 700117 HCHG RX CONTRAST REV CODE 255: Performed by: EMERGENCY MEDICINE

## 2018-09-18 PROCEDURE — 36415 COLL VENOUS BLD VENIPUNCTURE: CPT

## 2018-09-18 PROCEDURE — 85027 COMPLETE CBC AUTOMATED: CPT

## 2018-09-18 PROCEDURE — 700111 HCHG RX REV CODE 636 W/ 250 OVERRIDE (IP): Performed by: EMERGENCY MEDICINE

## 2018-09-18 PROCEDURE — 93005 ELECTROCARDIOGRAM TRACING: CPT | Performed by: EMERGENCY MEDICINE

## 2018-09-18 RX ORDER — OXYCODONE HYDROCHLORIDE 5 MG/1
5 TABLET ORAL EVERY 8 HOURS PRN
COMMUNITY

## 2018-09-18 RX ORDER — ACETAMINOPHEN 325 MG/1
650 TABLET ORAL EVERY 6 HOURS PRN
Status: DISCONTINUED | OUTPATIENT
Start: 2018-09-18 | End: 2018-10-10 | Stop reason: HOSPADM

## 2018-09-18 RX ORDER — SODIUM CHLORIDE 9 MG/ML
1000 INJECTION, SOLUTION INTRAVENOUS
Status: DISCONTINUED | OUTPATIENT
Start: 2018-09-18 | End: 2018-09-26

## 2018-09-18 RX ORDER — ONDANSETRON 4 MG/1
4 TABLET, ORALLY DISINTEGRATING ORAL EVERY 4 HOURS PRN
Status: DISCONTINUED | OUTPATIENT
Start: 2018-09-18 | End: 2018-10-10 | Stop reason: HOSPADM

## 2018-09-18 RX ORDER — AMOXICILLIN 250 MG
2 CAPSULE ORAL 2 TIMES DAILY
Status: DISCONTINUED | OUTPATIENT
Start: 2018-09-19 | End: 2018-10-10 | Stop reason: HOSPADM

## 2018-09-18 RX ORDER — OXYCODONE HYDROCHLORIDE 5 MG/1
2.5 TABLET ORAL
Status: DISCONTINUED | OUTPATIENT
Start: 2018-09-18 | End: 2018-09-25

## 2018-09-18 RX ORDER — NICOTINE 21 MG/24HR
14 PATCH, TRANSDERMAL 24 HOURS TRANSDERMAL
Status: DISCONTINUED | OUTPATIENT
Start: 2018-09-19 | End: 2018-10-10 | Stop reason: HOSPADM

## 2018-09-18 RX ORDER — POLYETHYLENE GLYCOL 3350 17 G/17G
1 POWDER, FOR SOLUTION ORAL
Status: DISCONTINUED | OUTPATIENT
Start: 2018-09-18 | End: 2018-10-10 | Stop reason: HOSPADM

## 2018-09-18 RX ORDER — OXYCODONE HYDROCHLORIDE 5 MG/1
5 TABLET ORAL
Status: DISCONTINUED | OUTPATIENT
Start: 2018-09-18 | End: 2018-09-25

## 2018-09-18 RX ORDER — PROMETHAZINE HYDROCHLORIDE 25 MG/1
12.5-25 SUPPOSITORY RECTAL EVERY 4 HOURS PRN
Status: DISCONTINUED | OUTPATIENT
Start: 2018-09-18 | End: 2018-09-30

## 2018-09-18 RX ORDER — MORPHINE SULFATE 4 MG/ML
2 INJECTION, SOLUTION INTRAMUSCULAR; INTRAVENOUS
Status: DISCONTINUED | OUTPATIENT
Start: 2018-09-18 | End: 2018-09-25

## 2018-09-18 RX ORDER — ONDANSETRON 2 MG/ML
4 INJECTION INTRAMUSCULAR; INTRAVENOUS EVERY 4 HOURS PRN
Status: DISCONTINUED | OUTPATIENT
Start: 2018-09-18 | End: 2018-10-10 | Stop reason: HOSPADM

## 2018-09-18 RX ORDER — PROMETHAZINE HYDROCHLORIDE 25 MG/1
12.5-25 TABLET ORAL EVERY 4 HOURS PRN
Status: DISCONTINUED | OUTPATIENT
Start: 2018-09-18 | End: 2018-09-30

## 2018-09-18 RX ORDER — AMOXICILLIN AND CLAVULANATE POTASSIUM 875; 125 MG/1; MG/1
1 TABLET, FILM COATED ORAL 2 TIMES DAILY
Status: ON HOLD | COMMUNITY
Start: 2018-08-26 | End: 2018-10-09

## 2018-09-18 RX ORDER — SODIUM CHLORIDE 9 MG/ML
INJECTION, SOLUTION INTRAVENOUS CONTINUOUS
Status: DISCONTINUED | OUTPATIENT
Start: 2018-09-18 | End: 2018-09-22

## 2018-09-18 RX ORDER — BISACODYL 10 MG
10 SUPPOSITORY, RECTAL RECTAL
Status: DISCONTINUED | OUTPATIENT
Start: 2018-09-18 | End: 2018-10-10 | Stop reason: HOSPADM

## 2018-09-18 RX ORDER — IBUPROFEN 200 MG
400 TABLET ORAL DAILY
COMMUNITY

## 2018-09-18 RX ORDER — SODIUM CHLORIDE 9 MG/ML
30 INJECTION, SOLUTION INTRAVENOUS
Status: DISCONTINUED | OUTPATIENT
Start: 2018-09-18 | End: 2018-09-26

## 2018-09-18 RX ADMIN — PIPERACILLIN AND TAZOBACTAM 3.38 G: 3; .375 INJECTION, POWDER, LYOPHILIZED, FOR SOLUTION INTRAVENOUS; PARENTERAL at 18:14

## 2018-09-18 RX ADMIN — SODIUM CHLORIDE: 9 INJECTION, SOLUTION INTRAVENOUS at 22:47

## 2018-09-18 RX ADMIN — IOHEXOL 100 ML: 350 INJECTION, SOLUTION INTRAVENOUS at 17:24

## 2018-09-18 RX ADMIN — MAGNESIUM HYDROXIDE 30 ML: 400 SUSPENSION ORAL at 21:05

## 2018-09-18 RX ADMIN — OXYCODONE HYDROCHLORIDE 5 MG: 5 TABLET ORAL at 22:47

## 2018-09-18 RX ADMIN — VANCOMYCIN HYDROCHLORIDE 1600 MG: 100 INJECTION, POWDER, LYOPHILIZED, FOR SOLUTION INTRAVENOUS at 18:56

## 2018-09-18 ASSESSMENT — ENCOUNTER SYMPTOMS
HEADACHES: 0
PALPITATIONS: 0
NECK PAIN: 0
BRUISES/BLEEDS EASILY: 0
MYALGIAS: 0
FOCAL WEAKNESS: 0
HEMOPTYSIS: 0
SEIZURES: 0
BLOOD IN STOOL: 0
CHILLS: 0
SPUTUM PRODUCTION: 1
WEIGHT LOSS: 1
DIARRHEA: 0
NAUSEA: 0
FEVER: 0
FLANK PAIN: 0
BLURRED VISION: 0
SHORTNESS OF BREATH: 1
SORE THROAT: 0
WHEEZING: 0
DIAPHORESIS: 0
COUGH: 1
BACK PAIN: 0
ABDOMINAL PAIN: 0
VOMITING: 0
DIZZINESS: 0

## 2018-09-18 ASSESSMENT — PAIN SCALES - GENERAL
PAINLEVEL_OUTOF10: 5
PAINLEVEL_OUTOF10: 2
PAINLEVEL_OUTOF10: 0

## 2018-09-18 NOTE — ED PROVIDER NOTES
ED Provider Note    CHIEF COMPLAINT  Chief Complaint   Patient presents with   • Sent by MD     reports he had part of LT lung removed and drained in July. Saw by surgeon today, sent to ER as he reports he is now having pain to LT side and is coughing up green sputum.    • Cough       HPI  rk Frederic Ceballos is a 58 y.o. male with a long history of tobacco use 1 pack per day tobacco, currently still smoking a few cigarettes a day, recent admission for a left-sided hydropneumothorax complicated by empyema, status post left thoracotomy and decortication evacuation of purulent empyema who presents from the office of [facility] for possible recurrence of pneumonia and empyema.  The patient was seen in the office in follow-up because of increased left-sided chest pain.  He was found to have a productive cough of a yellowish to greenish sputum for the past week, and was sent to the ER for further evaluation.  I spoke with Dr. Gaviria of [facility] requested that a CT scan of the chest be obtained.  Patient was also noted to be hypoxic with oxygen saturation of 84% on room air.  The patient denies any fever, shaking chills, but has had a productive cough for the past 5-6 days.  He complains of pain over the left lateral rib area where he had his chest tubes and thoracotomy.  He says the pain is worse with coughing and deep inspiration.  He denies any lightheadedness, dizziness, nausea, or vomiting.    REVIEW OF SYSTEMS  See HPI for further details. All other systems are negative.     PAST MEDICAL HISTORY  Past Medical History:   Diagnosis Date   • Chronic obstructive pulmonary disease (HCC)        FAMILY HISTORY  History reviewed. No pertinent family history.    SOCIAL HISTORY  Social History     Social History   • Marital status: Single     Spouse name: N/A   • Number of children: N/A   • Years of education: N/A     Social History Main Topics   • Smoking status: Current Every Day Smoker     Packs/day: 1.00      Types: Cigarettes   • Smokeless tobacco: Never Used   • Alcohol use Yes   • Drug use: Yes     Types: Inhaled      Comment: cannabis   • Sexual activity: Not on file     Other Topics Concern   • Not on file     Social History Narrative   • No narrative on file       SURGICAL HISTORY  Past Surgical History:   Procedure Laterality Date   • THORACOTOMY Left 7/11/2018    Procedure: THORACOTOMY-WITH DECORTICATION;  Surgeon: Diego Martínez M.D.;  Location: SURGERY [facility];  Service: General       CURRENT MEDICATIONS  Home Medications     Reviewed by Pepper Enamorado (Pharmacy Tech) on 09/18/18 at 1624  Med List Status: Complete   Medication Last Dose Status   amoxicillin-clavulanate (AUGMENTIN) 875-125 MG Tab 9/8/2018 Active   aspirin (ASA) 325 MG Tab 7/3/2018 Active   ibuprofen (MOTRIN) 200 MG Tab 9/18/2018 Active   oxyCODONE immediate-release (ROXICODONE) 5 MG Tab 9/15/2018 Active                ALLERGIES  No Known Allergies    PHYSICAL EXAM  VITAL SIGNS: /71   Pulse 99   Temp 36.9 °C (98.5 °F)   Resp (!) 22   Ht 1.829 m (6')   Wt 63.3 kg (139 lb 8.8 oz)   SpO2 97%   BMI 18.93 kg/m²   Constitutional: Awake, alert, in no acute distress, Non-toxic appearance.   HENT: Atraumatic. Bilateral external ears normal, mucous membranes moist, throat nonerythematous without exudates, nose is normal.  Eyes: PERRL, EOMI, conjunctiva moist, noninjected.  Neck: Nontender, Normal range of motion, No nuchal rigidity, No stridor.   Lymphatic: No lymphadenopathy noted.   Cardiovascular: Regular rate and rhythm, no murmurs, rubs, gallops.  Thorax & Lungs:  Good breath sounds bilaterally, with crackles heard about the third up from the base of the left, about USP up on the right, slight expiratory wheeze, no retractions.  There is a well-healed incision and 2 chest tube sites over the left lateral rib area.  Slight tenderness to this area without erythema, induration, crepitus, or fluctuance.  Abdomen: Bowel sounds  normal, Soft, nontender, nondistended, no rebound, guarding, masses.  Back: No CVA or spinal tenderness.  Extremities: Intact distal pulses, No edema, No tenderness.   Skin: Warm, Dry, No rashes.   Musculoskeletal: No joint swelling or tenderness.  Neurologic: Alert & oriented x 3, sensory and motor function normal. No focal deficits.   Psychiatric: Affect normal, Judgment normal, Mood normal.     EKG  Twelve-lead EKG shows sinus tachycardia, rate of 106, ventricular bigeminy, normal intervals, normal axis, slight ST elevations in leads V2, V3, with inverted T waves in leads V1 through V3, 3, aVF,    RADIOLOGY/PROCEDURES  CT-CHEST (THORAX) WITH   Final Result      1.  Loculated fluid collection that does contain a significant amount of air with an air-fluid level is now identified in the left lateral hemithorax. This extends towards the left hilar area and there is some pulmonary opacification and volume loss    including air bronchograms immediately adjacent to this collection. Differential diagnosis does include bronchopleural fistula. Empyema could be present. Postoperative fluid collection is also possible.      2.  Consolidation versus mass in the left upper lobe is decreased compared to prior exam.      3.  Air-filled cavities again noted superiorly in right hemithorax.      4.  Size of pulmonary opacifications with poorly defined borders and some cavities in each lung have decreased compared to the prior exam. Multifocal opacifications in the left lung which are small in size have likely increased in number compared to the    prior exam. Progression of inflammatory or infectious process in these areas is a possibility.      5.  Opacifications throughout the right lung have decreased compared to the prior exam.      6.  Mediastinal and hilar adenopathy is again noted.            DX-CHEST-PORTABLE (1 VIEW)   Final Result      1.  Loculated left pneumothorax is now filled with fluid following chest tube removal.       2.  Scarring and bullous change in the right upper lobe.            COURSE & MEDICAL DECISION MAKING  Pertinent Labs & Imaging studies reviewed. (See chart for details)  The patient presents with the above complaints.  He was hypoxic on room air at 84% and placed on supplement oxygen.  He is currently afebrile.  EKG shows a sinus tachycardia with ventricular bigeminy.  CBC shows a white count 15,800, hemoglobin 13.2, 74% polys, chemistry shows a chloride 95, glucose 109, otherwise unremarkable, lactic acid 2.0, INR 1.2.  CT scan of the chest with IV contrast shows a loculated left pneumothorax now filled with fluid following chest tube removal.  Also multifocal opacities in the left lung which are small in size have likely increased in number compared to the prior exam.  Progression of inflammatory or infectious process in these areas is a possibility per radiology. Patient was given a dose of Zosyn and vancomycin.  Case was discussed with Dr. Baker for admission.  Dr. Gaviria of general surgery was consulted and will see the patient.    FINAL IMPRESSION  1.  Loculated fluid-filled left pneumothorax  2.  Pneumonia  3.         Electronically signed by: Ugo Mclaughlin, 9/18/2018 4:54 PM

## 2018-09-18 NOTE — ED NOTES
Med rec complete per pt at bedside  Ok to discuss medications with visitor present  No known drug allergies    Pt started a 13 day course of AUGMENTIN 875 mg on 7- and finished on 9-8-2018.

## 2018-09-18 NOTE — ED TRIAGE NOTES
Chief Complaint   Patient presents with   • Sent by MD     reports he had part of LT lung removed and drained in July. Saw by surgeon today, sent to ER as he reports he is now having pain to LT side and is coughing up green sputum.    • Cough     Pt ambulatory to triage for above. Pain to LT side 2/10. Denies fevers. Denies SOB. [facility]     /71   Pulse (!) 109   Temp 36.9 °C (98.5 °F)   Resp 16   Ht 1.829 m (6')   Wt 63.3 kg (139 lb 8.8 oz)   SpO2 92%   BMI 18.93 kg/m²

## 2018-09-18 NOTE — ED NOTES
Pt ambulated to green28, changed to gown and placed monitor.  spo2 84% on room air, placed 2L nc , now @97%.  Pt a/xo4, coughing yellow sputum on/off x2wks.

## 2018-09-19 ENCOUNTER — APPOINTMENT (OUTPATIENT)
Dept: RADIOLOGY | Facility: MEDICAL CENTER | Age: 58
DRG: 853 | End: 2018-09-19
Attending: RADIOLOGY

## 2018-09-19 ENCOUNTER — APPOINTMENT (OUTPATIENT)
Dept: RADIOLOGY | Facility: MEDICAL CENTER | Age: 58
DRG: 853 | End: 2018-09-19
Attending: SURGERY

## 2018-09-19 PROBLEM — J18.9 HCAP (HEALTHCARE-ASSOCIATED PNEUMONIA): Status: ACTIVE | Noted: 2018-09-19

## 2018-09-19 PROBLEM — J86.9 EMPYEMA LUNG (HCC): Status: ACTIVE | Noted: 2018-09-19

## 2018-09-19 PROBLEM — J94.2 PNEUMOHEMOTHORAX: Status: ACTIVE | Noted: 2018-07-19

## 2018-09-19 PROBLEM — J44.9 COPD (CHRONIC OBSTRUCTIVE PULMONARY DISEASE) (HCC): Status: ACTIVE | Noted: 2018-09-19

## 2018-09-19 LAB
APPEARANCE FLD: NORMAL
BODY FLD TYPE: NORMAL
BODY FLD TYPE: NORMAL
COLOR FLD: NORMAL
CSF COMMENTS 1658: NORMAL
CYTOLOGY REG CYTOL: NORMAL
PH FLD: 5.5 [PH]
RBC # FLD: NORMAL CELLS/UL
WBC # FLD: NORMAL CELLS/UL

## 2018-09-19 PROCEDURE — 83986 ASSAY PH BODY FLUID NOS: CPT

## 2018-09-19 PROCEDURE — A9270 NON-COVERED ITEM OR SERVICE: HCPCS | Performed by: INTERNAL MEDICINE

## 2018-09-19 PROCEDURE — 87015 SPECIMEN INFECT AGNT CONCNTJ: CPT

## 2018-09-19 PROCEDURE — 700111 HCHG RX REV CODE 636 W/ 250 OVERRIDE (IP): Performed by: INTERNAL MEDICINE

## 2018-09-19 PROCEDURE — 87077 CULTURE AEROBIC IDENTIFY: CPT

## 2018-09-19 PROCEDURE — 87186 SC STD MICRODIL/AGAR DIL: CPT

## 2018-09-19 PROCEDURE — 770020 HCHG ROOM/CARE - TELE (206)

## 2018-09-19 PROCEDURE — 99153 MOD SED SAME PHYS/QHP EA: CPT

## 2018-09-19 PROCEDURE — 700111 HCHG RX REV CODE 636 W/ 250 OVERRIDE (IP): Performed by: RADIOLOGY

## 2018-09-19 PROCEDURE — 87102 FUNGUS ISOLATION CULTURE: CPT

## 2018-09-19 PROCEDURE — 88305 TISSUE EXAM BY PATHOLOGIST: CPT

## 2018-09-19 PROCEDURE — 87070 CULTURE OTHR SPECIMN AEROBIC: CPT

## 2018-09-19 PROCEDURE — 71045 X-RAY EXAM CHEST 1 VIEW: CPT

## 2018-09-19 PROCEDURE — 700102 HCHG RX REV CODE 250 W/ 637 OVERRIDE(OP): Performed by: INTERNAL MEDICINE

## 2018-09-19 PROCEDURE — 87116 MYCOBACTERIA CULTURE: CPT

## 2018-09-19 PROCEDURE — 87205 SMEAR GRAM STAIN: CPT

## 2018-09-19 PROCEDURE — 700105 HCHG RX REV CODE 258: Performed by: INTERNAL MEDICINE

## 2018-09-19 PROCEDURE — 700111 HCHG RX REV CODE 636 W/ 250 OVERRIDE (IP)

## 2018-09-19 PROCEDURE — 700101 HCHG RX REV CODE 250

## 2018-09-19 PROCEDURE — 99152 MOD SED SAME PHYS/QHP 5/>YRS: CPT

## 2018-09-19 PROCEDURE — 99233 SBSQ HOSP IP/OBS HIGH 50: CPT | Performed by: INTERNAL MEDICINE

## 2018-09-19 PROCEDURE — 88112 CYTOPATH CELL ENHANCE TECH: CPT

## 2018-09-19 PROCEDURE — 87206 SMEAR FLUORESCENT/ACID STAI: CPT

## 2018-09-19 PROCEDURE — 89051 BODY FLUID CELL COUNT: CPT

## 2018-09-19 PROCEDURE — 0W9B30Z DRAINAGE OF LEFT PLEURAL CAVITY WITH DRAINAGE DEVICE, PERCUTANEOUS APPROACH: ICD-10-PCS | Performed by: RADIOLOGY

## 2018-09-19 RX ORDER — SODIUM CHLORIDE 9 MG/ML
500 INJECTION, SOLUTION INTRAVENOUS
Status: DISPENSED | OUTPATIENT
Start: 2018-09-19 | End: 2018-09-19

## 2018-09-19 RX ORDER — NALOXONE HYDROCHLORIDE 0.4 MG/ML
INJECTION, SOLUTION INTRAMUSCULAR; INTRAVENOUS; SUBCUTANEOUS
Status: COMPLETED
Start: 2018-09-19 | End: 2018-09-19

## 2018-09-19 RX ORDER — MIDAZOLAM HYDROCHLORIDE 1 MG/ML
INJECTION INTRAMUSCULAR; INTRAVENOUS
Status: COMPLETED
Start: 2018-09-19 | End: 2018-09-19

## 2018-09-19 RX ORDER — MIDAZOLAM HYDROCHLORIDE 1 MG/ML
.5-2 INJECTION INTRAMUSCULAR; INTRAVENOUS PRN
Status: ACTIVE | OUTPATIENT
Start: 2018-09-19 | End: 2018-09-19

## 2018-09-19 RX ORDER — ONDANSETRON 2 MG/ML
4 INJECTION INTRAMUSCULAR; INTRAVENOUS PRN
Status: ACTIVE | OUTPATIENT
Start: 2018-09-19 | End: 2018-09-19

## 2018-09-19 RX ORDER — LIDOCAINE HYDROCHLORIDE 20 MG/ML
INJECTION, SOLUTION INFILTRATION; PERINEURAL
Status: COMPLETED
Start: 2018-09-19 | End: 2018-09-19

## 2018-09-19 RX ADMIN — OXYCODONE HYDROCHLORIDE 5 MG: 5 TABLET ORAL at 20:14

## 2018-09-19 RX ADMIN — SODIUM CHLORIDE: 9 INJECTION, SOLUTION INTRAVENOUS at 16:37

## 2018-09-19 RX ADMIN — SODIUM CHLORIDE: 9 INJECTION, SOLUTION INTRAVENOUS at 20:33

## 2018-09-19 RX ADMIN — FENTANYL CITRATE 25 MCG: 50 INJECTION, SOLUTION INTRAMUSCULAR; INTRAVENOUS at 13:49

## 2018-09-19 RX ADMIN — MIDAZOLAM HYDROCHLORIDE 1 MG: 1 INJECTION INTRAMUSCULAR; INTRAVENOUS at 13:49

## 2018-09-19 RX ADMIN — VANCOMYCIN HYDROCHLORIDE 1100 MG: 100 INJECTION, POWDER, LYOPHILIZED, FOR SOLUTION INTRAVENOUS at 17:25

## 2018-09-19 RX ADMIN — PIPERACILLIN SODIUM AND TAZOBACTAM SODIUM 3.38 G: 3; .375 INJECTION, POWDER, FOR SOLUTION INTRAVENOUS at 21:00

## 2018-09-19 RX ADMIN — FENTANYL CITRATE 25 MCG: 50 INJECTION, SOLUTION INTRAMUSCULAR; INTRAVENOUS at 13:55

## 2018-09-19 RX ADMIN — SODIUM CHLORIDE: 9 INJECTION, SOLUTION INTRAVENOUS at 10:25

## 2018-09-19 RX ADMIN — SODIUM CHLORIDE: 9 INJECTION, SOLUTION INTRAVENOUS at 20:16

## 2018-09-19 RX ADMIN — LIDOCAINE HYDROCHLORIDE: 20 INJECTION, SOLUTION INFILTRATION; PERINEURAL at 13:49

## 2018-09-19 RX ADMIN — VANCOMYCIN HYDROCHLORIDE 1100 MG: 100 INJECTION, POWDER, LYOPHILIZED, FOR SOLUTION INTRAVENOUS at 06:00

## 2018-09-19 RX ADMIN — STANDARDIZED SENNA CONCENTRATE AND DOCUSATE SODIUM 2 TABLET: 8.6; 5 TABLET ORAL at 18:00

## 2018-09-19 RX ADMIN — MIDAZOLAM 1 MG: 1 INJECTION INTRAMUSCULAR; INTRAVENOUS at 13:49

## 2018-09-19 RX ADMIN — NICOTINE 14 MG: 14 PATCH, EXTENDED RELEASE TRANSDERMAL at 06:00

## 2018-09-19 ASSESSMENT — PAIN SCALES - GENERAL
PAINLEVEL_OUTOF10: 0
PAINLEVEL_OUTOF10: 0
PAINLEVEL_OUTOF10: 5
PAINLEVEL_OUTOF10: 0

## 2018-09-19 ASSESSMENT — ENCOUNTER SYMPTOMS
NERVOUS/ANXIOUS: 0
COUGH: 1
VOMITING: 0
DIZZINESS: 0
MEMORY LOSS: 0
WHEEZING: 0
SPUTUM PRODUCTION: 1
WEAKNESS: 1
PALPITATIONS: 0
FOCAL WEAKNESS: 0
ABDOMINAL PAIN: 0
FEVER: 0
SENSORY CHANGE: 0
NAUSEA: 0
SPEECH CHANGE: 0
CHILLS: 0
HEADACHES: 0
SHORTNESS OF BREATH: 1
FLANK PAIN: 0
MYALGIAS: 1
DEPRESSION: 0
DIAPHORESIS: 0
BACK PAIN: 0

## 2018-09-19 ASSESSMENT — LIFESTYLE VARIABLES
EVER_SMOKED: YES
PACK_YEARS: 40

## 2018-09-19 ASSESSMENT — COPD QUESTIONNAIRES
DO YOU EVER COUGH UP ANY MUCUS OR PHLEGM?: NO/ONLY WITH OCCASIONAL COLDS OR INFECTIONS
COPD SCREENING SCORE: 3
HAVE YOU SMOKED AT LEAST 100 CIGARETTES IN YOUR ENTIRE LIFE: YES
DURING THE PAST 4 WEEKS HOW MUCH DID YOU FEEL SHORT OF BREATH: NONE/LITTLE OF THE TIME

## 2018-09-19 NOTE — ASSESSMENT & PLAN NOTE
This is severe sepsis with the following associated acute organ dysfunction(s): acute respiratory failure.   Likely source is HCAP  Patient has been started on IV fluids per septic protocol  Lactate is within normal limits  Patient is started on IV vancomycin and IV Zosyn, monitor for vancomycin toxicity  Follow blood cultures

## 2018-09-19 NOTE — ASSESSMENT & PLAN NOTE
Counseled and educated. Said he plans to quit upon discharge. He will continue with nicotine patch for now.

## 2018-09-19 NOTE — OR SURGEON
Immediate Post- Operative Note        PostOp Diagnosis: left chest empyema    Procedure(s): Ct Drainage    Estimated Blood Loss: Less than 5 ml        Complications: None            9/19/2018     1:58 PM     Ochoa Glez

## 2018-09-19 NOTE — ASSESSMENT & PLAN NOTE
Surgery Dr. Gaviria was consulted by the ER physician  Started on supplemental oxygen and RT protocol

## 2018-09-19 NOTE — RESPIRATORY CARE
COPD EDUCATION by COPD CLINICAL EDUCATOR  9/19/2018 at 9:57 AM by Maria A Ledesma     Patient reviewed by COPD education team. Patient does not qualify for COPD program at this time

## 2018-09-19 NOTE — CONSULTS
Surgical Consultation    Date: 9/19/2018    Requesting Physician: Dr. Tellez  PCP: Pcp Pt States None  Attending Physician: Pb Gaviria M.D.    CC: cough, green sputum, left chest wall pain    HPI: This is a 58 y.o. male with a history of COPD who is presenting with a history of admission approximately 2 months ago for a large left empyema, with cavitary pneumonia.  He underwent thoracotomy with decortication and chest tube placement.  There was no sign of malignancy or positive microbiology except for one bronchoalveolar lavage in mid July that was positive for group F streptococcus.  He had a persistent large air leak that ultimately became stable on waterseal, and he was discharged with a chest tube.  This was removed approximately 2 weeks later in the office.  He reports doing well for approximately 1-month, but then developed a cough, green sputum production, chills, and left sided chest wall pain over the last few days.  I saw him in the office, and sent into the emergency department for associated tachycardia and mild hypotension.  CT shows persistent left hydropneumothorax, although smaller than previously.  Some of the diffuse cavitary lung lesions have resolved and others are smaller.  He had a leukocytosis of 15,000.  He has been placed on antibiotics.    Past Medical History:   Diagnosis Date   • Chronic obstructive pulmonary disease (HCC)        Past Surgical History:   Procedure Laterality Date   • THORACOTOMY Left 7/11/2018    Procedure: THORACOTOMY-WITH DECORTICATION;  Surgeon: Diego Martínez M.D.;  Location: SURGERY [facility];  Service: General       Current Facility-Administered Medications   Medication Dose Route Frequency Provider Last Rate Last Dose   • NS infusion 1,899 mL  30 mL/kg Intravenous Once PRN Willi Tellez M.D.       • senna-docusate (PERICOLACE or SENOKOT S) 8.6-50 MG per tablet 2 Tab  2 Tab Oral BID Willi Tellez M.D.        And   • polyethylene glycol/lytes (MIRALAX) PACKET 1  Packet  1 Packet Oral QDAY PRN Willi Tellez M.D.        And   • magnesium hydroxide (MILK OF MAGNESIA) suspension 30 mL  30 mL Oral QDAY PRN Willi Tellez M.D.   30 mL at 09/18/18 2105    And   • bisacodyl (DULCOLAX) suppository 10 mg  10 mg Rectal QDAY PRN Willi Tellez M.D.       • Respiratory Care per Protocol   Nebulization Continuous RT Willi Tellze M.D.       • NS infusion   Intravenous Continuous Willi Tellez M.D. 100 mL/hr at 09/19/18 1025     • NS (BOLUS) infusion 1,000 mL  1,000 mL Intravenous Once PRN Willi Tellez M.D.   Stopped at 09/18/18 2239   • acetaminophen (TYLENOL) tablet 650 mg  650 mg Oral Q6HRS PRN Willi Tellez M.D.       • Pharmacy Consult Request ...Pain Management Review   Other PRN Willi Tellez M.D.        And   • oxyCODONE immediate-release (ROXICODONE) tablet 2.5 mg  2.5 mg Oral Q3HRS PRN Willi Tellez M.D.        And   • oxyCODONE immediate-release (ROXICODONE) tablet 5 mg  5 mg Oral Q3HRS PRN Willi Tellez M.D.   5 mg at 09/18/18 2247    And   • morphine (pf) 4 mg/ml injection 2 mg  2 mg Intravenous Q3HRS PRN Willi Tellez M.D.       • MD Alert...Vancomycin per Pharmacy  1 Each Other pharmacy to dose Willi Tellez M.D.       • ondansetron (ZOFRAN) syringe/vial injection 4 mg  4 mg Intravenous Q4HRS PRN Willi Tellez M.D.       • ondansetron (ZOFRAN ODT) dispertab 4 mg  4 mg Oral Q4HRS PRN Willi Tellez M.D.       • promethazine (PHENERGAN) tablet 12.5-25 mg  12.5-25 mg Oral Q4HRS PRN Willi Tellez M.D.       • promethazine (PHENERGAN) suppository 12.5-25 mg  12.5-25 mg Rectal Q4HRS PRN Willi Tellez M.D.       • prochlorperazine (COMPAZINE) injection 5-10 mg  5-10 mg Intravenous Q4HRS PRN Willi Tellez M.D.       • nicotine (NICODERM) 14 MG/24HR 14 mg  14 mg Transdermal Daily-0600 Willi Tellez M.D.   14 mg at 09/19/18 0600    And   • nicotine polacrilex (NICORETTE) 2 MG piece 2 mg  2 mg Oral Q HOUR PRN Willi Tellez M.D.       • piperacillin-tazobactam (ZOSYN) 3.375 g in  mL IVPB  3.375 g  Intravenous Q8HRS Willi Tellez M.D.       • vancomycin 1,100 mg in  mL IVPB  1,100 mg Intravenous Q12HR Willi Tellez M.D.   Stopped at 09/19/18 0800       Social History     Social History   • Marital status: Single     Spouse name: N/A   • Number of children: N/A   • Years of education: N/A     Occupational History   • Not on file.     Social History Main Topics   • Smoking status: Current Every Day Smoker     Packs/day: 1.00     Types: Cigarettes   • Smokeless tobacco: Never Used   • Alcohol use Yes   • Drug use: Yes     Types: Inhaled      Comment: cannabis   • Sexual activity: Not on file     Other Topics Concern   • Not on file     Social History Narrative   • No narrative on file       History reviewed. No pertinent family history.    Allergies:  Patient has no known allergies.    Review of Systems:  Negative except as noted above in the HPI on 10 point review    Physical Exam:  Blood pressure 101/63, pulse 64, temperature 36.5 °C (97.7 °F), resp. rate 14, height 1.829 m (6'), weight 63.3 kg (139 lb 8.8 oz), SpO2 96 %.    Constitutional: he is oriented to person, place, and time.  he appears well-developed and appropriately nourished. No acute distress, but mild fatigue.   Head: Normocephalic and atraumatic.   Neck: Normal range of motion. Neck supple. No JVD present. No tracheal deviation present.   Cardiovascular: Normal rate, regular rhythm  Pulmonary/Chest: Breathing is nonlabored. No stridor. No respiratory distress.   Abdominal: Soft, nontender, nondistended.  There is no rebound and no guarding.   Musculoskeletal: Normal range of motion. he exhibits no edema and no tenderness.   Neurological: he is alert and oriented to person, place, and time.  No gross deficits noted  Skin: Skin is warm and dry. No rash noted. he is not diaphoretic. No erythema. No pallor.   Psychiatric: he has a normal mood and affect.  Behavior is normal.       Labs:  Recent Labs      09/18/18   1620   WBC  15.8*   RBC  5.05    HEMOGLOBIN  13.2*   HEMATOCRIT  42.6   MCV  84.4   MCH  26.1*   MCHC  31.0*   RDW  50.8*   PLATELETCT  781*   MPV  9.1     Recent Labs      09/18/18   1620   SODIUM  137   POTASSIUM  3.7   CHLORIDE  95*   CO2  31   GLUCOSE  109*   BUN  6*   CREATININE  0.60   CALCIUM  9.3     Recent Labs      09/18/18   1620   APTT  29.7   INR  1.20*     Recent Labs      09/18/18   1620   ASTSGOT  20   ALTSGPT  22   TBILIRUBIN  0.3   ALKPHOSPHAT  67   GLOBULIN  4.8*   INR  1.20*       Radiology:  CT-CHEST (THORAX) WITH   Final Result      1.  Loculated fluid collection that does contain a significant amount of air with an air-fluid level is now identified in the left lateral hemithorax. This extends towards the left hilar area and there is some pulmonary opacification and volume loss    including air bronchograms immediately adjacent to this collection. Differential diagnosis does include bronchopleural fistula. Empyema could be present. Postoperative fluid collection is also possible.      2.  Consolidation versus mass in the left upper lobe is decreased compared to prior exam.      3.  Air-filled cavities again noted superiorly in right hemithorax.      4.  Size of pulmonary opacifications with poorly defined borders and some cavities in each lung have decreased compared to the prior exam. Multifocal opacifications in the left lung which are small in size have likely increased in number compared to the    prior exam. Progression of inflammatory or infectious process in these areas is a possibility.      5.  Opacifications throughout the right lung have decreased compared to the prior exam.      6.  Mediastinal and hilar adenopathy is again noted.            DX-CHEST-PORTABLE (1 VIEW)   Final Result      1.  Loculated left pneumothorax is now filled with fluid following chest tube removal.      2.  Scarring and bullous change in the right upper lobe.      IR-CHEST TUBE-EMPYEMA LEFT    (Results Pending)       Assessment: This is a  58 y.o. male with a history of COPD and bullous disease in the right upper lobe, as well as a history of cavitary pneumonia and large left empyema status post thoracotomy and decortication in mid July.  He presents with clinical signs of pneumonia and empyema, and was found to have a leukocytosis and imaging findings of a hydropneumothorax.  The parenchymal disease appears improved, but there is a persistent left pleural space which likely became reinfected.  He is currently hemodynamically stable      Recommendations:   -Initially, recommend IR guided pigtail drain to Pleur-evac on suction, and obtaining fluid studies (chemistry, microbiology, cytology). I spoke to interventional radiology and have placed the order for the procedure, as well as the fluid studies.  -I think that there is a relatively high likelihood that the patient will require a repeat decortication.  The goal would be to obliterate the dead space, which is a high risk for reinfection.  Given the patient's underlying lung disease, the fact that he required a partial lung resection in July, and the long-standing nature of the partial lung entrapment, it is unclear whether this will be successful.  I even discussed the possible eventual need of open drainage of the thorax (Eloesser flap), if necessary.  Again, at this time will prove seed in a stepwise fashion starting with the least invasive interventions, and working up to more invasive procedures as necessary.  -Continue n.p.o. status until the patient has the pleural drain in place, then may advance diet as tolerated  -Continue broad-spectrum antibiotics for now.  The patient may benefit from infectious disease consult at some point once we have more data about the fluid      Pb Gaviria M.D.  [facility]  [phone]

## 2018-09-19 NOTE — PROGRESS NOTES
CT Procedure Note:    Patient arrived to CT4.  Patient A&Ox4 on 2L nasal cannula and in no apparent distress.  Patient consented by Dr Glez; all questions answered.  Dr. Glez completed left chest tube placement (pleurevac).  60cc purulent green fluid taken to lab; 360cc fluid aspirated total. The patient tolerated the procedure well; ETCo2 range 29-33, with consistent waveform during the procedure.  Gauze and medipore tape applied to drain, CDI.  Patient alert and oriented and verbally appropriate post procedure, vital signs stable.  Report given to ROLANDO Chaparro.  ROLANDO Crook and Karla transported patient to room and patient handoff to bedside RN and all questions answered.      [employer] Flexima APDL Locking Pigtail 12F x 25cm  REF [id number]  LOT [id number]

## 2018-09-19 NOTE — PROGRESS NOTES
[facility] Hospitalist Progress Note    Date of Service: 2018    Chief Complaint  58 y.o. male admitted 2018 with history of chronic tobacco use, COPD, history of hydropneumothorax and empyema in July of this year status post chest tube placement and removal now with recurrent shortness of breath and empyema requiring surgery/chest tube placement.    Interval Problem Update  Reports mild shortness of breath  Minimal cough and sputum production  Ready to stop smoking    Consultants/Specialty  Surgery    Disposition  TBD        Review of Systems   Constitutional: Negative for chills, diaphoresis, fever and malaise/fatigue.   HENT: Negative for congestion and hearing loss.    Respiratory: Positive for cough, sputum production and shortness of breath. Negative for wheezing.    Cardiovascular: Negative for chest pain, palpitations and leg swelling.   Gastrointestinal: Negative for abdominal pain, nausea and vomiting.   Genitourinary: Negative for dysuria and flank pain.   Musculoskeletal: Positive for myalgias. Negative for back pain and joint pain.   Neurological: Positive for weakness. Negative for dizziness, sensory change, speech change, focal weakness and headaches.   Psychiatric/Behavioral: Negative for depression and memory loss. The patient is not nervous/anxious.       Physical Exam  Laboratory/Imaging   Hemodynamics  Temp (24hrs), Av.5 °C (97.7 °F), Min:36.2 °C (97.1 °F), Max:36.9 °C (98.5 °F)   Temperature: 36.3 °C (97.4 °F)  Pulse  Av.2  Min: 64  Max: 111 Heart Rate (Monitored): 84  Blood Pressure: 113/68, NIBP: 131/70      Respiratory      Respiration: 16, Pulse Oximetry: 94 %, O2 Daily Delivery Respiratory : Silicone Nasal Cannula     Work Of Breathing / Effort: Mild  RUL Breath Sounds: Diminished, RML Breath Sounds: Diminished, RLL Breath Sounds: Diminished, JOSSE Breath Sounds: Diminished, LLL Breath Sounds: Diminished    Fluids    Intake/Output Summary (Last 24 hours) at 18 1420  Last  data filed at 09/19/18 1305   Gross per 24 hour   Intake             1140 ml   Output             1450 ml   Net             -310 ml       Nutrition  Orders Placed This Encounter   Procedures   • Diet Order Regular     Standing Status:   Standing     Number of Occurrences:   1     Order Specific Question:   Diet:     Answer:   Regular [1]     Physical Exam   Constitutional: He is oriented to person, place, and time. He appears well-nourished. No distress.   HENT:   Head: Normocephalic and atraumatic.   Nose: Nose normal.   Eyes: Pupils are equal, round, and reactive to light. EOM are normal. Right eye exhibits no discharge. Left eye exhibits no discharge.   Neck: Neck supple. No thyromegaly present.   Cardiovascular: Normal rate and intact distal pulses.    No murmur heard.  Pulmonary/Chest: Effort normal. No respiratory distress. He has no wheezes. He has rales.   Abdominal: Soft. Bowel sounds are normal. He exhibits no distension. There is no tenderness.   Musculoskeletal: He exhibits tenderness. He exhibits no edema.   Neurological: He is alert and oriented to person, place, and time. No cranial nerve deficit. Coordination normal.   Skin: Skin is warm and dry. No rash noted. He is not diaphoretic. No erythema.   Psychiatric: He has a normal mood and affect. His behavior is normal. Judgment and thought content normal.   Nursing note and vitals reviewed.      Recent Labs      09/18/18   1620   WBC  15.8*   RBC  5.05   HEMOGLOBIN  13.2*   HEMATOCRIT  42.6   MCV  84.4   MCH  26.1*   MCHC  31.0*   RDW  50.8*   PLATELETCT  781*   MPV  9.1     Recent Labs      09/18/18   1620   SODIUM  137   POTASSIUM  3.7   CHLORIDE  95*   CO2  31   GLUCOSE  109*   BUN  6*   CREATININE  0.60   CALCIUM  9.3     Recent Labs      09/18/18   1620   APTT  29.7   INR  1.20*                  Assessment/Plan     Sepsis (HCC)- (present on admission)   Assessment & Plan    This is severe sepsis with the following associated acute organ  dysfunction(s): acute respiratory failure.   Likely source is HCAP  Patient has been started on IV fluids per septic protocol  Lactate is within normal limits  Patient is started on IV vancomycin and IV Zosyn, monitor for vancomycin toxicity  Follow blood cultures          Pneumohemothorax- (present on admission)   Assessment & Plan    Surgery Dr. Gaviria was consulted by the ER physician  Started on supplemental oxygen and RT protocol        Acute respiratory failure with hypoxia (HCC)- (present on admission)   Assessment & Plan    Secondary to empyema and HCAP        Protein malnutrition (HCC)- (present on admission)   Assessment & Plan    Nutrition consult        Panlobular emphysema (HCC)- (present on admission)   Assessment & Plan    Continue supplemental oxygen and RT protocol  DuoNeb as needed        HCAP (healthcare-associated pneumonia)   Assessment & Plan    Started on IV vancomycin and IV Zosyn, monitor for vancomycin toxicity  Started on supplemental oxygen and RT protocol        Empyema lung (HCC)- (present on admission)   Assessment & Plan    Started on IV vancomycin and IV Zosyn  Surgery , Dr. Gaviria for chest tube placement  Recurrent  Follow blood and respiratory cultures        Tobacco abuse- (present on admission)   Assessment & Plan    Tobacco cessation education provided for more than 4 minutes, discussed options of nicotine patch, medical treatment with wellbutrin and chantix. Discussed the risks of smoking including increased risk of heart disease, stroke, cancer and COPD. Discussed the benefits of quitting smoking. Nicotine replacement protocol will be provided to the patient.    Greater than 30-pack-year  Advised cessation            Quality-Core Measures   Reviewed items::  Labs reviewed, Medications reviewed and Radiology images reviewed  DVT prophylaxis - mechanical:  SCDs  Ulcer Prophylaxis::  Not indicated  Antibiotics:  Treating active infection/contamination beyond 24 hours  perioperative coverage  Assessed for rehabilitation services:  Patient returned to prior level of function, rehabilitation not indicated at this time

## 2018-09-19 NOTE — CARE PLAN
Problem: Communication  Goal: The ability to communicate needs accurately and effectively will improve  Outcome: PROGRESSING AS EXPECTED  Discussed plan of care, reviewed meds with pt, encouraged pt to voice any questions and/or concerns regarding care.       Problem: Safety  Goal: Will remain free from falls  Outcome: PROGRESSING AS EXPECTED  Assessed fall risk, fall precautions initiated. Educated patient on use of call light, no slip socks on, bed lowest position. All needs attended to. Patient verbalized understanding. Pt calls appropriately.

## 2018-09-19 NOTE — ED NOTES
"Pt requesting milk of mag, states 'I had a bad burger earlier\". Pt medicated per MAR. Lights dimmed for comfort.   "

## 2018-09-19 NOTE — PROGRESS NOTES
2 RN skin check completed with ROLANDO Duran on admission. Patient skin grossly intact. Reddened areas of bilateral elbows but are easily and briskly blanchable.

## 2018-09-19 NOTE — PROGRESS NOTES
Pharmacy Kinetics 58 y.o. male on vancomycin day # 1 2018    Currently on Vancomycin new start    Indication for Treatment: pneumonia    Pertinent history per medical record: Admitted on 2018 for recurrence of pneumonia.  Patient was recently admitted for L sided hydropneumothorax complicated with empyema, s/p L thoracotomy and decortication. Previous cultures with group F strep. CT demonstrated loculated fluid collection in the L lateral hemithorax. Empiric antibiotics initiated for pneumonia with coverage of nosocomial pathogens given previous antibiotic course.     Other antibiotics: Zosyn 3.375 g IV q8h    Allergies: Patient has no known allergies.     List concerns for renal function: none    Pertinent cultures to date:    PBC x 2: in process    Recent Labs      18   1620   WBC  15.8*   NEUTSPOLYS  73.90*   BANDSSTABS  3.50     Recent Labs      18   1620   BUN  6*   CREATININE  0.60   ALBUMIN  3.0*     Blood pressure 114/71, pulse 95, temperature 36.9 °C (98.5 °F), resp. rate 20, height 1.829 m (6'), weight 63.3 kg (139 lb 8.8 oz), SpO2 95 %. Temp (24hrs), Av.9 °C (98.5 °F), Min:36.9 °C (98.5 °F), Max:36.9 °C (98.5 °F)      A/P   1. Vancomycin dose change: initiate 1100 mg IV q12h  2. Next vancomycin level: ~2 days (not yet ordered)  3. Goal trough: 16-20 mcg/mL  4. Comments: new start vancomycin for pneumonia. Patient with complicated history of pneumonia with previous cultures positive for Group F strep. Would highly recommend surgical and ID consults. PBC in process. Will resume previous dosing of 1100 mg IV q12h as patient was essentially therapeutic on that dose. Will continue to follow.     Corina Murray, RaizaD

## 2018-09-19 NOTE — PROGRESS NOTES
"Updated Dr. Luciano regarding pt's chest tube, will continue with Dr. Gaviria's orders of water leak and/or gravity suction. Pt agitated at this time, explained plan of care, reassurance provided. Pt states, \"I don't wanna do anything else, I'm going home tomorrow.\" MD aware.   "

## 2018-09-19 NOTE — PROGRESS NOTES
Pt back on tele, monitors notified. VS monitoring initiated. Pt in no distress at this time, suction set at 35 cm per MD order.

## 2018-09-19 NOTE — PROGRESS NOTES
Pt is very agitated. Pt states that he does not want our services and does not want us bothering him.  Explained to pt that we are helping him and pt still states that he wants left alone.

## 2018-09-19 NOTE — H&P
Hospital Medicine History & Physical Note    Date of Service  9/18/2018    Primary Care Physician  Pcp Pt States None    Consultants  Surgery Dr. Gaviria  Code Status  Full code    Chief Complaint  Shortness of breath and cough    History of Presenting Illness  58 y.o. male with a past medical history of COPD and ongoing tobacco abuse who presented 9/18/2018 with worsening left sided rib pain and productive cough.  Patient was discharged on 7/25/18 after a prolonged hospitalization for hydropneumothorax and empyema status post left thoracotomy, decortication, evacuation of purulent empyema, debridement of necrotic lung and rib resection.  Respiratory cultures grew Streptococcus group B.  The patient was treated with IV Unasyn for 2 weeks followed by a course of Augmentin until 8/7/2018 which he has completed.  The patient required intubation and was treated in the ICU.  His chest tube was managed by Dr. Gaviria.  Patient has noticed worsening left-sided rib pain which is worse with coughing and deep breathing.  He reports a productive cough for the past 5 days.  He also reports worsening shortness of breath.  He denies any fevers, chills, hemoptysis, lightheadedness or vomiting.  He was seen at Dr. Gaviria's office and was instructed to present to the ER for further evaluation.    EKG interpreted by me reveals sinus tachycardia with ventricular bigeminy.  No ST elevation or ST depression noted.  Chest x-ray interpreted by me reveals left-sided loculated hydropneumothorax.    Review of Systems  Review of Systems   Constitutional: Positive for malaise/fatigue and weight loss. Negative for chills, diaphoresis and fever.   HENT: Negative for hearing loss and sore throat.    Eyes: Negative for blurred vision.   Respiratory: Positive for cough, sputum production and shortness of breath. Negative for hemoptysis and wheezing.    Cardiovascular: Positive for chest pain. Negative for palpitations and leg swelling.    Gastrointestinal: Negative for abdominal pain, blood in stool, diarrhea, nausea and vomiting.   Genitourinary: Negative for dysuria, flank pain and urgency.   Musculoskeletal: Negative for back pain, joint pain, myalgias and neck pain.   Skin: Negative for rash.   Neurological: Negative for dizziness, focal weakness, seizures and headaches.   Endo/Heme/Allergies: Does not bruise/bleed easily.   Psychiatric/Behavioral: Negative for suicidal ideas.   All other systems reviewed and are negative.      Past Medical History   has a past medical history of Chronic obstructive pulmonary disease (HCC).    Surgical History   has a past surgical history that includes thoracotomy (Left, 7/11/2018).     Family History  History reviewed.  No pertinent family history    Social History   reports that he has been smoking Cigarettes.  He has been smoking about 1.00 pack per day. He has never used smokeless tobacco. He reports that he drinks alcohol. He reports that he uses drugs, including Inhaled.    Allergies  No Known Allergies    Medications  Prior to Admission Medications   Prescriptions Last Dose Informant Patient Reported? Taking?   amoxicillin-clavulanate (AUGMENTIN) 875-125 MG Tab 9/8/2018 at FINISHED Patient's Home Pharmacy Yes Yes   Sig: Take 1 Tab by mouth 2 times a day. 13 day course on 7-   aspirin (ASA) 325 MG Tab 7/3/2018 at STOPPED Patient Yes No   Sig: Take 325 mg by mouth every day. Pt only took for 2 days   ibuprofen (MOTRIN) 200 MG Tab 9/18/2018 at 0730 Patient Yes Yes   Sig: Take 400 mg by mouth every day.   oxyCODONE immediate-release (ROXICODONE) 5 MG Tab 9/15/2018 at AM Patient's Home Pharmacy Yes Yes   Sig: Take 5 mg by mouth every 8 hours as needed for Severe Pain.      Facility-Administered Medications: None       Physical Exam  Blood Pressure: 114/71   Temperature: 36.9 °C (98.5 °F)   Pulse: 94   Respiration: 20   Pulse Oximetry: 97 %     Physical Exam   Constitutional: He is oriented to person,  place, and time. He appears well-developed and well-nourished. No distress.   Thin   HENT:   Head: Normocephalic and atraumatic.   Mouth/Throat: Oropharynx is clear and moist.   Eyes: Pupils are equal, round, and reactive to light. Conjunctivae are normal. No scleral icterus.   Neck: Normal range of motion. Neck supple.   Cardiovascular: Regular rhythm and normal heart sounds.    Tachycardic   Pulmonary/Chest:   Increased effort   diminished breath sounds left lung base with crackles     Abdominal: Soft. Bowel sounds are normal. He exhibits no distension. There is no tenderness. There is no rebound.   Musculoskeletal: Normal range of motion. He exhibits no edema or tenderness.   Lymphadenopathy:     He has no cervical adenopathy.   Neurological: He is alert and oriented to person, place, and time. No cranial nerve deficit. Coordination normal.   Skin: Skin is warm. No rash noted.   Psychiatric: He has a normal mood and affect. His behavior is normal.   Nursing note and vitals reviewed.      Laboratory:  Recent Labs      09/18/18   1620   WBC  15.8*   RBC  5.05   HEMOGLOBIN  13.2*   HEMATOCRIT  42.6   MCV  84.4   MCH  26.1*   MCHC  31.0*   RDW  50.8*   PLATELETCT  781*   MPV  9.1     Recent Labs      09/18/18   1620   SODIUM  137   POTASSIUM  3.7   CHLORIDE  95*   CO2  31   GLUCOSE  109*   BUN  6*   CREATININE  0.60   CALCIUM  9.3     Recent Labs      09/18/18   1620   ALTSGPT  22   ASTSGOT  20   ALKPHOSPHAT  67   TBILIRUBIN  0.3   GLUCOSE  109*     Recent Labs      09/18/18   1620   APTT  29.7   INR  1.20*             No results found for: TROPONINI    Urinalysis:    No results found     Imaging:  CT-CHEST (THORAX) WITH   Final Result      1.  Loculated fluid collection that does contain a significant amount of air with an air-fluid level is now identified in the left lateral hemithorax. This extends towards the left hilar area and there is some pulmonary opacification and volume loss    including air bronchograms  immediately adjacent to this collection. Differential diagnosis does include bronchopleural fistula. Empyema could be present. Postoperative fluid collection is also possible.      2.  Consolidation versus mass in the left upper lobe is decreased compared to prior exam.      3.  Air-filled cavities again noted superiorly in right hemithorax.      4.  Size of pulmonary opacifications with poorly defined borders and some cavities in each lung have decreased compared to the prior exam. Multifocal opacifications in the left lung which are small in size have likely increased in number compared to the    prior exam. Progression of inflammatory or infectious process in these areas is a possibility.      5.  Opacifications throughout the right lung have decreased compared to the prior exam.      6.  Mediastinal and hilar adenopathy is again noted.            DX-CHEST-PORTABLE (1 VIEW)   Final Result      1.  Loculated left pneumothorax is now filled with fluid following chest tube removal.      2.  Scarring and bullous change in the right upper lobe.            Assessment/Plan:  I anticipate this patient will require at least two midnights for appropriate medical management, necessitating inpatient admission.    Sepsis (HCC)- (present on admission)   Assessment & Plan    This is severe sepsis with the following associated acute organ dysfunction(s): acute respiratory failure.   Likely source is HCAP  Patient has been started on IV fluids per septic protocol  Lactate is within normal limits  Patient is started on IV vancomycin and IV Zosyn, monitor for vancomycin toxicity  Follow blood cultures          Pneumohemothorax- (present on admission)   Assessment & Plan    Surgery Dr. Gaviria was consulted by the ER physician  Started on supplemental oxygen and RT protocol        Acute respiratory failure with hypoxia (HCC)- (present on admission)   Assessment & Plan    Secondary to empyema and HCAP        Protein malnutrition  (HCC)- (present on admission)   Assessment & Plan    Nutrition consult        Panlobular emphysema (HCC)- (present on admission)   Assessment & Plan    Continue supplemental oxygen and RT protocol  DuoNeb as needed        HCAP (healthcare-associated pneumonia)   Assessment & Plan    Started on IV vancomycin and IV Zosyn, monitor for vancomycin toxicity  Started on supplemental oxygen and RT protocol        Empyema lung (HCC)- (present on admission)   Assessment & Plan    Started on IV vancomycin and IV Zosyn  Surgery has been consulted  Follow blood and respiratory cultures        Tobacco abuse- (present on admission)   Assessment & Plan    Tobacco cessation education provided for more than 4 minutes, discussed options of nicotine patch, medical treatment with wellbutrin and chantix. Discussed the risks of smoking including increased risk of heart disease, stroke, cancer and COPD. Discussed the benefits of quitting smoking. Nicotine replacement protocol will be provided to the patient.              VTE prophylaxis: SCD

## 2018-09-19 NOTE — PROGRESS NOTES
Notified Dr. Gaviria regarding continous air bubbles from chest tube. Orders to change to water seal received.

## 2018-09-19 NOTE — ASSESSMENT & PLAN NOTE
Continue supplemental oxygen and RT protocol. Suspect variability in pulse rate likely related to pulmonary status and bradycardia likely from hyper vagal tone while asleep.   DuoNeb as needed  Consider outpatient pulmonary rehab and consultation at discharge. May be limited by insurance.   Home oxygen evaluation prior to discharge  Barrier to care mainly from patient refusing some reasonable treatment options.

## 2018-09-19 NOTE — PROGRESS NOTES
Pharmacy Kinetics 58 y.o. male on vancomycin day # 2  2018    Currently on Vancomycin 1100 mg iv q12hr   (0600, 1800)    Indication for Treatment: PNA    Pertinent history per medical record: Admitted on 2018 for recurrence of pneumonia.  Patient was recently admitted for L sided hydropneumothorax complicated with empyema, s/p L thoracotomy and decortication. Previous cultures with group F strep. CT demonstrated loculated fluid collection in the L lateral hemithorax. Empiric antibiotics initiated for pneumonia with coverage of nosocomial pathogens given previous antibiotic course.      Other antibiotics: Zosyn 3.375 g IV q8h     Allergies: Patient has no known allergies.      List concerns for renal function: none     Pertinent cultures to date:    PBC x 2: NGTD    Recent Labs      18   1620   WBC  15.8*   NEUTSPOLYS  73.90*   BANDSSTABS  3.50     Recent Labs      18   1620   BUN  6*   CREATININE  0.60   ALBUMIN  3.0*     No results for input(s): VANCOTROUGH, VANCOPEAK, VANCORANDOM in the last 72 hours.  Intake/Output Summary (Last 24 hours) at 18 1049  Last data filed at 18 0756   Gross per 24 hour   Intake             1140 ml   Output             1200 ml   Net              -60 ml      Blood pressure 101/63, pulse 64, temperature 36.5 °C (97.7 °F), resp. rate 14, height 1.829 m (6'), weight 63.3 kg (139 lb 8.8 oz), SpO2 96 %. Temp (24hrs), Av.6 °C (97.8 °F), Min:36.2 °C (97.1 °F), Max:36.9 °C (98.5 °F)      A/P   1. Vancomycin dose change: none  2. Next vancomycin level: tomorrow, 18, at 0530   (ordered)  3. Goal trough: 16-20 mcg/mL  4. Comments: NNL to assess leukocytosis; remains afebrile. PCT and MRSA nares ordered. NNL to assess renal function; UOP is adequate per I&O's.  Continue current dose. Trough level ordered for prior to 4th dose to assess accumulation and dose appropriateness. Pharmacy to monitor cultures for possible de-escalation.       Savannah Waddell  PharmD., BCPS

## 2018-09-19 NOTE — PROGRESS NOTES
12 Hour chart check complete.    MS:  Patient Normal sinus 70s-90s with occasional unifocal PVCs. BP stable without support. Satting high 90s on 2LNC.    0.16/0.08/0.34

## 2018-09-19 NOTE — ASSESSMENT & PLAN NOTE
Continue abx per ID, plan for at least 4 weeks total.  Continue supplemental oxygen and RT protocol  Check RFP and CBC Q72 hours.  If he must leave AMA afyter surgery clearance, discharge on Augmentin and Bactrim DS through 10/23 per ID recommendation.

## 2018-09-19 NOTE — PROGRESS NOTES
Received report from night shift RNDagoberto. Discussed plan of care, updated white board. Pt is resting in bed, alert and oriented. Pt denies any pain or distress at this time. Fall precautions in place.  at bedside, denies SOB. All needs met. Bed in lowest position. Call light within reach. Will continue to monitor.

## 2018-09-19 NOTE — ASSESSMENT & PLAN NOTE
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 Stable despite report of enlarged PTHx. CTX surgery following and assisting with care.   Chest tube to water seal.  Continue abx through 10/23.   RT protocol  See above

## 2018-09-19 NOTE — ASSESSMENT & PLAN NOTE
9/19 Zosyn initiated  9/24 Zosyn stopped, cefepime initiated for Enterobacter cloacae and Streptococcus viridans  9/28 Enterobacter cloacae now resistant to cefepime therapy, changed to meropenem.  IID on board, recommending meropenem for at least 4 weeks but should patient leave AMA, can send home with Bactrim DS and Augmentin through 10/23.  CT management per surgical team. Left  Chest tube in place, to water seal. Stable leak. Persistent but slightly enlarged PTX. No respiratory distress.  Not ready for discharge per surgery. Understands the complex nature of his condition.   Consider pulmonary consultation for outpatient management considering panlobular emphysema as well.    Home oxygen evaluation prior to discharge.  Counseled on tobacco use and need to quit previously. He said he is quitting upon discharge.   Due to lack of insurance, he may have to stay at the hospital for the duration of his IV antibiotics due for completion 10/23 but he does not want to wait that long due to mounting bills and need to work. Patient likely to go AMA once cleared by surgery.   Will be prescribing Augmentin and Bactrim for patient to last through 10/23 if he decides to leave AMA as it is better than nothing.

## 2018-09-20 LAB
GRAM STN SPEC: ABNORMAL
PROCALCITONIN SERPL-MCNC: <0.05 NG/ML
RHODAMINE-AURAMINE STN SPEC: NORMAL
SIGNIFICANT IND 70042: ABNORMAL
SIGNIFICANT IND 70042: NORMAL
SITE SITE: ABNORMAL
SITE SITE: NORMAL
SOURCE SOURCE: ABNORMAL
SOURCE SOURCE: NORMAL
VANCOMYCIN TROUGH SERPL-MCNC: 11.4 UG/ML (ref 10–20)

## 2018-09-20 PROCEDURE — 0BJ08ZZ INSPECTION OF TRACHEOBRONCHIAL TREE, VIA NATURAL OR ARTIFICIAL OPENING ENDOSCOPIC: ICD-10-PCS | Performed by: INTERNAL MEDICINE

## 2018-09-20 PROCEDURE — 84145 PROCALCITONIN (PCT): CPT

## 2018-09-20 PROCEDURE — 770020 HCHG ROOM/CARE - TELE (206)

## 2018-09-20 PROCEDURE — 700111 HCHG RX REV CODE 636 W/ 250 OVERRIDE (IP): Performed by: INTERNAL MEDICINE

## 2018-09-20 PROCEDURE — 700102 HCHG RX REV CODE 250 W/ 637 OVERRIDE(OP): Performed by: INTERNAL MEDICINE

## 2018-09-20 PROCEDURE — 700105 HCHG RX REV CODE 258: Performed by: INTERNAL MEDICINE

## 2018-09-20 PROCEDURE — 99233 SBSQ HOSP IP/OBS HIGH 50: CPT | Performed by: INTERNAL MEDICINE

## 2018-09-20 PROCEDURE — 80202 ASSAY OF VANCOMYCIN: CPT

## 2018-09-20 PROCEDURE — 99152 MOD SED SAME PHYS/QHP 5/>YRS: CPT | Performed by: INTERNAL MEDICINE

## 2018-09-20 PROCEDURE — A9270 NON-COVERED ITEM OR SERVICE: HCPCS | Performed by: INTERNAL MEDICINE

## 2018-09-20 PROCEDURE — 302977 HCHG BRONCHOSCOPY PROC-THERAPEUTIC

## 2018-09-20 PROCEDURE — 160048 HCHG OR STATISTICAL LEVEL 1-5: Performed by: INTERNAL MEDICINE

## 2018-09-20 PROCEDURE — 700111 HCHG RX REV CODE 636 W/ 250 OVERRIDE (IP)

## 2018-09-20 PROCEDURE — 36415 COLL VENOUS BLD VENIPUNCTURE: CPT

## 2018-09-20 PROCEDURE — 99254 IP/OBS CNSLTJ NEW/EST MOD 60: CPT | Performed by: INTERNAL MEDICINE

## 2018-09-20 RX ORDER — MIDAZOLAM HYDROCHLORIDE 1 MG/ML
INJECTION INTRAMUSCULAR; INTRAVENOUS
Status: DISCONTINUED | OUTPATIENT
Start: 2018-09-20 | End: 2018-09-20 | Stop reason: HOSPADM

## 2018-09-20 RX ORDER — MIDAZOLAM HYDROCHLORIDE 1 MG/ML
1-5 INJECTION INTRAMUSCULAR; INTRAVENOUS ONCE
Status: DISCONTINUED | OUTPATIENT
Start: 2018-09-20 | End: 2018-09-20

## 2018-09-20 RX ADMIN — VANCOMYCIN HYDROCHLORIDE 1100 MG: 100 INJECTION, POWDER, LYOPHILIZED, FOR SOLUTION INTRAVENOUS at 06:49

## 2018-09-20 RX ADMIN — PIPERACILLIN SODIUM AND TAZOBACTAM SODIUM 3.38 G: 3; .375 INJECTION, POWDER, FOR SOLUTION INTRAVENOUS at 15:54

## 2018-09-20 RX ADMIN — OXYCODONE HYDROCHLORIDE 5 MG: 5 TABLET ORAL at 20:41

## 2018-09-20 RX ADMIN — OXYCODONE HYDROCHLORIDE 2.5 MG: 5 TABLET ORAL at 20:41

## 2018-09-20 RX ADMIN — PIPERACILLIN SODIUM AND TAZOBACTAM SODIUM 3.38 G: 3; .375 INJECTION, POWDER, FOR SOLUTION INTRAVENOUS at 06:22

## 2018-09-20 RX ADMIN — STANDARDIZED SENNA CONCENTRATE AND DOCUSATE SODIUM 2 TABLET: 8.6; 5 TABLET ORAL at 06:21

## 2018-09-20 RX ADMIN — NICOTINE 14 MG: 14 PATCH, EXTENDED RELEASE TRANSDERMAL at 06:21

## 2018-09-20 RX ADMIN — PIPERACILLIN SODIUM AND TAZOBACTAM SODIUM 3.38 G: 3; .375 INJECTION, POWDER, FOR SOLUTION INTRAVENOUS at 20:37

## 2018-09-20 ASSESSMENT — PAIN SCALES - GENERAL
PAINLEVEL_OUTOF10: 0
PAINLEVEL_OUTOF10: 4
PAINLEVEL_OUTOF10: 0
PAINLEVEL_OUTOF10: 0

## 2018-09-20 ASSESSMENT — ENCOUNTER SYMPTOMS
FEVER: 0
MYALGIAS: 1
WHEEZING: 0
CHILLS: 0
HEADACHES: 0
PALPITATIONS: 0
BACK PAIN: 0
MEMORY LOSS: 0
SHORTNESS OF BREATH: 1
DIZZINESS: 0
SENSORY CHANGE: 0
SPEECH CHANGE: 0
WEAKNESS: 1
COUGH: 1
NERVOUS/ANXIOUS: 0
FOCAL WEAKNESS: 0
ABDOMINAL PAIN: 0
FLANK PAIN: 0
SPUTUM PRODUCTION: 0
NAUSEA: 0

## 2018-09-20 ASSESSMENT — COGNITIVE AND FUNCTIONAL STATUS - GENERAL
SUGGESTED CMS G CODE MODIFIER DAILY ACTIVITY: CH
MOBILITY SCORE: 24
SUGGESTED CMS G CODE MODIFIER MOBILITY: CH
DAILY ACTIVITIY SCORE: 24

## 2018-09-20 ASSESSMENT — LIFESTYLE VARIABLES
ON A TYPICAL DAY WHEN YOU DRINK ALCOHOL HOW MANY DRINKS DO YOU HAVE: 6
HAVE PEOPLE ANNOYED YOU BY CRITICIZING YOUR DRINKING: NO
EVER FELT BAD OR GUILTY ABOUT YOUR DRINKING: NO
TOTAL SCORE: 0
TOTAL SCORE: 0
CONSUMPTION TOTAL: POSITIVE
AVERAGE NUMBER OF DAYS PER WEEK YOU HAVE A DRINK CONTAINING ALCOHOL: 7
EVER HAD A DRINK FIRST THING IN THE MORNING TO STEADY YOUR NERVES TO GET RID OF A HANGOVER: NO
ALCOHOL_USE: YES
TOTAL SCORE: 0
HOW MANY TIMES IN THE PAST YEAR HAVE YOU HAD 5 OR MORE DRINKS IN A DAY: 365
HAVE YOU EVER FELT YOU SHOULD CUT DOWN ON YOUR DRINKING: NO

## 2018-09-20 ASSESSMENT — PATIENT HEALTH QUESTIONNAIRE - PHQ9
2. FEELING DOWN, DEPRESSED, IRRITABLE, OR HOPELESS: NOT AT ALL
SUM OF ALL RESPONSES TO PHQ9 QUESTIONS 1 AND 2: 0
1. LITTLE INTEREST OR PLEASURE IN DOING THINGS: NOT AT ALL

## 2018-09-20 NOTE — PROGRESS NOTES
Pharmacy Kinetics 58 y.o. male on vancomycin day # 3  2018    Currently on Vancomycin 1100 mg iv q12hr   (0600, 1800)     Indication for Treatment: PNA     Pertinent history per medical record: Admitted on 2018 for recurrence of pneumonia.  Patient was recently admitted for L sided hydropneumothorax complicated with empyema, s/p L thoracotomy and decortication. Previous cultures with group F strep. CT demonstrated loculated fluid collection in the L lateral hemithorax. Empiric antibiotics initiated for pneumonia with coverage of nosocomial pathogens given previous antibiotic course.      Other antibiotics: Zosyn 3.375 g IV q8h     Allergies: Patient has no known allergies.       List concerns for renal function: none     Pertinent cultures to date:   18: PBC x 2 -  NGTD  18: Pleural fluid  - GNR, Viridans Strep    Recent Labs      18   1620   WBC  15.8*   NEUTSPOLYS  73.90*   BANDSSTABS  3.50     Recent Labs      18   1620   BUN  6*   CREATININE  0.60   ALBUMIN  3.0*     Recent Labs      18   0521   VANCOTROUGH  11.4     Intake/Output Summary (Last 24 hours) at 18 1154  Last data filed at 18 0345   Gross per 24 hour   Intake              480 ml   Output             2225 ml   Net            -1745 ml      Blood pressure 119/61, pulse 63, temperature 36.4 °C (97.6 °F), resp. rate 20, height 1.829 m (6'), weight 66.9 kg (147 lb 7.8 oz), SpO2 97 %. Temp (24hrs), Av.4 °C (97.5 °F), Min:36.1 °C (96.9 °F), Max:36.7 °C (98.1 °F)      A/P   1. Vancomycin dose change: 1300mg IV q12h    (0600, 1800)  2. Next vancomycin level: 2-3 days   (not ordered)  3. Goal trough: 16-20 mcg/mL  4. Comments: NNL to assess WBC or renal function. Pt is afebrile. Bronchoscopy scheduled today - will follow for cultures.  Pleural fluid is growing non-lactose fermenting GNR and Viridans strep - will continue to monitor for final results. Trough level is subtherapeutic - increase dose to ~20mg/kg  per protocol. Recommend a trough level in a few days to assess accumulation and dose appropriateness. Pharmacy to monitor cultures for possible de-escalation.      Savannah Waddell, PharmD., BCPS

## 2018-09-20 NOTE — PROGRESS NOTES
[facility] Hospitalist Progress Note    Date of Service: 2018    Chief Complaint  58 y.o. male admitted 2018 with history of chronic tobacco use, COPD, history of hydropneumothorax and empyema in July of this year status post chest tube placement and removal now with recurrent shortness of breath and empyema requiring surgery/chest tube placement.    Interval Problem Update  Status post chest tube placement, now set to suction  Minimal air leak  N.p.o. for bronchoscopy today    Consultants/Specialty  Surgery    Disposition  TBD  Npo  Pigtail in place  pulm eval for bronch, ? Decortication per surgery  Cont IV abx  F/u labs        Review of Systems   Constitutional: Negative for chills and fever.   HENT: Negative for congestion.    Respiratory: Positive for cough and shortness of breath. Negative for sputum production and wheezing.    Cardiovascular: Negative for palpitations and leg swelling.   Gastrointestinal: Negative for abdominal pain and nausea.   Genitourinary: Negative for dysuria and flank pain.   Musculoskeletal: Positive for myalgias. Negative for back pain.   Neurological: Positive for weakness. Negative for dizziness, sensory change, speech change, focal weakness and headaches.   Psychiatric/Behavioral: Negative for memory loss. The patient is not nervous/anxious.       Physical Exam  Laboratory/Imaging   Hemodynamics  Temp (24hrs), Av.4 °C (97.5 °F), Min:36.1 °C (96.9 °F), Max:36.7 °C (98.1 °F)   Temperature: 36.4 °C (97.6 °F)  Pulse  Av.5  Min: 43  Max: 111 Heart Rate (Monitored): (!) 59  Blood Pressure: 114/66, NIBP: 145/69      Respiratory      Respiration: 18, Pulse Oximetry: 100 %, O2 Daily Delivery Respiratory : Silicone Nasal Cannula     Work Of Breathing / Effort: Mild  RUL Breath Sounds: Inspiratory Wheezes, RML Breath Sounds: Inspiratory Wheezes, RLL Breath Sounds: Diminished, JOSSE Breath Sounds: Inspiratory Wheezes, LLL Breath Sounds: Diminished    Fluids    Intake/Output  Summary (Last 24 hours) at 09/20/18 1355  Last data filed at 09/20/18 0345   Gross per 24 hour   Intake              480 ml   Output             1975 ml   Net            -1495 ml       Nutrition  Orders Placed This Encounter   Procedures   • Diet NPO     Standing Status:   Standing     Number of Occurrences:   1     Order Specific Question:   Restrict to:     Answer:   Strict [1]     Physical Exam   Constitutional: He is oriented to person, place, and time. He appears well-nourished. No distress.   HENT:   Head: Normocephalic and atraumatic.   Nose: Nose normal.   Mouth/Throat: No oropharyngeal exudate.   Eyes: Pupils are equal, round, and reactive to light. EOM are normal. Right eye exhibits no discharge. Left eye exhibits no discharge.   Neck: Neck supple.   Cardiovascular: Normal rate and intact distal pulses.    No murmur heard.  Pulmonary/Chest: Effort normal. No respiratory distress. He has no wheezes. He has rales.   Chest tube in place   Abdominal: Soft. Bowel sounds are normal. He exhibits no distension.   Musculoskeletal: He exhibits no edema or tenderness.   Neurological: He is alert and oriented to person, place, and time. No cranial nerve deficit.   Skin: Skin is warm and dry.   Psychiatric: He has a normal mood and affect. His behavior is normal. Judgment and thought content normal.   Nursing note and vitals reviewed.      Recent Labs      09/18/18   1620   WBC  15.8*   RBC  5.05   HEMOGLOBIN  13.2*   HEMATOCRIT  42.6   MCV  84.4   MCH  26.1*   MCHC  31.0*   RDW  50.8*   PLATELETCT  781*   MPV  9.1     Recent Labs      09/18/18   1620   SODIUM  137   POTASSIUM  3.7   CHLORIDE  95*   CO2  31   GLUCOSE  109*   BUN  6*   CREATININE  0.60   CALCIUM  9.3     Recent Labs      09/18/18   1620   APTT  29.7   INR  1.20*                  Assessment/Plan     Sepsis (HCC)- (present on admission)   Assessment & Plan    This is severe sepsis with the following associated acute organ dysfunction(s): acute respiratory  failure.   Likely source is HCAP  Patient has been started on IV fluids per septic protocol  Lactate is within normal limits  Patient is started on IV vancomycin and IV Zosyn, monitor for vancomycin toxicity  Follow blood cultures          Pneumohemothorax- (present on admission)   Assessment & Plan    Surgery Dr. Gaviria was consulted by the ER physician  Started on supplemental oxygen and RT protocol        Acute respiratory failure with hypoxia (HCC)- (present on admission)   Assessment & Plan    Secondary to empyema and HCAP  improving        Protein malnutrition (HCC)- (present on admission)   Assessment & Plan    Nutrition consult        Panlobular emphysema (HCC)- (present on admission)   Assessment & Plan    Continue supplemental oxygen and RT protocol  DuoNeb as needed        HCAP (healthcare-associated pneumonia)   Assessment & Plan    Started on IV vancomycin and IV Zosyn, monitor for vancomycin toxicity  Started on supplemental oxygen and RT protocol        Empyema lung (HCC)- (present on admission)   Assessment & Plan    Started on IV vancomycin and IV Zosyn  Surgery , Dr. Gaviria , pigtail placement, management per surgery 9/19  Fluid cx pending  pulm consulted, Dr. Gondal, appreciate input, arranging for bronch r/o large airway fistula  9/20 CXR improving L effusion  Recurrent  Follow blood and respiratory cultures        Tobacco abuse- (present on admission)   Assessment & Plan    Tobacco cessation education provided for more than 4 minutes, discussed options of nicotine patch, medical treatment with wellbutrin and chantix. Discussed the risks of smoking including increased risk of heart disease, stroke, cancer and COPD. Discussed the benefits of quitting smoking. Nicotine replacement protocol will be provided to the patient.    Greater than 30-pack-year  Advised cessation            Quality-Core Measures   Reviewed items::  Labs reviewed, Medications reviewed and Radiology images reviewed  DVT  prophylaxis - mechanical:  SCDs  Ulcer Prophylaxis::  Not indicated  Antibiotics:  Treating active infection/contamination beyond 24 hours perioperative coverage  Assessed for rehabilitation services:  Patient returned to prior level of function, rehabilitation not indicated at this time

## 2018-09-20 NOTE — PROGRESS NOTES
Pt transferred back to tele per RNKonrad. Pt alert and oriented x4. No distress at this time. Educated pt on NPO status except ice chips for two hours per MD. Verbalized understanding. VS monitoring initiated, tele monitors notified.

## 2018-09-20 NOTE — CARE PLAN
Problem: Safety  Goal: Will remain free from falls    Intervention: Assess risk factors for falls  Patient will ask for assistance to walk if needed for safety. Call light in place near patient.

## 2018-09-20 NOTE — PROGRESS NOTES
Received report from night shift RNMerlyn. Discussed plan of care, updated white board. Pt is resting in bed, alert and oriented x4. Pt denies any pain or distress at this time. Pt agitated and states that he wants to go home. Education and reassurance provided. Fall precautions in place. All needs met. Bed in lowest position. Call light within reach. Will continue to monitor.

## 2018-09-20 NOTE — PROGRESS NOTES
Report received from ROLANDO Mtz.  Chest tube evaluated and outcome recorded at 25ml.  All questions answered. No c/o pain or discomfort from patient.

## 2018-09-20 NOTE — OP REPORT
DATE OF SERVICE:  09/20/2018    PROCEDURE:  Diagnostic bronchoscopy.    INDICATIONS:  To evaluate for bronchopleural fistula in the large airways in a   patient with recurrent hydropneumothorax left, requested by Dr. Gaviria.    CONSENT:  An informed consent was obtained from the patient after he was   explained the indications, alternatives and complications of the procedure.    ANESTHESIA:  Moderate sedation with fentanyl 25 mcg intravenously, Versed 2 mg   intravenously, and  2% Lidocaine 10 mL for topical anesthesia intraprocedure.    PROCEDURE IN DETAIL: Procedure was performed in endoscopy suite.    After performing a time-out, the patient was positioned   to lie in a supine position.  The medications for moderate sedation were administered  intravenously.  A bite-block was applied.  Flexible bronchoscope was advanced   through the bite-block via oral route.  Epiglottis and vocal cords were observed.   Vocal cord movements were normal with respiratory.  Lidocaine was sprayed over the vocal   cords and then the scope was advanced through the vocal cords into the   subglottis and distal trachea and advanced towards the porsha and then it was   Advanced towards the right mainstem.  Distal airway segments of the right upper lobe,   right middle lobe and right lower lobe were examined and the scope was   retracted and advanced towards the left mainstem and then distal airway   segments of the left upper lobe, lingula and left upper division and then the   left lower lobe basal segemnts were also examined.  Then, the scope was retracted.    FINDINGS:  There were no endobronchial lesions, ulcers or growth.  There was no   bronchopleural fistula within the main trachea,porsha, left mainstem, the distal   segments of the left upper lobe or the left lower lobe segments that could be   visualized by the bronchoscope.  Similarly, there was no bronchopleural   fistula on the right bronchial tree.  There was copious whitish  purulent secretions,  which was mucoid present bilaterally, which was removed with saline   irrigation and gentle suctioning. The patient tolerated the procedure well.    There were no complications.       ____________________________________     Muhammad K. Gondal, MD MKG / [facility]    DD:  09/20/2018 14:35:05  DT:  09/20/2018 15:25:57    D#:  [id number]  Job#:  [id number]

## 2018-09-20 NOTE — PROGRESS NOTES
12 CC   2017         RE:  :  MRN: Joshua Ennis  1945  [id number]     Dear Dr. Sanchez,    Thank you for asking me to see your very pleasant patient, Joshua Ennis, in neuro-ophthalmic consultation.  I would like to thank you for sending your records and I have summarized them in the history of present illness. He presented with his spouse who provided additional history.  My assessment and plan are below.  For further details, please see my attached clinic note.          Assessment & Plan     Joshua Ennis is a 71 year old male with the following diagnoses:   1. Transient visual loss, unspecified laterality    2. Diplopia    3. Subjective visual disturbance    4. Visual field defect       Mr. Ennis has a history of skin melanoma, and granulomatosis with polyangiitis. Today he presents for evaluation of intermittent short episodes of variable double vision for about 2-3 years and in addition to that he had an episode of complete vision loss in both eye that lasted for <30 seconds about 8 months ago.     Examination today is remarkable for normal visual acuity.  No afferent defect. Left hyperphoria.  He has a paracentral visual field defect RIGHT eye but the optical coherence tomography is normal.  This is likely an artifact. The patient is asymptomatic.   The double vision is hard for him to describe and does not correlate to his exam.  Will obtain testing for myasthenia gravis and an MRI brain.  If these are normal, then would observe.    The transient vision loss is not apt to be embolic since it was complete, simultaneous, bilateral vision loss x seconds.  It is virtually impossible to embolize both eyes or both occipital lobes simultaneously.  Additionally, emboli typically cause transient vision loss x 2-5 min rather than seconds.  It was one episode 8 months ago.  I think it unlikely to be vasculitic for the same reasons as the embolic argument.  Would observe.           Again, thank you for allowing  me to participate in the care of your patient.      Sincerely,    Tyler Robertson MD  Professor, Neuro-Ophthalmology  Department of Ophthalmology and Visual Neurosciences  [facility]    CC: Ede Sanchez MD  [address]  [facility] [address]  VIA In Basket     Marisa Johnson MD  [facility]  [address] [facility] [address]  [facility] [address]  VIA In Basket

## 2018-09-20 NOTE — PROGRESS NOTES
Report given to Konrad from endo. Consent signed, Dr. Manley saw pt at bedside and explained procedure. Pt scheduled for bronchoscopy for 1400.

## 2018-09-20 NOTE — CONSULTS
DATE OF SERVICE:  09/20/2018    PULMONARY CONSULTATION    REQUESTING PHYSICIAN:  Carmen Luciano DO, hospitalist.    REASON FOR CONSULTATION:  Evaluation for bronchoscopy.    CHIEF COMPLAINT:  Chest pain and cough, left, for the last couple of weeks.    HISTORY OF PRESENT ILLNESS:  This 58-year-old gentleman with known history of   COPD, bullous emphysema, chronic smoker, had sustained a cavitary pneumonia,   left, 2 months ago, complicating into a large left empyema status post left   thoracotomy with decortication and chest tube placement and a prolonged stay   in the hospital in 07/2018.  He was readmitted again yesterday with   persistence of left hydropneumothorax.  Hence, a pigtail catheter was placed   by interventional radiology yesterday with removal of 350 mL of purulent   fluid.  The chest surgeon is thinking of performing a repeat thoracotomy with   decortication, but prior to the procedure, he wants the pulmonary to perform a   bronchoscopy on this patient to exclude the possibility of bronchopleural   fistula in the large airways.  Hence, a pulmonary consult was called.  Patient   denies constitutional symptoms of fever, chills, or sweating.  He denies   symptoms of nasal congestion or postnasal dripping.  He does admit to have   symptoms of cough productive of greenish expectoration accompanied by dyspnea   and left-sided chest pain.  No hemoptysis.  Denies palpitations, dependent   edema, or orthopnea.  No nausea, vomiting, or diarrhea.  No dysuria or   hematuria.  Complains of generalized weakness.    PAST MEDICAL HISTORY:  COPD, emphysema, empyema, left status post thoracotomy   with decortication, and healthcare associated pneumonia.    PAST SURGICAL HISTORY:  Status post thoracotomy in 07/11/2018.    CURRENT MEDICATIONS:  Vancomycin, Zosyn, Nicoderm patches, prochlorperazine,   promethazine, oxycodone, and Tylenol.    ALLERGIES:  No known drug allergies.    PERSONAL AND SOCIAL HISTORY:  Patient is  single.  He is a chronic smoker with   more than 30-pack-year history of smoking.  He had cigarette smoke last time   on Monday.  He drinks alcohol on social occasions and uses cannabis also.    FAMILY HISTORY:  No family history of tuberculosis, COPD, or asthma.    REVIEW OF SYSTEMS:  Besides the positive and negative pertinent symptoms in   the history of present illness, all other systems otherwise are unremarkable.    PHYSICAL EXAMINATION:  GENERAL:  A middle-aged gentleman of lean build sitting in bed and not in   respiratory distress.  VITAL SIGNS:  Temperature 97.6 Fahrenheit, pulse 63, respiratory rate 20,   blood pressure 119/61, oxygen saturation 97% on 1 liter of oxygen via nasal   cannula.  HEAD, EYES, EARS, NOSE, AND THROAT:  Pupils are equal, round, and reactive to   light and accommodation.  Extraocular movements are intact.  Sclerae are   anicteric.  No oral thrush.  Oral mucosa is moist.  Nasal septum is in   midline.  NECK:  Supple.  Accessory muscles of respiration are prominent.  Jugular veins   are not distended.  No carotid bruit.  Trachea is central.  CHEST:  Breath sounds are audible with diminished breath sounds in the left   base with crackles.  Bilateral inspiratory and expiratory wheeze is present.  HEART:  First and second heart sound audible.  No murmur.  ABDOMEN:  Soft, nontender.  Liver and spleen not palpable.  Bowel sounds are   present.  EXTREMITIES:  No dependent edema, no clubbing or cyanosis.  Peripheral pulses   are palpable.  NEUROLOGIC:  Patient is alert and oriented to time, place, and person.  Tendon   jerks are intact.  Cranial nerves are intact.    LABORATORY DATA:  WBC count is 15.8, hemoglobin 13.2, hematocrit 42.6, and   platelet count 781.  Sodium 137, potassium 3.7, chloride 95, CO2 31, BUN 6,   and creatinine 0.60.  Liver function tests are normal.  Lactate levels are 2.    Prothrombin time is 14.9, INR is 1.2, partial thromboplastin time is 29.7.    IMAGING:  CT  scan of the chest has been reviewed.  It shows loculated fluid   collection with an air fluid level in the left lateral hemithorax extending to   the left hilar area.  There is some pulmonary opacification and volume loss   including air bronchograms immediately adjacent to this collection.  There are   large bolus air-filled cavities present in the superior anterior right   hemithorax.  Fibrotic opacifications and bronchiectasis are noted.  Linear   fibrotic opacifications around the bronchi in the left lower lobe have   increased compared to the prior exam and scattered opacities, which are poorly   defined borders have also increased slightly to the left lower lobe.    Moderate mediastinal hilar adenopathy is noted.  No large masses are seen   within the upper abdomen.  There is excellent positioning of the locking loop   catheter in the loculated pleural collection.    ASSESSMENT:  Patient has presented with current empyema lung, left, with   hydropneumothorax.  He has panlobular emphysema, chronic obstructive pulmonary   disease with chronic hypoxic respiratory failure, and chronic tobacco abuse.    PLAN:  The patient is already n.p.o. since midnight.  Hence, we will perform a   diagnostic bronchoscopy to exclude large airway bronchopleural fistula.  The   indications, alternatives, and complications of the procedure have been   explained to the patient and he agrees and understands with the plan and has   consented for the procedure.  Continue oxygen supplementation.  Continue chest   tube drainage with underwater seal.    I thank, Dr. Luciano, for seeking my advice in the management of the case.       ____________________________________     Muhammad K. Gondal, MD MKG / [facility]    DD:  09/20/2018 11:55:26  DT:  09/20/2018 14:25:33    D#:  [id number]  Job#:  [id number]

## 2018-09-20 NOTE — CARE PLAN
Problem: Infection  Goal: Will remain free from infection    Intervention: Assess signs and symptoms of infection  Insertion site for chest tube will be monitored for s/s of infection.

## 2018-09-20 NOTE — PROGRESS NOTES
Progress Note:  9/20/2018, 9:16 AM    S: No acute events.  Left pleural pigtail placed, initial purulent output of approximately 350 mL, with an additional 25 mL overnight.  Tube was initially to waterseal with very small intermittent air leak.  Afebrile, antibiotics ongoing    O:  Blood pressure 125/81, pulse 83, temperature 36.6 °C (97.8 °F), resp. rate 16, height 1.829 m (6'), weight 66.9 kg (147 lb 7.8 oz), SpO2 94 %.    NAD, awake, alert  Breathing is nonlabored  Left pleural pigtail in place, to waterseal with very small intermittent air leak.  Minimal purulent output      A:   Active Hospital Problems    Diagnosis   • Sepsis (HCC) [A41.9]     Priority: High   • Pneumohemothorax [J94.2]     Priority: Medium   • Acute respiratory failure with hypoxia (HCC) [J96.01]     Priority: Medium   • Panlobular emphysema (HCC) [J43.1]     Priority: Medium   • Protein malnutrition (HCC) [E46]     Priority: Medium   • Tobacco abuse [Z72.0]     Priority: Low   • Empyema lung (HCC) [J86.9]   • HCAP (healthcare-associated pneumonia) [J18.9]     58-year-old male with COPD, and history of what appeared to be multi-focal pneumonia and large left empyema status post decortication.  Had persistent air leak and lateral plural space, requiring discharge with chest tube.  He represented with signs of recurrent pneumonia/empyema and findings of a hydropneumothorax.  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  Awaiting bronchoscopy results  -Also, patient is a current smoker.  Counseling given.   Nicotine patch in place.  RN pointed out history of relatively heavy daily alcohol use as well, making him a concern for withdrawal    Pb Gaviria M.D.  [facility]  [phone]

## 2018-09-20 NOTE — PROGRESS NOTES
Dr. Gaviria at bedside. MD placed pigtail suction at -10cm. Orders to continue suction at this time. Dr. Gaviria provided pt with extensive education regarding plan of care. Pt verbalized understanding, however continues to refuse RT protocol.

## 2018-09-21 LAB
ANION GAP SERPL CALC-SCNC: 6 MMOL/L (ref 0–11.9)
ANISOCYTOSIS BLD QL SMEAR: ABNORMAL
BACTERIA WND AEROBE CULT: ABNORMAL
BASOPHILS # BLD AUTO: 0.9 % (ref 0–1.8)
BASOPHILS # BLD: 0.1 K/UL (ref 0–0.12)
BUN SERPL-MCNC: 11 MG/DL (ref 8–22)
CALCIUM SERPL-MCNC: 8.6 MG/DL (ref 8.5–10.5)
CHLORIDE SERPL-SCNC: 99 MMOL/L (ref 96–112)
CO2 SERPL-SCNC: 31 MMOL/L (ref 20–33)
CREAT SERPL-MCNC: 0.75 MG/DL (ref 0.5–1.4)
EOSINOPHIL # BLD AUTO: 0.32 K/UL (ref 0–0.51)
EOSINOPHIL NFR BLD: 2.8 % (ref 0–6.9)
ERYTHROCYTE [DISTWIDTH] IN BLOOD BY AUTOMATED COUNT: 51.7 FL (ref 35.9–50)
GLUCOSE SERPL-MCNC: 91 MG/DL (ref 65–99)
GRAM STN SPEC: ABNORMAL
HCT VFR BLD AUTO: 40.5 % (ref 42–52)
HGB BLD-MCNC: 12.6 G/DL (ref 14–18)
LYMPHOCYTES # BLD AUTO: 2.02 K/UL (ref 1–4.8)
LYMPHOCYTES NFR BLD: 17.4 % (ref 22–41)
MANUAL DIFF BLD: NORMAL
MCH RBC QN AUTO: 26.9 PG (ref 27–33)
MCHC RBC AUTO-ENTMCNC: 31.1 G/DL (ref 33.7–35.3)
MCV RBC AUTO: 86.4 FL (ref 81.4–97.8)
MONOCYTES # BLD AUTO: 0.96 K/UL (ref 0–0.85)
MONOCYTES NFR BLD AUTO: 8.3 % (ref 0–13.4)
MORPHOLOGY BLD-IMP: NORMAL
NEUTROPHILS # BLD AUTO: 8.19 K/UL (ref 1.82–7.42)
NEUTROPHILS NFR BLD: 69.7 % (ref 44–72)
NEUTS BAND NFR BLD MANUAL: 0.9 % (ref 0–10)
NRBC # BLD AUTO: 0 K/UL
NRBC BLD-RTO: 0 /100 WBC
OVALOCYTES BLD QL SMEAR: NORMAL
PLATELET # BLD AUTO: 684 K/UL (ref 164–446)
PLATELET BLD QL SMEAR: NORMAL
PMV BLD AUTO: 9.2 FL (ref 9–12.9)
POTASSIUM SERPL-SCNC: 4.3 MMOL/L (ref 3.6–5.5)
RBC # BLD AUTO: 4.69 M/UL (ref 4.7–6.1)
RBC BLD AUTO: PRESENT
SIGNIFICANT IND 70042: ABNORMAL
SITE SITE: ABNORMAL
SODIUM SERPL-SCNC: 136 MMOL/L (ref 135–145)
SOURCE SOURCE: ABNORMAL
TOXIC GRANULES BLD QL SMEAR: SLIGHT
WBC # BLD AUTO: 11.6 K/UL (ref 4.8–10.8)

## 2018-09-21 PROCEDURE — 99233 SBSQ HOSP IP/OBS HIGH 50: CPT | Performed by: INTERNAL MEDICINE

## 2018-09-21 PROCEDURE — 700105 HCHG RX REV CODE 258: Performed by: INTERNAL MEDICINE

## 2018-09-21 PROCEDURE — 700111 HCHG RX REV CODE 636 W/ 250 OVERRIDE (IP): Performed by: INTERNAL MEDICINE

## 2018-09-21 PROCEDURE — 80048 BASIC METABOLIC PNL TOTAL CA: CPT

## 2018-09-21 PROCEDURE — 36415 COLL VENOUS BLD VENIPUNCTURE: CPT

## 2018-09-21 PROCEDURE — 700102 HCHG RX REV CODE 250 W/ 637 OVERRIDE(OP): Performed by: INTERNAL MEDICINE

## 2018-09-21 PROCEDURE — A9270 NON-COVERED ITEM OR SERVICE: HCPCS | Performed by: INTERNAL MEDICINE

## 2018-09-21 PROCEDURE — 770020 HCHG ROOM/CARE - TELE (206)

## 2018-09-21 PROCEDURE — 99232 SBSQ HOSP IP/OBS MODERATE 35: CPT | Performed by: INTERNAL MEDICINE

## 2018-09-21 PROCEDURE — 85027 COMPLETE CBC AUTOMATED: CPT

## 2018-09-21 PROCEDURE — 85007 BL SMEAR W/DIFF WBC COUNT: CPT

## 2018-09-21 RX ADMIN — SODIUM CHLORIDE: 9 INJECTION, SOLUTION INTRAVENOUS at 20:15

## 2018-09-21 RX ADMIN — PIPERACILLIN SODIUM AND TAZOBACTAM SODIUM 3.38 G: 3; .375 INJECTION, POWDER, FOR SOLUTION INTRAVENOUS at 05:54

## 2018-09-21 RX ADMIN — VANCOMYCIN HYDROCHLORIDE 1300 MG: 100 INJECTION, POWDER, LYOPHILIZED, FOR SOLUTION INTRAVENOUS at 05:55

## 2018-09-21 RX ADMIN — NICOTINE 14 MG: 14 PATCH, EXTENDED RELEASE TRANSDERMAL at 06:00

## 2018-09-21 RX ADMIN — STANDARDIZED SENNA CONCENTRATE AND DOCUSATE SODIUM 2 TABLET: 8.6; 5 TABLET ORAL at 18:00

## 2018-09-21 RX ADMIN — PIPERACILLIN SODIUM AND TAZOBACTAM SODIUM 3.38 G: 3; .375 INJECTION, POWDER, FOR SOLUTION INTRAVENOUS at 20:17

## 2018-09-21 RX ADMIN — OXYCODONE HYDROCHLORIDE 5 MG: 5 TABLET ORAL at 20:18

## 2018-09-21 RX ADMIN — PIPERACILLIN SODIUM AND TAZOBACTAM SODIUM 3.38 G: 3; .375 INJECTION, POWDER, FOR SOLUTION INTRAVENOUS at 14:22

## 2018-09-21 RX ADMIN — OXYCODONE HYDROCHLORIDE 5 MG: 5 TABLET ORAL at 23:33

## 2018-09-21 ASSESSMENT — PAIN SCALES - GENERAL
PAINLEVEL_OUTOF10: 4
PAINLEVEL_OUTOF10: 5
PAINLEVEL_OUTOF10: 6
PAINLEVEL_OUTOF10: 0
PAINLEVEL_OUTOF10: 0

## 2018-09-21 ASSESSMENT — ENCOUNTER SYMPTOMS
WEAKNESS: 1
PALPITATIONS: 0
SHORTNESS OF BREATH: 1
BACK PAIN: 0
COUGH: 1
ABDOMINAL PAIN: 0
FLANK PAIN: 0
CHILLS: 0
DIAPHORESIS: 0
SPUTUM PRODUCTION: 0
MYALGIAS: 1
FEVER: 0
HEADACHES: 0
SENSORY CHANGE: 0
MEMORY LOSS: 0

## 2018-09-21 NOTE — PROGRESS NOTES
Progress Note:  9/21/2018, 7:54 AM    S: No acute events.  S/p bronchoscopy without large airway fistula seen.  Afebrile. Persistent cough/sputum. Modest chest tube output    O:  Blood pressure 123/68, pulse 65, temperature 36.2 °C (97.1 °F), resp. rate 17, height 1.829 m (6'), weight 69 kg (152 lb 1.9 oz), SpO2 100 %.    NAD, awake, alert  Breathing nonlabored  L pigtail in place, intermittent air leak, minimal output, on -10 suction      A:   Active Hospital Problems    Diagnosis   • Sepsis ([facility]) [A41.9]     Priority: High   • Pneumohemothorax [J94.2]     Priority: Medium   • Acute respiratory failure with hypoxia ([facility]) [J96.01]     Priority: Medium   • Panlobular emphysema (HCC) [J43.1]     Priority: Medium   • Protein malnutrition ([facility]) [E46]     Priority: Medium   • Tobacco abuse [Z72.0]     Priority: Low   • Empyema lung ([facility]) [J86.9]   • HCAP (healthcare-associated pneumonia) [J18.9]         P:   -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 May seal with time/antibiotics, but I think unlikely.  Discussed Thoracotomy/decortication with goal of allowing lung to re-expand and seal leak.  Offered to perform today, but patient is against surgery at this time, wants to wait a few days to see if leak seals nonoperatively.  I told him I think this is possible but unlikely.  -Place chest tube to water seal today  -Continue abx and aggressive pulmonary hygiene  -Tentative plan for repeat CT chest on Sunday to evaluate pleural space    Pb Gaviria M.D.  [facility]  [phone]

## 2018-09-21 NOTE — PROGRESS NOTES
[facility] Hospitalist Progress Note    Date of Service: 2018    Chief Complaint  58 y.o. male admitted 2018 with history of chronic tobacco use, COPD, history of hydropneumothorax and empyema in July of this year status post chest tube placement and removal now with recurrent shortness of breath and empyema requiring surgery/chest tube placement.    Interval Problem Update  Status post bronchoscopy with no fistula noted  Shortness of breath improving  Minimal cough    Consultants/Specialty  Surgery    Disposition  TBD    Pigtail in place  Plan for decortication by surgery Monday  Cont IV abx          Review of Systems   Constitutional: Negative for chills, diaphoresis, fever and malaise/fatigue.   HENT: Negative for congestion.    Respiratory: Positive for cough and shortness of breath. Negative for sputum production.    Cardiovascular: Negative for palpitations and leg swelling.   Gastrointestinal: Negative for abdominal pain.   Genitourinary: Negative for flank pain.   Musculoskeletal: Positive for myalgias. Negative for back pain and joint pain.   Neurological: Positive for weakness. Negative for sensory change and headaches.   Psychiatric/Behavioral: Negative for memory loss.      Physical Exam  Laboratory/Imaging   Hemodynamics  Temp (24hrs), Av.4 °C (97.5 °F), Min:36.1 °C (96.9 °F), Max:36.8 °C (98.2 °F)   Temperature: 36.4 °C (97.5 °F)  Pulse  Av.4  Min: 43  Max: 111 Heart Rate (Monitored): 85  Blood Pressure: 125/84, NIBP: 130/79      Respiratory      Respiration: 14, Pulse Oximetry: 94 %     Work Of Breathing / Effort: Mild  RUL Breath Sounds: Inspiratory Wheezes;Expiratory Wheezes, RML Breath Sounds: Diminished, RLL Breath Sounds: Diminished, JOSSE Breath Sounds: Inspiratory Wheezes;Expiratory Wheezes, LLL Breath Sounds: Diminished    Fluids    Intake/Output Summary (Last 24 hours) at 18 1327  Last data filed at 18 1145   Gross per 24 hour   Intake              480 ml   Output              2340 ml   Net            -1860 ml       Nutrition  Orders Placed This Encounter   Procedures   • Diet Order Regular     Standing Status:   Standing     Number of Occurrences:   1     Order Specific Question:   Diet:     Answer:   Regular [1]     Physical Exam   Constitutional: He is oriented to person, place, and time. No distress.   HENT:   Head: Normocephalic and atraumatic.   Eyes: Pupils are equal, round, and reactive to light. EOM are normal. No scleral icterus.   Neck: Neck supple.   Cardiovascular: Normal rate and intact distal pulses.    No murmur heard.  Pulmonary/Chest: Effort normal. No respiratory distress. He has rales.   Chest tube in place   Abdominal: Soft. Bowel sounds are normal. He exhibits no distension.   Musculoskeletal: He exhibits no edema.   Neurological: He is alert and oriented to person, place, and time. No cranial nerve deficit. Coordination normal.   Skin: Skin is warm and dry. He is not diaphoretic.   Psychiatric: He has a normal mood and affect. His behavior is normal. Judgment and thought content normal.   Nursing note and vitals reviewed.      Recent Labs      09/18/18   1620  09/21/18   0301   WBC  15.8*  11.6*   RBC  5.05  4.69*   HEMOGLOBIN  13.2*  12.6*   HEMATOCRIT  42.6  40.5*   MCV  84.4  86.4   MCH  26.1*  26.9*   MCHC  31.0*  31.1*   RDW  50.8*  51.7*   PLATELETCT  781*  684*   MPV  9.1  9.2     Recent Labs      09/18/18   1620  09/21/18   0301   SODIUM  137  136   POTASSIUM  3.7  4.3   CHLORIDE  95*  99   CO2  31  31   GLUCOSE  109*  91   BUN  6*  11   CREATININE  0.60  0.75   CALCIUM  9.3  8.6     Recent Labs      09/18/18   1620   APTT  29.7   INR  1.20*                  Assessment/Plan     Sepsis (HCC)- (present on admission)   Assessment & Plan    This is severe sepsis with the following associated acute organ dysfunction(s): acute respiratory failure.   Likely source is HCAP  Patient has been started on IV fluids per septic protocol  Lactate is within normal  limits  Patient is started on IV vancomycin and IV Zosyn, monitor for vancomycin toxicity  Follow blood cultures          Pneumohemothorax- (present on admission)   Assessment & Plan    Surgery Dr. Gaviria was consulted by the ER physician  Started on supplemental oxygen and RT protocol        Acute respiratory failure with hypoxia (HCC)- (present on admission)   Assessment & Plan    Secondary to empyema and HCAP  improving        Protein malnutrition (HCC)- (present on admission)   Assessment & Plan    Nutrition consult        Panlobular emphysema (HCC)- (present on admission)   Assessment & Plan    Continue supplemental oxygen and RT protocol  DuoNeb as needed        HCAP (healthcare-associated pneumonia)   Assessment & Plan    Started on IV vancomycin and IV Zosyn, monitor for vancomycin toxicity  Started on supplemental oxygen and RT protocol        Empyema lung (HCC)- (present on admission)   Assessment & Plan    Discontinue IV vancomycin     Continue IV Zosyn  Surgery , Dr. Gaviria , pigtail placement, management per surgery 9/19  Fluid cx strep viridans, Enterobacter cloaca  pulm consulted, Dr. Gondal, appreciate input, status post bronchoscopy, no fistula noted  9/20 CXR improving L effusion  Recurrent  Follow blood negative    Plan for decortication on Monday        Tobacco abuse- (present on admission)   Assessment & Plan    Tobacco cessation education provided for more than 4 minutes, discussed options of nicotine patch, medical treatment with wellbutrin and chantix. Discussed the risks of smoking including increased risk of heart disease, stroke, cancer and COPD. Discussed the benefits of quitting smoking. Nicotine replacement protocol will be provided to the patient.    Greater than 30-pack-year  Advised cessation            Quality-Core Measures   Reviewed items::  Labs reviewed, Medications reviewed and Radiology images reviewed  DVT prophylaxis - mechanical:  SCDs  Ulcer Prophylaxis::  Not  indicated  Antibiotics:  Treating active infection/contamination beyond 24 hours perioperative coverage  Assessed for rehabilitation services:  Patient returned to prior level of function, rehabilitation not indicated at this time

## 2018-09-21 NOTE — PROGRESS NOTES
Received report from night shift RNMerlyn. Discussed plan of care, updated white board. Pt is resting in bed, alert and oriented. Pt denies any pain or distress at this time. Fall precautions in place. All needs met. Bed in lowest position. Call light within reach. Will continue to monitor.

## 2018-09-21 NOTE — PROGRESS NOTES
EKG: NV: 0.14 QRS: 0.08; QT: 0.38;  RANGES , PERIODS OF COLBY 47-56; RARE PVC , ASYMPTOMATIC WITH THE BRADYCARDIC PERIOD.

## 2018-09-21 NOTE — CARE PLAN
Problem: Infection  Goal: Will remain free from infection    Intervention: Assess signs and symptoms of infection  Patient's chest tube sight will be monitored for s/s of infection throughout shift.

## 2018-09-21 NOTE — PROGRESS NOTES
Pulmonary Progress Note    HISTORY OF PRESENT ILLNESS:  This 58-year-old gentleman with known history of   COPD, bullous emphysema, chronic smoker, had sustained a cavitary pneumonia,   left, 2 months ago, complicating into a large left empyema status post left   thoracotomy with decortication and chest tube placement and a prolonged stay   in the hospital in 07/2018.  He was readmitted again yesterday with   persistence of left hydropneumothorax.  Hence, a pigtail catheter was placed   by interventional radiology yesterday with removal of 350 mL of purulent   fluid.  The chest surgeon is thinking of performing a repeat thoracotomy with   decortication, but prior to the procedure, he wants the pulmonary to perform a   bronchoscopy on this patient to exclude the possibility of bronchopleural   fistula in the large airways.  Hence, a pulmonary consult was called.  Patient   denies constitutional symptoms of fever, chills, or sweating.  He denies   symptoms of nasal congestion or postnasal dripping.  He does admit to have   symptoms of cough productive of greenish expectoration accompanied by dyspnea   and left-sided chest pain.  No hemoptysis.  Denies palpitations, dependent   edema, or orthopnea.  No nausea, vomiting, or diarrhea.  No dysuria or   hematuria.  Complains of generalized weakness.    9/21/18: Bronchoscopy done yesterday. No endobronchial lesions or bronchopleural fistula noted.  Patient feels better today. Thoracoscopy with decortication scheduled for Monday 9/24/18.  35 ml of purulent fluid collected in under water seal. No air leak noted. Vancomycin discontinued     PAST MEDICAL HISTORY:  COPD, emphysema, empyema, left status post thoracotomy   with decortication, and healthcare associated pneumonia.     PAST SURGICAL HISTORY:  Status post thoracotomy in 07/11/2018.     CURRENT MEDICATIONS:  Zosyn, Nicoderm patches, prochlorperazine,   promethazine, oxycodone, and Tylenol.     ALLERGIES:  No known drug  allergies.        PHYSICAL EXAMINATION:  GENERAL:  A middle-aged gentleman of lean build sitting in bed and not in   respiratory distress.  VITAL SIGNS:  Temperature 97.5 F, pulse 74, respiratory rate 214,   blood pressure 125/84, oxygen saturation 94% on 1 liter of oxygen via nasal   cannula.  HEAD, EYES, EARS, NOSE, AND THROAT:  Pupils are equal, round, and reactive to   light and accommodation.  Extraocular movements are intact.  Sclerae are   anicteric.  No oral thrush.  Oral mucosa is moist.  Nasal septum is in   midline.  NECK:  Supple.  Accessory muscles of respiration are not prominent.  Jugular veins   are not distended.  No carotid bruit.  Trachea is central.  CHEST:  Breath sounds are audible with diminished breath sounds in the left   base with crackles.  Bilateral inspiratory and expiratory wheeze is present. Left chest tube in place  HEART:  First and second heart sound audible.  No murmur.  ABDOMEN:  Soft, nontender.  Liver and spleen not palpable.  Bowel sounds are   present.  EXTREMITIES:  No dependent edema, no clubbing or cyanosis.  Peripheral pulses   are palpable.  NEUROLOGIC:  Patient is alert and oriented to time, place, and person.  Tendon   jerks are intact.  Cranial nerves are intact.     LABORATORY DATA:    Results for WALLY HINDS (MRN [id number]) as of 9/21/2018 14:00   Ref. Range 9/21/2018 03:01   WBC Latest Ref Range: 4.8 - 10.8 K/uL 11.6 (H)   RBC Latest Ref Range: 4.70 - 6.10 M/uL 4.69 (L)   Hemoglobin Latest Ref Range: 14.0 - 18.0 g/dL 12.6 (L)   Hematocrit Latest Ref Range: 42.0 - 52.0 % 40.5 (L)   MCV Latest Ref Range: 81.4 - 97.8 fL 86.4   MCH Latest Ref Range: 27.0 - 33.0 pg 26.9 (L)   MCHC Latest Ref Range: 33.7 - 35.3 g/dL 31.1 (L)   RDW Latest Ref Range: 35.9 - 50.0 fL 51.7 (H)   Platelet Count Latest Ref Range: 164 - 446 K/uL 684 (H)   MPV Latest Ref Range: 9.0 - 12.9 fL 9.2   Results for WALLY HINDS (MRN [id number]) as of 9/21/2018 14:00   Ref. Range 9/21/2018 03:01    Sodium Latest Ref Range: 135 - 145 mmol/L 136   Potassium Latest Ref Range: 3.6 - 5.5 mmol/L 4.3   Chloride Latest Ref Range: 96 - 112 mmol/L 99   Co2 Latest Ref Range: 20 - 33 mmol/L 31   Anion Gap Latest Ref Range: 0.0 - 11.9  6.0   Glucose Latest Ref Range: 65 - 99 mg/dL 91   Bun Latest Ref Range: 8 - 22 mg/dL 11   Creatinine Latest Ref Range: 0.50 - 1.40 mg/dL 0.75   GFR If  Latest Ref Range: >60 mL/min/1.73 m 2 >60   GFR If Non  Latest Ref Range: >60 mL/min/1.73 m 2 >60   Calcium Latest Ref Range: 8.5 - 10.5 mg/dL 8.6        Component   Significant Indicator  (Positive)   POS (POS)    Source   BF    Site   Pleural Fluid    Culture Result Wound  (Abnormal)   Gram Stain Result  (Abnormal)      Many WBCs.   Few Gram positive rods.   Moderate Gram negative rods.   Moderate Gram positive cocci.     Culture Result Wound  (Abnormal)      Viridans Streptococcus   Heavy growth     Culture Result Wound  (Abnormal)      Enterobacter cloacae   Light growth        IMAGING:  CT scan of the chest has been reviewed.  It shows loculated fluid   collection with an air fluid level in the left lateral hemithorax extending to   the left hilar area.  There is some pulmonary opacification and volume loss   including air bronchograms immediately adjacent to this collection.  There are   large bolus air-filled cavities present in the superior anterior right   hemithorax.  Fibrotic opacifications and bronchiectasis are noted.  Linear   fibrotic opacifications around the bronchi in the left lower lobe have   increased compared to the prior exam and scattered opacities, which are poorly   defined borders have also increased slightly to the left lower lobe.    Moderate mediastinal hilar adenopathy is noted.  No large masses are seen   within the upper abdomen.  There is excellent positioning of the locking loop   catheter in the loculated pleural collection.     ASSESSMENT:     Empyema , left, with  hydropneumothorax s/p chest tube placement   Panlobular emphysema / chronic obstructive pulmonary disease   Acute on chronic hypoxic respiratory failure  Chronic tobacco abuse.     PLAN:    Continue antibiotics  Continue oxygen supplementation and titrate to maintain SpO2 above 90%   Continue chest tube drainage with underwater seal.  Duonebs via nebulizer q 6 hourly  I agree with cardiothoracic surgery management plan  Watch for respiratory distress

## 2018-09-21 NOTE — PROGRESS NOTES
Report received from ROLANDO Mtz.  Chest tube observed and reviewed. Patient resting comfortably. No c/o pain or discomfort. All questions answered.

## 2018-09-21 NOTE — CARE PLAN
Problem: Respiratory:  Goal: Respiratory status will improve    Intervention: Assess and monitor pulmonary status  Patient's respiratory will be monitored throughout shift, has chest tube for pneumothorax.

## 2018-09-22 PROBLEM — M54.50 ACUTE BILATERAL LOW BACK PAIN: Status: ACTIVE | Noted: 2018-09-22

## 2018-09-22 PROCEDURE — 82103 ALPHA-1-ANTITRYPSIN TOTAL: CPT

## 2018-09-22 PROCEDURE — 700105 HCHG RX REV CODE 258: Performed by: INTERNAL MEDICINE

## 2018-09-22 PROCEDURE — 99232 SBSQ HOSP IP/OBS MODERATE 35: CPT | Performed by: INTERNAL MEDICINE

## 2018-09-22 PROCEDURE — 700111 HCHG RX REV CODE 636 W/ 250 OVERRIDE (IP): Performed by: INTERNAL MEDICINE

## 2018-09-22 PROCEDURE — 770020 HCHG ROOM/CARE - TELE (206)

## 2018-09-22 PROCEDURE — A9270 NON-COVERED ITEM OR SERVICE: HCPCS | Performed by: INTERNAL MEDICINE

## 2018-09-22 PROCEDURE — 36415 COLL VENOUS BLD VENIPUNCTURE: CPT

## 2018-09-22 PROCEDURE — 700102 HCHG RX REV CODE 250 W/ 637 OVERRIDE(OP): Performed by: INTERNAL MEDICINE

## 2018-09-22 PROCEDURE — 99233 SBSQ HOSP IP/OBS HIGH 50: CPT | Performed by: INTERNAL MEDICINE

## 2018-09-22 RX ORDER — CYCLOBENZAPRINE HCL 10 MG
10 TABLET ORAL 3 TIMES DAILY PRN
Status: DISCONTINUED | OUTPATIENT
Start: 2018-09-22 | End: 2018-10-10 | Stop reason: HOSPADM

## 2018-09-22 RX ADMIN — OXYCODONE HYDROCHLORIDE 5 MG: 5 TABLET ORAL at 10:18

## 2018-09-22 RX ADMIN — PIPERACILLIN SODIUM AND TAZOBACTAM SODIUM 3.38 G: 3; .375 INJECTION, POWDER, FOR SOLUTION INTRAVENOUS at 20:56

## 2018-09-22 RX ADMIN — OXYCODONE HYDROCHLORIDE 5 MG: 5 TABLET ORAL at 16:10

## 2018-09-22 RX ADMIN — PIPERACILLIN SODIUM AND TAZOBACTAM SODIUM 3.38 G: 3; .375 INJECTION, POWDER, FOR SOLUTION INTRAVENOUS at 13:27

## 2018-09-22 RX ADMIN — NICOTINE 14 MG: 14 PATCH, EXTENDED RELEASE TRANSDERMAL at 05:03

## 2018-09-22 RX ADMIN — OXYCODONE HYDROCHLORIDE 5 MG: 5 TABLET ORAL at 05:08

## 2018-09-22 RX ADMIN — POLYETHYLENE GLYCOL 3350 1 PACKET: 17 POWDER, FOR SOLUTION ORAL at 17:01

## 2018-09-22 RX ADMIN — PIPERACILLIN SODIUM AND TAZOBACTAM SODIUM 3.38 G: 3; .375 INJECTION, POWDER, FOR SOLUTION INTRAVENOUS at 05:03

## 2018-09-22 RX ADMIN — STANDARDIZED SENNA CONCENTRATE AND DOCUSATE SODIUM 2 TABLET: 8.6; 5 TABLET ORAL at 17:01

## 2018-09-22 RX ADMIN — OXYCODONE HYDROCHLORIDE 5 MG: 5 TABLET ORAL at 20:56

## 2018-09-22 RX ADMIN — STANDARDIZED SENNA CONCENTRATE AND DOCUSATE SODIUM 2 TABLET: 8.6; 5 TABLET ORAL at 05:03

## 2018-09-22 ASSESSMENT — ENCOUNTER SYMPTOMS
FLANK PAIN: 0
FOCAL WEAKNESS: 0
ABDOMINAL PAIN: 0
COUGH: 1
FEVER: 0
SHORTNESS OF BREATH: 1
NECK PAIN: 0
PALPITATIONS: 0
MYALGIAS: 1
BACK PAIN: 1
SENSORY CHANGE: 0
DEPRESSION: 0
HEADACHES: 0
MEMORY LOSS: 0
CHILLS: 0
WEAKNESS: 1
NERVOUS/ANXIOUS: 1
NAUSEA: 0

## 2018-09-22 ASSESSMENT — PAIN SCALES - GENERAL
PAINLEVEL_OUTOF10: 3
PAINLEVEL_OUTOF10: 4
PAINLEVEL_OUTOF10: 3
PAINLEVEL_OUTOF10: 5
PAINLEVEL_OUTOF10: 3
PAINLEVEL_OUTOF10: 4
PAINLEVEL_OUTOF10: 4
PAINLEVEL_OUTOF10: 7
PAINLEVEL_OUTOF10: 5
PAINLEVEL_OUTOF10: 5
PAINLEVEL_OUTOF10: 6
PAINLEVEL_OUTOF10: 3

## 2018-09-22 NOTE — CARE PLAN
Problem: Knowledge Deficit  Goal: Knowledge of disease process/condition, treatment plan, diagnostic tests, and medications will improve  POC discussed with the patient and family. All questions and concerns addressed and answered. Patient is involved in POC and verbalizes the understanding of the disease process, medications, tests and treatments. Questions on POC encouraged throughout care.       Problem: Psychosocial Needs:  Goal: Level of anxiety will decrease  Anxiety triggers identified. Calming techniques provided to patient. Patient is involved in POC and is encouraged to verbalize questions and concerns.

## 2018-09-22 NOTE — PROGRESS NOTES
Received bedside report from PM nurse. Assumed patient care. Chart reviewed. Pt was resting in bed. A&O x 4. No concerns, complaints or distress. Patient states minor chest pain from the chest tube. Denies medications for it. Chest tube to water seal. Pt uses suction due to moderate amount of greenish sputum. 3L of O2. POC updated with pt and on the patient communication board. Bed locked and in the lowest position. Call light within reach. Tele box on. Will continue to monitor.

## 2018-09-22 NOTE — PROGRESS NOTES
"Continuously encouraged and educated pt on importance of ambulation and use of IS throughout the day. Pt refused ambulation at this time and states, \"I'll get up on the edge of the bed but I don't wanna go anywhere, there's too many people outside.\" IS at best is 1200.   "

## 2018-09-22 NOTE — PROGRESS NOTES
Report received by Bibi MARSHALL. Assumed care of pt. Assessment complete, tele box on. Pt A&Ox4, VSS. Pt reports left sided rib pain, around chest tube site. Medicated per MAR. Plan of care discussed. Call light within reach, bed in lowest position, and pt has no further questions at this time.

## 2018-09-22 NOTE — PROGRESS NOTES
"Pt is irritable. States his back hurts but refuses any offered treatment. MD at bedside and aware. Pt states \"just leave me alone.\"   "

## 2018-09-22 NOTE — PROGRESS NOTES
Progress Note:  9/22/2018, 8:31 AM    S: No acute events.  Persistent cough and sputum production, but he feels like sputum amount is less.  Pain controlled, afebrile    O:  Blood pressure 129/82, pulse 86, temperature 36.2 °C (97.2 °F), resp. rate 18, height 1.829 m (6'), weight 69.6 kg (153 lb 7 oz), SpO2 99 %.    NAD, awake, alert  Breathing nonlabored  L pigtail in place, expiratory air leak on water seal, less significant than yesterday      A:   Active Hospital Problems    Diagnosis   • Sepsis ([facility]) [A41.9]     Priority: High   • Pneumohemothorax [J94.2]     Priority: Medium   • Acute respiratory failure with hypoxia ([facility]) [J96.01]     Priority: Medium   • Panlobular emphysema ([facility]) [J43.1]     Priority: Medium   • Protein malnutrition ([facility]) [E46]     Priority: Medium   • Tobacco abuse [Z72.0]     Priority: Low   • Empyema lung ([facility]) [J86.9]   • HCAP (healthcare-associated pneumonia) [J18.9]         P:   -Continue antibiotics  -Continue chest tube to water seal  -Continue aggressive pulmonary hygiene  -CT Chest on Sunday afternoon/evening to evaluate improvement/progression of L loculated space.  Airleak seems to be improving, but will likely need decortication to release trapped lung.  Empyema will not resolve unless empty space obliterated by some vitalized tissue.  Patient continues to want to wait to see if air leak /space will resolve.  If it does not, redo thoracotomy/decortication on Monday only option  -NPO after midnight Sunday night    Pb Gaviria M.D.  [facility]  [phone]

## 2018-09-22 NOTE — PROGRESS NOTES
Pulmonary Progress Note     HISTORY OF PRESENT ILLNESS:  This 58-year-old gentleman with known history of   COPD/ bullous emphysema, chronic smoker, had sustained a cavitary pneumonia,   left, 2 months ago, complicating into a large left empyema status post left   thoracotomy with decortication and chest tube placement and a prolonged stay   in the hospital in 07/2018.  He was readmitted again yesterday with   persistence of left hydropneumothorax.  Hence, a pigtail catheter was placed   by interventional radiology yesterday with removal of 350 mL of purulent   fluid.  The chest surgeon is thinking of performing a repeat thoracotomy with   decortication, but prior to the procedure, he wants the pulmonary to perform a   bronchoscopy on this patient to exclude the possibility of bronchopleural   fistula in the large airways.  Hence, a pulmonary consult was called.  Patient   denies constitutional symptoms of fever, chills, or sweating.  He denies   symptoms of nasal congestion or postnasal dripping.  He does admit to have   symptoms of cough productive of greenish expectoration accompanied by dyspnea   and left-sided chest pain.  No hemoptysis.  Denies palpitations, dependent   edema, or orthopnea.  No nausea, vomiting, or diarrhea.  No dysuria or   hematuria.  Complains of generalized weakness.     9/21/18: Bronchoscopy done yesterday. No endobronchial lesions or bronchopleural fistula   Noted. Patient feels better today. Thoracoscopy with decortication scheduled for Monday 9/24/18.  35 ml of purulent fluid collected in under water seal. No air leak noted. Vancomycin discontinued    9/22/18: Patient denies pleuritic chest pain. Pulse oximetry improved. Denies productive   cough or hemoptysis. No chest tightness or wheeze. Pleural fluid is exudate, empyema.   Left pigtail catheter is in place with minimal drainage of purulent fluid. Patient is afebrile on Zosyn.   Pleural fluid culture positive for streptococcus  viridans and enterobacter cloacae.  CT surgery following. Decortication scheduled for 9/24/18.     PAST MEDICAL HISTORY:  COPD/ emphysema, empyema, left status post thoracotomy   with decortication, and healthcare associated pneumonia.     PAST SURGICAL HISTORY:  Status post thoracotomy in 07/11/2018.     CURRENT MEDICATIONS:  Zosyn, Nicoderm patches, prochlorperazine,   promethazine, oxycodone, and Tylenol.     ALLERGIES:  No known drug allergies.     PHYSICAL EXAMINATION:  GENERAL:  A middle-aged gentleman of lean build sitting in bed and not in   respiratory distress.  VITAL SIGNS:  Temperature 97.3 F, pulse 74, respiratory rate 18,   blood pressure 115/75, oxygen saturation 99% on 3 liter of oxygen via nasal   cannula.  HEAD, EYES, EARS, NOSE, AND THROAT:  Pupils are equal, round, and reactive to   light and accommodation.  Extraocular movements are intact.  Sclerae are   anicteric.  No oral thrush.  Oral mucosa is moist.  Nasal septum is in   midline.  NECK:  Supple.  Accessory muscles of respiration are not prominent.  Jugular veins   are not distended.  No carotid bruit.  Trachea is central.  CHEST:  Breath sounds are audible with no wheeze. Left chest tube in place  HEART:  First and second heart sound audible.  No murmur.  ABDOMEN:  Soft, nontender.  Liver and spleen not palpable.  Bowel sounds are   present.  EXTREMITIES:  No dependent edema, no clubbing or cyanosis.  Peripheral pulses   are palpable.  NEUROLOGIC:  Patient is alert and oriented to time, place, and person.  Tendon   jerks are intact.  Cranial nerves are intact.     LABORATORY DATA:    Results for WALLY HINDS (MRN [id number]) as of 9/21/2018 14:00    Ref. Range 9/21/2018 03:01   WBC Latest Ref Range: 4.8 - 10.8 K/uL 11.6 (H)   RBC Latest Ref Range: 4.70 - 6.10 M/uL 4.69 (L)   Hemoglobin Latest Ref Range: 14.0 - 18.0 g/dL 12.6 (L)   Hematocrit Latest Ref Range: 42.0 - 52.0 % 40.5 (L)   MCV Latest Ref Range: 81.4 - 97.8 fL 86.4   MCH  Latest Ref Range: 27.0 - 33.0 pg 26.9 (L)   MCHC Latest Ref Range: 33.7 - 35.3 g/dL 31.1 (L)   RDW Latest Ref Range: 35.9 - 50.0 fL 51.7 (H)   Platelet Count Latest Ref Range: 164 - 446 K/uL 684 (H)   MPV Latest Ref Range: 9.0 - 12.9 fL 9.2   Results for WALLY HINDS (MRN [id number]) as of 2018 14:00    Ref. Range 2018 03:01   Sodium Latest Ref Range: 135 - 145 mmol/L 136   Potassium Latest Ref Range: 3.6 - 5.5 mmol/L 4.3   Chloride Latest Ref Range: 96 - 112 mmol/L 99   Co2 Latest Ref Range: 20 - 33 mmol/L 31   Anion Gap Latest Ref Range: 0.0 - 11.9  6.0   Glucose Latest Ref Range: 65 - 99 mg/dL 91   Bun Latest Ref Range: 8 - 22 mg/dL 11   Creatinine Latest Ref Range: 0.50 - 1.40 mg/dL 0.75   GFR If  Latest Ref Range: >60 mL/min/1.73 m 2 >60   GFR If Non  Latest Ref Range: >60 mL/min/1.73 m 2 >60   Calcium Latest Ref Range: 8.5 - 10.5 mg/dL 8.6         Component   Significant Indicator  (Positive)   POS (POS)    Source   BF    Site   Pleural Fluid    Culture Result Wound  (Abnormal)   Gram Stain Result  (Abnormal)      Many WBCs.   Few Gram positive rods.   Moderate Gram negative rods.   Moderate Gram positive cocci.     Culture Result Wound  (Abnormal)      Viridans Streptococcus   Heavy growth     Culture Result Wound  (Abnormal)      Enterobacter cloacae   Light growth        Pleural fluid AFB negative.  Pleural fluid pH 5.50    IMAGIN/18/2018 5:05 PM    HISTORY/REASON FOR EXAM:  Left-sided chest pain and coughing up green sputum. Prior left hemithorax surgery.      TECHNIQUE/EXAM DESCRIPTION:  CT scan of the chest with contrast.    Thin-section helical images were obtained from the lung apices through the adrenal glands following the bolus administration of 80 mL of Optiray 350 nonionic contrast.    Low dose optimization technique was utilized for this CT exam including automated exposure control and adjustment of the mA and/or kV according to patient  size.    COMPARISON:  07/17/2018    FINDINGS:  Air-filled cavities in the superior and superior anterior right hemithorax are again noted.  Left-sided loculated pleural fluid collection contains air-fluid level located laterally throughout much of the left hemithorax that extends towards the left hilar area. Some bronchi do extend to the edge of the area. Focal pulmonary opacifications with   poorly defined borders are again noted in each lung. Most of these appear less prominent than on the prior CT but some new smaller lesions do appear to be present mainly in the left lung. Opacifications in the right lung have decreased compared to the   prior exam. Fibrotic opacifications and bronchiectasis are again noted bilaterally. Linear and fibrotic opacifications around the bronchi in the left lower lobe have increased compared to the prior exam and scattered opacifications with poorly defined   borders have also increased slightly in the left lower lobe. Left-sided chest tube is no longer present.  Mild to moderate mediastinal and hilar adenopathy is again noted.  There is no evidence of pericardial effusion.  No large masses are seen within the upper abdomen.  Calcific atherosclerosis is again noted.   Impression       1.  Loculated fluid collection that does contain a significant amount of air with an air-fluid level is now identified in the left lateral hemithorax. This extends towards the left hilar area and there is some pulmonary opacification and volume loss   including air bronchograms immediately adjacent to this collection. Differential diagnosis does include bronchopleural fistula. Empyema could be present. Postoperative fluid collection is also possible.    2.  Consolidation versus mass in the left upper lobe is decreased compared to prior exam.    3.  Air-filled cavities again noted superiorly in right hemithorax.    4.  Size of pulmonary opacifications with poorly defined borders and some cavities in each  lung have decreased compared to the prior exam. Multifocal opacifications in the left lung which are small in size have likely increased in number compared to the   prior exam. Progression of inflammatory or infectious process in these areas is a possibility.    5.  Opacifications throughout the right lung have decreased compared to the prior exam.    6.  Mediastinal and hilar adenopathy is again noted.     Reading Provider Reading Date   Lucas Jones M.D. Sep 18, 2018     DX-CHEST-PORTABLE (1 VIEW) (Order #[id number]) on 9/19/18   Narrative       9/19/2018 4:45 PM    HISTORY/REASON FOR EXAM:  LEFT pneumothorax with chest tube in place      TECHNIQUE/EXAM DESCRIPTION AND NUMBER OF VIEWS:  Single portable view of the chest.    COMPARISON: The study from earlier the same date at 2:55 PM    FINDINGS:    LEFT pigtail chest tube is in unchanged position.    HEART: Stable size.  LUNGS: Mixed lucency and opacity in the RIGHT upper lobe is redemonstrated. The lungs are overall hyperlucent.  PLEURA: Partially loculated LEFT pneumothorax is likely small changed.       Impression       1.  Slight interval decrease in size of the loculated LEFT pneumothorax with chest tube in place  2.  No other interval change   Reading Provider Reading Date   Abhijit Marmolejo M.D. Sep 19, 2018         ASSESSMENT:    Empyema , left, with hydropneumothorax s/p Pigtail chest tube placement  with persistent loculated pneumothorax left  Panlobular emphysema / chronic obstructive pulmonary disease   Acute on chronic hypoxic respiratory failure on O2 supplementation  Chronic tobacco abuse.     PLAN:    Continue antibiotic  Continue oxygen supplementation and titrate to maintain SpO2 above 90%   Continue chest tube drainage with underwater seal.  Duonebs via nebulizer q 6 hourly  Send alpha 1 antitrysin levels and genotype  Watch for respiratory distress  Scheduled for thoracotomy with decortication on 9/24/18

## 2018-09-22 NOTE — PROGRESS NOTES
[facility] Hospitalist Progress Note    Date of Service: 2018    Chief Complaint  58 y.o. male admitted 2018 with history of chronic tobacco use, COPD, history of hydropneumothorax and empyema in July of this year status post chest tube placement and removal now with recurrent shortness of breath and empyema requiring surgery/chest tube placement.    Interval Problem Update  Denies shortness of breath  Chest tube to waterseal  Reports acute onset of low back pain, thinks he is just lying in bed for too long  Slight improvement with oxycodone  Nontender to palpation  No lower extremity weakness or numbness    Consultants/Specialty  Surgery    Disposition  TBD  Discussed with pulmonary  Pigtail in place  Plan for decortication by surgery, Monday  Cont IV abx          Review of Systems   Constitutional: Negative for chills, fever and malaise/fatigue.   Respiratory: Positive for cough and shortness of breath.    Cardiovascular: Negative for chest pain, palpitations and leg swelling.   Gastrointestinal: Negative for abdominal pain and nausea.   Genitourinary: Negative for flank pain.   Musculoskeletal: Positive for back pain and myalgias. Negative for joint pain and neck pain.   Neurological: Positive for weakness. Negative for sensory change, focal weakness and headaches.   Psychiatric/Behavioral: Negative for depression and memory loss. The patient is nervous/anxious.       Physical Exam  Laboratory/Imaging   Hemodynamics  Temp (24hrs), Av.4 °C (97.5 °F), Min:36.2 °C (97.2 °F), Max:36.7 °C (98.1 °F)   Temperature: 36.3 °C (97.3 °F)  Pulse  Av.7  Min: 43  Max: 111    Blood Pressure: 119/72      Respiratory      Respiration: 18, Pulse Oximetry: 96 %     Work Of Breathing / Effort: Mild  RUL Breath Sounds: Rhonchi, RML Breath Sounds: Rhonchi, RLL Breath Sounds: Rhonchi, JOSSE Breath Sounds: Rhonchi, LLL Breath Sounds: Rhonchi    Fluids    Intake/Output Summary (Last 24 hours) at 18 7739  Last data filed  at 09/22/18 1439   Gross per 24 hour   Intake             2830 ml   Output             2285 ml   Net              545 ml       Nutrition  Orders Placed This Encounter   Procedures   • Diet Order Regular     Standing Status:   Standing     Number of Occurrences:   1     Order Specific Question:   Diet:     Answer:   Regular [1]   • Diet NPO     Standing Status:   Standing     Number of Occurrences:   8     Order Specific Question:   Restrict to:     Answer:   Sips with Medications [3]     Physical Exam   Constitutional: He is oriented to person, place, and time. He appears well-developed. No distress.   HENT:   Head: Normocephalic and atraumatic.   Mouth/Throat: No oropharyngeal exudate.   Eyes: Pupils are equal, round, and reactive to light. EOM are normal.   Neck: Neck supple.   Cardiovascular: Normal rate and intact distal pulses.    Pulmonary/Chest: Effort normal. No respiratory distress. He has no wheezes. He has rales. He exhibits no tenderness.   Chest tube in place, dressing clean dry and intact   Abdominal: Soft. Bowel sounds are normal. He exhibits no distension.   Musculoskeletal: He exhibits no edema or tenderness.   Neurological: He is alert and oriented to person, place, and time. No cranial nerve deficit. He exhibits normal muscle tone. Coordination normal.   Skin: Skin is warm and dry. No rash noted. No erythema.   Psychiatric: He has a normal mood and affect. His behavior is normal. Judgment and thought content normal.   Nursing note and vitals reviewed.      Recent Labs      09/21/18   0301   WBC  11.6*   RBC  4.69*   HEMOGLOBIN  12.6*   HEMATOCRIT  40.5*   MCV  86.4   MCH  26.9*   MCHC  31.1*   RDW  51.7*   PLATELETCT  684*   MPV  9.2     Recent Labs      09/21/18   0301   SODIUM  136   POTASSIUM  4.3   CHLORIDE  99   CO2  31   GLUCOSE  91   BUN  11   CREATININE  0.75   CALCIUM  8.6                      Assessment/Plan     Sepsis (HCC)- (present on admission)   Assessment & Plan    This is severe  sepsis with the following associated acute organ dysfunction(s): acute respiratory failure.   Likely source is HCAP  Patient has been started on IV fluids per septic protocol  Lactate is within normal limits  Patient is started on IV vancomycin and IV Zosyn, monitor for vancomycin toxicity  Follow blood cultures          Pneumohemothorax- (present on admission)   Assessment & Plan    Surgery Dr. Gaviria was consulted by the ER physician  Started on supplemental oxygen and RT protocol        Acute respiratory failure with hypoxia (HCC)- (present on admission)   Assessment & Plan    Secondary to empyema and HCAP  improving        Protein malnutrition (HCC)- (present on admission)   Assessment & Plan    Nutrition consult        Panlobular emphysema (HCC)- (present on admission)   Assessment & Plan    Continue supplemental oxygen and RT protocol  DuoNeb as needed        Acute bilateral low back pain   Assessment & Plan    Likely musculoskeletal  Add Flexeril as needed  No anesthesia or focal weakness          HCAP (healthcare-associated pneumonia)   Assessment & Plan    Started on IV vancomycin and IV Zosyn, monitor for vancomycin toxicity  Started on supplemental oxygen and RT protocol        Empyema lung (HCC)- (present on admission)   Assessment & Plan    Discontinue IV vancomycin     Continue IV Zosyn  Surgery , Dr. Gaviria , pigtail placement, management per surgery 9/19  Fluid cx strep viridans, Enterobacter cloaca  pulm consulted, Dr. Gondal, appreciate input, status post bronchoscopy, no fistula noted  9/20 CXR improving L effusion  Recurrent  Follow blood negative    Plan for decortication on Monday  Npo after MN Sunday        Tobacco abuse- (present on admission)   Assessment & Plan    Tobacco cessation education provided for more than 4 minutes, discussed options of nicotine patch, medical treatment with wellbutrin and chantix. Discussed the risks of smoking including increased risk of heart disease, stroke,  cancer and COPD. Discussed the benefits of quitting smoking. Nicotine replacement protocol will be provided to the patient.    Greater than 30-pack-year  Advised cessation            Quality-Core Measures   Reviewed items::  Labs reviewed, Medications reviewed and Radiology images reviewed  DVT prophylaxis - mechanical:  SCDs  Ulcer Prophylaxis::  Not indicated  Antibiotics:  Treating active infection/contamination beyond 24 hours perioperative coverage  Assessed for rehabilitation services:  Patient returned to prior level of function, rehabilitation not indicated at this time

## 2018-09-22 NOTE — CARE PLAN
Problem: Safety  Goal: Will remain free from injury  Outcome: PROGRESSING AS EXPECTED  Fall precautions in place. Treaded socks on pt. Appropriate signs on doorway. IV pole on same side as bathroom. Bedrails up. Bed in lowest position and locked.  Call light and phone within reach. Patient educated on importance of calling nurses before getting out of bed, verbalizes understanding. Bed alarm on.    Problem: Pain Management  Goal: Pain level will decrease to patient's comfort goal  Outcome: PROGRESSING AS EXPECTED   09/21/18 2018   OTHER   Pain Scale 0 - 10  5   Comfort Goal Comfort at Rest;Comfort with Movement;Perform Activity;Sleep Comfortably     Administered Oxycodone 5mg q.3hr for 5/10 pain near chest tube site.

## 2018-09-23 ENCOUNTER — APPOINTMENT (OUTPATIENT)
Dept: RADIOLOGY | Facility: MEDICAL CENTER | Age: 58
DRG: 853 | End: 2018-09-23
Attending: SURGERY

## 2018-09-23 PROBLEM — R00.1 BRADYCARDIA: Status: ACTIVE | Noted: 2018-09-23

## 2018-09-23 LAB
ANION GAP SERPL CALC-SCNC: 3 MMOL/L (ref 0–11.9)
BACTERIA BLD CULT: NORMAL
BACTERIA BLD CULT: NORMAL
BASOPHILS # BLD AUTO: 1.3 % (ref 0–1.8)
BASOPHILS # BLD: 0.13 K/UL (ref 0–0.12)
BUN SERPL-MCNC: 8 MG/DL (ref 8–22)
CALCIUM SERPL-MCNC: 8.7 MG/DL (ref 8.5–10.5)
CHLORIDE SERPL-SCNC: 100 MMOL/L (ref 96–112)
CO2 SERPL-SCNC: 35 MMOL/L (ref 20–33)
CREAT SERPL-MCNC: 0.83 MG/DL (ref 0.5–1.4)
EOSINOPHIL # BLD AUTO: 0.44 K/UL (ref 0–0.51)
EOSINOPHIL NFR BLD: 4.3 % (ref 0–6.9)
ERYTHROCYTE [DISTWIDTH] IN BLOOD BY AUTOMATED COUNT: 53.8 FL (ref 35.9–50)
GLUCOSE SERPL-MCNC: 110 MG/DL (ref 65–99)
HCT VFR BLD AUTO: 42.8 % (ref 42–52)
HGB BLD-MCNC: 12.7 G/DL (ref 14–18)
IMM GRANULOCYTES # BLD AUTO: 0.18 K/UL (ref 0–0.11)
IMM GRANULOCYTES NFR BLD AUTO: 1.7 % (ref 0–0.9)
LYMPHOCYTES # BLD AUTO: 2.72 K/UL (ref 1–4.8)
LYMPHOCYTES NFR BLD: 26.3 % (ref 22–41)
MCH RBC QN AUTO: 25.9 PG (ref 27–33)
MCHC RBC AUTO-ENTMCNC: 29.7 G/DL (ref 33.7–35.3)
MCV RBC AUTO: 87.3 FL (ref 81.4–97.8)
MONOCYTES # BLD AUTO: 0.81 K/UL (ref 0–0.85)
MONOCYTES NFR BLD AUTO: 7.8 % (ref 0–13.4)
NEUTROPHILS # BLD AUTO: 6.07 K/UL (ref 1.82–7.42)
NEUTROPHILS NFR BLD: 58.6 % (ref 44–72)
NRBC # BLD AUTO: 0 K/UL
NRBC BLD-RTO: 0 /100 WBC
PLATELET # BLD AUTO: 641 K/UL (ref 164–446)
PMV BLD AUTO: 8.7 FL (ref 9–12.9)
POTASSIUM SERPL-SCNC: 4.2 MMOL/L (ref 3.6–5.5)
PROCALCITONIN SERPL-MCNC: <0.05 NG/ML
RBC # BLD AUTO: 4.9 M/UL (ref 4.7–6.1)
SIGNIFICANT IND 70042: NORMAL
SIGNIFICANT IND 70042: NORMAL
SITE SITE: NORMAL
SITE SITE: NORMAL
SODIUM SERPL-SCNC: 138 MMOL/L (ref 135–145)
SOURCE SOURCE: NORMAL
SOURCE SOURCE: NORMAL
WBC # BLD AUTO: 10.4 K/UL (ref 4.8–10.8)

## 2018-09-23 PROCEDURE — 700105 HCHG RX REV CODE 258: Performed by: INTERNAL MEDICINE

## 2018-09-23 PROCEDURE — 71260 CT THORAX DX C+: CPT

## 2018-09-23 PROCEDURE — 80048 BASIC METABOLIC PNL TOTAL CA: CPT

## 2018-09-23 PROCEDURE — 99231 SBSQ HOSP IP/OBS SF/LOW 25: CPT | Performed by: INTERNAL MEDICINE

## 2018-09-23 PROCEDURE — 99233 SBSQ HOSP IP/OBS HIGH 50: CPT | Performed by: INTERNAL MEDICINE

## 2018-09-23 PROCEDURE — 700111 HCHG RX REV CODE 636 W/ 250 OVERRIDE (IP): Performed by: INTERNAL MEDICINE

## 2018-09-23 PROCEDURE — 700117 HCHG RX CONTRAST REV CODE 255: Performed by: SURGERY

## 2018-09-23 PROCEDURE — 84145 PROCALCITONIN (PCT): CPT

## 2018-09-23 PROCEDURE — 770020 HCHG ROOM/CARE - TELE (206)

## 2018-09-23 PROCEDURE — A9270 NON-COVERED ITEM OR SERVICE: HCPCS | Performed by: INTERNAL MEDICINE

## 2018-09-23 PROCEDURE — 700102 HCHG RX REV CODE 250 W/ 637 OVERRIDE(OP): Performed by: INTERNAL MEDICINE

## 2018-09-23 PROCEDURE — 85025 COMPLETE CBC W/AUTO DIFF WBC: CPT

## 2018-09-23 PROCEDURE — 700105 HCHG RX REV CODE 258

## 2018-09-23 RX ORDER — SODIUM CHLORIDE 9 MG/ML
INJECTION, SOLUTION INTRAVENOUS
Status: COMPLETED
Start: 2018-09-23 | End: 2018-09-23

## 2018-09-23 RX ADMIN — OXYCODONE HYDROCHLORIDE 5 MG: 5 TABLET ORAL at 12:58

## 2018-09-23 RX ADMIN — PIPERACILLIN SODIUM AND TAZOBACTAM SODIUM 3.38 G: 3; .375 INJECTION, POWDER, FOR SOLUTION INTRAVENOUS at 13:40

## 2018-09-23 RX ADMIN — SODIUM CHLORIDE: 9 INJECTION, SOLUTION INTRAVENOUS at 20:45

## 2018-09-23 RX ADMIN — OXYCODONE HYDROCHLORIDE 5 MG: 5 TABLET ORAL at 20:33

## 2018-09-23 RX ADMIN — IOHEXOL 80 ML: 350 INJECTION, SOLUTION INTRAVENOUS at 22:21

## 2018-09-23 RX ADMIN — OXYCODONE HYDROCHLORIDE 5 MG: 5 TABLET ORAL at 05:55

## 2018-09-23 RX ADMIN — PIPERACILLIN SODIUM AND TAZOBACTAM SODIUM 3.38 G: 3; .375 INJECTION, POWDER, FOR SOLUTION INTRAVENOUS at 05:46

## 2018-09-23 RX ADMIN — STANDARDIZED SENNA CONCENTRATE AND DOCUSATE SODIUM 2 TABLET: 8.6; 5 TABLET ORAL at 17:27

## 2018-09-23 RX ADMIN — PIPERACILLIN SODIUM AND TAZOBACTAM SODIUM 3.38 G: 3; .375 INJECTION, POWDER, FOR SOLUTION INTRAVENOUS at 20:32

## 2018-09-23 RX ADMIN — NICOTINE 14 MG: 14 PATCH, EXTENDED RELEASE TRANSDERMAL at 05:46

## 2018-09-23 RX ADMIN — MAGNESIUM HYDROXIDE 30 ML: 400 SUSPENSION ORAL at 17:27

## 2018-09-23 RX ADMIN — STANDARDIZED SENNA CONCENTRATE AND DOCUSATE SODIUM 2 TABLET: 8.6; 5 TABLET ORAL at 05:47

## 2018-09-23 ASSESSMENT — ENCOUNTER SYMPTOMS
SENSORY CHANGE: 0
WEAKNESS: 0
SHORTNESS OF BREATH: 0
HEADACHES: 0
NERVOUS/ANXIOUS: 1
FLANK PAIN: 0
ABDOMINAL PAIN: 0
MYALGIAS: 1
FEVER: 0
BACK PAIN: 0
MEMORY LOSS: 0
NECK PAIN: 0
COUGH: 0
CHILLS: 0
DIZZINESS: 0
PALPITATIONS: 0

## 2018-09-23 ASSESSMENT — PAIN SCALES - GENERAL
PAINLEVEL_OUTOF10: 0
PAINLEVEL_OUTOF10: 8
PAINLEVEL_OUTOF10: 0
PAINLEVEL_OUTOF10: 0
PAINLEVEL_OUTOF10: 7
PAINLEVEL_OUTOF10: 0

## 2018-09-23 NOTE — PROGRESS NOTES
Report received at bedside, patient care assumed. Tele box on. Patient resting comfortably with no signs of distress noted. Patient denies pain and SOB. Last BM was 9/18/18, will continue to advance bowel protocol. Fall precautions in place with bed in lowest position, treaded sock slippers on, and call light within reach. Will continue to monitor.

## 2018-09-23 NOTE — PROGRESS NOTES
EKG: UT: 0.14; QRS: 0.10; QT: 0.40;  RANGES: SB: 45, ASYMPTOMATIC; RANGES: SR 61-84 W/ FOCAL PVC'S AND FOCAL PAC'S

## 2018-09-23 NOTE — CARE PLAN
Problem: Infection  Goal: Will remain free from infection    Intervention: Assess signs and symptoms of infection  Chest tube site will be assessed every shift for s/s of infection.

## 2018-09-23 NOTE — PROGRESS NOTES
Monitor Summary    SB-SR 51-95, R PAC, F PVC, bigem., HR down to 42, 43 non sustaining    16/08/46

## 2018-09-23 NOTE — PROGRESS NOTES
Report received from Sulma MARSHALL.  Patient sitting up in bed eating. Patient's care reviewed. Chest tube collection reviewed. No c/o pain discomfort per patient.

## 2018-09-23 NOTE — PROGRESS NOTES
Pulmonary Progress Note     HISTORY OF PRESENT ILLNESS:  This 58-year-old gentleman with known history of   COPD/ bullous emphysema, chronic smoker, had sustained a cavitary pneumonia,   left, 2 months ago, complicating into a large left empyema status post left   thoracotomy with decortication and chest tube placement and a prolonged stay   in the hospital in 07/2018.  He was readmitted again yesterday with   persistence of left hydropneumothorax.  Hence, a pigtail catheter was placed   by interventional radiology yesterday with removal of 350 mL of purulent   fluid.  The chest surgeon is thinking of performing a repeat thoracotomy with   decortication, but prior to the procedure, he wants the pulmonary to perform a   bronchoscopy on this patient to exclude the possibility of bronchopleural   fistula in the large airways.  Hence, a pulmonary consult was called.  Patient   denies constitutional symptoms of fever, chills, or sweating.  He denies   symptoms of nasal congestion or postnasal dripping.  He does admit to have   symptoms of cough productive of greenish expectoration accompanied by dyspnea   and left-sided chest pain.  No hemoptysis.  Denies palpitations, dependent   edema, or orthopnea.  No nausea, vomiting, or diarrhea.  No dysuria or   hematuria.  Complains of generalized weakness.     9/21/18: Bronchoscopy done yesterday. No endobronchial lesions or bronchopleural fistula   Noted. Patient feels better today. Thoracoscopy with decortication scheduled for Monday 9/24/18.  35 ml of purulent fluid collected in under water seal. No air leak noted. Vancomycin discontinued     9/22/18: Patient denies pleuritic chest pain. Pulse oximetry improved. Denies productive   cough or hemoptysis. No chest tightness or wheeze. Pleural fluid is exudate, empyema.   Left pigtail catheter is in place with minimal drainage of purulent fluid. Patient is afebrile on Zosyn.   Pleural fluid culture positive for streptococcus  viridans and enterobacter cloacae.  CT surgery following. Decortication scheduled for 9/24/18.    9/22/18: Feels better. No chest pain. No hemoptysis or productive cough.     PAST MEDICAL HISTORY:  COPD/ emphysema, empyema, left status post thoracotomy   with decortication, and healthcare associated pneumonia.     PAST SURGICAL HISTORY:  Status post thoracotomy in 07/11/2018.     CURRENT MEDICATIONS:  Zosyn, Nicoderm patches, prochlorperazine,   promethazine, oxycodone, and Tylenol.     ALLERGIES:  No known drug allergies.     PHYSICAL EXAMINATION:  GENERAL:  A middle-aged gentleman of lean build sitting in bed and not in   respiratory distress.  VITAL SIGNS:  Temperature 97 F, pulse 57, respiratory rate 18,   blood pressure 112/68, oxygen saturation 98% on 2 liter of oxygen via nasal   cannula.  HEAD, EYES, EARS, NOSE, AND THROAT:  Pupils are equal, round, and reactive to   light and accommodation.  Extraocular movements are intact.  Sclerae are   anicteric.  No oral thrush.  Oral mucosa is moist.  Nasal septum is in   midline.  NECK:  Supple.  Accessory muscles of respiration are not prominent.  Jugular veins   are not distended.  No carotid bruit.  Trachea is central.  CHEST:  Breath sounds are audible with no wheeze. Left chest tube in place  HEART:  First and second heart sound audible.  No murmur.  ABDOMEN:  Soft, nontender.  Liver and spleen not palpable.  Bowel sounds are   present.  EXTREMITIES:  No dependent edema, no clubbing or cyanosis.  Peripheral pulses   are palpable.  NEUROLOGIC:  Patient is alert and oriented to time, place, and person.  Tendon   jerks are intact.  Cranial nerves are intact.     LABORATORY DATA:      Results for WALLY HINDS (MRN [id number]) as of 9/23/2018 09:14   Ref. Range 9/23/2018 03:20   WBC Latest Ref Range: 4.8 - 10.8 K/uL 10.4   RBC Latest Ref Range: 4.70 - 6.10 M/uL 4.90   Hemoglobin Latest Ref Range: 14.0 - 18.0 g/dL 12.7 (L)   Hematocrit Latest Ref Range: 42.0 - 52.0  % 42.8   MCV Latest Ref Range: 81.4 - 97.8 fL 87.3   MCH Latest Ref Range: 27.0 - 33.0 pg 25.9 (L)   MCHC Latest Ref Range: 33.7 - 35.3 g/dL 29.7 (L)   RDW Latest Ref Range: 35.9 - 50.0 fL 53.8 (H)   Platelet Count Latest Ref Range: 164 - 446 K/uL 641 (H)   MPV Latest Ref Range: 9.0 - 12.9 fL 8.7 (L)    Results for WALLY HINDS (MRN [id number]) as of 2018 09:14   Ref. Range 2018 03:20   Sodium Latest Ref Range: 135 - 145 mmol/L 138   Potassium Latest Ref Range: 3.6 - 5.5 mmol/L 4.2   Chloride Latest Ref Range: 96 - 112 mmol/L 100   Co2 Latest Ref Range: 20 - 33 mmol/L 35 (H)   Anion Gap Latest Ref Range: 0.0 - 11.9  3.0   Glucose Latest Ref Range: 65 - 99 mg/dL 110 (H)   Bun Latest Ref Range: 8 - 22 mg/dL 8   Creatinine Latest Ref Range: 0.50 - 1.40 mg/dL 0.83   GFR If  Latest Ref Range: >60 mL/min/1.73 m 2 >60   GFR If Non  Latest Ref Range: >60 mL/min/1.73 m 2 >60   Calcium Latest Ref Range: 8.5 - 10.5 mg/dL 8.7        Component   Significant Indicator  (Positive)   POS (POS)    Source   BF    Site   Pleural Fluid    Culture Result Wound  (Abnormal)   Gram Stain Result  (Abnormal)      Many WBCs.   Few Gram positive rods.   Moderate Gram negative rods.   Moderate Gram positive cocci.     Culture Result Wound  (Abnormal)      Viridans Streptococcus   Heavy growth     Culture Result Wound  (Abnormal)      Enterobacter cloacae   Light growth        Pleural fluid AFB negative.  Pleural fluid pH 5.50     IMAGIN/18/2018 5:05 PM    HISTORY/REASON FOR EXAM:  Left-sided chest pain and coughing up green sputum. Prior left hemithorax surgery.      TECHNIQUE/EXAM DESCRIPTION:  CT scan of the chest with contrast.    Thin-section helical images were obtained from the lung apices through the adrenal glands following the bolus administration of 80 mL of Optiray 350 nonionic contrast.    Low dose optimization technique was utilized for this CT exam including automated exposure  control and adjustment of the mA and/or kV according to patient size.    COMPARISON:  07/17/2018    FINDINGS:  Air-filled cavities in the superior and superior anterior right hemithorax are again noted.  Left-sided loculated pleural fluid collection contains air-fluid level located laterally throughout much of the left hemithorax that extends towards the left hilar area. Some bronchi do extend to the edge of the area. Focal pulmonary opacifications with   poorly defined borders are again noted in each lung. Most of these appear less prominent than on the prior CT but some new smaller lesions do appear to be present mainly in the left lung. Opacifications in the right lung have decreased compared to the   prior exam. Fibrotic opacifications and bronchiectasis are again noted bilaterally. Linear and fibrotic opacifications around the bronchi in the left lower lobe have increased compared to the prior exam and scattered opacifications with poorly defined   borders have also increased slightly in the left lower lobe. Left-sided chest tube is no longer present.  Mild to moderate mediastinal and hilar adenopathy is again noted.  There is no evidence of pericardial effusion.  No large masses are seen within the upper abdomen.  Calcific atherosclerosis is again noted.   Impression            1.  Loculated fluid collection that does contain a significant amount of air with an air-fluid level is now identified in the left lateral hemithorax. This extends towards the left hilar area and there is some pulmonary opacification and volume loss   including air bronchograms immediately adjacent to this collection. Differential diagnosis does include bronchopleural fistula. Empyema could be present. Postoperative fluid collection is also possible.    2.  Consolidation versus mass in the left upper lobe is decreased compared to prior exam.    3.  Air-filled cavities again noted superiorly in right hemithorax.    4.  Size of pulmonary  opacifications with poorly defined borders and some cavities in each lung have decreased compared to the prior exam. Multifocal opacifications in the left lung which are small in size have likely increased in number compared to the   prior exam. Progression of inflammatory or infectious process in these areas is a possibility.    5.  Opacifications throughout the right lung have decreased compared to the prior exam.    6.  Mediastinal and hilar adenopathy is again noted.      Reading Provider Reading Date   Lucas Jones M.D. Sep 18, 2018      DX-CHEST-PORTABLE (1 VIEW) (Order #[id number]) on 9/19/18   Narrative        9/19/2018 4:45 PM    HISTORY/REASON FOR EXAM:  LEFT pneumothorax with chest tube in place      TECHNIQUE/EXAM DESCRIPTION AND NUMBER OF VIEWS:  Single portable view of the chest.    COMPARISON: The study from earlier the same date at 2:55 PM    FINDINGS:    LEFT pigtail chest tube is in unchanged position.    HEART: Stable size.  LUNGS: Mixed lucency and opacity in the RIGHT upper lobe is redemonstrated. The lungs are overall hyperlucent.  PLEURA: Partially loculated LEFT pneumothorax is likely small changed.        Impression            1.  Slight interval decrease in size of the loculated LEFT pneumothorax with chest tube in place  2.  No other interval change   Reading Provider Reading Date   Abhijit Marmolejo M.D. Sep 19, 2018            ASSESSMENT:    Empyema , left, with hydropneumothorax s/p Pigtail chest tube placement  with persistent loculated pneumothorax left  Panlobular emphysema / chronic obstructive pulmonary disease   Acute on chronic hypoxic respiratory failure on O2 supplementation  Chronic tobacco abuse.     PLAN:    Smoking cessation counseling done  Continue antibiotic  Continue oxygen supplementation and titrate to maintain SpO2 above 90%   Continue chest tube drainage with underwater seal.  Duonebs via nebulizer q 6 hourly  Send alpha 1 antitrysin levels and genotype  Watch  for respiratory distress  Scheduled for thoracotomy with decortication on 9/24/18. CT surgery following  Keep NPO after midnight

## 2018-09-23 NOTE — PROGRESS NOTES
Patient complained of pain at chest tube site, medicated per MAR. Chest tube drainage appropriate. Patient is again resting comfortable in bed. No questions or requests at this time. Will continue to monitor.

## 2018-09-23 NOTE — CARE PLAN
Problem: Bowel/Gastric:  Goal: Will not experience complications related to bowel motility    Intervention: Implement interventions to promote bowel evacuation if inadequate bowel movements in past 48 hours  Bowel regimen protocol started to increase bowel sounds and to have a bowel movement.

## 2018-09-23 NOTE — PROGRESS NOTES
[facility] Hospitalist Progress Note    Date of Service: 2018    Chief Complaint  58 y.o. male admitted 2018 with history of chronic tobacco use, COPD, history of hydropneumothorax and empyema in July of this year status post chest tube placement and removal now with recurrent shortness of breath and empyema requiring surgery/chest tube placement.    Interval Problem Update  Back pain improving  No new complaints  Denies sob    Consultants/Specialty  Surgery    Disposition  TBD  Discussed with pulmonary  Pigtail in place  Plan for decortication by surgery, Monday  Cont IV abx          Review of Systems   Constitutional: Negative for chills and fever.   Respiratory: Negative for cough and shortness of breath.    Cardiovascular: Negative for chest pain, palpitations and leg swelling.   Gastrointestinal: Negative for abdominal pain.   Genitourinary: Negative for flank pain and urgency.   Musculoskeletal: Positive for myalgias. Negative for back pain and neck pain.   Neurological: Negative for dizziness, sensory change, weakness and headaches.   Psychiatric/Behavioral: Negative for memory loss. The patient is nervous/anxious.       Physical Exam  Laboratory/Imaging   Hemodynamics  Temp (24hrs), Av.4 °C (97.6 °F), Min:36.1 °C (97 °F), Max:36.8 °C (98.2 °F)   Temperature: 36.1 °C (97 °F)  Pulse  Av.6  Min: 43  Max: 111    Blood Pressure: 112/68      Respiratory      Respiration: 18, Pulse Oximetry: 98 %        RUL Breath Sounds: Clear, RML Breath Sounds: Diminished, RLL Breath Sounds: Diminished, JOSSE Breath Sounds: Clear, LLL Breath Sounds: Diminished    Fluids    Intake/Output Summary (Last 24 hours) at 18 1357  Last data filed at 18 1300   Gross per 24 hour   Intake              920 ml   Output             3240 ml   Net            -2320 ml       Nutrition  Orders Placed This Encounter   Procedures   • Diet Order Regular     Standing Status:   Standing     Number of Occurrences:   1     Order  Specific Question:   Diet:     Answer:   Regular [1]   • Diet NPO     Standing Status:   Standing     Number of Occurrences:   8     Order Specific Question:   Restrict to:     Answer:   Sips with Medications [3]     Physical Exam   Constitutional: He is oriented to person, place, and time. No distress.   HENT:   Head: Normocephalic and atraumatic.   Eyes: Pupils are equal, round, and reactive to light. EOM are normal. No scleral icterus.   Neck: Neck supple.   Cardiovascular: Normal rate and intact distal pulses.    Pulmonary/Chest: Effort normal. No respiratory distress. He has no wheezes. He has rales.   Chest tube in place, dressing clean dry and intact   Abdominal: Soft. Bowel sounds are normal. He exhibits no distension. There is no tenderness.   Musculoskeletal: He exhibits no edema.   Neurological: He is alert and oriented to person, place, and time. No cranial nerve deficit. Coordination normal.   Skin: Skin is warm and dry. He is not diaphoretic.   Psychiatric: He has a normal mood and affect. His behavior is normal. Judgment and thought content normal.   Nursing note and vitals reviewed.      Recent Labs      09/21/18   0301  09/23/18   0320   WBC  11.6*  10.4   RBC  4.69*  4.90   HEMOGLOBIN  12.6*  12.7*   HEMATOCRIT  40.5*  42.8   MCV  86.4  87.3   MCH  26.9*  25.9*   MCHC  31.1*  29.7*   RDW  51.7*  53.8*   PLATELETCT  684*  641*   MPV  9.2  8.7*     Recent Labs      09/21/18   0301  09/23/18   0320   SODIUM  136  138   POTASSIUM  4.3  4.2   CHLORIDE  99  100   CO2  31  35*   GLUCOSE  91  110*   BUN  11  8   CREATININE  0.75  0.83   CALCIUM  8.6  8.7                      Assessment/Plan     Sepsis (HCC)- (present on admission)   Assessment & Plan    This is severe sepsis with the following associated acute organ dysfunction(s): acute respiratory failure.   Likely source is HCAP  Patient has been started on IV fluids per septic protocol  Lactate is within normal limits  Patient is started on IV  vancomycin and IV Zosyn, monitor for vancomycin toxicity  Follow blood cultures          Pneumohemothorax- (present on admission)   Assessment & Plan    Surgery Dr. Gaviria was consulted by the ER physician  Started on supplemental oxygen and RT protocol        Acute respiratory failure with hypoxia (HCC)- (present on admission)   Assessment & Plan    Secondary to empyema and HCAP  improving        Protein malnutrition (HCC)- (present on admission)   Assessment & Plan    Nutrition consult        Panlobular emphysema (HCC)- (present on admission)   Assessment & Plan    Continue supplemental oxygen and RT protocol  DuoNeb as needed        Bradycardia   Assessment & Plan    ASymptomatic  Continue cardiac monitoring          Acute bilateral low back pain   Assessment & Plan    Likely musculoskeletal  Add Flexeril as needed  No anesthesia or focal weakness          HCAP (healthcare-associated pneumonia)   Assessment & Plan    Started on IV vancomycin and IV Zosyn, monitor for vancomycin toxicity  Started on supplemental oxygen and RT protocol        Empyema lung (HCC)- (present on admission)   Assessment & Plan    Discontinue IV vancomycin     Continue IV Zosyn  Surgery , Dr. Gaviria , pigtail placement, management per surgery 9/19  Fluid cx strep viridans, Enterobacter cloaca  pulm consulted, Dr. Gondal, appreciate input, status post bronchoscopy, no fistula noted  9/20 CXR improving L effusion  Recurrent  Follow blood negative    Plan for decortication on Monday, 9/24, surgery to arrange  Npo after MN Sunday        Tobacco abuse- (present on admission)   Assessment & Plan    Tobacco cessation education provided for more than 4 minutes, discussed options of nicotine patch, medical treatment with wellbutrin and chantix. Discussed the risks of smoking including increased risk of heart disease, stroke, cancer and COPD. Discussed the benefits of quitting smoking. Nicotine replacement protocol will be provided to the  patient.    Greater than 30-pack-year  Advised cessation            Quality-Core Measures   Reviewed items::  Labs reviewed, Medications reviewed and Radiology images reviewed  DVT prophylaxis - mechanical:  SCDs  Ulcer Prophylaxis::  Not indicated  Antibiotics:  Treating active infection/contamination beyond 24 hours perioperative coverage  Assessed for rehabilitation services:  Patient returned to prior level of function, rehabilitation not indicated at this time

## 2018-09-24 PROBLEM — K21.9 GASTROESOPHAGEAL REFLUX DISEASE WITHOUT ESOPHAGITIS: Status: ACTIVE | Noted: 2018-09-24

## 2018-09-24 PROCEDURE — 700105 HCHG RX REV CODE 258: Performed by: INTERNAL MEDICINE

## 2018-09-24 PROCEDURE — A9270 NON-COVERED ITEM OR SERVICE: HCPCS | Performed by: INTERNAL MEDICINE

## 2018-09-24 PROCEDURE — 99233 SBSQ HOSP IP/OBS HIGH 50: CPT | Performed by: INTERNAL MEDICINE

## 2018-09-24 PROCEDURE — 700102 HCHG RX REV CODE 250 W/ 637 OVERRIDE(OP): Performed by: INTERNAL MEDICINE

## 2018-09-24 PROCEDURE — 770020 HCHG ROOM/CARE - TELE (206)

## 2018-09-24 PROCEDURE — 99232 SBSQ HOSP IP/OBS MODERATE 35: CPT | Performed by: INTERNAL MEDICINE

## 2018-09-24 PROCEDURE — 700111 HCHG RX REV CODE 636 W/ 250 OVERRIDE (IP): Performed by: INTERNAL MEDICINE

## 2018-09-24 RX ORDER — FAMOTIDINE 20 MG/1
20 TABLET, FILM COATED ORAL 2 TIMES DAILY PRN
Status: DISCONTINUED | OUTPATIENT
Start: 2018-09-24 | End: 2018-10-10 | Stop reason: HOSPADM

## 2018-09-24 RX ADMIN — ONDANSETRON 4 MG: 2 INJECTION, SOLUTION INTRAMUSCULAR; INTRAVENOUS at 03:42

## 2018-09-24 RX ADMIN — CEFEPIME 2 G: 2 INJECTION, POWDER, FOR SOLUTION INTRAVENOUS at 22:13

## 2018-09-24 RX ADMIN — PIPERACILLIN SODIUM AND TAZOBACTAM SODIUM 3.38 G: 3; .375 INJECTION, POWDER, FOR SOLUTION INTRAVENOUS at 13:20

## 2018-09-24 RX ADMIN — NICOTINE 14 MG: 14 PATCH, EXTENDED RELEASE TRANSDERMAL at 06:03

## 2018-09-24 RX ADMIN — OXYCODONE HYDROCHLORIDE 5 MG: 5 TABLET ORAL at 13:20

## 2018-09-24 RX ADMIN — FAMOTIDINE 20 MG: 20 TABLET ORAL at 18:25

## 2018-09-24 RX ADMIN — OXYCODONE HYDROCHLORIDE 5 MG: 5 TABLET ORAL at 19:59

## 2018-09-24 RX ADMIN — PIPERACILLIN SODIUM AND TAZOBACTAM SODIUM 3.38 G: 3; .375 INJECTION, POWDER, FOR SOLUTION INTRAVENOUS at 06:03

## 2018-09-24 RX ADMIN — STANDARDIZED SENNA CONCENTRATE AND DOCUSATE SODIUM 2 TABLET: 8.6; 5 TABLET ORAL at 19:59

## 2018-09-24 RX ADMIN — STANDARDIZED SENNA CONCENTRATE AND DOCUSATE SODIUM 2 TABLET: 8.6; 5 TABLET ORAL at 06:03

## 2018-09-24 ASSESSMENT — ENCOUNTER SYMPTOMS
PALPITATIONS: 0
NAUSEA: 0
NECK PAIN: 0
HEARTBURN: 1
WEAKNESS: 0
NERVOUS/ANXIOUS: 1
DIAPHORESIS: 0
ABDOMINAL PAIN: 0
SHORTNESS OF BREATH: 0
VOMITING: 0
FEVER: 0
COUGH: 0
HEADACHES: 0
MYALGIAS: 1
CHILLS: 0
DEPRESSION: 0

## 2018-09-24 ASSESSMENT — PAIN SCALES - GENERAL
PAINLEVEL_OUTOF10: 0
PAINLEVEL_OUTOF10: 4
PAINLEVEL_OUTOF10: 5
PAINLEVEL_OUTOF10: 0

## 2018-09-24 NOTE — PROGRESS NOTES
Progress Note:  9/24/2018, 9:22 AM    S: No acute events. Afebrile. Pain controlled, cough little better. Persistent air leak. CT repeated showing persistent hydropneumothorax with trapped lung, and possible small BPF.      O:  Blood pressure 111/73, pulse 65, temperature 36.4 °C (97.5 °F), resp. rate 16, height 1.829 m (6'), weight 69.3 kg (152 lb 12.5 oz), SpO2 99 %.    NAD, awake, alert  Breathing nonlabored  L pigtail drain in place, minimal output, persistent expiratory air leak      A:   Active Hospital Problems    Diagnosis   • Sepsis (HCC) [A41.9]     Priority: High   • Pneumohemothorax [J94.2]     Priority: Medium   • Acute respiratory failure with hypoxia (HCC) [J96.01]     Priority: Medium   • Panlobular emphysema (HCC) [J43.1]     Priority: Medium   • Protein malnutrition (HCC) [E46]     Priority: Medium   • Tobacco abuse [Z72.0]     Priority: Low   • Bradycardia [R00.1]   • Acute bilateral low back pain [M54.5]   • Empyema lung (HCC) [J86.9]   • HCAP (healthcare-associated pneumonia) [J18.9]         P:   -Discussed OR for redo-Left thoracotomy, decortication, and possible repair of BPF including vascularized tissue flap.  The operation was discussed along with the risks, which include but are not limited to bleeding, infection, damage to surrounding structures, need for further procedures, prolonged air-leak, pneumonia, death.  The benefits and alternatives were also discussed and the patient wishes to proceed.  -Planned for tomorrow at 9:30 am.  -Discussed possible need for epidural catheter and postoperative ICU admission  -OK for diet now, NPO after midnight tonight  -Continue antibiotics and pulm toilet      Pb Gaviria M.D.  [facility]  [phone]

## 2018-09-24 NOTE — PROGRESS NOTES
Report received at bedside, patient care assumed. Tele box on. Patient resting in bed with no signs of distress noted. Patient assessed and NPO status verified. Education provided to patient about remaining NPO and morning POC, patient verbalized understanding. Fall precautions in place with bed in lowest position, treaded sock slippers on, and call light within reach. Will continue to monitor.

## 2018-09-24 NOTE — PROGRESS NOTES
2030: Beside report received, pt care assumed. Pt educated on need for CT chest with contrast pre procedure and need for large bore IV 20g, consent for procedure. Verbalized understanding.     2145: pt accompanied to CT by rn and transport team as pt with chest tube. Tolerated well

## 2018-09-24 NOTE — PROGRESS NOTES
Pulmonary Progress Note    PATIENT: Donna Ceballos  MRN: [id number]  : 1960    Admission date: 2018    ASSESSMENT/PLAN:  Active Problems:    Empyema - strep viridans and enterbacter cloaca    Panlobular emphysema (HCC) POA: Yes    Protein malnutrition (HCC) POA: Yes    Acute respiratory failure with hypoxia (HCC) POA: Yes    Pneumohemothorax POA: Yes    Tobacco abuse POA: Yes    Acute bilateral low back pain POA: Unknown      Pneumohemothorax  Surgery Dr. Gaviria was consulted by the ER physician  Started on supplemental oxygen and RT protocol    Empyema lung (HCC)  Continue IV Zosyn  Surgery , Dr. Gaviria , pigtail placement, management per surgery   Fluid cx strep viridans, Enterobacter cloaca  pulm consulted, Dr. Gondal, appreciate input, status post bronchoscopy, no fistula noted   CXR improving L effusion  Recurrent    57 y/o male with recurrent loculated pneumothorax and empyema.  VATS procedure planned for am.  Needs encouragement to dc tobacco.    SUBJECTIVE:  Feeling okay; pain previously limited his ability to work    MEDICATIONS:    Current Facility-Administered Medications:   •  cyclobenzaprine (FLEXERIL) tablet 10 mg, 10 mg, Oral, TID PRN, Carmen Luciano D.O.  •  Pharmacy Consult Request ...Pain Management Review 1 Each, 1 Each, Other, PRN, Ochoa Glez M.D.  •  NS infusion 1,899 mL, 30 mL/kg, Intravenous, Once PRN, Willi Tellez M.D.  •  senna-docusate (PERICOLACE or SENOKOT S) 8.6-50 MG per tablet 2 Tab, 2 Tab, Oral, BID, 2 Tab at 18 0603 **AND** polyethylene glycol/lytes (MIRALAX) PACKET 1 Packet, 1 Packet, Oral, QDAY PRN, 1 Packet at 18 1701 **AND** magnesium hydroxide (MILK OF MAGNESIA) suspension 30 mL, 30 mL, Oral, QDAY PRN, 30 mL at 18 1727 **AND** bisacodyl (DULCOLAX) suppository 10 mg, 10 mg, Rectal, QDAY PRN, Willi Tellez M.D.  •  Respiratory Care per Protocol, , Nebulization, Continuous RT, Willi Tellez M.D.  •  NS (BOLUS) infusion 1,000 mL, 1,000 mL,  Intravenous, Once PRN, Willi Tellez M.D., Stopped at 09/18/18 2239  •  acetaminophen (TYLENOL) tablet 650 mg, 650 mg, Oral, Q6HRS PRN, Willi Tellez M.D.  •  Notify provider if pain remains uncontrolled, , , CONTINUOUS **AND** Use the numeric rating scale (NRS-11) on regular floors and Critical-Care Pain Observation Tool (CPOT) on ICUs/Trauma to assess pain, , , CONTINUOUS **AND** Pulse Ox (Oximetry), , , CONTINUOUS **AND** Pharmacy Consult Request ...Pain Management Review, , Other, PRN **AND** If patient difficult to arouse and/or has respiratory depression, stop any opiates that are currently infusing and call a Rapid Response., , , CONTINUOUS **AND** oxyCODONE immediate-release (ROXICODONE) tablet 2.5 mg, 2.5 mg, Oral, Q3HRS PRN, 2.5 mg at 09/20/18 2041 **AND** oxyCODONE immediate-release (ROXICODONE) tablet 5 mg, 5 mg, Oral, Q3HRS PRN, 5 mg at 09/23/18 2033 **AND** morphine (pf) 4 mg/ml injection 2 mg, 2 mg, Intravenous, Q3HRS PRN, Willi Tellez M.D.  •  ondansetron (ZOFRAN) syringe/vial injection 4 mg, 4 mg, Intravenous, Q4HRS PRN, Willi Tellez M.D., 4 mg at 09/24/18 0342  •  ondansetron (ZOFRAN ODT) dispertab 4 mg, 4 mg, Oral, Q4HRS PRN, Willi Tellez M.D.  •  promethazine (PHENERGAN) tablet 12.5-25 mg, 12.5-25 mg, Oral, Q4HRS PRN, Willi Tellez M.D.  •  promethazine (PHENERGAN) suppository 12.5-25 mg, 12.5-25 mg, Rectal, Q4HRS PRN, Willi Tellez M.D.  •  prochlorperazine (COMPAZINE) injection 5-10 mg, 5-10 mg, Intravenous, Q4HRS PRN, Willi Tellez M.D.  •  nicotine (NICODERM) 14 MG/24HR 14 mg, 14 mg, Transdermal, Daily-0600, 14 mg at 09/24/18 0603 **AND** Nicotine Replacement Patient Education Materials, , , Once **AND** nicotine polacrilex (NICORETTE) 2 MG piece 2 mg, 2 mg, Oral, Q HOUR PRN, Willi Tellez M.D.  •  piperacillin-tazobactam (ZOSYN) 3.375 g in  mL IVPB, 3.375 g, Intravenous, Q8HRS, Willi Tellez M.D., Stopped at 09/24/18 1003    OBJECTIVE:    /73   Pulse 65   Temp 36.4 °C (97.5 °F)    Resp 16   Ht 1.829 m (6')   Wt 69.3 kg (152 lb 12.5 oz)   SpO2 99%   BMI 20.72 kg/m²   General: conversant in full sentences  HEENT: Sclera clear, conjunctiva pink, oropharynx clear, mucus membranes moist  Heart: regular  Lungs: clear  Abdomen: soft  Extremities: no clubbing cyanosis or edema  Neuro: intact  Skin: intact    DATA REVIEW:  LABORATORY DATA:    Recent Labs      09/23/18   0320   WBC  10.4   RBC  4.90   HEMOGLOBIN  12.7*   HEMATOCRIT  42.8   MCV  87.3   MCH  25.9*   MCHC  29.7*   RDW  53.8*   PLATELETCT  641*   MPV  8.7*     Recent Labs      09/23/18   0320   SODIUM  138   POTASSIUM  4.2   CHLORIDE  100   CO2  35*   GLUCOSE  110*   BUN  8   CREATININE  0.83   CALCIUM  8.7            IMAGING:   CXR (personally reviewed) - emphysematous changes with pigtail on left      Assessment and plan as above  Time 35 minutes    Poli Dinero M.D.

## 2018-09-24 NOTE — PROGRESS NOTES
Spoke with Dr Gaviria regarding POC. Patient surgery rescheduled for tomorrow 9/25. NPO diet DC'd. NPO status reinstated for 00:00.

## 2018-09-25 ENCOUNTER — APPOINTMENT (OUTPATIENT)
Dept: RADIOLOGY | Facility: MEDICAL CENTER | Age: 58
DRG: 853 | End: 2018-09-25
Attending: SURGERY

## 2018-09-25 PROBLEM — J94.2 PNEUMOHEMOTHORAX: Status: RESOLVED | Noted: 2018-07-19 | Resolved: 2018-09-25

## 2018-09-25 PROBLEM — A41.9 SEPSIS (HCC): Status: RESOLVED | Noted: 2018-09-18 | Resolved: 2018-09-25

## 2018-09-25 LAB
A1AT SERPL-MCNC: 189 MG/DL (ref 90–200)
ERYTHROCYTE [DISTWIDTH] IN BLOOD BY AUTOMATED COUNT: 54.4 FL (ref 35.9–50)
GRAM STN SPEC: NORMAL
HCT VFR BLD AUTO: 39.4 % (ref 42–52)
HGB BLD-MCNC: 11.8 G/DL (ref 14–18)
MCH RBC QN AUTO: 26.2 PG (ref 27–33)
MCHC RBC AUTO-ENTMCNC: 29.9 G/DL (ref 33.7–35.3)
MCV RBC AUTO: 87.6 FL (ref 81.4–97.8)
PLATELET # BLD AUTO: 595 K/UL (ref 164–446)
PMV BLD AUTO: 9 FL (ref 9–12.9)
RBC # BLD AUTO: 4.5 M/UL (ref 4.7–6.1)
SIGNIFICANT IND 70042: NORMAL
SITE SITE: NORMAL
SOURCE SOURCE: NORMAL
WBC # BLD AUTO: 27.9 K/UL (ref 4.8–10.8)

## 2018-09-25 PROCEDURE — 160035 HCHG PACU - 1ST 60 MINS PHASE I: Performed by: SURGERY

## 2018-09-25 PROCEDURE — 87077 CULTURE AEROBIC IDENTIFY: CPT

## 2018-09-25 PROCEDURE — 0BNG0ZZ RELEASE LEFT UPPER LUNG LOBE, OPEN APPROACH: ICD-10-PCS | Performed by: SURGERY

## 2018-09-25 PROCEDURE — 501445 HCHG STAPLER, SKIN DISP: Performed by: SURGERY

## 2018-09-25 PROCEDURE — 700111 HCHG RX REV CODE 636 W/ 250 OVERRIDE (IP)

## 2018-09-25 PROCEDURE — 85027 COMPLETE CBC AUTOMATED: CPT

## 2018-09-25 PROCEDURE — 700111 HCHG RX REV CODE 636 W/ 250 OVERRIDE (IP): Performed by: ANESTHESIOLOGY

## 2018-09-25 PROCEDURE — 87015 SPECIMEN INFECT AGNT CONCNTJ: CPT

## 2018-09-25 PROCEDURE — A9270 NON-COVERED ITEM OR SERVICE: HCPCS | Performed by: INTERNAL MEDICINE

## 2018-09-25 PROCEDURE — 99233 SBSQ HOSP IP/OBS HIGH 50: CPT | Performed by: INTERNAL MEDICINE

## 2018-09-25 PROCEDURE — 501463 HCHG STAPLES, UNIV. ROTIC: Performed by: SURGERY

## 2018-09-25 PROCEDURE — 501838 HCHG SUTURE GENERAL: Performed by: SURGERY

## 2018-09-25 PROCEDURE — 99291 CRITICAL CARE FIRST HOUR: CPT | Performed by: SURGERY

## 2018-09-25 PROCEDURE — 71045 X-RAY EXAM CHEST 1 VIEW: CPT

## 2018-09-25 PROCEDURE — C1729 CATH, DRAINAGE: HCPCS | Performed by: SURGERY

## 2018-09-25 PROCEDURE — 500385 HCHG DRAIN, PLEUROVAC ADUL: Performed by: SURGERY

## 2018-09-25 PROCEDURE — 3E0T3BZ INTRODUCTION OF ANESTHETIC AGENT INTO PERIPHERAL NERVES AND PLEXI, PERCUTANEOUS APPROACH: ICD-10-PCS | Performed by: SURGERY

## 2018-09-25 PROCEDURE — 160048 HCHG OR STATISTICAL LEVEL 1-5: Performed by: SURGERY

## 2018-09-25 PROCEDURE — 501411 HCHG SPONGE, BABY LAP W/O RINGS: Performed by: SURGERY

## 2018-09-25 PROCEDURE — 0BCP0ZZ EXTIRPATION OF MATTER FROM LEFT PLEURA, OPEN APPROACH: ICD-10-PCS | Performed by: SURGERY

## 2018-09-25 PROCEDURE — A4314 CATH W/DRAINAGE 2-WAY LATEX: HCPCS | Performed by: SURGERY

## 2018-09-25 PROCEDURE — 700101 HCHG RX REV CODE 250

## 2018-09-25 PROCEDURE — 110454 HCHG SHELL REV 250: Performed by: SURGERY

## 2018-09-25 PROCEDURE — 160041 HCHG SURGERY MINUTES - EA ADDL 1 MIN LEVEL 4: Performed by: SURGERY

## 2018-09-25 PROCEDURE — 87075 CULTR BACTERIA EXCEPT BLOOD: CPT

## 2018-09-25 PROCEDURE — 160029 HCHG SURGERY MINUTES - 1ST 30 MINS LEVEL 4: Performed by: SURGERY

## 2018-09-25 PROCEDURE — 87186 SC STD MICRODIL/AGAR DIL: CPT

## 2018-09-25 PROCEDURE — 700105 HCHG RX REV CODE 258: Performed by: ANESTHESIOLOGY

## 2018-09-25 PROCEDURE — 770022 HCHG ROOM/CARE - ICU (200)

## 2018-09-25 PROCEDURE — A9270 NON-COVERED ITEM OR SERVICE: HCPCS | Performed by: ANESTHESIOLOGY

## 2018-09-25 PROCEDURE — 160009 HCHG ANES TIME/MIN: Performed by: SURGERY

## 2018-09-25 PROCEDURE — 700111 HCHG RX REV CODE 636 W/ 250 OVERRIDE (IP): Performed by: INTERNAL MEDICINE

## 2018-09-25 PROCEDURE — 700102 HCHG RX REV CODE 250 W/ 637 OVERRIDE(OP): Performed by: ANESTHESIOLOGY

## 2018-09-25 PROCEDURE — A6404 STERILE GAUZE > 48 SQ IN: HCPCS | Performed by: SURGERY

## 2018-09-25 PROCEDURE — 87205 SMEAR GRAM STAIN: CPT

## 2018-09-25 PROCEDURE — 0KXJ0ZZ TRANSFER LEFT THORAX MUSCLE, OPEN APPROACH: ICD-10-PCS | Performed by: SURGERY

## 2018-09-25 PROCEDURE — 700105 HCHG RX REV CODE 258: Performed by: INTERNAL MEDICINE

## 2018-09-25 PROCEDURE — 160036 HCHG PACU - EA ADDL 30 MINS PHASE I: Performed by: SURGERY

## 2018-09-25 PROCEDURE — A6402 STERILE GAUZE <= 16 SQ IN: HCPCS | Performed by: SURGERY

## 2018-09-25 PROCEDURE — 88305 TISSUE EXAM BY PATHOLOGIST: CPT

## 2018-09-25 PROCEDURE — 160002 HCHG RECOVERY MINUTES (STAT): Performed by: SURGERY

## 2018-09-25 PROCEDURE — 700102 HCHG RX REV CODE 250 W/ 637 OVERRIDE(OP): Performed by: INTERNAL MEDICINE

## 2018-09-25 PROCEDURE — 87070 CULTURE OTHR SPECIMN AEROBIC: CPT

## 2018-09-25 PROCEDURE — 502240 HCHG MISC OR SUPPLY RC 0272: Performed by: SURGERY

## 2018-09-25 RX ORDER — SODIUM CHLORIDE, SODIUM LACTATE, POTASSIUM CHLORIDE, AND CALCIUM CHLORIDE .6; .31; .03; .02 G/100ML; G/100ML; G/100ML; G/100ML
250 INJECTION, SOLUTION INTRAVENOUS PRN
Status: DISCONTINUED | OUTPATIENT
Start: 2018-09-25 | End: 2018-09-28

## 2018-09-25 RX ORDER — ONDANSETRON 2 MG/ML
4 INJECTION INTRAMUSCULAR; INTRAVENOUS
Status: DISCONTINUED | OUTPATIENT
Start: 2018-09-25 | End: 2018-09-25 | Stop reason: HOSPADM

## 2018-09-25 RX ORDER — NALOXONE HYDROCHLORIDE 0.4 MG/ML
0.1 INJECTION, SOLUTION INTRAMUSCULAR; INTRAVENOUS; SUBCUTANEOUS PRN
Status: DISCONTINUED | OUTPATIENT
Start: 2018-09-25 | End: 2018-09-25 | Stop reason: HOSPADM

## 2018-09-25 RX ORDER — ONDANSETRON 2 MG/ML
4 INJECTION INTRAMUSCULAR; INTRAVENOUS EVERY 4 HOURS PRN
Status: DISCONTINUED | OUTPATIENT
Start: 2018-09-25 | End: 2018-09-25

## 2018-09-25 RX ORDER — BUPIVACAINE HYDROCHLORIDE AND EPINEPHRINE 5; 5 MG/ML; UG/ML
INJECTION, SOLUTION EPIDURAL; INTRACAUDAL; PERINEURAL
Status: DISCONTINUED | OUTPATIENT
Start: 2018-09-25 | End: 2018-09-25 | Stop reason: HOSPADM

## 2018-09-25 RX ORDER — HALOPERIDOL 5 MG/ML
1 INJECTION INTRAMUSCULAR
Status: DISCONTINUED | OUTPATIENT
Start: 2018-09-25 | End: 2018-09-25 | Stop reason: HOSPADM

## 2018-09-25 RX ORDER — SCOLOPAMINE TRANSDERMAL SYSTEM 1 MG/1
1 PATCH, EXTENDED RELEASE TRANSDERMAL
Status: DISCONTINUED | OUTPATIENT
Start: 2018-09-25 | End: 2018-10-10 | Stop reason: HOSPADM

## 2018-09-25 RX ORDER — DIPHENHYDRAMINE HYDROCHLORIDE 50 MG/ML
25 INJECTION INTRAMUSCULAR; INTRAVENOUS EVERY 6 HOURS PRN
Status: DISCONTINUED | OUTPATIENT
Start: 2018-09-25 | End: 2018-09-30

## 2018-09-25 RX ORDER — CELECOXIB 200 MG/1
200 CAPSULE ORAL 2 TIMES DAILY WITH MEALS
Status: DISCONTINUED | OUTPATIENT
Start: 2018-09-25 | End: 2018-10-10 | Stop reason: HOSPADM

## 2018-09-25 RX ORDER — HALOPERIDOL 5 MG/ML
1 INJECTION INTRAMUSCULAR EVERY 6 HOURS PRN
Status: DISCONTINUED | OUTPATIENT
Start: 2018-09-25 | End: 2018-09-30

## 2018-09-25 RX ORDER — DEXAMETHASONE SODIUM PHOSPHATE 4 MG/ML
4 INJECTION, SOLUTION INTRA-ARTICULAR; INTRALESIONAL; INTRAMUSCULAR; INTRAVENOUS; SOFT TISSUE
Status: DISCONTINUED | OUTPATIENT
Start: 2018-09-25 | End: 2018-09-27

## 2018-09-25 RX ORDER — GLYCOPYRROLATE 0.2 MG/ML
0.2 INJECTION INTRAMUSCULAR; INTRAVENOUS
Status: DISCONTINUED | OUTPATIENT
Start: 2018-09-25 | End: 2018-09-25 | Stop reason: HOSPADM

## 2018-09-25 RX ORDER — DIPHENHYDRAMINE HYDROCHLORIDE 50 MG/ML
12.5 INJECTION INTRAMUSCULAR; INTRAVENOUS
Status: DISCONTINUED | OUTPATIENT
Start: 2018-09-25 | End: 2018-09-25 | Stop reason: HOSPADM

## 2018-09-25 RX ADMIN — FENTANYL CITRATE 50 MCG: 50 INJECTION, SOLUTION INTRAMUSCULAR; INTRAVENOUS at 14:00

## 2018-09-25 RX ADMIN — STANDARDIZED SENNA CONCENTRATE AND DOCUSATE SODIUM 2 TABLET: 8.6; 5 TABLET ORAL at 05:09

## 2018-09-25 RX ADMIN — ROPIVACAINE HYDROCHLORIDE: 10 INJECTION, SOLUTION EPIDURAL at 09:15

## 2018-09-25 RX ADMIN — ALBUTEROL SULFATE 5 MG: 2.5 SOLUTION RESPIRATORY (INHALATION) at 13:37

## 2018-09-25 RX ADMIN — FENTANYL CITRATE 50 MCG: 50 INJECTION, SOLUTION INTRAMUSCULAR; INTRAVENOUS at 13:54

## 2018-09-25 RX ADMIN — OXYCODONE HYDROCHLORIDE 5 MG: 5 TABLET ORAL at 03:21

## 2018-09-25 RX ADMIN — NICOTINE 14 MG: 14 PATCH, EXTENDED RELEASE TRANSDERMAL at 05:09

## 2018-09-25 RX ADMIN — CELECOXIB 200 MG: 200 CAPSULE ORAL at 18:44

## 2018-09-25 RX ADMIN — CEFEPIME 2 G: 2 INJECTION, POWDER, FOR SOLUTION INTRAVENOUS at 22:32

## 2018-09-25 RX ADMIN — CEFEPIME 2 G: 2 INJECTION, POWDER, FOR SOLUTION INTRAVENOUS at 05:09

## 2018-09-25 RX ADMIN — STANDARDIZED SENNA CONCENTRATE AND DOCUSATE SODIUM 2 TABLET: 8.6; 5 TABLET ORAL at 18:00

## 2018-09-25 RX ADMIN — FENTANYL CITRATE 50 MCG: 50 INJECTION, SOLUTION INTRAMUSCULAR; INTRAVENOUS at 14:29

## 2018-09-25 ASSESSMENT — ENCOUNTER SYMPTOMS
COUGH: 0
DIZZINESS: 0
FEVER: 0
ABDOMINAL PAIN: 0
BACK PAIN: 0
MEMORY LOSS: 0
PALPITATIONS: 0
NECK PAIN: 0
MYALGIAS: 1
HEADACHES: 0
WEAKNESS: 0
FLANK PAIN: 0
NERVOUS/ANXIOUS: 1

## 2018-09-25 ASSESSMENT — PAIN SCALES - GENERAL
PAINLEVEL_OUTOF10: 7
PAINLEVEL_OUTOF10: 8
PAINLEVEL_OUTOF10: 10
PAINLEVEL_OUTOF10: 0
PAINLEVEL_OUTOF10: 7
PAINLEVEL_OUTOF10: 6
PAINLEVEL_OUTOF10: 8
PAINLEVEL_OUTOF10: 10
PAINLEVEL_OUTOF10: 5
PAINLEVEL_OUTOF10: 7

## 2018-09-25 ASSESSMENT — PATIENT HEALTH QUESTIONNAIRE - PHQ9
SUM OF ALL RESPONSES TO PHQ9 QUESTIONS 1 AND 2: 0
2. FEELING DOWN, DEPRESSED, IRRITABLE, OR HOPELESS: NOT AT ALL
SUM OF ALL RESPONSES TO PHQ9 QUESTIONS 1 AND 2: 0
1. LITTLE INTEREST OR PLEASURE IN DOING THINGS: NOT AT ALL
1. LITTLE INTEREST OR PLEASURE IN DOING THINGS: NOT AT ALL
2. FEELING DOWN, DEPRESSED, IRRITABLE, OR HOPELESS: NOT AT ALL

## 2018-09-25 NOTE — PROGRESS NOTES
Patient arrived from PACU on 4L O2 nasal cannula, saturations in upper 80's, transitioned to oxymask 10L, SpO2 95%. Awake and oriented x 4. VSS.

## 2018-09-25 NOTE — OR NURSING
Dr Gaviria notified of pt difficulty breathing and both chest tubes not functioning in the E chamber. Ordered to place both chest tubes to water seal.

## 2018-09-25 NOTE — PROGRESS NOTES
Trauma / Surgical Daily Progress Note    Date of Service  9/25/2018    Chief Complaint  58 y.o. male admitted 9/18/2018 with HCAP (healthcare-associated pneumonia)    Interval Events  New consult from Dr. Gaviria. Complicated 58 year old male with persistent empyema and a BP fistula after previous thoracotomy and decortication.   ICU admit for tenuous respiratory status and resuscitation.  Antibiotics in place    Review of Systems  Review of Systems   Unable to perform ROS: Acuity of condition        Vital Signs for last 24 hours  Temp:  [36.1 °C (96.9 °F)-36.6 °C (97.9 °F)] 36.1 °C (96.9 °F)  Pulse:  [] 104  Resp:  [14-29] 23  BP: (108-127)/(64-80) 124/69    Hemodynamic parameters for last 24 hours       Respiratory Data     Respiration: (!) 23, Pulse Oximetry: 96 %     Work Of Breathing / Effort: Accessory Muscle Use  RUL Breath Sounds: Clear, RML Breath Sounds: Diminished, RLL Breath Sounds: Diminished, JOSSE Breath Sounds: Clear, LLL Breath Sounds: Diminished    Physical Exam  Physical Exam   Constitutional: No distress.   Appears chronically ill   HENT:   Head: Normocephalic and atraumatic.   Eyes: Pupils are equal, round, and reactive to light. EOM are normal.   Neck: Neck supple. No tracheal deviation present.   Cardiovascular:   Tachycardic, regular   Pulmonary/Chest:   Two left chest tubes in place. One tube with small air leak, other tube without leak.   Abdominal: Soft. He exhibits no distension. There is no tenderness.   Genitourinary:   Genitourinary Comments: Motley in place   Musculoskeletal: Normal range of motion. He exhibits no edema.   Neurological:   Drowsy but awake. KNIGHT.   Skin: Skin is warm and dry.   Psychiatric:   Unable to assess - drowsy   Nursing note and vitals reviewed.      Laboratory  Recent Results (from the past 24 hour(s))   CBC WITHOUT DIFFERENTIAL    Collection Time: 09/25/18 12:08 PM   Result Value Ref Range    WBC 27.9 (H) 4.8 - 10.8 K/uL    RBC 4.50 (L) 4.70 - 6.10 M/uL     Hemoglobin 11.8 (L) 14.0 - 18.0 g/dL    Hematocrit 39.4 (L) 42.0 - 52.0 %    MCV 87.6 81.4 - 97.8 fL    MCH 26.2 (L) 27.0 - 33.0 pg    MCHC 29.9 (L) 33.7 - 35.3 g/dL    RDW 54.4 (H) 35.9 - 50.0 fL    Platelet Count 595 (H) 164 - 446 K/uL    MPV 9.0 9.0 - 12.9 fL       Fluids    Intake/Output Summary (Last 24 hours) at 09/25/18 1649  Last data filed at 09/25/18 1600   Gross per 24 hour   Intake           3051.6 ml   Output             3565 ml   Net           -513.4 ml       Core Measures & Quality Metrics  Labs reviewed, Medications reviewed and Radiology images reviewed  Motley catheter: Epidural Catheter / Drain  Central line in place: Need for access    DVT Prophylaxis: Contraindicated - High bleeding risk  DVT prophylaxis - mechanical: SCDs    Antibiotics: Treating active infection/contamination beyond 24 hours perioperative coverage      BARI Score  ETOH Screening    Assessment/Plan  Empyema lung (HCC)- (present on admission)   Assessment & Plan    Failed prior decortication as he developed BP fistula as outpatient  Cefepime/vanco  9/25/2018 thoracotomy, decortication, intercostal muscle flap. Two 32 Fr chest tubes in place.  Trend CXR        Acute respiratory failure with hypoxia (HCC)- (present on admission)   Assessment & Plan    High risk for deterioration s/p thoracotomy  Aggressive pain control and pulmonary toilet        Protein malnutrition (HCC)- (present on admission)   Assessment & Plan    Encourage high protein enteral intake          Panlobular emphysema (HCC)- (present on admission)   Assessment & Plan    Chronic condition, monitor            Plan:  Aggressive pulmonary toilet, high risk for decompensation.  Multimodal pain control, epidural catheter.    Discussed patient condition with RN, RT and Pharmacy.  The patient is/remains critically ill with acute hypoxemic respiratory failure, empyema with BP fistula. He is at high risk of decompensation with loss of vital organ function.    I provided the  following critical care services: resuscitation, management of above..    Critical care time spent exclusive of procedures: 45 minutes.    Luis Prince MD  [phone]

## 2018-09-25 NOTE — CARE PLAN
Problem: Knowledge Deficit  Goal: Knowledge of disease process/condition, treatment plan, diagnostic tests, and medications will improve  Patient updated on plan of care for the day, patient verbalized understanding. All questions and concerns addressed at this time.    Problem: Pain Management  Goal: Pain level will decrease to patient's comfort goal  Bed alarm on, non slip socks on, bed in low position, 2 side rails up, call light within reach, will continue to monitor.

## 2018-09-25 NOTE — PROGRESS NOTES
[facility] Hospitalist Progress Note    Date of Service: 2018    Chief Complaint  58 y.o. male admitted 2018 with history of chronic tobacco use, COPD, history of hydropneumothorax and empyema in July of this year status post chest tube placement and removal now with recurrent shortness of breath and empyema requiring surgery/chest tube placement.    Interval Problem Update  PTX/hydro-underwent CT placement again and decortication with no issues. Back in the ICU post operatively.     Consultants/Specialty  Surgery    Disposition  TBD  Discussed with pulmonary  Pigtail in place  Plan for decortication by surgery, Monday  Cont IV abx          Review of Systems   Constitutional: Negative for fever.   HENT: Negative for hearing loss.    Respiratory: Negative for cough and hemoptysis.    Cardiovascular: Positive for chest pain. Negative for palpitations and leg swelling.   Gastrointestinal: Negative for abdominal pain, nausea and vomiting.   Genitourinary: Negative for flank pain and urgency.   Musculoskeletal: Positive for myalgias. Negative for back pain and neck pain.   Neurological: Negative for dizziness, weakness and headaches.   Psychiatric/Behavioral: Negative for memory loss. The patient is nervous/anxious.    All other systems reviewed and are negative.     Physical Exam  Laboratory/Imaging   Hemodynamics  Temp (24hrs), Av.3 °C (97.3 °F), Min:36.1 °C (96.9 °F), Max:36.4 °C (97.6 °F)   Temperature: 36.1 °C (96.9 °F), Monitored Temp: 36.4 °C (97.5 °F)  Pulse  Av.6  Min: 43  Max: 111 Heart Rate (Monitored): 79  Arterial BP: 109/88, NIBP: (!) 96/54      Respiratory      Respiration: 14, Pulse Oximetry: 97 %, O2 Daily Delivery Respiratory : Silicone Nasal Cannula     PEP/CPT Method: Positive Airway Pressure Device (10), Work Of Breathing / Effort: Mild  RUL Breath Sounds: Clear;Diminished, RML Breath Sounds: Clear;Diminished, RLL Breath Sounds: Diminished, JOSSE Breath Sounds: Clear;Diminished, LLL  Breath Sounds: Diminished    Fluids    Intake/Output Summary (Last 24 hours) at 09/26/18 1408  Last data filed at 09/26/18 1200   Gross per 24 hour   Intake          1146.99 ml   Output             2660 ml   Net         -1513.01 ml       Nutrition  Orders Placed This Encounter   Procedures   • Diet Order Regular     Standing Status:   Standing     Number of Occurrences:   1     Order Specific Question:   Diet:     Answer:   Regular [1]     Physical Exam   Constitutional: He is oriented to person, place, and time. No distress.   HENT:   Head: Normocephalic and atraumatic.   Eyes: Pupils are equal, round, and reactive to light. EOM are normal. Right eye exhibits no discharge. Left eye exhibits no discharge.   Neck: Neck supple.   Cardiovascular: Normal rate and intact distal pulses.    Pulmonary/Chest: Effort normal. No stridor. No respiratory distress. He has rales.   Chest tube in place, dressing appears C/D/I   Abdominal: Soft. Bowel sounds are normal. There is no tenderness.   Musculoskeletal: He exhibits no tenderness.   Neurological: He is alert and oriented to person, place, and time. No cranial nerve deficit.   Skin: Skin is warm and dry. He is not diaphoretic.   Psychiatric: He has a normal mood and affect.   Nursing note and vitals reviewed.      Recent Labs      09/25/18   1208  09/26/18   0449   WBC  27.9*  18.4*   RBC  4.50*  4.58*   HEMOGLOBIN  11.8*  12.2*   HEMATOCRIT  39.4*  40.9*   MCV  87.6  89.3   MCH  26.2*  26.6*   MCHC  29.9*  29.8*   RDW  54.4*  56.2*   PLATELETCT  595*  535*   MPV  9.0  9.2     Recent Labs      09/26/18   0449   SODIUM  137   POTASSIUM  4.5   CHLORIDE  97   CO2  32   GLUCOSE  93   BUN  11   CREATININE  0.64   CALCIUM  9.2                      Assessment/Plan     Empyema lung (HCC)- (present on admission)   Assessment & Plan    Discontinue IV vancomycin     Continue IV Zosyn  -s/p decortication doing well, F/U drainage, F/U final speciation  -monitor output and respiratory  status closely        Acute respiratory failure with hypoxia (HCC)- (present on admission)   Assessment & Plan    Secondary to empyema and HCAP  improving        Protein malnutrition (HCC)- (present on admission)   Assessment & Plan    Nutrition consult        Panlobular emphysema (HCC)- (present on admission)   Assessment & Plan    Continue supplemental oxygen and RT protocol  DuoNeb as needed        Gastroesophageal reflux disease without esophagitis   Assessment & Plan    pepcid prn        Bradycardia- (present on admission)   Assessment & Plan    ASymptomatic  Continue cardiac monitoring          Acute bilateral low back pain- (present on admission)   Assessment & Plan    Likely musculoskeletal  Add Flexeril as needed  No anesthesia or focal weakness          HCAP (healthcare-associated pneumonia)- (present on admission)   Assessment & Plan    -continue IV zosyn for now as outlined below  Started on supplemental oxygen and RT protocol        Tobacco abuse- (present on admission)   Assessment & Plan    Tobacco cessation education provided for more than 4 minutes, discussed options of nicotine patch, medical treatment with wellbutrin and chantix. Discussed the risks of smoking including increased risk of heart disease, stroke, cancer and COPD. Discussed the benefits of quitting smoking. Nicotine replacement protocol will be provided to the patient.    Greater than 30-pack-year  Advised cessation            Quality-Core Measures   Reviewed items::  Labs reviewed, Medications reviewed and Radiology images reviewed  DVT prophylaxis - mechanical:  SCDs  Ulcer Prophylaxis::  Not indicated  Antibiotics:  Treating active infection/contamination beyond 24 hours perioperative coverage  Assessed for rehabilitation services:  Patient returned to prior level of function, rehabilitation not indicated at this time

## 2018-09-25 NOTE — OP REPORT
Operative Report    Date: 9/25/2018    PreOp Diagnosis:   1.  Recurrent Empyema, chronic  2.  Alveolar-pleural fistula     PostOp Diagnosis: same     Procedure(s):  1.  Diagnostic Bronchoscopy  2.  Left THORACOTOMY- REDO - Wound Class: Dirty or Infected  3.  Lung DECORTICATION - Wound Class: Dirty or Infected  4.  Intercostal muscle flap transposition to parenchymal airway fistula  5.  Posterior rib blocks, multiple     Surgeon(s):  Pb Gaviria M.D.     Anesthesiologist/Type of Anesthesia:  Anesthesiologist: David Almeida M.D./General     Surgical Staff:  Circulator: Dimple Peters R.N.  Relief Circulator: Gali Harris R.N.  Relief Scrub: Jose Brush  Scrub Person: Gali Regalado  First Assist: SHERRY Diaz     Specimens removed if any:  ID Type Source Tests Collected by Time Destination   1 : Left pleural peel Tissue Chest ANAEROBIC CULTURE, CULTURE TISSUE W/ GRM STAIN Pb Gaviria M.D. 9/25/2018 12:45 PM     A : Left pleural peel Tissue Chest PATHOLOGY SPECIMEN Pb Gaviria M.D. 9/25/2018 12:46 PM           Estimated Blood Loss: 250mL     Drains:  1.  32F Left Angled argyle chest drain  2.  32F Left Straight argyle chest drain     Findings: Organized chronic left lateral empyema cavity covered with thick fibrous pleural rind.  There is diffuse moderate airleak in major fissure which is quite central.  No bronchopleural fistula seen on pre-procedure bronchoscopy.  No endobronchial lesions seen.     Complications: none noted    Indications:  58-year-old male with a history of COPD and cavitary pneumonia as well as large left empyema status post left thoracotomy, decortication, and nonanatomic lung resection by another surgeon approximately 2 months ago. He was discharged from the hospital with an air leak and the chest tube to a pneumostatic valve. The chest tube was removed approximately 2 weeks later. He re-presented with a persistent left  hydropneumothorax, clinical evidence of empyema and evidence of an alveolar pleural fistula. Pigtail drain was placed which had an intermittent air leak, and he was kept on antibiotics. Bronchoscopy was performed by pulmonary medicine and did not reveal a fistula. Repeat CT showed stable large hydropneumothorax and large rind consistent with organized empyema. Surgery was recommended for decortication. The procedure as well as the risks, benefits, alternatives discussed and the patient wished to proceed.    Procedure in detail:   The patient was taken to the operating room and placed on the operating table in supine position. A thoracic epidural catheter was placed by the anesthesia team. Appropriate monitoring lines and tubes were placed. General endotracheal anesthesia was induced. The patient was then placed in right lateral decubitus position exposing the left chest. Care was taken to pad all pressure points. A sterile prep and drape was performed. A timeout was performed.    A redo left thoracotomy was performed through the same incision and carried down through into the pleural space. The pleural space was entered and there was evidence of chronic empyema cavity with a small amount of purulent fluid and thickened rind involving a considerable portion of the left lateral chest. The rib at the level of thoracotomy had already been resected at the prior operation. An intercostal muscle flap was carefully removed from the rib above, taking care to keep its blood supply intact this was divided anteriorly and this was placed aside for further use at a later time. A tedious decortication was then performed removing this thick rind from the lateral, inferior, posterior and apical areas of the chest. This was performed as carefully as possible, but there was an inevitable and unavoidable injury to the visceral pleura. In the inferior hilar portion of the left upper lobe, there were several moderate areas of parenchymal  air leak. Because of the significant inflammatory change and thickened overlying pleural rind, a full dissection in order to expose the hilar structures was impossible. The inferomedial portion of the left lower lobe was covered with such tenacious rind that it was impossible to remove without significant damage to the underlying lung. Hashtag incisions were created to allow this area of the lung to reinflate as much as possible, and the periosteal elevators used to scrape away as much of the rind as possible.  There was a small area of thick fibrous rind extending towards the left hilum which was divided with a blue load on the 45 mm Endo CHRISTIN stapler. This allowed for the intercostal muscle bundle to be tucked down into this area which covered the approximate 2 x 2 centimeter diffuse area of moderate airleak. This muscle flap was secured with interrupted 2-0 Vicryl suture. 232 Welsh chest tubes were placed, one in the apical lateral position and an angled over the diaphragm. These were secured with 0 Ethibond in the standard fashion. The lung was ventilated, and had multiple areas diffuse leak. The left pleural space was irrigated with warm saline, and dried with a lap pad. Evicel liquid aerosolized sealant was then sprayed over the surface of the lung. #1 Vicryl pericostal sutures were then used to reapproximate the ribs, and the posterior-lateral thoracotomy was then closed in layers using 0 Vicryl, 2-0 Vicryl, and skin staples. A Prevena wound management system was placed over the thoracotomy and appropriate chest tube dressings were applied. There was an intermittent air leak at the end of the case on -10 section.  Because of the complex into the case it was elected that the patient should go to the surgical ICU for close monitoring.  The patient tolerated the procedure overall well, and left the operating room extubated for the recovery room in stable and satisfactory condition.  All sponge, needle, and  instrument counts were reported as correct at the end of the procedure      Pb Gaviria M.D.  [facility]  [phone]

## 2018-09-25 NOTE — PROGRESS NOTES
[facility] Hospitalist Progress Note    Date of Service: 2018    Chief Complaint  58 y.o. male admitted 2018 with history of chronic tobacco use, COPD, history of hydropneumothorax and empyema in July of this year status post chest tube placement and removal now with recurrent shortness of breath and empyema requiring surgery/chest tube placement.    Interval Problem Update  Intermittent heartburn  Denies sob      Consultants/Specialty  Surgery    Disposition  TBD  Discussed with pulmonary  Pigtail in place  Plan for decortication by surgery, Tuesday  Cont IV abx          Review of Systems   Constitutional: Negative for chills, diaphoresis, fever and malaise/fatigue.   Respiratory: Negative for cough and shortness of breath.    Cardiovascular: Negative for palpitations and leg swelling.   Gastrointestinal: Positive for heartburn. Negative for abdominal pain, nausea and vomiting.   Genitourinary: Negative for dysuria and urgency.   Musculoskeletal: Positive for myalgias. Negative for neck pain.   Neurological: Negative for weakness and headaches.   Psychiatric/Behavioral: Negative for depression. The patient is nervous/anxious.       Physical Exam  Laboratory/Imaging   Hemodynamics  Temp (24hrs), Av.4 °C (97.6 °F), Min:36.1 °C (97 °F), Max:36.7 °C (98.1 °F)   Temperature: 36.1 °C (97 °F)  Pulse  Av.7  Min: 43  Max: 111    Blood Pressure: 100/60      Respiratory      Respiration: 15, Pulse Oximetry: 90 %     Work Of Breathing / Effort: Mild  RUL Breath Sounds: Clear, RML Breath Sounds: Diminished, RLL Breath Sounds: Diminished, JOSSE Breath Sounds: Clear, LLL Breath Sounds: Diminished    Fluids    Intake/Output Summary (Last 24 hours) at 18 1822  Last data filed at 18 1600   Gross per 24 hour   Intake              640 ml   Output             2900 ml   Net            -2260 ml       Nutrition  Orders Placed This Encounter   Procedures   • Diet Order Regular     Standing Status:   Standing      Number of Occurrences:   1     Order Specific Question:   Diet:     Answer:   Regular [1]   • Diet NPO     Standing Status:   Standing     Number of Occurrences:   8     Order Specific Question:   Restrict to:     Answer:   Sips with Medications [3]     Physical Exam   Constitutional: He is oriented to person, place, and time. He appears well-developed. No distress.   HENT:   Head: Normocephalic and atraumatic.   Eyes: Pupils are equal, round, and reactive to light. EOM are normal.   Neck: Neck supple.   Cardiovascular: Normal rate and intact distal pulses.    Pulmonary/Chest: Effort normal. No respiratory distress. He has no wheezes. He has rales.   Chest tube in place, dressing clean dry and intact   Abdominal: Soft. Bowel sounds are normal. He exhibits no distension. There is no tenderness. There is no rebound.   Musculoskeletal: He exhibits no edema.   Neurological: He is alert and oriented to person, place, and time. No cranial nerve deficit. He exhibits normal muscle tone.   Skin: Skin is warm and dry. No erythema.   Psychiatric: He has a normal mood and affect. His behavior is normal. Judgment and thought content normal.   Nursing note and vitals reviewed.      Recent Labs      09/23/18   0320   WBC  10.4   RBC  4.90   HEMOGLOBIN  12.7*   HEMATOCRIT  42.8   MCV  87.3   MCH  25.9*   MCHC  29.7*   RDW  53.8*   PLATELETCT  641*   MPV  8.7*     Recent Labs      09/23/18   0320   SODIUM  138   POTASSIUM  4.2   CHLORIDE  100   CO2  35*   GLUCOSE  110*   BUN  8   CREATININE  0.83   CALCIUM  8.7                      Assessment/Plan     Sepsis (HCC)- (present on admission)   Assessment & Plan    This is severe sepsis with the following associated acute organ dysfunction(s): acute respiratory failure.   Likely source is HCAP  Patient has been started on IV fluids per septic protocol  Lactate is within normal limits  Patient is started on IV vancomycin and IV Zosyn, monitor for vancomycin toxicity  Follow blood  cultures          Pneumohemothorax- (present on admission)   Assessment & Plan    Surgery Dr. Gaviria was consulted by the ER physician  Started on supplemental oxygen and RT protocol        Acute respiratory failure with hypoxia (HCC)- (present on admission)   Assessment & Plan    Secondary to empyema and HCAP  improving        Protein malnutrition (HCC)- (present on admission)   Assessment & Plan    Nutrition consult        Panlobular emphysema (HCC)- (present on admission)   Assessment & Plan    Continue supplemental oxygen and RT protocol  DuoNeb as needed        Gastroesophageal reflux disease without esophagitis   Assessment & Plan    pepcid prn        Bradycardia   Assessment & Plan    ASymptomatic  Continue cardiac monitoring          Acute bilateral low back pain   Assessment & Plan    Likely musculoskeletal  Add Flexeril as needed  No anesthesia or focal weakness          HCAP (healthcare-associated pneumonia)   Assessment & Plan    Started on IV vancomycin and IV Zosyn, monitor for vancomycin toxicity  Started on supplemental oxygen and RT protocol        Empyema lung (HCC)- (present on admission)   Assessment & Plan    Discontinue IV vancomycin     Continue IV Zosyn  Surgery , Dr. Gaviria , pigtail placement, management per surgery 9/19  Fluid cx strep viridans, Enterobacter cloaca  pulm consulted, Dr. Gondal, appreciate input, status post bronchoscopy, no fistula noted  9/20 CXR improving L effusion  Recurrent  Follow blood negative    Plan for decortication on Monday, 9/25, surgery to arrange  Npo after MN         Tobacco abuse- (present on admission)   Assessment & Plan    Tobacco cessation education provided for more than 4 minutes, discussed options of nicotine patch, medical treatment with wellbutrin and chantix. Discussed the risks of smoking including increased risk of heart disease, stroke, cancer and COPD. Discussed the benefits of quitting smoking. Nicotine replacement protocol will be provided  to the patient.    Greater than 30-pack-year  Advised cessation            Quality-Core Measures   Reviewed items::  Labs reviewed, Medications reviewed and Radiology images reviewed  DVT prophylaxis - mechanical:  SCDs  Ulcer Prophylaxis::  Not indicated  Antibiotics:  Treating active infection/contamination beyond 24 hours perioperative coverage  Assessed for rehabilitation services:  Patient returned to prior level of function, rehabilitation not indicated at this time

## 2018-09-25 NOTE — PROGRESS NOTES
Received report and assumed care of patient. Patient is alert and oriented x4. Patient is in no signs of distress, reports minor pain, will medicate per MAR. Chest tube to water seal suction to left chest.  Per day shift RN no output for couple of days for patient. Patient was updated on the plan of care for the day. Call light within reach, bed in low position, 2 side rails up. Educated on fall risk, verbalizes understanding, Will continue to monitor.

## 2018-09-25 NOTE — PROGRESS NOTES
Pt to pre op, monitor tech notified and pt addiment about having his belongings at bedside. Notified pre op rn.

## 2018-09-25 NOTE — OR SURGEON
Immediate Post OP Note    PreOp Diagnosis:   1.  Recurrent Empyema, chronic  2.  Alveolar-pleural fistula    PostOp Diagnosis: same    Procedure(s):  1.  Diagnostic Bronchoscopy  2.  Left THORACOTOMY- REDO - Wound Class: Dirty or Infected  3.  Lung DECORTICATION - Wound Class: Dirty or Infected  4.  Intercostal muscle flap transposition to parenchymal airway fistula  5.  Posterior rib blocks, multiple    Surgeon(s):  Pb Gaviria M.D.    Anesthesiologist/Type of Anesthesia:  Anesthesiologist: David Almeida M.D./General    Surgical Staff:  Circulator: Dimple Peters R.N.  Relief Circulator: Gali Harris R.N.  Relief Scrub: Jose Brush  Scrub Person: Glai Regalado  First Assist: SHERRY Diaz    Specimens removed if any:  ID Type Source Tests Collected by Time Destination   1 : Left pleural peel Tissue Chest ANAEROBIC CULTURE, CULTURE TISSUE W/ GRM STAIN Pb Gaviria M.D. 9/25/2018 12:45 PM    A : Left pleural peel Tissue Chest PATHOLOGY SPECIMEN Pb Gaviria M.D. 9/25/2018 12:46 PM        Estimated Blood Loss: 250mL    Drains:  1.  32F Left Angled argyle chest drain  2.  32F Left Straight argyle chest drain    Findings: Organized chronic left lateral empyema cavity covered with thick fibrous pleural rind.  There is diffuse moderate airleak in major fissure which is quite central.  No bronchopleural fistula seen on pre-procedure bronchoscopy.  No endobronchial lesions seen.    Complications: none noted    Outcome:  Transferred to SICU in stable condition    9/25/2018 1:19 PM Pb aGviria M.D.

## 2018-09-25 NOTE — ASSESSMENT & PLAN NOTE
Failed prior decortication as he developed BP fistula as outpatient  Cefepime/vanco  9/25/2018 thoracotomy, decortication, intercostal muscle flap. Two 32 Fr chest tubes in place.  9/19 Zosyn initiated  9/24 Zosyn altered cefepime initiated for Enterobacter cloacae and Streptococcus viridans  9/28 Enterobacter cloacae now resistant to cefepime therapy altered to meropenem.  9/28 Meropenem day 1  Trend CXR

## 2018-09-26 LAB
ANION GAP SERPL CALC-SCNC: 8 MMOL/L (ref 0–11.9)
BASOPHILS # BLD AUTO: 0.5 % (ref 0–1.8)
BASOPHILS # BLD: 0.1 K/UL (ref 0–0.12)
BUN SERPL-MCNC: 11 MG/DL (ref 8–22)
CALCIUM SERPL-MCNC: 9.2 MG/DL (ref 8.5–10.5)
CHLORIDE SERPL-SCNC: 97 MMOL/L (ref 96–112)
CO2 SERPL-SCNC: 32 MMOL/L (ref 20–33)
CREAT SERPL-MCNC: 0.64 MG/DL (ref 0.5–1.4)
EOSINOPHIL # BLD AUTO: 0.08 K/UL (ref 0–0.51)
EOSINOPHIL NFR BLD: 0.4 % (ref 0–6.9)
ERYTHROCYTE [DISTWIDTH] IN BLOOD BY AUTOMATED COUNT: 56.2 FL (ref 35.9–50)
GLUCOSE SERPL-MCNC: 93 MG/DL (ref 65–99)
HCT VFR BLD AUTO: 40.9 % (ref 42–52)
HGB BLD-MCNC: 12.2 G/DL (ref 14–18)
IMM GRANULOCYTES # BLD AUTO: 0.13 K/UL (ref 0–0.11)
IMM GRANULOCYTES NFR BLD AUTO: 0.7 % (ref 0–0.9)
LYMPHOCYTES # BLD AUTO: 2.53 K/UL (ref 1–4.8)
LYMPHOCYTES NFR BLD: 13.8 % (ref 22–41)
MCH RBC QN AUTO: 26.6 PG (ref 27–33)
MCHC RBC AUTO-ENTMCNC: 29.8 G/DL (ref 33.7–35.3)
MCV RBC AUTO: 89.3 FL (ref 81.4–97.8)
MONOCYTES # BLD AUTO: 1.16 K/UL (ref 0–0.85)
MONOCYTES NFR BLD AUTO: 6.3 % (ref 0–13.4)
NEUTROPHILS # BLD AUTO: 14.38 K/UL (ref 1.82–7.42)
NEUTROPHILS NFR BLD: 78.3 % (ref 44–72)
NRBC # BLD AUTO: 0 K/UL
NRBC BLD-RTO: 0 /100 WBC
PLATELET # BLD AUTO: 535 K/UL (ref 164–446)
PMV BLD AUTO: 9.2 FL (ref 9–12.9)
POTASSIUM SERPL-SCNC: 4.5 MMOL/L (ref 3.6–5.5)
RBC # BLD AUTO: 4.58 M/UL (ref 4.7–6.1)
SODIUM SERPL-SCNC: 137 MMOL/L (ref 135–145)
WBC # BLD AUTO: 18.4 K/UL (ref 4.8–10.8)

## 2018-09-26 PROCEDURE — 99233 SBSQ HOSP IP/OBS HIGH 50: CPT | Performed by: SURGERY

## 2018-09-26 PROCEDURE — 85025 COMPLETE CBC W/AUTO DIFF WBC: CPT

## 2018-09-26 PROCEDURE — A9270 NON-COVERED ITEM OR SERVICE: HCPCS | Performed by: ANESTHESIOLOGY

## 2018-09-26 PROCEDURE — 94668 MNPJ CHEST WALL SBSQ: CPT

## 2018-09-26 PROCEDURE — 94667 MNPJ CHEST WALL 1ST: CPT

## 2018-09-26 PROCEDURE — 700102 HCHG RX REV CODE 250 W/ 637 OVERRIDE(OP): Performed by: ANESTHESIOLOGY

## 2018-09-26 PROCEDURE — 80048 BASIC METABOLIC PNL TOTAL CA: CPT

## 2018-09-26 PROCEDURE — A9270 NON-COVERED ITEM OR SERVICE: HCPCS | Performed by: INTERNAL MEDICINE

## 2018-09-26 PROCEDURE — 700102 HCHG RX REV CODE 250 W/ 637 OVERRIDE(OP): Performed by: INTERNAL MEDICINE

## 2018-09-26 PROCEDURE — 770001 HCHG ROOM/CARE - MED/SURG/GYN PRIV*

## 2018-09-26 PROCEDURE — 700111 HCHG RX REV CODE 636 W/ 250 OVERRIDE (IP): Performed by: INTERNAL MEDICINE

## 2018-09-26 PROCEDURE — 700105 HCHG RX REV CODE 258: Performed by: INTERNAL MEDICINE

## 2018-09-26 RX ORDER — FUROSEMIDE 10 MG/ML
40 INJECTION INTRAMUSCULAR; INTRAVENOUS ONCE
Status: DISCONTINUED | OUTPATIENT
Start: 2018-09-26 | End: 2018-09-26 | Stop reason: CLARIF

## 2018-09-26 RX ORDER — ALBUMIN (HUMAN) 12.5 G/50ML
50 SOLUTION INTRAVENOUS ONCE
Status: DISCONTINUED | OUTPATIENT
Start: 2018-09-26 | End: 2018-09-26 | Stop reason: CLARIF

## 2018-09-26 RX ADMIN — CEFEPIME 2 G: 2 INJECTION, POWDER, FOR SOLUTION INTRAVENOUS at 05:28

## 2018-09-26 RX ADMIN — STANDARDIZED SENNA CONCENTRATE AND DOCUSATE SODIUM 2 TABLET: 8.6; 5 TABLET ORAL at 06:00

## 2018-09-26 RX ADMIN — NICOTINE 14 MG: 14 PATCH, EXTENDED RELEASE TRANSDERMAL at 05:28

## 2018-09-26 RX ADMIN — CELECOXIB 200 MG: 200 CAPSULE ORAL at 17:32

## 2018-09-26 RX ADMIN — STANDARDIZED SENNA CONCENTRATE AND DOCUSATE SODIUM 2 TABLET: 8.6; 5 TABLET ORAL at 18:00

## 2018-09-26 RX ADMIN — CEFEPIME 2 G: 2 INJECTION, POWDER, FOR SOLUTION INTRAVENOUS at 21:05

## 2018-09-26 RX ADMIN — CEFEPIME 2 G: 2 INJECTION, POWDER, FOR SOLUTION INTRAVENOUS at 13:53

## 2018-09-26 RX ADMIN — CELECOXIB 200 MG: 200 CAPSULE ORAL at 07:52

## 2018-09-26 ASSESSMENT — ENCOUNTER SYMPTOMS
GASTROINTESTINAL NEGATIVE: 1
NAUSEA: 0
SHORTNESS OF BREATH: 1
VOMITING: 0
CONSTITUTIONAL NEGATIVE: 1
ROS GI COMMENTS: BM 9/23
HEMOPTYSIS: 0
MYALGIAS: 1

## 2018-09-26 ASSESSMENT — PAIN SCALES - GENERAL
PAINLEVEL_OUTOF10: 5
PAINLEVEL_OUTOF10: 2
PAINLEVEL_OUTOF10: 4
PAINLEVEL_OUTOF10: 3
PAINLEVEL_OUTOF10: 2
PAINLEVEL_OUTOF10: 5
PAINLEVEL_OUTOF10: 3
PAINLEVEL_OUTOF10: 3
PAINLEVEL_OUTOF10: 4
PAINLEVEL_OUTOF10: 5

## 2018-09-26 ASSESSMENT — PATIENT HEALTH QUESTIONNAIRE - PHQ9
SUM OF ALL RESPONSES TO PHQ9 QUESTIONS 1 AND 2: 0
1. LITTLE INTEREST OR PLEASURE IN DOING THINGS: NOT AT ALL
2. FEELING DOWN, DEPRESSED, IRRITABLE, OR HOPELESS: NOT AT ALL

## 2018-09-26 NOTE — PROGRESS NOTES
2 RN skin check    Surgical incision to L chest with Provina dressing c/d/i. L chest CT insertion site x 2 with gauze and tape dressing c/d/i. Sacrum pink in color, blanching, mepilex applied. Skin otherwise intact.

## 2018-09-26 NOTE — CARE PLAN
Problem: Mobility  Goal: Risk for activity intolerance will decrease    Intervention: Encourage patient to increase activity level in collaboration with Interdisciplinary Team  Pt agreed to sit edge of bed to increase mobility efforts. Will continue to encourage increase in mobility to sit up in chair.

## 2018-09-26 NOTE — DISCHARGE PLANNING
Care Transition Team Assessment      In the case of an emergency, pt's legal NOK is SisterRianna     RN CM met with pt at bedside and obtained the information used in this assessment. Pt verified accuracy of facesheet. Pt lives in a2 story apartment alone.  Pt uses [facility] in [place]. Prior to current hospitalization, pt was completely independent in ADLS/IADLS. Pt drives and is able to attend necessary MD appointments. 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 Pt has financial concerns due to being off work. Pt has a limited support system. Pt denies/admits any hx of substance use and denies any dx of mh. States he drinks approximately 6 beers/day and smokes marijuana occasionally. Needs PCP in [place] as he does not have vehicle. Has no health insurance, does not qualify for MCaid. Pt states he declined post acute services at last discharge.    Information Source: Pt  Orientation : Oriented x 4  Information Given By: Patient  Informant's Name:  (Donna De La Garza)  Who is responsible for making decisions for patient? : Patient    Readmission Evaluation  Is this a readmission?: Yes - unplanned readmission  Was an appointment arranged for you prior to discharge?: No  Were there new prescriptions you were supposed to fill after you were discharged?: Yes, prescriptions filled  Did you understand your discharge instructions?: Yes  Did you have enough support after your last discharge?: Yes    Elopement Risk  Legal Hold: No  Ambulatory or Self Mobile in Wheelchair: No-Not an Elopement Risk  Disoriented: No  Psychiatric Symptoms: None  History of Wandering: No  Elopement this Admit: No  Vocalizing Wanting to Leave: No  Displays Behaviors, Body Language Wanting to Leave: No-Not at Risk for Elopement  Elopement Risk: Not at Risk for Elopement    Interdisciplinary Discharge Planning  Does Admitting Nurse Feel This Could be a Complex Discharge?: No  Primary Care Physician:   (none)  Lives with - Patient's Self Care Capacity: Alone and Able to Care For Self  Patient or legal guardian wants to designate a caregiver (see row info): No  Support Systems: Family Member(s), Friends / Neighbors  Housing / Facility: 2 Story Apartment / Condo  Do You Take your Prescribed Medications Regularly: No  Mobility Issues: No  Prior Services: None  Patient Expects to be Discharged to::  (home)    Discharge Preparedness  What is your plan after discharge?: Uncertain - pending medical team collaboration  What are your discharge supports?: Sibling  Prior Functional Level: Ambulatory, Drives Self, Independent with Activities of Daily Living, Independent with Medication Management  Difficulity with ADLs: None  Difficulity with IADLs: None    Functional Assesment  Prior Functional Level: Ambulatory, Drives Self, Independent with Activities of Daily Living, Independent with Medication Management    Finances  Financial Barriers to Discharge: No    Vision / Hearing Impairment  Vision Impairment : Yes  Right Eye Vision: Impaired, Wears Glasses  Left Eye Vision: Impaired, Wears Glasses  Hearing Impairment : No    Values / Beliefs / Concerns  Values / Beliefs Concerns : No         Domestic Abuse  Have you ever been the victim of abuse or violence?: No  Physical Abuse or Sexual Abuse: No  Verbal Abuse or Emotional Abuse: No         Discharge Risks or Barriers  Discharge risks or barriers?: No PCP, Uninsured / underinsured, Transportation, Lives alone, no community support  Patient risk factors: Lives alone and no community support, No PCP, Uninsured or underinsured    Anticipated Discharge Information  Discharge Address: [address] [facility]  Discharge Contact Phone Number: [phone]

## 2018-09-26 NOTE — PROGRESS NOTES
MD notified of urine output <30 ml/hr for 4 hours and critical lactic levels of 4.3 and 4.5. New orders received. Will continue to monitort.

## 2018-09-26 NOTE — CARE PLAN
Problem: Respiratory:  Goal: Respiratory status will improve    Intervention: Educate and encourage incentive spirometry usage  Patient educated about the importance of IS use. Will continue to encourage use.       Problem: Pain Management  Goal: Pain level will decrease to patient's comfort goal    Intervention: Follow pain managment plan developed in collaboration with patient and Interdisciplinary Team  Pain assessed Q2 hours. Education provided regarding epidural and pain management. Will continue to monitor.

## 2018-09-26 NOTE — PROGRESS NOTES
Dr Gaviria called and ordered both chest tubes to be placed to -10 of suction. RN read order back to MD and implemented orders. Patient is tolerating suction fine at this time. Will continue to monitor.

## 2018-09-26 NOTE — PROGRESS NOTES
Trauma / Surgical Daily Progress Note    Date of Service  9/26/2018    Chief Complaint  58 y.o. male admitted 9/18/2018 with HCAP (healthcare-associated pneumonia)    Interval Events  POD # 1 - left thoracotomy with decortication   CT X 2 - both to suction - CT #1 - 400 cc out and CT #2 - 65 cc  CT #2 with air leak  ABX continues  WBC trend down  Transfer to [facility]      Review of Systems  Review of Systems   Constitutional: Negative.    HENT: Negative.    Respiratory: Positive for shortness of breath.    Gastrointestinal: Negative.         BM 9/23   Genitourinary: Negative.    Musculoskeletal: Positive for myalgias (chest wall pain ).        Vital Signs  Temp:  [36.1 °C (96.9 °F)-36.4 °C (97.6 °F)] 36.1 °C (96.9 °F)  Pulse:  [] 110  Resp:  [11-29] 20    Physical Exam  Physical Exam   Constitutional: He is oriented to person, place, and time. He appears well-developed. No distress.   Pulmonary/Chest: Effort normal.   Diminished left lung  CT x 2 - #2 with air leak    Abdominal: Soft. There is no tenderness.   Musculoskeletal: Normal range of motion.   Neurological: He is alert and oriented to person, place, and time.   Nursing note and vitals reviewed.      Laboratory  Recent Results (from the past 24 hour(s))   CBC WITHOUT DIFFERENTIAL    Collection Time: 09/25/18 12:08 PM   Result Value Ref Range    WBC 27.9 (H) 4.8 - 10.8 K/uL    RBC 4.50 (L) 4.70 - 6.10 M/uL    Hemoglobin 11.8 (L) 14.0 - 18.0 g/dL    Hematocrit 39.4 (L) 42.0 - 52.0 %    MCV 87.6 81.4 - 97.8 fL    MCH 26.2 (L) 27.0 - 33.0 pg    MCHC 29.9 (L) 33.7 - 35.3 g/dL    RDW 54.4 (H) 35.9 - 50.0 fL    Platelet Count 595 (H) 164 - 446 K/uL    MPV 9.0 9.0 - 12.9 fL   GRAM STAIN    Collection Time: 09/25/18 12:45 PM   Result Value Ref Range    Significant Indicator .     Source TISS     Site Left Pleural Peel     Gram Stain Result No organisms seen.    CBC WITH DIFFERENTIAL    Collection Time: 09/26/18  4:49 AM   Result Value Ref Range    WBC 18.4 (H) 4.8  - 10.8 K/uL    RBC 4.58 (L) 4.70 - 6.10 M/uL    Hemoglobin 12.2 (L) 14.0 - 18.0 g/dL    Hematocrit 40.9 (L) 42.0 - 52.0 %    MCV 89.3 81.4 - 97.8 fL    MCH 26.6 (L) 27.0 - 33.0 pg    MCHC 29.8 (L) 33.7 - 35.3 g/dL    RDW 56.2 (H) 35.9 - 50.0 fL    Platelet Count 535 (H) 164 - 446 K/uL    MPV 9.2 9.0 - 12.9 fL    Neutrophils-Polys 78.30 (H) 44.00 - 72.00 %    Lymphocytes 13.80 (L) 22.00 - 41.00 %    Monocytes 6.30 0.00 - 13.40 %    Eosinophils 0.40 0.00 - 6.90 %    Basophils 0.50 0.00 - 1.80 %    Immature Granulocytes 0.70 0.00 - 0.90 %    Nucleated RBC 0.00 /100 WBC    Neutrophils (Absolute) 14.38 (H) 1.82 - 7.42 K/uL    Lymphs (Absolute) 2.53 1.00 - 4.80 K/uL    Monos (Absolute) 1.16 (H) 0.00 - 0.85 K/uL    Eos (Absolute) 0.08 0.00 - 0.51 K/uL    Baso (Absolute) 0.10 0.00 - 0.12 K/uL    Immature Granulocytes (abs) 0.13 (H) 0.00 - 0.11 K/uL    NRBC (Absolute) 0.00 K/uL   BASIC METABOLIC PANEL    Collection Time: 09/26/18  4:49 AM   Result Value Ref Range    Sodium 137 135 - 145 mmol/L    Potassium 4.5 3.6 - 5.5 mmol/L    Chloride 97 96 - 112 mmol/L    Co2 32 20 - 33 mmol/L    Glucose 93 65 - 99 mg/dL    Bun 11 8 - 22 mg/dL    Creatinine 0.64 0.50 - 1.40 mg/dL    Calcium 9.2 8.5 - 10.5 mg/dL    Anion Gap 8.0 0.0 - 11.9   ESTIMATED GFR    Collection Time: 09/26/18  4:49 AM   Result Value Ref Range    GFR If African American >60 >60 mL/min/1.73 m 2    GFR If Non African American >60 >60 mL/min/1.73 m 2       Fluids    Intake/Output Summary (Last 24 hours) at 09/26/18 1055  Last data filed at 09/26/18 1000   Gross per 24 hour   Intake          3722.99 ml   Output             3075 ml   Net           647.99 ml       Core Measures & Quality Metrics  Core Measures & Quality Metrics  BARI Score  ETOH Screening    Assessment/Plan  Empyema lung (HCC)- (present on admission)   Assessment & Plan    Failed prior decortication as he developed BP fistula as outpatient  Cefepime/vanco  9/25/2018 thoracotomy, decortication,  intercostal muscle flap. Two 32 Fr chest tubes in place.  9/19 Zosyn initiated  9/24 Zosyn altered cefepime initiated for Enterobacter cloacae and Streptococcus viridans  9/26 Cefepime day 3  Trend CXR        Acute respiratory failure with hypoxia (HCC)- (present on admission)   Assessment & Plan    High risk for deterioration s/p thoracotomy  Aggressive pain control and pulmonary toilet         Protein malnutrition (HCC)- (present on admission)   Assessment & Plan    Encourage high protein enteral intake         Panlobular emphysema (HCC)- (present on admission)   Assessment & Plan    Chronic condition, monitor          Discussed patient condition with RN, Patient and trauma surgery. Dr. Yen     Seen on rounds  Doing well clinically  Minimal O2 requirements  Ok to transfer to the floor  Discussed with RN, RT, pharmacy and Merlyn Yen MD

## 2018-09-26 NOTE — PROGRESS NOTES
2 RN Skin Check, chest tube incision sites are clean, dry, no edema. Redness on back, blanching. Sacrum pink/blanching, mepilex in place. No pressure concerns noted.

## 2018-09-26 NOTE — PROGRESS NOTES
Patient became slightly hypotensive with SBP of 77 after hooking chest tubes up to -10 of suction. BP cuff was repositioned and arms were changes. After taking chest tubes off suction BP returned to previous range in high 90s.

## 2018-09-26 NOTE — PROGRESS NOTES
2 RN skin check completed    Sacrum red, blanching. Mepilex in place  Chest tube dressing site clean and dry  Respiratory device site intact  Heels and elbows intact, no signs of breakdown

## 2018-09-26 NOTE — CARE PLAN
Problem: Bowel/Gastric:  Goal: Normal bowel function is maintained or improved  Patient tolerated ice chips, denies N/V. Clear liquid tray ordered, patient asleep at this time.    Problem: Respiratory:  Goal: Respiratory status will improve  Patient saturating 98-99% on 4L O2 nasal cannula. Denies SOB. Pulled 250 ml on IS. Chest tubes to water seal, moderate amount of sanguinous output.    Problem: Pain Management  Goal: Pain level will decrease to patient's comfort goal  Patient c/o L back/chest pain rated 7/10. Patient educated on epidural infusion, patient used button, upon reassessment patient asleep. Will continue to monitor.

## 2018-09-26 NOTE — PROGRESS NOTES
Progress Note:  9/26/2018, 11:38 AM    S: No acute events.  Afebrile, denies coughing up fluid. Pain ok. Chest tubes to water seal    O:  Blood pressure 124/69, pulse 88, temperature 36.1 °C (96.9 °F), resp. rate (!) 21, height 1.829 m (6'), weight 65.3 kg (143 lb 15.4 oz), SpO2 97 %.    NAD, awake, alert  Breathing nonlabored  L chest tubes to water seal, small expiratory air leak but large tidaling      A:   Active Hospital Problems    Diagnosis   • Empyema lung (HCC) [J86.9]     Priority: High     9/19 Zosyn initiated  9/24 Zosyn altered cefepime initiated for Enterobacter cloacae and Streptococcus viridans  9/26 Cefepime day 3     • Acute respiratory failure with hypoxia (HCC) [J96.01]     Priority: Medium   • Panlobular emphysema (HCC) [J43.1]     Priority: Medium   • Protein malnutrition (HCC) [E46]     Priority: Medium   • Tobacco abuse [Z72.0]     Priority: Low   • Gastroesophageal reflux disease without esophagitis [K21.9]   • Bradycardia [R00.1]   • Acute bilateral low back pain [M54.5]   • HCAP (healthcare-associated pneumonia) [J18.9]         P:   -Place chest tubes to -10 suction, monitor breathing  -Patient SHOULD NOT LAY ON RIGHT SIDE Please  -Continue aggressive pulmonary hygiene  -May ambulate off suction  -OOB to chair for all meals  -OK for transfer to [facility], back to hospitalist service primary.  I will follow closely  -Continue antibiotics  -F/u pleural peel OR cultures    Pb Gaviria M.D.  [facility]  [phone]

## 2018-09-26 NOTE — PROGRESS NOTES
Dr. Gaviria at bedside discussing yesterday's surgery with the patient. All questions and concerns were addressed by MD. Ayers for RN to remove arterial line. Andria for patient to transfer out of ICU.

## 2018-09-26 NOTE — CARE PLAN
Problem: Respiratory:  Goal: Respiratory status will improve  Outcome: PROGRESSING AS EXPECTED  Pt decreased to 3L from 4L oxygen nasal cannula, oxy saturation 97%.  Pt educated on importance of IS use, deep breathing and coughing. Strong effort on IS at 1250. Pt will continue to use IS q 1 hr and deep breathe/cough.

## 2018-09-27 ENCOUNTER — APPOINTMENT (OUTPATIENT)
Dept: RADIOLOGY | Facility: MEDICAL CENTER | Age: 58
DRG: 853 | End: 2018-09-27
Attending: NURSE PRACTITIONER

## 2018-09-27 LAB
BACTERIA TISS AEROBE CULT: ABNORMAL
GRAM STN SPEC: ABNORMAL
SIGNIFICANT IND 70042: ABNORMAL
SITE SITE: ABNORMAL
SOURCE SOURCE: ABNORMAL

## 2018-09-27 PROCEDURE — 700102 HCHG RX REV CODE 250 W/ 637 OVERRIDE(OP): Performed by: INTERNAL MEDICINE

## 2018-09-27 PROCEDURE — 700105 HCHG RX REV CODE 258: Performed by: INTERNAL MEDICINE

## 2018-09-27 PROCEDURE — A9270 NON-COVERED ITEM OR SERVICE: HCPCS | Performed by: INTERNAL MEDICINE

## 2018-09-27 PROCEDURE — 700105 HCHG RX REV CODE 258: Performed by: ANESTHESIOLOGY

## 2018-09-27 PROCEDURE — 94668 MNPJ CHEST WALL SBSQ: CPT

## 2018-09-27 PROCEDURE — 71045 X-RAY EXAM CHEST 1 VIEW: CPT

## 2018-09-27 PROCEDURE — 700111 HCHG RX REV CODE 636 W/ 250 OVERRIDE (IP): Performed by: ANESTHESIOLOGY

## 2018-09-27 PROCEDURE — 700102 HCHG RX REV CODE 250 W/ 637 OVERRIDE(OP): Performed by: ANESTHESIOLOGY

## 2018-09-27 PROCEDURE — 700111 HCHG RX REV CODE 636 W/ 250 OVERRIDE (IP): Performed by: INTERNAL MEDICINE

## 2018-09-27 PROCEDURE — A9270 NON-COVERED ITEM OR SERVICE: HCPCS | Performed by: ANESTHESIOLOGY

## 2018-09-27 PROCEDURE — 770001 HCHG ROOM/CARE - MED/SURG/GYN PRIV*

## 2018-09-27 RX ORDER — DEXMEDETOMIDINE HYDROCHLORIDE 4 UG/ML
.1-1.5 INJECTION, SOLUTION INTRAVENOUS CONTINUOUS
Status: DISCONTINUED | OUTPATIENT
Start: 2018-09-27 | End: 2018-09-27

## 2018-09-27 RX ADMIN — CELECOXIB 200 MG: 200 CAPSULE ORAL at 09:29

## 2018-09-27 RX ADMIN — DIPHENHYDRAMINE HYDROCHLORIDE 25 MG: 50 INJECTION, SOLUTION INTRAMUSCULAR; INTRAVENOUS at 20:31

## 2018-09-27 RX ADMIN — CEFEPIME 2 G: 2 INJECTION, POWDER, FOR SOLUTION INTRAVENOUS at 05:17

## 2018-09-27 RX ADMIN — CEFEPIME 2 G: 2 INJECTION, POWDER, FOR SOLUTION INTRAVENOUS at 13:29

## 2018-09-27 RX ADMIN — STANDARDIZED SENNA CONCENTRATE AND DOCUSATE SODIUM 2 TABLET: 8.6; 5 TABLET ORAL at 06:00

## 2018-09-27 RX ADMIN — CELECOXIB 200 MG: 200 CAPSULE ORAL at 18:15

## 2018-09-27 RX ADMIN — CEFEPIME 2 G: 2 INJECTION, POWDER, FOR SOLUTION INTRAVENOUS at 23:05

## 2018-09-27 RX ADMIN — NICOTINE 14 MG: 14 PATCH, EXTENDED RELEASE TRANSDERMAL at 05:16

## 2018-09-27 RX ADMIN — ROPIVACAINE HYDROCHLORIDE: 10 INJECTION, SOLUTION EPIDURAL at 02:13

## 2018-09-27 RX ADMIN — STANDARDIZED SENNA CONCENTRATE AND DOCUSATE SODIUM 2 TABLET: 8.6; 5 TABLET ORAL at 18:00

## 2018-09-27 ASSESSMENT — PAIN SCALES - GENERAL
PAINLEVEL_OUTOF10: 3
PAINLEVEL_OUTOF10: 2
PAINLEVEL_OUTOF10: 3
PAINLEVEL_OUTOF10: 2
PAINLEVEL_OUTOF10: 3

## 2018-09-27 ASSESSMENT — ENCOUNTER SYMPTOMS
GASTROINTESTINAL NEGATIVE: 1
SHORTNESS OF BREATH: 1
CONSTITUTIONAL NEGATIVE: 1
ROS GI COMMENTS: BM 9/23
MYALGIAS: 1

## 2018-09-27 NOTE — PROGRESS NOTES
2 RN skin check completed    Sacrum blanching, mepilex in place  Lines moved, no signs of breakdown

## 2018-09-27 NOTE — PROGRESS NOTES
Progress Note:  9/27/2018, 9:00 AM    S: No acute events. Breathing comfortably on 2L NC. Chest tubes to water seal overnight.  Connected to suction this morning. Afebrile.  No coughing up serosanguinous fluid    O:  Blood pressure 124/69, pulse (!) 102, temperature 36.1 °C (96.9 °F), resp. rate 16, height 1.829 m (6'), weight 63.4 kg (139 lb 12.4 oz), SpO2 94 %.    NAD, awake, alert  Breathing nonlabored  L chest tubes to -20 suction with small intermittent expiratory air leak in one tube      A:   Active Hospital Problems    Diagnosis   • Empyema lung (HCC) [J86.9]     Priority: High     9/19 Zosyn initiated  9/24 Zosyn altered cefepime initiated for Enterobacter cloacae and Streptococcus viridans  9/26 Cefepime day 3     • Acute respiratory failure with hypoxia (HCC) [J96.01]     Priority: Medium   • Panlobular emphysema (HCC) [J43.1]     Priority: Medium   • Protein malnutrition (HCC) [E46]     Priority: Medium   • Tobacco abuse [Z72.0]     Priority: Low   • Gastroesophageal reflux disease without esophagitis [K21.9]   • Bradycardia [R00.1]   • Acute bilateral low back pain [M54.5]   • HCAP (healthcare-associated pneumonia) [J18.9]         P:   -Continue chest tubes to -20 suction  -CXR today  -Aggressive pulmonary hygiene  -OOB to chair for all meals  -No lying R side down  -Plan to keep on suction through weekend  -Continue antibiotics for empyema  -OK for transfer to floor, please arrange hospitalist coverage as pt was initially on medical service    Pb Gaviria M.D.  [facility]  [phone]

## 2018-09-27 NOTE — PROGRESS NOTES
Pain Service    Patient comfortable. No nausea or weakness. Epidural site C/D/I. No changes in management.

## 2018-09-27 NOTE — PROGRESS NOTES
Trauma / Surgical Daily Progress Note    Date of Service  9/27/2018    Chief Complaint  58 y.o. male admitted 9/18/2018 with HCAP (healthcare-associated pneumonia)    Interval Events  POD # 2 - left thoracotomy with decortication   Trailed CT to suction - became hypotensive - water seal   CT X 2 - both to water seal - CT #1 - 220 cc out and CT #2 - 440 cc  CT # 2 with air leak   ABX day 10, Cefepime Day # 4   CXR pending  Labs in AM  Transfer to [facility]     Review of Systems  Review of Systems   Constitutional: Negative.    HENT: Negative.    Respiratory: Positive for shortness of breath.    Gastrointestinal: Negative.         BM 9/23   Genitourinary: Negative.    Musculoskeletal: Positive for myalgias (chest wall pain ).   All other systems reviewed and are negative.       Vital Signs  Pulse:  [] 95  Resp:  [14-40] 16    Physical Exam  Physical Exam   Constitutional: He is oriented to person, place, and time. He appears well-developed. No distress.   Pulmonary/Chest: Effort normal.   Diminished left lung  CT x 2 - #2 with air leak    Abdominal: Soft. There is no tenderness.   Musculoskeletal: Normal range of motion.   Neurological: He is alert and oriented to person, place, and time.   Nursing note and vitals reviewed.      Laboratory  No results found for this or any previous visit (from the past 24 hour(s)).    Fluids    Intake/Output Summary (Last 24 hours) at 09/27/18 0841  Last data filed at 09/27/18 0600   Gross per 24 hour   Intake              852 ml   Output             2730 ml   Net            -1878 ml       Core Measures & Quality Metrics  Labs reviewed and Medications reviewed  Motley catheter: No Motley      DVT Prophylaxis: Contraindicated - High bleeding risk (epidural )        Assessed for rehab: Patient was assess for and/or received rehabilitation services during this hospitalization    BARI Score  ETOH Screening    Assessment/Plan  Empyema lung (HCC)- (present on admission)   Assessment & Plan     Failed prior decortication as he developed BP fistula as outpatient  Cefepime/vanco  9/25/2018 thoracotomy, decortication, intercostal muscle flap. Two 32 Fr chest tubes in place.  9/19 Zosyn initiated  9/24 Zosyn altered cefepime initiated for Enterobacter cloacae and Streptococcus viridans  9/26 Cefepime day 3  Trend CXR        Acute respiratory failure with hypoxia (HCC)- (present on admission)   Assessment & Plan    High risk for deterioration s/p thoracotomy  Aggressive pain control and pulmonary toilet         Protein malnutrition (HCC)- (present on admission)   Assessment & Plan    Encourage high protein enteral intake         Panlobular emphysema (HCC)- (present on admission)   Assessment & Plan    Chronic condition, monitor            Discussed patient condition with RN, Patient and trauma surgery. Dr. Yen     Seen on rounds  Remains relatively stable  Chest tubes intermittently on suction  Ok to floor  Discussed with RN, RT, pharmacy and Merlyn Yen MD

## 2018-09-27 NOTE — CARE PLAN
Problem: Knowledge Deficit  Goal: Knowledge of disease process/condition, treatment plan, diagnostic tests, and medications will improve    Intervention: Explain information regarding disease process/condition, treatment plan, diagnostic tests, and medications and document in education  Patient educated regarding transfer status, chest tube management, and pain control.       Problem: Mobility  Goal: Risk for activity intolerance will decrease    Intervention: Encourage patient to increase activity level in collaboration with Interdisciplinary Team  Activity encouraged, and mobility performed with assistance.

## 2018-09-28 ENCOUNTER — APPOINTMENT (OUTPATIENT)
Dept: RADIOLOGY | Facility: MEDICAL CENTER | Age: 58
DRG: 853 | End: 2018-09-28
Attending: NURSE PRACTITIONER

## 2018-09-28 LAB
ANISOCYTOSIS BLD QL SMEAR: ABNORMAL
BACTERIA SPEC ANAEROBE CULT: NORMAL
BASOPHILS # BLD AUTO: 0.9 % (ref 0–1.8)
BASOPHILS # BLD: 0.15 K/UL (ref 0–0.12)
EOSINOPHIL # BLD AUTO: 0.57 K/UL (ref 0–0.51)
EOSINOPHIL NFR BLD: 3.5 % (ref 0–6.9)
ERYTHROCYTE [DISTWIDTH] IN BLOOD BY AUTOMATED COUNT: 55.7 FL (ref 35.9–50)
HCT VFR BLD AUTO: 31.8 % (ref 42–52)
HGB BLD-MCNC: 9.4 G/DL (ref 14–18)
HYPOCHROMIA BLD QL SMEAR: ABNORMAL
LYMPHOCYTES # BLD AUTO: 2.12 K/UL (ref 1–4.8)
LYMPHOCYTES NFR BLD: 13 % (ref 22–41)
MACROCYTES BLD QL SMEAR: ABNORMAL
MANUAL DIFF BLD: NORMAL
MCH RBC QN AUTO: 26 PG (ref 27–33)
MCHC RBC AUTO-ENTMCNC: 29.6 G/DL (ref 33.7–35.3)
MCV RBC AUTO: 87.8 FL (ref 81.4–97.8)
MONOCYTES # BLD AUTO: 0.7 K/UL (ref 0–0.85)
MONOCYTES NFR BLD AUTO: 4.3 % (ref 0–13.4)
MORPHOLOGY BLD-IMP: NORMAL
NEUTROPHILS # BLD AUTO: 12.76 K/UL (ref 1.82–7.42)
NEUTROPHILS NFR BLD: 78.3 % (ref 44–72)
NRBC # BLD AUTO: 0 K/UL
NRBC BLD-RTO: 0 /100 WBC
PLATELET # BLD AUTO: 529 K/UL (ref 164–446)
PLATELET BLD QL SMEAR: NORMAL
PMV BLD AUTO: 9.6 FL (ref 9–12.9)
RBC # BLD AUTO: 3.62 M/UL (ref 4.7–6.1)
RBC BLD AUTO: PRESENT
SIGNIFICANT IND 70042: NORMAL
SITE SITE: NORMAL
SOURCE SOURCE: NORMAL
TARGETS BLD QL SMEAR: NORMAL
WBC # BLD AUTO: 16.3 K/UL (ref 4.8–10.8)

## 2018-09-28 PROCEDURE — 700102 HCHG RX REV CODE 250 W/ 637 OVERRIDE(OP): Performed by: ANESTHESIOLOGY

## 2018-09-28 PROCEDURE — 97161 PT EVAL LOW COMPLEX 20 MIN: CPT

## 2018-09-28 PROCEDURE — 700105 HCHG RX REV CODE 258: Performed by: INTERNAL MEDICINE

## 2018-09-28 PROCEDURE — A9270 NON-COVERED ITEM OR SERVICE: HCPCS | Performed by: ANESTHESIOLOGY

## 2018-09-28 PROCEDURE — 700102 HCHG RX REV CODE 250 W/ 637 OVERRIDE(OP): Performed by: INTERNAL MEDICINE

## 2018-09-28 PROCEDURE — 85027 COMPLETE CBC AUTOMATED: CPT

## 2018-09-28 PROCEDURE — 700105 HCHG RX REV CODE 258: Performed by: SURGERY

## 2018-09-28 PROCEDURE — A9270 NON-COVERED ITEM OR SERVICE: HCPCS | Performed by: NURSE PRACTITIONER

## 2018-09-28 PROCEDURE — A9270 NON-COVERED ITEM OR SERVICE: HCPCS | Performed by: INTERNAL MEDICINE

## 2018-09-28 PROCEDURE — 700102 HCHG RX REV CODE 250 W/ 637 OVERRIDE(OP): Performed by: NURSE PRACTITIONER

## 2018-09-28 PROCEDURE — 700111 HCHG RX REV CODE 636 W/ 250 OVERRIDE (IP): Performed by: SURGERY

## 2018-09-28 PROCEDURE — 94668 MNPJ CHEST WALL SBSQ: CPT

## 2018-09-28 PROCEDURE — 770006 HCHG ROOM/CARE - MED/SURG/GYN SEMI*

## 2018-09-28 PROCEDURE — G8978 MOBILITY CURRENT STATUS: HCPCS | Mod: CJ

## 2018-09-28 PROCEDURE — 700111 HCHG RX REV CODE 636 W/ 250 OVERRIDE (IP): Performed by: INTERNAL MEDICINE

## 2018-09-28 PROCEDURE — 99233 SBSQ HOSP IP/OBS HIGH 50: CPT | Performed by: SURGERY

## 2018-09-28 PROCEDURE — G8979 MOBILITY GOAL STATUS: HCPCS | Mod: CI

## 2018-09-28 PROCEDURE — 71045 X-RAY EXAM CHEST 1 VIEW: CPT

## 2018-09-28 PROCEDURE — 85007 BL SMEAR W/DIFF WBC COUNT: CPT

## 2018-09-28 RX ORDER — HYDROMORPHONE HYDROCHLORIDE 2 MG/ML
0.5 INJECTION, SOLUTION INTRAMUSCULAR; INTRAVENOUS; SUBCUTANEOUS
Status: DISCONTINUED | OUTPATIENT
Start: 2018-09-28 | End: 2018-10-10 | Stop reason: HOSPADM

## 2018-09-28 RX ORDER — OXYCODONE HYDROCHLORIDE 5 MG/1
5 TABLET ORAL
Status: DISCONTINUED | OUTPATIENT
Start: 2018-09-28 | End: 2018-10-10 | Stop reason: HOSPADM

## 2018-09-28 RX ORDER — SODIUM CHLORIDE 9 MG/ML
INJECTION, SOLUTION INTRAVENOUS
Status: ACTIVE
Start: 2018-09-28 | End: 2018-09-29

## 2018-09-28 RX ORDER — OXYCODONE HYDROCHLORIDE 10 MG/1
10 TABLET ORAL
Status: DISCONTINUED | OUTPATIENT
Start: 2018-09-28 | End: 2018-10-10 | Stop reason: HOSPADM

## 2018-09-28 RX ADMIN — OXYCODONE HYDROCHLORIDE 5 MG: 5 TABLET ORAL at 16:01

## 2018-09-28 RX ADMIN — STANDARDIZED SENNA CONCENTRATE AND DOCUSATE SODIUM 2 TABLET: 8.6; 5 TABLET ORAL at 06:00

## 2018-09-28 RX ADMIN — ACETAMINOPHEN 650 MG: 325 TABLET, FILM COATED ORAL at 16:01

## 2018-09-28 RX ADMIN — STANDARDIZED SENNA CONCENTRATE AND DOCUSATE SODIUM 2 TABLET: 8.6; 5 TABLET ORAL at 18:00

## 2018-09-28 RX ADMIN — CELECOXIB 200 MG: 200 CAPSULE ORAL at 08:00

## 2018-09-28 RX ADMIN — MEROPENEM 500 MG: 500 INJECTION, POWDER, FOR SOLUTION INTRAVENOUS at 12:29

## 2018-09-28 RX ADMIN — CELECOXIB 200 MG: 200 CAPSULE ORAL at 17:29

## 2018-09-28 RX ADMIN — MEROPENEM 500 MG: 500 INJECTION, POWDER, FOR SOLUTION INTRAVENOUS at 17:29

## 2018-09-28 RX ADMIN — CEFEPIME 2 G: 2 INJECTION, POWDER, FOR SOLUTION INTRAVENOUS at 05:33

## 2018-09-28 RX ADMIN — OXYCODONE HYDROCHLORIDE 10 MG: 10 TABLET ORAL at 21:09

## 2018-09-28 RX ADMIN — NICOTINE 14 MG: 14 PATCH, EXTENDED RELEASE TRANSDERMAL at 05:33

## 2018-09-28 ASSESSMENT — COGNITIVE AND FUNCTIONAL STATUS - GENERAL
SUGGESTED CMS G CODE MODIFIER MOBILITY: CJ
TURNING FROM BACK TO SIDE WHILE IN FLAT BAD: A LITTLE
MOVING TO AND FROM BED TO CHAIR: A LITTLE
MOBILITY SCORE: 22

## 2018-09-28 ASSESSMENT — PAIN SCALES - GENERAL
PAINLEVEL_OUTOF10: 3
PAINLEVEL_OUTOF10: 4
PAINLEVEL_OUTOF10: 3
PAINLEVEL_OUTOF10: 3
PAINLEVEL_OUTOF10: 6
PAINLEVEL_OUTOF10: 3

## 2018-09-28 ASSESSMENT — GAIT ASSESSMENTS
ASSISTIVE DEVICE: FRONT WHEEL WALKER
DISTANCE (FEET): 100
GAIT LEVEL OF ASSIST: STAND BY ASSIST

## 2018-09-28 ASSESSMENT — ENCOUNTER SYMPTOMS
SHORTNESS OF BREATH: 1
GASTROINTESTINAL NEGATIVE: 1
MYALGIAS: 1
CONSTITUTIONAL NEGATIVE: 1

## 2018-09-28 NOTE — PROGRESS NOTES
Patient arrived to unit. Patient transferred from wheel chair to bed with minimal staff assistance.     Report received from ICU RN.   Assessment complete.  A&O x 4. Patient calls appropriately.  Patient ambualtes with FWW and standby assist.   Patient has 3/10 pain. Medicated per MAR.  Epidural in place   Denies N&V. Tolerating regular diet.  Surgical incision in place left chest. Prevena in place.   Chest tubes x2 left chest. Chest tubes to 20 cm suction at this time. No leak appreciated upon assessment.  + void (crystal), + flatus  Patient denies SOB.  SCD's in use.    Review plan with of care with patient. Call light and personal belongings with in reach. Hourly rounding in place. All needs met at this time.    2 RN Skin Check Complete  Oxygen via nasal canula in use. Patient wears glasses. Some redness, blanching noted behind ears. Silicone O2 tubing in use.   Chest tubes x2 left chest. Dressing clean, dry, intact. Prevena in place lateral left chest.   Epidural in place upper thoracic spine. Unable to visualize insertion site at this time because tape is covering it. Otherwise, dressing appears clean and intact.   Some redness, blanchable, noted at sacrum. Some excoriation at sacrum.   Statlock in place right thigh.   Bruising noted right great toe. Patient states it has been this way.

## 2018-09-28 NOTE — PROGRESS NOTES
Anesthesia Pain Service    Patient is POD # 3 from a left thoracotomy by Dr Gaviria.  The patient states that his pain control is excellent and there are no gross side effects to the post op epidural infusion currently running at 6 cc/hr.  The insertion site of the thoracic epidural is CDI.  The case was discussed with nursing and the plan is for possible transfer to floor today pending evaluation by Dr Gaviria.  The anesthesia pain service remains immediately available should any need arise.    Mac Rivera MD

## 2018-09-28 NOTE — CARE PLAN
Problem: Safety  Goal: Will remain free from falls    Intervention: Implement fall precautions  Educate pt to call for assistance.

## 2018-09-28 NOTE — PROGRESS NOTES
9/28  S:  58 y.o.male s/p Thoracotomy, Decortication, Muscle Flap transposition by Dr Gaviria  POD# 3.  Patient stable in ICU sitting up in bed this morning, tolerating diet and reports good pain control.  He has an epidural in place and this is due to be removed by anesthesia today.  Able to sit up but not yet ambulating at this time.  CTs remain to suction as planned.      O:  Blood pressure 124/69, pulse 83, temperature 36.1 °C (96.9 °F), resp. rate 18, height 1.829 m (6'), weight 62.2 kg (137 lb 2 oz), SpO2 96 %.  I/O last 3 completed shifts:  In: 1792 [P.O.:920; I.V.:372]  Out: 3160 [Urine:2565]  Recent Labs      09/26/18   0449   SODIUM  137   POTASSIUM  4.5   CHLORIDE  97   CO2  32   GLUCOSE  93   BUN  11   CREATININE  0.64   CALCIUM  9.2     Recent Labs      09/25/18   1208  09/26/18   0449  09/28/18   0515   WBC  27.9*  18.4*  16.3*   RBC  4.50*  4.58*  3.62*   HEMOGLOBIN  11.8*  12.2*  9.4*   HEMATOCRIT  39.4*  40.9*  31.8*   MCV  87.6  89.3  87.8   MCH  26.2*  26.6*  26.0*   MCHC  29.9*  29.8*  29.6*   RDW  54.4*  56.2*  55.7*   PLATELETCT  595*  535*  529*   MPV  9.0  9.2  9.6       Alert and Oriented x3, No Acute Distress  Normal Respiratory Effort  Abdomen soft, appropriately tender  Incisions/Bandages clean/dry/intact  Extremities warm and well perfused  CTs in place x2- serosagniunous output and small airleak only at this time    A/P:  Pain control with PO meds after epidural removed  Diet as tolerated  Fluids SLIV  Ambulate tid and ad yu-PT/OT consult to assist  Continue IV Abx as planned for empyema  Ok for transfer to floor- hospitalist primary  D/C Tiesha today

## 2018-09-28 NOTE — CARE PLAN
Problem: Respiratory:  Goal: Respiratory status will improve    Intervention: Educate and encourage coughing and deep breathing  Encourage deep breathing and coughing.

## 2018-09-28 NOTE — PROGRESS NOTES
Report given to ROLANDO Bales over phone. All questions addressed. Patient aware of new room assignment. RN to transfer patient to [facility] at this time.

## 2018-09-28 NOTE — CARE PLAN
Problem: Infection  Goal: Will remain free from infection  Outcome: PROGRESSING SLOWER THAN EXPECTED  Pt receiving antibiotics. Assessing for signs of infection- Elevated WBC, increased temperature. Motley care given per protocol.    Problem: Pain Management  Goal: Pain level will decrease to patient's comfort goal  Outcome: PROGRESSING AS EXPECTED  Pt educated and aware of use of epidural PCA. Pt verbalizes understanding, states pain is well controlled.

## 2018-09-28 NOTE — PROGRESS NOTES
Trauma / Surgical Daily Progress Note    Date of Service  9/28/2018    Chief Complaint  58 y.o. male admitted 9/18/2018 with HCAP (healthcare-associated pneumonia)  POD # 3 - left thoracotomy with decortication     Interval Events  CT X 2 - both to suction- CT #1 - 65 cc out and CT #2 - 230 cc  CT # 2 with tiny air leak   Culture resistant -ABX day # 11 - Meropenem day # 1   Transfer to [facility] to hospitalist service     Nursing notes that anesthesiology may remove epidural today - will start oral narcotics as well as Lovenox upon removal      Review of Systems  Review of Systems   Constitutional: Negative.    HENT: Negative.    Respiratory: Positive for shortness of breath.    Gastrointestinal: Negative.    Genitourinary: Negative.    Musculoskeletal: Positive for myalgias (chest wall pain ).   All other systems reviewed and are negative.       Vital Signs  Pulse:  [] 93  Resp:  [14-17] 16    Physical Exam  Physical Exam   Constitutional: He is oriented to person, place, and time. He appears well-developed. No distress.   Pulmonary/Chest: Effort normal.   Diminished left lung  CT x 2 - #2 with air leak   Incisional wound vac in place   Moist cough   Musculoskeletal: Normal range of motion.   Neurological: He is alert and oriented to person, place, and time.   Nursing note and vitals reviewed.      Laboratory  Recent Results (from the past 24 hour(s))   CBC WITH DIFFERENTIAL    Collection Time: 09/28/18  5:15 AM   Result Value Ref Range    WBC 16.3 (H) 4.8 - 10.8 K/uL    RBC 3.62 (L) 4.70 - 6.10 M/uL    Hemoglobin 9.4 (L) 14.0 - 18.0 g/dL    Hematocrit 31.8 (L) 42.0 - 52.0 %    MCV 87.8 81.4 - 97.8 fL    MCH 26.0 (L) 27.0 - 33.0 pg    MCHC 29.6 (L) 33.7 - 35.3 g/dL    RDW 55.7 (H) 35.9 - 50.0 fL    Platelet Count 529 (H) 164 - 446 K/uL    MPV 9.6 9.0 - 12.9 fL    Neutrophils-Polys 78.30 (H) 44.00 - 72.00 %    Lymphocytes 13.00 (L) 22.00 - 41.00 %    Monocytes 4.30 0.00 - 13.40 %    Eosinophils 3.50 0.00 - 6.90 %     Basophils 0.90 0.00 - 1.80 %    Nucleated RBC 0.00 /100 WBC    Neutrophils (Absolute) 12.76 (H) 1.82 - 7.42 K/uL    Lymphs (Absolute) 2.12 1.00 - 4.80 K/uL    Monos (Absolute) 0.70 0.00 - 0.85 K/uL    Eos (Absolute) 0.57 (H) 0.00 - 0.51 K/uL    Baso (Absolute) 0.15 (H) 0.00 - 0.12 K/uL    NRBC (Absolute) 0.00 K/uL    Hypochromia 1+     Anisocytosis 1+     Macrocytosis 1+    DIFFERENTIAL MANUAL    Collection Time: 09/28/18  5:15 AM   Result Value Ref Range    Manual Diff Status PERFORMED    PERIPHERAL SMEAR REVIEW    Collection Time: 09/28/18  5:15 AM   Result Value Ref Range    Peripheral Smear Review see below    PLATELET ESTIMATE    Collection Time: 09/28/18  5:15 AM   Result Value Ref Range    Plt Estimation Increased    MORPHOLOGY    Collection Time: 09/28/18  5:15 AM   Result Value Ref Range    RBC Morphology Present     Target Cells 2+        Fluids    Intake/Output Summary (Last 24 hours) at 09/28/18 0927  Last data filed at 09/28/18 0600   Gross per 24 hour   Intake             1188 ml   Output             1550 ml   Net             -362 ml       Core Measures & Quality Metrics  Medications reviewed, Labs reviewed and Radiology images reviewed  Motley catheter: No Motley      DVT Prophylaxis: Contraindicated - High bleeding risk            BARI Score  ETOH Screening    Assessment/Plan  Empyema lung (HCC)- (present on admission)   Assessment & Plan    Failed prior decortication as he developed BP fistula as outpatient  Cefepime/vanco  9/25/2018 thoracotomy, decortication, intercostal muscle flap. Two 32 Fr chest tubes in place.  9/19 Zosyn initiated  9/24 Zosyn altered cefepime initiated for Enterobacter cloacae and Streptococcus viridans  9/28 Enterobacter cloacae now resistant to cefepime therapy altered to meropenem.  9/28 Meropenem day 1  Trend CXR        Acute respiratory failure with hypoxia (HCC)- (present on admission)   Assessment & Plan    High risk for deterioration s/p thoracotomy  Aggressive pain  control and pulmonary toilet         Protein malnutrition (HCC)- (present on admission)   Assessment & Plan    Encourage high protein enteral intake         Panlobular emphysema (HCC)- (present on admission)   Assessment & Plan    Chronic condition, monitor            Discussed patient condition with RN, Patient and trauma surgery. Dr. Yen     Seen on rounds  Remains clinically stable  Ok for floor  Discussed with RN, RT, pharmacy and Merlyn Yen MD

## 2018-09-29 ENCOUNTER — APPOINTMENT (OUTPATIENT)
Dept: RADIOLOGY | Facility: MEDICAL CENTER | Age: 58
DRG: 853 | End: 2018-09-29
Attending: NURSE PRACTITIONER

## 2018-09-29 PROBLEM — R00.1 BRADYCARDIA: Status: RESOLVED | Noted: 2018-09-23 | Resolved: 2018-09-29

## 2018-09-29 PROCEDURE — 99233 SBSQ HOSP IP/OBS HIGH 50: CPT | Performed by: INTERNAL MEDICINE

## 2018-09-29 PROCEDURE — 700102 HCHG RX REV CODE 250 W/ 637 OVERRIDE(OP): Performed by: NURSE PRACTITIONER

## 2018-09-29 PROCEDURE — 94669 MECHANICAL CHEST WALL OSCILL: CPT

## 2018-09-29 PROCEDURE — 71045 X-RAY EXAM CHEST 1 VIEW: CPT

## 2018-09-29 PROCEDURE — 700111 HCHG RX REV CODE 636 W/ 250 OVERRIDE (IP): Performed by: SURGERY

## 2018-09-29 PROCEDURE — A9270 NON-COVERED ITEM OR SERVICE: HCPCS | Performed by: INTERNAL MEDICINE

## 2018-09-29 PROCEDURE — 700105 HCHG RX REV CODE 258: Performed by: SURGERY

## 2018-09-29 PROCEDURE — 99255 IP/OBS CONSLTJ NEW/EST HI 80: CPT | Performed by: INTERNAL MEDICINE

## 2018-09-29 PROCEDURE — A9270 NON-COVERED ITEM OR SERVICE: HCPCS | Performed by: ANESTHESIOLOGY

## 2018-09-29 PROCEDURE — 700102 HCHG RX REV CODE 250 W/ 637 OVERRIDE(OP): Performed by: INTERNAL MEDICINE

## 2018-09-29 PROCEDURE — 94668 MNPJ CHEST WALL SBSQ: CPT

## 2018-09-29 PROCEDURE — 770006 HCHG ROOM/CARE - MED/SURG/GYN SEMI*

## 2018-09-29 PROCEDURE — A9270 NON-COVERED ITEM OR SERVICE: HCPCS | Performed by: NURSE PRACTITIONER

## 2018-09-29 PROCEDURE — 700102 HCHG RX REV CODE 250 W/ 637 OVERRIDE(OP): Performed by: ANESTHESIOLOGY

## 2018-09-29 RX ADMIN — FAMOTIDINE 20 MG: 20 TABLET ORAL at 00:31

## 2018-09-29 RX ADMIN — OXYCODONE HYDROCHLORIDE 5 MG: 5 TABLET ORAL at 21:11

## 2018-09-29 RX ADMIN — CELECOXIB 200 MG: 200 CAPSULE ORAL at 08:47

## 2018-09-29 RX ADMIN — ACETAMINOPHEN 650 MG: 325 TABLET, FILM COATED ORAL at 18:06

## 2018-09-29 RX ADMIN — NICOTINE 14 MG: 14 PATCH, EXTENDED RELEASE TRANSDERMAL at 05:12

## 2018-09-29 RX ADMIN — MEROPENEM 500 MG: 500 INJECTION, POWDER, FOR SOLUTION INTRAVENOUS at 00:26

## 2018-09-29 RX ADMIN — CELECOXIB 200 MG: 200 CAPSULE ORAL at 17:30

## 2018-09-29 RX ADMIN — POLYETHYLENE GLYCOL 3350 1 PACKET: 17 POWDER, FOR SOLUTION ORAL at 13:22

## 2018-09-29 RX ADMIN — OXYCODONE HYDROCHLORIDE 5 MG: 5 TABLET ORAL at 18:06

## 2018-09-29 RX ADMIN — MEROPENEM 500 MG: 500 INJECTION, POWDER, FOR SOLUTION INTRAVENOUS at 18:06

## 2018-09-29 RX ADMIN — STANDARDIZED SENNA CONCENTRATE AND DOCUSATE SODIUM 2 TABLET: 8.6; 5 TABLET ORAL at 18:00

## 2018-09-29 RX ADMIN — MEROPENEM 500 MG: 500 INJECTION, POWDER, FOR SOLUTION INTRAVENOUS at 05:12

## 2018-09-29 RX ADMIN — STANDARDIZED SENNA CONCENTRATE AND DOCUSATE SODIUM 2 TABLET: 8.6; 5 TABLET ORAL at 05:12

## 2018-09-29 RX ADMIN — OXYCODONE HYDROCHLORIDE 10 MG: 10 TABLET ORAL at 08:47

## 2018-09-29 RX ADMIN — ACETAMINOPHEN 650 MG: 325 TABLET, FILM COATED ORAL at 08:47

## 2018-09-29 RX ADMIN — MEROPENEM 500 MG: 500 INJECTION, POWDER, FOR SOLUTION INTRAVENOUS at 13:15

## 2018-09-29 RX ADMIN — MAGNESIUM HYDROXIDE 30 ML: 400 SUSPENSION ORAL at 13:22

## 2018-09-29 ASSESSMENT — ENCOUNTER SYMPTOMS
COUGH: 1
MYALGIAS: 0
SHORTNESS OF BREATH: 0
HEMOPTYSIS: 1
NAUSEA: 0
WEAKNESS: 1
HEADACHES: 0
ABDOMINAL PAIN: 0
BACK PAIN: 1
CONSTIPATION: 0

## 2018-09-29 ASSESSMENT — PAIN SCALES - GENERAL
PAINLEVEL_OUTOF10: 6
PAINLEVEL_OUTOF10: 7

## 2018-09-29 NOTE — PROGRESS NOTES
[facility] Hospitalist Progress Note    Date of Service: 2018    Chief Complaint  58 y.o. male admitted 2018 with COPD and tobacco, recent hospitalization for hydropneumothorax and empyema s/p left thoracotomy decortication, lung debridement and rib resection. Resp cx grew strep group B and was discharged on course of augmentin. He presented from Dr. Gaviria's office with cough and SOB. CT showed persistent left hydropneumothorax. IR consulted for pigtail drain placement to pleurvac suction. He had bronchoscopy that showed white purulent secretions that was irrigated and suctioned. 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     Interval Problem Update  Has productive cough with dark streaks. Denies SOB. Eating fairly.   Chest tube pain is controlled. One chest tube still has air leak.  ID consulted  Chest tube with 215cc output    Consultants/Specialty  Surgery   IR  Pulm  ID    Disposition  Chest tube management, likely needs PICC for meropenem  Lives in [place]        Review of Systems   HENT: Negative for congestion.    Respiratory: Positive for cough and hemoptysis. Negative for shortness of breath.    Cardiovascular: Positive for chest pain.   Gastrointestinal: Negative for abdominal pain, constipation and nausea.   Musculoskeletal: Positive for back pain. Negative for myalgias.   Skin: Negative for itching.   Neurological: Positive for weakness. Negative for headaches.      Physical Exam  Laboratory/Imaging   Hemodynamics  Temp (24hrs), Av.7 °C (98.1 °F), Min:36.6 °C (97.8 °F), Max:36.9 °C (98.4 °F)   Temperature: 36.7 °C (98 °F), Monitored Temp: 37.7 °C (99.9 °F)  Pulse  Av.4  Min: 43  Max: 112    Blood Pressure: 118/73, NIBP: 103/60      Respiratory      Respiration: 18, Pulse Oximetry: 96 %, O2 Daily Delivery Respiratory : Silicone Nasal Cannula     PEP/CPT Method: Positive  Airway Pressure Device, Work Of Breathing / Effort: Mild  RUL Breath Sounds: Clear, RML Breath Sounds: Clear, RLL Breath Sounds: Clear, JOSSE Breath Sounds: Diminished, LLL Breath Sounds: Diminished    Fluids    Intake/Output Summary (Last 24 hours) at 09/29/18 1348  Last data filed at 09/29/18 1150   Gross per 24 hour   Intake              960 ml   Output             2735 ml   Net            -1775 ml       Nutrition  Orders Placed This Encounter   Procedures   • Diet Order Regular     Standing Status:   Standing     Number of Occurrences:   1     Order Specific Question:   Diet:     Answer:   Regular [1]     Physical Exam   Constitutional: He is oriented to person, place, and time.   Thin, frail   HENT:   Head: Atraumatic.   Mouth/Throat: Oropharynx is clear and moist.   Eyes: Conjunctivae are normal. Right eye exhibits no discharge. Left eye exhibits no discharge.   Cardiovascular: Normal rate and regular rhythm.    Pulmonary/Chest: Effort normal. He has wheezes. He has rales.   Left chest tubes in place   Abdominal: Soft. There is no tenderness. There is no rebound and no guarding.   Musculoskeletal: He exhibits no edema.   Diffuse muscle atrophy   Neurological: He is alert and oriented to person, place, and time.   Skin: Skin is warm and dry.   Nursing note and vitals reviewed.      Recent Labs      09/28/18   0515   WBC  16.3*   RBC  3.62*   HEMOGLOBIN  9.4*   HEMATOCRIT  31.8*   MCV  87.8   MCH  26.0*   MCHC  29.6*   RDW  55.7*   PLATELETCT  529*   MPV  9.6                          Assessment/Plan     Empyema lung (HCC)- (present on admission)   Assessment & Plan    9/19 Zosyn initiated  9/24 Zosyn altered cefepime initiated for Enterobacter cloacae and Streptococcus viridans  9/28 Enterobacter cloacae now resistant to cefepime therapy altered to meropenem.  ID consulted, will continue on meropenem        Acute respiratory failure with hypoxia (HCC)- (present on admission)   Assessment & Plan    Secondary to  empyema and HCAP and alveolar-pleural fistula  Chest tubes still with leak, surgery following  Continue abx  RT protocol  Improving        Protein malnutrition (HCC)- (present on admission)   Assessment & Plan    Nutrition consult        Panlobular emphysema (HCC)- (present on admission)   Assessment & Plan    Continue supplemental oxygen and RT protocol  DuoNeb as needed        Gastroesophageal reflux disease without esophagitis   Assessment & Plan    pepcid prn        Acute bilateral low back pain- (present on admission)   Assessment & Plan    Likely musculoskeletal  Add Flexeril as needed  No anesthesia or focal weakness  Improved          HCAP (healthcare-associated pneumonia)- (present on admission)   Assessment & Plan    Continue abx per ID  Started on supplemental oxygen and RT protocol        Tobacco abuse- (present on admission)   Assessment & Plan    Tobacco cessation education provided for more than 4 minutes, discussed options of nicotine patch, medical treatment with wellbutrin and chantix. Discussed the risks of smoking including increased risk of heart disease, stroke, cancer and COPD. Discussed the benefits of quitting smoking. Nicotine replacement protocol will be provided to the patient.    Greater than 30-pack-year  Advised cessation            Quality-Core Measures   Reviewed items::  EKG reviewed, Labs reviewed, Medications reviewed and Radiology images reviewed  Motley catheter::  No Motley  DVT prophylaxis - mechanical:  SCDs

## 2018-09-29 NOTE — CONSULTS
[facility] INFECTIOUS DISEASES INPATIENT CONSULT NOTE     Date of Service: 9/29/2018    Consult Requested By: Sulaiman Schmitt M.D.    Reason for Consultation: Recurrent empyema    Chief Complaint: Infection    History of Present Illness:     Donna Ceballos is a 58 y.o. male with past medical history of COPD and tobacco use, recent hospitalization for hydropneumothorax and left empyema status post left thoracotomy, decortication, debridement, and rib resection in July 2018.  Patient is now admitted 9/18/2018 when he presented with cough and shortness of breath, with CT that showed a persistent left hydropneumothorax, with clinical evidence of empyema, and concern for an alveolar pleural fistula. IR placed a pigtail drain initially. He underwent repeat thoracotomy, decortication, and intercostal muscle flap transposition to the parenchymal airway fistula on 9/25.    Pleural fluid cultures from 9/19 grew Enterobacter cloacae and viridans Strep.  OR cultures again grew Enterobacter, but this time resistant to cephalosporins and Zosyn.  Antibiotics were switched to meropenem.    During his prior hospitalization in July, the empyema culture grew group F strep.  He was treated with IV Unasyn for 2 weeks, followed by p.o. Augmentin for 2 weeks which was completed 8/8 2018.    She reports that he feels better, some chest pain at the chest tube site.    Review of Systems:  All other systems reviewed and are negative expect as noted in HPI    Past Medical History:   Diagnosis Date   • Chronic obstructive pulmonary disease (HCC)        Past Surgical History:   Procedure Laterality Date   • THORACOTOMY Left 9/25/2018    Procedure: THORACOTOMY- REDO;  Surgeon: Pb Gaviria M.D.;  Location: SURGERY [facility];  Service: Thoracic   • DECORTICATION Left 9/25/2018    Procedure: DECORTICATION;  Surgeon: Pb Gaviria M.D.;  Location: SURGERY [facility];  Service: Thoracic   • BRONCHOSCOPY-ENDO N/A 9/20/2018    Procedure:  BRONCHOSCOPY-ENDO;  Surgeon: Muhammad K Gondal, M.D.;  Location: ENDOSCOPY [facility];  Service: Gastroenterology   • THORACOTOMY Left 7/11/2018    Procedure: THORACOTOMY-WITH DECORTICATION;  Surgeon: Diego Martínez M.D.;  Location: SURGERY [facility];  Service: General     Family History  No history of similar illness in family    Social History     Social History   • Marital status: Single     Spouse name: N/A   • Number of children: N/A   • Years of education: N/A     Occupational History   • Not on file.     Social History Main Topics   • Smoking status: Current Every Day Smoker     Packs/day: 1.00     Types: Cigarettes   • Smokeless tobacco: Never Used   • Alcohol use Yes   • Drug use: Yes     Types: Inhaled      Comment: cannabis   • Sexual activity: Not on file     Other Topics Concern   • Not on file     Social History Narrative   • No narrative on file       No Known Allergies    Medications:    Current Facility-Administered Medications:   •  meropenem (MERREM) 500 mg in  mL IVPB, 500 mg, Intravenous, Q6HRS, Evelio Yen M.D., Stopped at 09/29/18 0542  •  oxyCODONE immediate-release (ROXICODONE) tablet 5 mg, 5 mg, Oral, Q3HRS PRN, 5 mg at 09/28/18 1601 **OR** oxyCODONE immediate release (ROXICODONE) tablet 10 mg, 10 mg, Oral, Q3HRS PRN, Merlyn Salazar, A.P.N., 10 mg at 09/29/18 0847  •  HYDROmorphone (DILAUDID) injection 0.5 mg, 0.5 mg, Intravenous, Q2HRS PRN, Merlyn Salazar, A.P.N.  •  Pharmacy Consult Request ...Pain Management Review 1 Each, 1 Each, Other, PRN, David Almeida M.D.  •  diphenhydrAMINE (BENADRYL) injection 25 mg, 25 mg, Intravenous, Q6HRS PRN, David Almeida M.D., 25 mg at 09/27/18 2031  •  haloperidol lactate (HALDOL) injection 1 mg, 1 mg, Intravenous, Q6HRS PRN, David Almeida M.D.  •  scopolamine (TRANSDERM-SCOP) patch 1 Patch, 1 Patch, Transdermal, Q72HRS PRN, David Almeida M.D.  •  celecoxib (CELEBREX) capsule 200 mg, 200 mg, Oral, BID WITH  MEALS, David Almeida M.D., 200 mg at 18 0847  •  famotidine (PEPCID) tablet 20 mg, 20 mg, Oral, BID PRN, SID Warren.O., 20 mg at 18 0031  •  cyclobenzaprine (FLEXERIL) tablet 10 mg, 10 mg, Oral, TID PRN, Carmen Luciano D.O.  •  senna-docusate (PERICOLACE or SENOKOT S) 8.6-50 MG per tablet 2 Tab, 2 Tab, Oral, BID, 2 Tab at 18 0512 **AND** polyethylene glycol/lytes (MIRALAX) PACKET 1 Packet, 1 Packet, Oral, QDAY PRN, 1 Packet at 18 1701 **AND** magnesium hydroxide (MILK OF MAGNESIA) suspension 30 mL, 30 mL, Oral, QDAY PRN, 30 mL at 18 1727 **AND** bisacodyl (DULCOLAX) suppository 10 mg, 10 mg, Rectal, QDAY PRN, Willi Tellez M.D.  •  Respiratory Care per Protocol, , Nebulization, Continuous RT, Willi Tellez M.D.  •  acetaminophen (TYLENOL) tablet 650 mg, 650 mg, Oral, Q6HRS PRN, Willi Tellez M.D., 650 mg at 18 0847  •  ondansetron (ZOFRAN) syringe/vial injection 4 mg, 4 mg, Intravenous, Q4HRS PRN, Willi Tellez M.D., 4 mg at 18 0342  •  ondansetron (ZOFRAN ODT) dispertab 4 mg, 4 mg, Oral, Q4HRS PRN, Willi Tellez M.D.  •  promethazine (PHENERGAN) tablet 12.5-25 mg, 12.5-25 mg, Oral, Q4HRS PRN, Willi Tellez M.D.  •  promethazine (PHENERGAN) suppository 12.5-25 mg, 12.5-25 mg, Rectal, Q4HRS PRN, Willi Tellez M.D.  •  prochlorperazine (COMPAZINE) injection 5-10 mg, 5-10 mg, Intravenous, Q4HRS PRN, Willi Tellez M.D.  •  nicotine (NICODERM) 14 MG/24HR 14 mg, 14 mg, Transdermal, Daily-0600, 14 mg at 18 0512 **AND** Nicotine Replacement Patient Education Materials, , , Once **AND** nicotine polacrilex (NICORETTE) 2 MG piece 2 mg, 2 mg, Oral, Q HOUR PRN, Willi Tellez M.D.    Physical Exam:   Vital Signs: /70   Pulse 81   Temp 36.9 °C (98.4 °F)   Resp 18   Ht 1.829 m (6')   Wt 62.2 kg (137 lb 2 oz)   SpO2 91%   BMI 18.60 kg/m²   Temp  Av.4 °C (97.6 °F)  Min: 36.1 °C (96.9 °F)  Max: 36.9 °C (98.5 °F)  Vital signs reviewed  Constitutional: Patient is  oriented to person, place, and time. Appears thin, in no acute distress   Head: Atraumatic, normocephalic  Eyes: Conjunctivae normal, EOM intact. Pupils are equal, round, and reactive to light.   Mouth/Throat: Lips without lesions, good dentition, oropharynx is clear and moist.  Neck: Neck supple. No masses/lymphadenopathy  Cardiovascular: Normal rate, regular rhythm, normal S1S2 and intact distal pulses. No murmur, gallop, or friction rub. No pedal edema.  Pulmonary/Chest: Left-sided chest tubes in place, surgical site with minimal surrounding erythema, decreased breath sounds over left mid and lower lung zones  Abdominal: Soft, non tender. BS + x 4. No masses or hepatosplenomegaly.   Musculoskeletal: No joint tenderness, swelling, erythema, or restriction of motion noted.  Neurological: Alert and oriented to person, place, and time. No gross cranial nerve deficit. No focal neural deficit noted  Skin: Skin is warm and dry. No rashes or embolic phenomena noted on exposed skin  Psychiatric: Normal mood and affect. Behavior is normal.     LABS:  Recent Labs      09/28/18 0515   WBC  16.3*      Recent Labs      09/28/18   0515   HEMOGLOBIN  9.4*   HEMATOCRIT  31.8*   MCV  87.8   MCH  26.0*   MACROCYTOSIS  1+   ANISOCYTOSIS  1+   PLATELETCT  529*       No results for input(s): SODIUM, POTASSIUM, CHLORIDE, CO2, CREATININE in the last 72 hours.    Invalid input(s): UREANITROGEN, EGFR, GULCOSE     No results for input(s): ALBUMIN in the last 72 hours.    Invalid input(s): AST, ALT, ALKPHOS, BILITOT, TOTALBILIRUB, BILIRUBINTOT, BILIRUBINDIR, BILIRUBININD, ALKALINEPHOS     MICRO:  Blood Culture   Date Value Ref Range Status   09/18/2018 No growth after 5 days of incubation.  Final        Latest pertinent labs were reviewed    IMAGING STUDIES:  Chest x-ray from today showed 2 left-sided chest tubes, dense opacities of left middle and lower lung zones, and a left pneumothorax that is stable.    Hospital Course/Assessment:    Donna Ceballos is a 58 y.o. male with a history of COPD and tobacco use, recent hospitalization for hydropneumothorax and left empyema status post left thoracotomy, decortication, debridement, and rib resection in July 2018 (cultures grew group F streptococcus, treated with IV Unasyn for 2 weeks followed by p.o. Augmentin for 2 weeks).  Patient is now admitted 9/18/2018 with persistent left hydropneumothorax, empyema, and concern for an alveolar pleural fistula. He underwent repeat thoracotomy, decortication, and intercostal muscle flap transposition to the parenchymal airway fistula on 9/25.    Pleural fluid cultures from 9/19 grew Enterobacter cloacae and viridans Strep.  OR cultures again grew Enterobacter, but this time resistant to cephalosporins and Zosyn.  Antibiotics were switched to meropenem.    Pertinent Diagnoses:  1. Sepsis  2. L empyema, status post surgery as above  3. L hydropneumothorax  4. L alveolar pleural fistula, status post surgery as above  5. COPD  6. Tobacco dependence    Plan:   -White count downtrending  -Continue IV meropenem 500 mg every 6 hours  -Anticipate at least 4 weeks of antibiotics - IV antibiotics for at least 2 weeks (meropenem or ertapenem), followed perhaps by p.o. Levaquin for the remaining 2 weeks, if no further issues (will need repeat imaging outpatient)  -Monitor    Plan was discussed with Dr. Schmitt    ID will follow. Please feel free to call with questions.    Ag Faria M.D.

## 2018-09-29 NOTE — FLOWSHEET NOTE
Respiratory Therapy Update    Interdisciplinary Plan of Care-Goals (Indications)  Hyperinflation Protocol Indications: Chest Trauma (Blunt, Penetrative, or Surgical) (09/29/18 0850)  Interdisciplinary Plan of Care-Outcomes   Hyperinflation Protocol Goals/Outcome: Stable Vital Capacity x24 hrs and Patient Understands / uses I.S. (09/28/18 0705)    #PEP/CPT (Manual) Initial: Initial (09/26/18 1111)          Cough: Non Productive (09/29/18 0852)  Sputum Amount: Unable to Evaluate (09/29/18 0850)  Sputum Color: Unable to Evaluate (09/29/18 0850)  Sputum Consistency: Unable to Evaluate (09/28/18 0400)                  O2 (LPM): 2 (09/29/18 0850)  O2 Daily Delivery Respiratory : Silicone Nasal Cannula (09/29/18 0850)    Breath Sounds  Pre/Post Intervention: Pre Intervention Assessment (09/27/18 1132)  RUL Breath Sounds: Clear (09/29/18 0852)  RML Breath Sounds: Clear (09/29/18 0852)  RLL Breath Sounds: Clear (09/29/18 0852)  JOSSE Breath Sounds: Diminished (09/29/18 0852)  LLL Breath Sounds: Diminished (09/29/18 0852)        Events/Summary/Plan: IS done with fair effort. (09/29/18 0850)

## 2018-09-29 NOTE — CARE PLAN
Problem: Safety  Goal: Will remain free from injury  Outcome: PROGRESSING AS EXPECTED  Safety precautions in place. Bed in locked/low position. 2 side rails up. Treaded socks. Call light in reach, calls appropriately. Hourly rounding practiced.    Problem: Respiratory:  Goal: Respiratory status will improve  Outcome: PROGRESSING AS EXPECTED  Monitoring chest tubes. IS use encouraged. PEP therapy also in place.

## 2018-09-29 NOTE — PROGRESS NOTES
/69   Pulse (!) 101   Temp 36.6 °C (97.9 °F)   Resp 18   Ht 1.829 m (6')   Wt 62.2 kg (137 lb 2 oz)   SpO2 95%   BMI 18.60 kg/m²     Patient is A&Ox4.   Denies pain at this time.   KNIGHT, CMS intact, denies numbness and tingling.   Mobilizes with SBA. Educated pt to call for assistance.   On 2L O2 NC, denies SOB or chest pain. Achieves 1200 on IS.   Chest tube x 2 to left rib cage, no air leak noted, dressing intact.   Incision to left chest with Prevena is clean, dry, and intact  Normoactive BS x 4. Tolerating diet. Denies nausea/vomiting.   + flatus, - BM at this time. Pt is voiding - PO intake of fluids encouraged.   Former epidural site intact, no leak noted.   PIV SL  Updated on POC. Belongings and call light within reach. All needs met at this time.

## 2018-09-29 NOTE — THERAPY
"Physical Therapy Evaluation completed.   Bed Mobility:  Supine to Sit: Supervised  Transfers: Sit to Stand: Supervised  Gait: Level Of Assist: Stand by Assist with Front-Wheel Walker       Plan of Care: Will benefit from Physical Therapy 2 times per week  Discharge Recommendations: Equipment: Will Continue to Assess for Equipment Needs. See below    Pt presents to PT with impaired aerobic capacity, balance, and endurance associated with recently limited mobility and medical co-morbidities. He is able to demonstrate short hallway ambulation with FWW with SBA. He is primarily limited 2' to decreased endurance and mild pain with CT's. Noted that this was pt's 1st attempt at ambulatory activity and anticipate with increased OOB activity from both PT and staff, he will progress well with regards to mobility. Will continue to visit though anticipate only 1-2 visits if needed as pt should continue to mobilize with staff and will likely self progress well. Anticipate no immediate skilled PT needs after dc to home.     See \"Rehab Therapy-Acute\" Patient Summary Report for complete documentation.     "

## 2018-09-29 NOTE — PROGRESS NOTES
Bedside report received.    Assessment complete.  A&O x 4. Patient calls appropriately.  Patient ambulates with FWW and one person assist.   Patient has 7/10 pain. Medicated per Mar.  Denies N&V. Tolerating regualr diet.  Surgical incision left chest with prevena in place. Chest tubes x2 in place left chest. Chest tubes to 20 cm suction at this time. Air leak noted in chest tube #2. MD aware.  + void, + flatus  Patient denies SOB.    Review plan with of care with patient. Call light and personal belongings with in reach. Hourly rounding in place. All needs met at this time.

## 2018-09-29 NOTE — PROGRESS NOTES
9/29  S:  58 y.o.male s/p Thoracotomy, Decortication, Muscle Flap transposition by Dr Gaviria POD# 4.  Patient stable since transfer to floor overnight, he is having good pain control, tolerating PO and ambulating with PT assistance.  Some blood with cough continues but minimal productive volume at this time.  CTs remain in place to suction.      O:  Blood pressure 116/73, pulse 98, temperature 36.8 °C (98.2 °F), resp. rate 17, height 1.829 m (6'), weight 62.2 kg (137 lb 2 oz), SpO2 98 %.  I/O last 3 completed shifts:  In: 2436 [P.O.:1900; I.V.:236]  Out: 2985 [Urine:2720]      Recent Labs      09/28/18   0515   WBC  16.3*   RBC  3.62*   HEMOGLOBIN  9.4*   HEMATOCRIT  31.8*   MCV  87.8   MCH  26.0*   MCHC  29.6*   RDW  55.7*   PLATELETCT  529*   MPV  9.6       Alert and Oriented x3, No Acute Distress  Normal Respiratory Effort  Abdomen soft, appropriately tender  Incisions/Bandages clean/dry/intact  Extremities warm and well perfused  CTs in place x2- serosanguinosu output (180cc), airleak present    A/P:  Pain control with PO meds  Diet as tolerated  Fluids SLIV  Ambulate tid and ad yu- PT/OT  Continue with CTs in place until airleak resolves

## 2018-09-30 ENCOUNTER — APPOINTMENT (OUTPATIENT)
Dept: RADIOLOGY | Facility: MEDICAL CENTER | Age: 58
DRG: 853 | End: 2018-09-30
Attending: NURSE PRACTITIONER

## 2018-09-30 LAB
ANION GAP SERPL CALC-SCNC: 5 MMOL/L (ref 0–11.9)
BUN SERPL-MCNC: 9 MG/DL (ref 8–22)
CALCIUM SERPL-MCNC: 8.7 MG/DL (ref 8.5–10.5)
CHLORIDE SERPL-SCNC: 100 MMOL/L (ref 96–112)
CO2 SERPL-SCNC: 32 MMOL/L (ref 20–33)
CREAT SERPL-MCNC: 0.54 MG/DL (ref 0.5–1.4)
ERYTHROCYTE [DISTWIDTH] IN BLOOD BY AUTOMATED COUNT: 53.1 FL (ref 35.9–50)
GLUCOSE SERPL-MCNC: 100 MG/DL (ref 65–99)
HCT VFR BLD AUTO: 29.9 % (ref 42–52)
HGB BLD-MCNC: 9.1 G/DL (ref 14–18)
MCH RBC QN AUTO: 25.9 PG (ref 27–33)
MCHC RBC AUTO-ENTMCNC: 30.4 G/DL (ref 33.7–35.3)
MCV RBC AUTO: 84.9 FL (ref 81.4–97.8)
PLATELET # BLD AUTO: 615 K/UL (ref 164–446)
PMV BLD AUTO: 9.2 FL (ref 9–12.9)
POTASSIUM SERPL-SCNC: 4 MMOL/L (ref 3.6–5.5)
RBC # BLD AUTO: 3.52 M/UL (ref 4.7–6.1)
SODIUM SERPL-SCNC: 137 MMOL/L (ref 135–145)
WBC # BLD AUTO: 8.6 K/UL (ref 4.8–10.8)

## 2018-09-30 PROCEDURE — 99232 SBSQ HOSP IP/OBS MODERATE 35: CPT | Performed by: INTERNAL MEDICINE

## 2018-09-30 PROCEDURE — 36415 COLL VENOUS BLD VENIPUNCTURE: CPT

## 2018-09-30 PROCEDURE — 71045 X-RAY EXAM CHEST 1 VIEW: CPT

## 2018-09-30 PROCEDURE — 700102 HCHG RX REV CODE 250 W/ 637 OVERRIDE(OP): Performed by: INTERNAL MEDICINE

## 2018-09-30 PROCEDURE — A9270 NON-COVERED ITEM OR SERVICE: HCPCS | Performed by: NURSE PRACTITIONER

## 2018-09-30 PROCEDURE — 700111 HCHG RX REV CODE 636 W/ 250 OVERRIDE (IP): Performed by: SURGERY

## 2018-09-30 PROCEDURE — A9270 NON-COVERED ITEM OR SERVICE: HCPCS | Performed by: INTERNAL MEDICINE

## 2018-09-30 PROCEDURE — 700102 HCHG RX REV CODE 250 W/ 637 OVERRIDE(OP): Performed by: NURSE PRACTITIONER

## 2018-09-30 PROCEDURE — 770006 HCHG ROOM/CARE - MED/SURG/GYN SEMI*

## 2018-09-30 PROCEDURE — 80048 BASIC METABOLIC PNL TOTAL CA: CPT

## 2018-09-30 PROCEDURE — A9270 NON-COVERED ITEM OR SERVICE: HCPCS | Performed by: ANESTHESIOLOGY

## 2018-09-30 PROCEDURE — 700102 HCHG RX REV CODE 250 W/ 637 OVERRIDE(OP): Performed by: ANESTHESIOLOGY

## 2018-09-30 PROCEDURE — 85027 COMPLETE CBC AUTOMATED: CPT

## 2018-09-30 PROCEDURE — 700105 HCHG RX REV CODE 258: Performed by: SURGERY

## 2018-09-30 RX ADMIN — STANDARDIZED SENNA CONCENTRATE AND DOCUSATE SODIUM 2 TABLET: 8.6; 5 TABLET ORAL at 17:43

## 2018-09-30 RX ADMIN — CELECOXIB 200 MG: 200 CAPSULE ORAL at 08:43

## 2018-09-30 RX ADMIN — MEROPENEM 500 MG: 500 INJECTION, POWDER, FOR SOLUTION INTRAVENOUS at 11:07

## 2018-09-30 RX ADMIN — FAMOTIDINE 20 MG: 20 TABLET ORAL at 19:50

## 2018-09-30 RX ADMIN — OXYCODONE HYDROCHLORIDE 10 MG: 10 TABLET ORAL at 21:58

## 2018-09-30 RX ADMIN — OXYCODONE HYDROCHLORIDE 5 MG: 5 TABLET ORAL at 06:47

## 2018-09-30 RX ADMIN — OXYCODONE HYDROCHLORIDE 10 MG: 10 TABLET ORAL at 11:07

## 2018-09-30 RX ADMIN — BISACODYL 10 MG: 10 SUPPOSITORY RECTAL at 09:01

## 2018-09-30 RX ADMIN — NICOTINE 14 MG: 14 PATCH, EXTENDED RELEASE TRANSDERMAL at 06:46

## 2018-09-30 RX ADMIN — MEROPENEM 500 MG: 500 INJECTION, POWDER, FOR SOLUTION INTRAVENOUS at 23:33

## 2018-09-30 RX ADMIN — MEROPENEM 500 MG: 500 INJECTION, POWDER, FOR SOLUTION INTRAVENOUS at 06:47

## 2018-09-30 RX ADMIN — OXYCODONE HYDROCHLORIDE 10 MG: 10 TABLET ORAL at 16:14

## 2018-09-30 RX ADMIN — CELECOXIB 200 MG: 200 CAPSULE ORAL at 17:43

## 2018-09-30 RX ADMIN — MEROPENEM 500 MG: 500 INJECTION, POWDER, FOR SOLUTION INTRAVENOUS at 00:22

## 2018-09-30 RX ADMIN — STANDARDIZED SENNA CONCENTRATE AND DOCUSATE SODIUM 2 TABLET: 8.6; 5 TABLET ORAL at 06:47

## 2018-09-30 RX ADMIN — MEROPENEM 500 MG: 500 INJECTION, POWDER, FOR SOLUTION INTRAVENOUS at 17:43

## 2018-09-30 ASSESSMENT — ENCOUNTER SYMPTOMS
ABDOMINAL PAIN: 1
WEAKNESS: 1
COUGH: 0
SHORTNESS OF BREATH: 0
HEMOPTYSIS: 1
WEAKNESS: 0
COUGH: 1
FEVER: 0
BACK PAIN: 1
CONSTIPATION: 1
NAUSEA: 0
MYALGIAS: 0
CONSTIPATION: 0
CHILLS: 0
HEADACHES: 0
ABDOMINAL PAIN: 0

## 2018-09-30 ASSESSMENT — PAIN SCALES - GENERAL
PAINLEVEL_OUTOF10: 2
PAINLEVEL_OUTOF10: 6
PAINLEVEL_OUTOF10: 2
PAINLEVEL_OUTOF10: 7
PAINLEVEL_OUTOF10: 6
PAINLEVEL_OUTOF10: 7
PAINLEVEL_OUTOF10: 7

## 2018-09-30 NOTE — PROGRESS NOTES
[facility] Hospitalist Progress Note    Date of Service: 2018    Chief Complaint  58 y.o. male admitted 2018 with COPD and tobacco, recent hospitalization for hydropneumothorax and empyema s/p left thoracotomy decortication, lung debridement and rib resection. Resp cx grew strep group B and was discharged on course of augmentin. He presented from Dr. Gaviria's office with cough and SOB. CT showed persistent left hydropneumothorax. IR consulted for pigtail drain placement to pleurvac suction. He had bronchoscopy that showed white purulent secretions that was irrigated and suctioned. 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     Interval Problem Update  Patient seen and evaluated on rounds  Pain improved  CT management per surgical team  ID managing abx  No other complaints per patient  Doing well    Consultants/Specialty  Surgery   IR  Pulm  ID    Disposition  Chest tube management, likely needs PICC for meropenem  Lives in [place]        Review of Systems   HENT: Negative for congestion.    Respiratory: Positive for cough and hemoptysis. Negative for shortness of breath.    Cardiovascular: Positive for chest pain.   Gastrointestinal: Negative for abdominal pain, constipation and nausea.   Musculoskeletal: Positive for back pain. Negative for myalgias.   Skin: Negative for itching.   Neurological: Positive for weakness. Negative for headaches.      Physical Exam  Laboratory/Imaging   Hemodynamics  Temp (24hrs), Av.6 °C (97.8 °F), Min:36.4 °C (97.5 °F), Max:36.8 °C (98.2 °F)   Temperature: 36.6 °C (97.8 °F)  Pulse  Av.9  Min: 43  Max: 112    Blood Pressure: 129/80      Respiratory      Respiration: 16, Pulse Oximetry: 96 %, O2 Daily Delivery Respiratory : Silicone Nasal Cannula     PEP/CPT Method: Positive Airway Pressure Device, Work Of Breathing / Effort: Mild  RUL Breath Sounds:  Clear, RML Breath Sounds: Diminished, RLL Breath Sounds: Diminished, JOSSE Breath Sounds: Clear, LLL Breath Sounds: Diminished    Fluids    Intake/Output Summary (Last 24 hours) at 09/30/18 1506  Last data filed at 09/30/18 1320   Gross per 24 hour   Intake              760 ml   Output             1730 ml   Net             -970 ml       Nutrition  Orders Placed This Encounter   Procedures   • Diet Order Regular     Standing Status:   Standing     Number of Occurrences:   1     Order Specific Question:   Diet:     Answer:   Regular [1]     Physical Exam   Constitutional: He is oriented to person, place, and time.   Thin, frail   HENT:   Head: Atraumatic.   Mouth/Throat: Oropharynx is clear and moist.   Eyes: Conjunctivae are normal. Right eye exhibits no discharge. Left eye exhibits no discharge.   Cardiovascular: Normal rate and regular rhythm.    Pulmonary/Chest: Effort normal. He has wheezes. He has rales.   Left chest tubes in place   Abdominal: Soft. There is no tenderness. There is no rebound and no guarding.   Musculoskeletal: He exhibits no edema.   Diffuse muscle atrophy   Neurological: He is alert and oriented to person, place, and time.   Skin: Skin is warm and dry.   Nursing note and vitals reviewed.      Recent Labs      09/28/18   0515  09/30/18   0425   WBC  16.3*  8.6   RBC  3.62*  3.52*   HEMOGLOBIN  9.4*  9.1*   HEMATOCRIT  31.8*  29.9*   MCV  87.8  84.9   MCH  26.0*  25.9*   MCHC  29.6*  30.4*   RDW  55.7*  53.1*   PLATELETCT  529*  615*   MPV  9.6  9.2     Recent Labs      09/30/18   0425   SODIUM  137   POTASSIUM  4.0   CHLORIDE  100   CO2  32   GLUCOSE  100*   BUN  9   CREATININE  0.54   CALCIUM  8.7                      Assessment/Plan     Empyema lung (HCC)- (present on admission)   Assessment & Plan    9/19 Zosyn initiated  9/24 Zosyn altered cefepime initiated for Enterobacter cloacae and Streptococcus viridans  9/28 Enterobacter cloacae now resistant to cefepime therapy altered to  meropenem.  ID consulted, will continue on meropenem  Abx recommendations per ID  CT management per surgical team         Acute respiratory failure with hypoxia (HCC)- (present on admission)   Assessment & Plan    Secondary to empyema and HCAP and alveolar-pleural fistula  Chest tubes still with leak, surgery following  Continue abx  RT protocol  Improving        Acute bilateral low back pain- (present on admission)   Assessment & Plan    Likely musculoskeletal  Add Flexeril as needed  No anesthesia or focal weakness  Improved          HCAP (healthcare-associated pneumonia)- (present on admission)   Assessment & Plan    Continue abx per ID  Started on supplemental oxygen and RT protocol        Gastroesophageal reflux disease without esophagitis- (present on admission)   Assessment & Plan    pepcid prn        Protein malnutrition (HCC)- (present on admission)   Assessment & Plan    Optimize nutrition         Panlobular emphysema (HCC)- (present on admission)   Assessment & Plan    Continue supplemental oxygen and RT protocol  DuoNeb as needed        Tobacco abuse- (present on admission)   Assessment & Plan    Counseled and educated             Quality-Core Measures   Reviewed items::  EKG reviewed, Labs reviewed, Medications reviewed and Radiology images reviewed  Motley catheter::  No Motley  DVT prophylaxis - mechanical:  SCDs

## 2018-09-30 NOTE — PROGRESS NOTES
Pt irritable this am. Pain rating at 7. Denies pain intervention as pt states it's making him more constipated. Pt agrees to take suppository this am. States he will adminster himself. Pt up to the bathroom with standby assist. Steady gait. No c/o n/v, n/t or SOB. Pt on 1L NC. Encouraged to work on IS 10 times an hour. Chest tube x 2. CT 1 to waterseal and CT 2 to suction per Dr. Corley. jomar present to CT 2 and MD aware. Tolerating regular diet. prevena to left chest incision. No leaks noted. Dressings intact. Plan of care discussed. Hourly rounding in place. Call light within reach. Blood pressure 136/79, pulse 63, temperature 36.5 °C (97.7 °F), resp. rate 16, height 1.829 m (6'), weight 62.2 kg (137 lb 2 oz), SpO2 98 %.

## 2018-09-30 NOTE — CARE PLAN
Problem: Pain Management  Goal: Pain level will decrease to patient's comfort goal  Pain controlled with oral pain meds.     Problem: Psychosocial Needs:  Goal: Level of anxiety will decrease  Pt encouraged to ask questions and verbalize concerns.

## 2018-09-30 NOTE — PROGRESS NOTES
9/30  S:  58 y.o.male s/p Thoracotomy, Decortication, Muscle Flap transposition by Dr Gaviria POD# 5.  Patient doing well overnight, ambulating, CTs remain to suction, tolerating diet, pain well controlled.      O:  Blood pressure 136/79, pulse 63, temperature 36.5 °C (97.7 °F), resp. rate 16, height 1.829 m (6'), weight 62.2 kg (137 lb 2 oz), SpO2 98 %.  I/O last 3 completed shifts:  In: 1920 [P.O.:1680; I.V.:240]  Out: 3350 [Urine:3130]  Recent Labs      09/30/18   0425   SODIUM  137   POTASSIUM  4.0   CHLORIDE  100   CO2  32   GLUCOSE  100*   BUN  9   CREATININE  0.54   CALCIUM  8.7     Recent Labs      09/28/18   0515  09/30/18   0425   WBC  16.3*  8.6   RBC  3.62*  3.52*   HEMOGLOBIN  9.4*  9.1*   HEMATOCRIT  31.8*  29.9*   MCV  87.8  84.9   MCH  26.0*  25.9*   MCHC  29.6*  30.4*   RDW  55.7*  53.1*   PLATELETCT  529*  615*   MPV  9.6  9.2       Alert and Oriented x3, No Acute Distress  Normal Respiratory Effort  Abdomen soft, appropriately tender  Incisions/Bandages clean/dry/intact  Extremities warm and well perfused  CTs in place x2- serosanguinosu output (110cc), airleak present in #2 but not in #1     A/P:  Pain control with PO meds  Diet as tolerated  Fluids SLIV  Ambulate tid and ad yu- PT/OT  Continue with CTs in place until airleak resolves.  Ok to place CT #1 to waterseal, CT #2 to remain on suction until airleak resolves

## 2018-09-30 NOTE — PROGRESS NOTES
/79   Pulse 81   Temp 36.4 °C (97.5 °F)   Resp 18   Ht 1.829 m (6')   Wt 62.2 kg (137 lb 2 oz)   SpO2 96%   BMI 18.60 kg/m²     Patient is A&Ox4.   Reports pain to left rib cage, medicated per NOE KNIGHT, CMS intact, denies numbness and tingling.   Mobilizes with SBA. Educated pt to call for assistance.   On 1L O2 NC, denies SOB or chest pain. Achieves 1200 on IS.   Chest tube x 2 to left rib cage, air leak noted to CT 2, dressing intact.   Incision to left chest with Prevena is clean, dry, and intact  Normoactive BS x 4. Tolerating diet. Denies nausea/vomiting.   + flatus, - BM at this time. Pt is voiding - PO intake of fluids encouraged.   Former epidural site intact, no leak noted.   PIV SL  Updated on POC. Belongings and call light within reach. All needs met at this time.

## 2018-09-30 NOTE — PROGRESS NOTES
Infectious Disease Progress Note    Author: Ag Faria M.D. Date & Time of service: 2018  7:58 AM    Chief Complaint:  Follow-up of recurrent empyema    Interval History:   afebrile, white count continues to down trend, 8.6 today.  Patient says that he is constipated with abdominal pain.  No issues with breathing.  Patient reports that primary team is aware and seeks no further help from me    Labs Reviewed, Medications Reviewed, Radiology Reviewed and Wound Reviewed.    Review of Systems:  Review of Systems   Constitutional: Negative for chills and fever.   Respiratory: Negative for cough and shortness of breath.    Cardiovascular: Positive for chest pain ( At chest tube site, mild).   Gastrointestinal: Positive for abdominal pain and constipation.   Genitourinary: Negative for dysuria.   Skin: Negative for itching.   Neurological: Negative for weakness.   All other systems reviewed and are negative.      Hemodynamics:  Temp (24hrs), Av.6 °C (97.8 °F), Min:36.4 °C (97.5 °F), Max:36.9 °C (98.4 °F)  Temperature: 36.5 °C (97.7 °F)  Pulse  Av  Min: 43  Max: 112   Blood Pressure: 136/79       Physical Exam:  Physical Exam   Constitutional: He is oriented to person, place, and time. No distress.   Thin  Appears uncomfortable, sitting up on the side of the bed   HENT:   Head: Normocephalic and atraumatic.   Mouth/Throat: No oropharyngeal exudate.   Eyes: Pupils are equal, round, and reactive to light. Conjunctivae are normal. No scleral icterus.   Cardiovascular: Normal rate, regular rhythm and normal heart sounds.  Exam reveals no gallop and no friction rub.    No murmur heard.  Pulmonary/Chest: Effort normal. He has no rales.   Decreased breath sounds left middle and lower lung zones   Abdominal: Soft. There is tenderness. There is guarding. There is no rebound.   Bowel sounds hyperactive   Musculoskeletal: Normal range of motion. He exhibits no edema.   Lymphadenopathy:     He has no cervical  adenopathy.   Neurological: He is alert and oriented to person, place, and time.   No gross cranial nerve or focal neuro deficit   Skin: Skin is warm and dry.   Psychiatric: His behavior is normal. Thought content normal.   Vitals reviewed.      Meds:    Current Facility-Administered Medications:   •  meropenem (MERREM) IV  •  oxyCODONE immediate-release **OR** oxyCODONE immediate-release  •  HYDROmorphone  •  Pharmacy Consult Request  •  diphenhydrAMINE  •  haloperidol lactate  •  scopolamine  •  celecoxib  •  famotidine  •  cyclobenzaprine  •  senna-docusate **AND** polyethylene glycol/lytes **AND** magnesium hydroxide **AND** bisacodyl  •  Respiratory Care per Protocol  •  acetaminophen  •  ondansetron  •  ondansetron  •  promethazine  •  promethazine  •  prochlorperazine  •  nicotine **AND** Nicotine Replacement Patient Education Materials **AND** nicotine polacrilex    Labs:  Recent Labs      09/28/18   0515  09/30/18   0425   WBC  16.3*  8.6   RBC  3.62*  3.52*   HEMOGLOBIN  9.4*  9.1*   HEMATOCRIT  31.8*  29.9*   MCV  87.8  84.9   MCH  26.0*  25.9*   RDW  55.7*  53.1*   PLATELETCT  529*  615*   MPV  9.6  9.2   NEUTSPOLYS  78.30*   --    LYMPHOCYTES  13.00*   --    MONOCYTES  4.30   --    EOSINOPHILS  3.50   --    BASOPHILS  0.90   --    RBCMORPHOLO  Present   --      Recent Labs      09/30/18   0425   SODIUM  137   POTASSIUM  4.0   CHLORIDE  100   CO2  32   GLUCOSE  100*   BUN  9     Recent Labs      09/30/18   0425   CREATININE  0.54       Imaging:  Ct-chest (thorax) With    Result Date: 9/23/2018 9/23/2018 10:11 PM HISTORY/REASON FOR EXAM:  LEFT hydropneumothorax with chest tube in place TECHNIQUE/EXAM DESCRIPTION: CT thorax with contrast. Thin-section helical images were obtained from the lung apices through the adrenal glands following the bolus administration of 80 mL of nonionic (Omnipaque 350) contrast. Low dose optimization technique was utilized for this CT exam including automated exposure control  and adjustment of the mA and/or kV according to patient size. COMPARISON:  9/18/2018 FINDINGS: THORACIC INLET: The thyroid gland appears unremarkable. No pathologic lymphadenopathy. AXILLA: No pathologic lymphadenopathy. MEDIASTINUM: Enlarged lymph nodes are redemonstrated. A RIGHT paratracheal lymph node measures 10 mm in short axis on image 36 of series 2. This is unchanged. An enlarged subcarinal lymph node measures 11 mm in short axis on image 58 of series 2. This is  unchanged. Enlarged RIGHT hilar lymph node measures 11 mm in short axis on axial image 54 of series 2. Previously this measured 13 mm. HEART: Normal size. No pericardial effusion. VASCULAR STRUCTURES: Thoracic aorta appears unremarkable. Origins of the great vessels appear normal. PLEURA: There is a pigtail catheter in the loculated LEFT pleural fluid and gas collection. Size of the collection overall is similar to the prior study. This may communicate with the central bronchial tree as on axial image 66 of series 2. No RIGHT pleural disease is present. LUNGS: RIGHT upper lobe cavity and bronchiectasis are redemonstrated. Multifocal LEFT upper and lower lobe lobe airspace opacities are smaller than on the prior study. Some of these in the upper lobe are cavitary. There is bronchial thickening and fluid in the LEFT lower lobe bronchi. BONES: No suspicious lytic or sclerotic bony lesions identified. UPPER ABDOMEN: Unremarkable     1.  Interval placement of a drain in the LEFT hydropneumothorax with a decrease in the fluid component and increase in the gaseous component. Suspect underlying bronchopleural fistula. 2.  LEFT central upper lobe pulmonary opacity is no longer masslike in appearance 3.  Interval decrease in multifocal partially cavitary LEFT lung disease, likely improving infection. Aspiration related lung disease is a consideration. 4.  Unchanged RIGHT upper lobe cavitation 5.  Mediastinal and RIGHT hilar adenopathy     Ct-chest (thorax)  With    Result Date: 9/18/2018 9/18/2018 5:05 PM HISTORY/REASON FOR EXAM:  Left-sided chest pain and coughing up green sputum. Prior left hemithorax surgery. TECHNIQUE/EXAM DESCRIPTION: CT scan of the chest with contrast. Thin-section helical images were obtained from the lung apices through the adrenal glands following the bolus administration of 80 mL of Optiray 350 nonionic contrast. Low dose optimization technique was utilized for this CT exam including automated exposure control and adjustment of the mA and/or kV according to patient size. COMPARISON:  07/17/2018 FINDINGS: Air-filled cavities in the superior and superior anterior right hemithorax are again noted. Left-sided loculated pleural fluid collection contains air-fluid level located laterally throughout much of the left hemithorax that extends towards the left hilar area. Some bronchi do extend to the edge of the area. Focal pulmonary opacifications with poorly defined borders are again noted in each lung. Most of these appear less prominent than on the prior CT but some new smaller lesions do appear to be present mainly in the left lung. Opacifications in the right lung have decreased compared to the prior exam. Fibrotic opacifications and bronchiectasis are again noted bilaterally. Linear and fibrotic opacifications around the bronchi in the left lower lobe have increased compared to the prior exam and scattered opacifications with poorly defined borders have also increased slightly in the left lower lobe. Left-sided chest tube is no longer present. Mild to moderate mediastinal and hilar adenopathy is again noted. There is no evidence of pericardial effusion. No large masses are seen within the upper abdomen. Calcific atherosclerosis is again noted.     1.  Loculated fluid collection that does contain a significant amount of air with an air-fluid level is now identified in the left lateral hemithorax. This extends towards the left hilar area and  there is some pulmonary opacification and volume loss including air bronchograms immediately adjacent to this collection. Differential diagnosis does include bronchopleural fistula. Empyema could be present. Postoperative fluid collection is also possible. 2.  Consolidation versus mass in the left upper lobe is decreased compared to prior exam. 3.  Air-filled cavities again noted superiorly in right hemithorax. 4.  Size of pulmonary opacifications with poorly defined borders and some cavities in each lung have decreased compared to the prior exam. Multifocal opacifications in the left lung which are small in size have likely increased in number compared to the prior exam. Progression of inflammatory or infectious process in these areas is a possibility. 5.  Opacifications throughout the right lung have decreased compared to the prior exam. 6.  Mediastinal and hilar adenopathy is again noted.     Ct-chest Tube-empyema Left    Result Date: 9/19/2018 9/19/2018 1:30 PM HISTORY/REASON FOR EXAM:  Left-sided empyema and history of blunt bronchopleural fistula. TECHNIQUE/EXAM DESCRIPTION AND NUMBER OF VIEWS:  CT guided empyema drainage in the left hemithorax. The biopsy/drainage was done utilizing low dose optimization CT techniques including auto modulation for  imaging and low mA CT/fluoroscopy mode for the procedure. PROCEDURE:  Informed consent was obtained. Localizing CT images were obtained with the patient in supine oblique position. The skin was prepped with Betadine and draped in a sterile fashion. SEDATION: Conscious sedation with 50 mcg of Fentanyl and 1 mg Versed was administered during the procedure with appropriate continuous patient monitoring by the radiology nurse. Sedation duration: 30 minutes Following local anesthesia with 1% Lidocaine, a 17 G guiding needle was placed and needle path confirmed with CT. An Amplatz guidewire was placed and following serial tract dilatation, a 12 [place] pigtail  locking catheter was placed in the loculated collection. A specimen was collected and submitted for culture and sensitivity and Gram stain. Total of 360 mLs of purulent fluid was drained. Following catheter drainage the patient began coughing up purulent material. This continued for approximately 10 minutes during which time the patient's vital signs and O2 sat remained stable. The catheter was secured to the skin and connected to suction bulb drainage. Final CT images were obtained documenting catheter position. The patient tolerated the procedure well with no evidence of complication. COMPARISON: Recent CT scan of the thorax FINDINGS: CT images demonstrate excellent positioning of the locking loop catheter in the loculated pleural collection. No evidence of pneumothorax is noted.     1. Purulent Empyema Drainage With Ct Guidance. 2. Patient coughed up a moderate amount of purulent material following the procedure suspicious for a bronchopleural fistula.     Dx-chest-portable (1 View)    Result Date: 9/30/2018 9/30/2018 5:02 AM HISTORY/REASON FOR EXAM:  Chest tube evaluation TECHNIQUE/EXAM DESCRIPTION AND NUMBER OF VIEWS: Single portable view of the chest. COMPARISON: 9/29/2018 FINDINGS: 2 left chest tubes are redemonstrated. Slightly smaller left pneumothorax. Improved aeration of the left upper lobe. Redemonstration of extensive airspace opacity in the left lung, more in the lung bases. Right upper lobe linear atelectasis or scarring. No significant pleural effusion. No pneumothorax. Stable cardiopericardial silhouette.     1. Slightly decreased left apical pneumothorax and improved aeration of the left upper lobe.    Dx-chest-portable (1 View)    Result Date: 9/29/2018 9/29/2018 5:07 AM HISTORY/REASON FOR EXAM:  Chest tube. TECHNIQUE/EXAM DESCRIPTION AND NUMBER OF VIEWS: Single portable view of the chest. COMPARISON: 9/28/2018 FINDINGS: There are 2 left-sided chest tube. Stable left pneumothorax is seen. There  is consolidation of the left lobe. Linear atelectasis is noted in the right upper lobe. There is no right pleural effusion. There is no right pneumothorax. The cardiac silhouette is obliterated by consolidation.     1.  Stable left-sided chest tubes. 2.  Diffuse consolidation of left middle lower lung zones. 3.  Stable left pneumothorax. 4.  Stable right linear atelectasis.    Dx-chest-portable (1 View)    Result Date: 9/28/2018 9/28/2018 5:04 AM HISTORY/REASON FOR EXAM: Thoracostomy tube TECHNIQUE/EXAM DESCRIPTION AND NUMBER OF VIEWS: Single AP view of the chest. COMPARISON: Yesterday FINDINGS: Hardware: No change. Left thoracostomy tubes. Lungs: Stable left lung consolidation and partial collapse with apical pneumothorax moderate size. Stable right apical bullous change with scarring. Pleura:  No right pleural space process is seen. Heart and mediastinum: The cardiomediastinal contours are stable.     Stable left apical pneumothorax with lung collapse and consolidation    Dx-chest-portable (1 View)    Result Date: 9/27/2018 9/27/2018 10:24 AM HISTORY/REASON FOR EXAM:  Follow-up left chest tubes and pneumothorax TECHNIQUE/EXAM DESCRIPTION AND NUMBER OF VIEWS: Single portable view of the chest. COMPARISON: 09/25/2018 FINDINGS: 2 separate left-sided chest tubes are again identified with no change in positioning. Left pneumothorax is again identified which appears stable since prior radiograph. Opacification of volume loss in the remaining left hemithorax is again noted. Linear opacification is in the right upper lung are again noted. No new infiltrates or consolidations or effusions are noted.     1.  Left-sided pneumothorax again identified. 2.  2. Left chest tubes again noted. 3.  No new infiltrates or consolidations have developed since the prior radiograph.    Dx-chest-portable (1 View)    Result Date: 9/25/2018 9/25/2018 2:24 PM HISTORY/REASON FOR EXAM:  Recent left thoracotomy, decortication. TECHNIQUE/EXAM  DESCRIPTION AND NUMBER OF VIEWS: Single portable view of the chest. COMPARISON: 9/19/2018 FINDINGS: LUNGS: Chronic atelectasis/scarring in the right upper lobe. Hyperinflation, consistent with COPD. Postsurgical changes in the left lung with volume loss in the left lung base. PNEUMOTHORAX: Small to moderate left pneumothorax. Mild subcutaneous emphysema of the left chest wall. LINES AND TUBES: 2 left chest tubes are in place, one in the upper and one in the lower left pleural space. MEDIASTINUM: Partially obscured cardiac silhouette. MUSCULOSKELETAL STRUCTURES: Recent left thoracotomy.     1. Postoperative chest with small to moderate left pneumothorax and volume loss in the left lung. Left chest tubes are positioned as described. 2. Stable hyperinflation of the right lung, consistent with COPD. Stable scarring in the right upper lobe.    Dx-chest-portable (1 View)    Result Date: 9/19/2018 9/19/2018 4:45 PM HISTORY/REASON FOR EXAM:  LEFT pneumothorax with chest tube in place TECHNIQUE/EXAM DESCRIPTION AND NUMBER OF VIEWS: Single portable view of the chest. COMPARISON: The study from earlier the same date at 2:55 PM FINDINGS: LEFT pigtail chest tube is in unchanged position. HEART: Stable size. LUNGS: Mixed lucency and opacity in the RIGHT upper lobe is redemonstrated. The lungs are overall hyperlucent. PLEURA: Partially loculated LEFT pneumothorax is likely small changed.     1.  Slight interval decrease in size of the loculated LEFT pneumothorax with chest tube in place 2.  No other interval change    Dx-chest-portable (1 View)    Result Date: 9/19/2018 9/19/2018 2:36 PM HISTORY/REASON FOR EXAM: Respiratory distress TECHNIQUE/EXAM DESCRIPTION AND NUMBER OF VIEWS: Single portable view of the chest. COMPARISON: Previous date FINDINGS: There is been interval placement of a small bore percutaneous drain into the left thorax. The previously seen loculated left pleural effusion has resolved with a moderate-sized left  loculated pneumothorax remaining in its place. There is emphysematous and bullous change involving the right lung apex with scarring. There is no evidence of focal consolidation or evidence of pulmonary edema. There is no pleural effusion. The heart is normal in size.     Interval placement of a percutaneous catheter into the left thorax. Previously seen left pleural effusion is now absent with a moderate size left pneumothorax remaining in its place. This was discussed with Dr. Glez at 3:46 PM on 9/19/2018.    Dx-chest-portable (1 View)    Result Date: 9/18/2018 9/18/2018 4:10 PM HISTORY/REASON FOR EXAM:  Sepsis. Left thoracotomy. Left-sided chest pain TECHNIQUE/EXAM DESCRIPTION AND NUMBER OF VIEWS: Single portable view of the chest. COMPARISON: 7/25/2018 FINDINGS: Single portable view of the chest demonstrates a normal cardiac silhouette and mediastinal contours. Left pneumothorax has filled with fluid. Left chest tube has been removed. There are postoperative changes from prior left eighth rib resection. There is scarring in the right upper lobe with cystic change in the right lung apex and associated pleural thickening.     1.  Loculated left pneumothorax is now filled with fluid following chest tube removal. 2.  Scarring and bullous change in the right upper lobe.      Micro:  Results     Procedure Component Value Units Date/Time    ANAEROBIC CULTURE [[id number]] Collected:  09/25/18 1245    Order Status:  Completed Specimen:  Tissue Updated:  09/28/18 1745     Significant Indicator NEG     Source TISS     Site Left Pleural Peel     Anaerobic Culture, Culture Res No Anaerobes isolated.    CULTURE TISSUE W/ GRM STAIN [[id number]]  (Abnormal)  (Susceptibility) Collected:  09/25/18 1245    Order Status:  Completed Specimen:  Tissue Updated:  09/28/18 1745     Significant Indicator POS (POS)     Source TISS     Site Left Pleural Peel     Tissue Culture -- (A)     Gram Stain Result No organisms seen.     Tissue  "Culture Enterobacter cloacae  Light growth   (A)      Viridans Streptococcus  Light growth   (A)    Culture & Susceptibility     ENTEROBACTER CLOACAE     Antibiotic Sensitivity Microscan Unit Status    Ampicillin Resistant >16 mcg/mL Final    Method: SENSITIVITY, SOREN    Cefepime Resistant >16 mcg/mL Final    Method: SENSITIVITY, SOREN    Cefotaxime Resistant >32 mcg/mL Final    Method: SENSITIVITY, SOREN    Cefotetan Resistant >32 mcg/mL Final    Method: SENSITIVITY, SOREN    Ceftazidime Resistant >16 mcg/mL Final    Method: SENSITIVITY, SOREN    Ceftriaxone Resistant >32 mcg/mL Final    Method: SENSITIVITY, SOREN    Cefuroxime Resistant >16 mcg/mL Final    Method: SENSITIVITY, SOREN    Ciprofloxacin Sensitive <=1 mcg/mL Final    Method: SENSITIVITY, SOREN    Ertapenem Sensitive <=1 mcg/mL Final    Method: SENSITIVITY, SOREN    Gentamicin Sensitive <=4 mcg/mL Final    Method: SENSITIVITY, SOREN    Pip/Tazobactam Resistant >64 mcg/mL Final    Method: SENSITIVITY, SOREN    Tigecycline Sensitive <=2 mcg/mL Final    Method: SENSITIVITY, SOREN    Tobramycin Sensitive <=4 mcg/mL Final    Method: SENSITIVITY, SOREN    Trimeth/Sulfa Sensitive <=2/38 mcg/mL Final    Method: SENSITIVITY, SOREN                       GRAM STAIN [[id number]] Collected:  09/25/18 1245    Order Status:  Completed Specimen:  Tissue Updated:  09/25/18 2247     Significant Indicator .     Source TISS     Site Left Pleural Peel     Gram Stain Result No organisms seen.    BLOOD CULTURE [[id number]] Collected:  09/18/18 1630    Order Status:  Completed Specimen:  Blood from Peripheral Updated:  09/23/18 1900     Significant Indicator NEG     Source BLD     Site PERIPHERAL     Blood Culture No growth after 5 days of incubation.    Narrative:       Per Hospital Policy: Only change Specimen Src: to \"Line\" if  specified by physician order.    BLOOD CULTURE [[id number]] Collected:  09/18/18 1620    Order Status:  Completed Specimen:  Blood from Peripheral Updated:  09/23/18 1900     " "Significant Indicator NEG     Source BLD     Site PERIPHERAL     Blood Culture No growth after 5 days of incubation.    Narrative:       Per Hospital Policy: Only change Specimen Src: to \"Line\" if  specified by physician order.          Assessment:  Dnona Ceballos is a 58 y.o. male with a history of COPD and tobacco use, recent hospitalization for hydropneumothorax and left empyema status post left thoracotomy, decortication, debridement, and rib resection in July 2018 (cultures grew group F streptococcus, treated with IV Unasyn for 2 weeks followed by p.o. Augmentin for 2 weeks).  Patient is now admitted 9/18/2018 with persistent left hydropneumothorax, empyema, and concern for an alveolar pleural fistula. He underwent repeat thoracotomy, decortication, and intercostal muscle flap transposition to the parenchymal airway fistula on 9/25.     Pleural fluid cultures from 9/19 grew Enterobacter cloacae and viridans Strep.  OR cultures again grew Enterobacter, but this time resistant to cephalosporins and Zosyn.  Antibiotics were switched to meropenem.     Pertinent Diagnoses:  1. Sepsis, improved  2. L empyema, status post surgery as above  3. L hydropneumothorax, stable  4. L alveolar pleural fistula, status post surgery as above  5. COPD  6. Tobacco dependence    Active Hospital Problems    Diagnosis   • Empyema lung (HCC) [J86.9]   • Acute respiratory failure with hypoxia (HCC) [J96.01]   • Panlobular emphysema (HCC) [J43.1]   • Protein malnutrition (HCC) [E46]   • Tobacco abuse [Z72.0]   • Gastroesophageal reflux disease without esophagitis [K21.9]   • Acute bilateral low back pain [M54.5]   • HCAP (healthcare-associated pneumonia) [J18.9]       Plan:  1. Sepsis, improved  White count downtrending  Continue antibiotics as below    2. L empyema, status post surgery as above  Culture growing cephalosporin resistant Enterobacter  Continue IV meropenem 500 mg every 6 hours  Anticipate at least 4 weeks of antibiotics " - IV antibiotics for at least 2 weeks (meropenem or ertapenem) - anticipated stop date:10/9/2018, followed perhaps by prenetta Prieto for the remaining 2 weeks, if no further issues (will need repeat imaging outpatient)    3. L hydropneumothorax, stable  Chest tubes in place, managed by CT surgery    4. COPD, tobacco dependence  Patient reports that he will now quit  Consult regarding the importance of quitting smoking, especially in the setting    5.  Disposition  Patient reports that he has no insurance  Will likely need to stay in the hospital for the duration of IV antibiotics  Will need to discuss with  on the weekday    Discussed with internal medicine, Dr. Peguero

## 2018-09-30 NOTE — CARE PLAN
Problem: Safety  Goal: Will remain free from injury  Outcome: PROGRESSING AS EXPECTED  Safety precautions in place. Bed in locked/low position. 2 side rails up. Treaded socks. Call light in reach, calls appropriately. Hourly rounding practiced.    Problem: Pain Management  Goal: Pain level will decrease to patient's comfort goal  Outcome: PROGRESSING AS EXPECTED  Pain managed with oxycodone PRN

## 2018-10-01 ENCOUNTER — APPOINTMENT (OUTPATIENT)
Dept: RADIOLOGY | Facility: MEDICAL CENTER | Age: 58
DRG: 853 | End: 2018-10-01
Attending: NURSE PRACTITIONER

## 2018-10-01 LAB
ANION GAP SERPL CALC-SCNC: 7 MMOL/L (ref 0–11.9)
BUN SERPL-MCNC: 9 MG/DL (ref 8–22)
CALCIUM SERPL-MCNC: 8.9 MG/DL (ref 8.5–10.5)
CHLORIDE SERPL-SCNC: 99 MMOL/L (ref 96–112)
CO2 SERPL-SCNC: 31 MMOL/L (ref 20–33)
CREAT SERPL-MCNC: 0.66 MG/DL (ref 0.5–1.4)
ERYTHROCYTE [DISTWIDTH] IN BLOOD BY AUTOMATED COUNT: 53.7 FL (ref 35.9–50)
GLUCOSE SERPL-MCNC: 90 MG/DL (ref 65–99)
HCT VFR BLD AUTO: 30.6 % (ref 42–52)
HGB BLD-MCNC: 9.4 G/DL (ref 14–18)
MCH RBC QN AUTO: 26 PG (ref 27–33)
MCHC RBC AUTO-ENTMCNC: 30.7 G/DL (ref 33.7–35.3)
MCV RBC AUTO: 84.5 FL (ref 81.4–97.8)
PLATELET # BLD AUTO: 674 K/UL (ref 164–446)
PMV BLD AUTO: 9.2 FL (ref 9–12.9)
POTASSIUM SERPL-SCNC: 4.2 MMOL/L (ref 3.6–5.5)
RBC # BLD AUTO: 3.62 M/UL (ref 4.7–6.1)
SODIUM SERPL-SCNC: 137 MMOL/L (ref 135–145)
WBC # BLD AUTO: 7.7 K/UL (ref 4.8–10.8)

## 2018-10-01 PROCEDURE — 97165 OT EVAL LOW COMPLEX 30 MIN: CPT

## 2018-10-01 PROCEDURE — 99232 SBSQ HOSP IP/OBS MODERATE 35: CPT | Performed by: INTERNAL MEDICINE

## 2018-10-01 PROCEDURE — A9270 NON-COVERED ITEM OR SERVICE: HCPCS | Performed by: ANESTHESIOLOGY

## 2018-10-01 PROCEDURE — G8989 SELF CARE D/C STATUS: HCPCS | Mod: CI

## 2018-10-01 PROCEDURE — A9270 NON-COVERED ITEM OR SERVICE: HCPCS | Performed by: INTERNAL MEDICINE

## 2018-10-01 PROCEDURE — 770006 HCHG ROOM/CARE - MED/SURG/GYN SEMI*

## 2018-10-01 PROCEDURE — 71045 X-RAY EXAM CHEST 1 VIEW: CPT

## 2018-10-01 PROCEDURE — A9270 NON-COVERED ITEM OR SERVICE: HCPCS | Performed by: NURSE PRACTITIONER

## 2018-10-01 PROCEDURE — 700111 HCHG RX REV CODE 636 W/ 250 OVERRIDE (IP): Performed by: SURGERY

## 2018-10-01 PROCEDURE — 36415 COLL VENOUS BLD VENIPUNCTURE: CPT

## 2018-10-01 PROCEDURE — 700102 HCHG RX REV CODE 250 W/ 637 OVERRIDE(OP): Performed by: INTERNAL MEDICINE

## 2018-10-01 PROCEDURE — 85027 COMPLETE CBC AUTOMATED: CPT

## 2018-10-01 PROCEDURE — G8988 SELF CARE GOAL STATUS: HCPCS | Mod: CI

## 2018-10-01 PROCEDURE — 700105 HCHG RX REV CODE 258

## 2018-10-01 PROCEDURE — 80048 BASIC METABOLIC PNL TOTAL CA: CPT

## 2018-10-01 PROCEDURE — G8987 SELF CARE CURRENT STATUS: HCPCS | Mod: CI

## 2018-10-01 PROCEDURE — 700105 HCHG RX REV CODE 258: Performed by: SURGERY

## 2018-10-01 PROCEDURE — 700102 HCHG RX REV CODE 250 W/ 637 OVERRIDE(OP): Performed by: ANESTHESIOLOGY

## 2018-10-01 PROCEDURE — 700102 HCHG RX REV CODE 250 W/ 637 OVERRIDE(OP): Performed by: NURSE PRACTITIONER

## 2018-10-01 RX ORDER — SODIUM CHLORIDE 9 MG/ML
INJECTION, SOLUTION INTRAVENOUS
Status: COMPLETED
Start: 2018-10-01 | End: 2018-10-01

## 2018-10-01 RX ADMIN — OXYCODONE HYDROCHLORIDE 10 MG: 10 TABLET ORAL at 10:17

## 2018-10-01 RX ADMIN — MEROPENEM 500 MG: 500 INJECTION, POWDER, FOR SOLUTION INTRAVENOUS at 17:32

## 2018-10-01 RX ADMIN — STANDARDIZED SENNA CONCENTRATE AND DOCUSATE SODIUM 2 TABLET: 8.6; 5 TABLET ORAL at 06:03

## 2018-10-01 RX ADMIN — SODIUM CHLORIDE: 9 INJECTION, SOLUTION INTRAVENOUS at 06:00

## 2018-10-01 RX ADMIN — NICOTINE 14 MG: 14 PATCH, EXTENDED RELEASE TRANSDERMAL at 06:03

## 2018-10-01 RX ADMIN — CELECOXIB 200 MG: 200 CAPSULE ORAL at 17:32

## 2018-10-01 RX ADMIN — CELECOXIB 200 MG: 200 CAPSULE ORAL at 07:49

## 2018-10-01 RX ADMIN — MEROPENEM 500 MG: 500 INJECTION, POWDER, FOR SOLUTION INTRAVENOUS at 11:42

## 2018-10-01 RX ADMIN — OXYCODONE HYDROCHLORIDE 10 MG: 10 TABLET ORAL at 04:48

## 2018-10-01 RX ADMIN — OXYCODONE HYDROCHLORIDE 10 MG: 10 TABLET ORAL at 16:05

## 2018-10-01 RX ADMIN — STANDARDIZED SENNA CONCENTRATE AND DOCUSATE SODIUM 2 TABLET: 8.6; 5 TABLET ORAL at 17:32

## 2018-10-01 RX ADMIN — OXYCODONE HYDROCHLORIDE 10 MG: 10 TABLET ORAL at 20:04

## 2018-10-01 RX ADMIN — FAMOTIDINE 20 MG: 20 TABLET ORAL at 21:49

## 2018-10-01 RX ADMIN — MEROPENEM 500 MG: 500 INJECTION, POWDER, FOR SOLUTION INTRAVENOUS at 06:03

## 2018-10-01 ASSESSMENT — ACTIVITIES OF DAILY LIVING (ADL): TOILETING: INDEPENDENT

## 2018-10-01 ASSESSMENT — PAIN SCALES - GENERAL
PAINLEVEL_OUTOF10: 5
PAINLEVEL_OUTOF10: 3
PAINLEVEL_OUTOF10: 5
PAINLEVEL_OUTOF10: 7
PAINLEVEL_OUTOF10: 3
PAINLEVEL_OUTOF10: 4
PAINLEVEL_OUTOF10: 3
PAINLEVEL_OUTOF10: 5
PAINLEVEL_OUTOF10: 2

## 2018-10-01 ASSESSMENT — COGNITIVE AND FUNCTIONAL STATUS - GENERAL
SUGGESTED CMS G CODE MODIFIER DAILY ACTIVITY: CH
DAILY ACTIVITIY SCORE: 24

## 2018-10-01 ASSESSMENT — ENCOUNTER SYMPTOMS
BACK PAIN: 1
WEAKNESS: 1
HEMOPTYSIS: 1
NAUSEA: 0
MYALGIAS: 0
SHORTNESS OF BREATH: 0
CHILLS: 0
HEADACHES: 0
WEAKNESS: 0
CONSTIPATION: 0
COUGH: 0
ABDOMINAL PAIN: 0
FEVER: 0
COUGH: 1

## 2018-10-01 NOTE — PROGRESS NOTES
[facility] Hospitalist Progress Note    Date of Service: 10/1/2018    Chief Complaint  58 y.o. male admitted 2018 with COPD and tobacco, recent hospitalization for hydropneumothorax and empyema s/p left thoracotomy decortication, lung debridement and rib resection. Resp cx grew strep group B and was discharged on course of augmentin. He presented from Dr. Gaviria's office with cough and SOB. CT showed persistent left hydropneumothorax. IR consulted for pigtail drain placement to pleurvac suction. He had bronchoscopy that showed white purulent secretions that was irrigated and suctioned. 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     Interval Problem Update  Patient seen and evaluated on rounds  Refused to talk to me x 2  Angry and wanted me out of room  Reports he wants to left alone   Per nursing c/o abdominal pain  KUB ordered  Surgical team following the patient  CT management per surgical team  ID managing abx      Consultants/Specialty  Surgery   IR  Pulm  ID    Disposition  Chest tube management, likely needs PICC for meropenem  Lives in [place]        Review of Systems   HENT: Negative for congestion.    Respiratory: Positive for cough and hemoptysis. Negative for shortness of breath.    Cardiovascular: Positive for chest pain.   Gastrointestinal: Negative for abdominal pain, constipation and nausea.   Musculoskeletal: Positive for back pain. Negative for myalgias.   Skin: Negative for itching.   Neurological: Positive for weakness. Negative for headaches.      Physical Exam  Laboratory/Imaging   Hemodynamics  Temp (24hrs), Av.6 °C (97.9 °F), Min:36.2 °C (97.2 °F), Max:36.8 °C (98.3 °F)   Temperature: 36.7 °C (98.1 °F)  Pulse  Av.6  Min: 43  Max: 112    Blood Pressure: 120/74      Respiratory      Respiration: 18, Pulse Oximetry: 95 %     Work Of Breathing / Effort: Mild  RUL Breath  Sounds: Clear, RML Breath Sounds: Diminished, RLL Breath Sounds: Diminished, JOSSE Breath Sounds: Expiratory Wheezes;Fine Crackles, LLL Breath Sounds: Diminished    Fluids    Intake/Output Summary (Last 24 hours) at 10/01/18 1552  Last data filed at 10/01/18 1243   Gross per 24 hour   Intake             1240 ml   Output             2995 ml   Net            -1755 ml       Nutrition  Orders Placed This Encounter   Procedures   • Diet Order Regular     Standing Status:   Standing     Number of Occurrences:   1     Order Specific Question:   Diet:     Answer:   Regular [1]     Physical Exam     Patient refused exam x 2 today     Recent Labs      09/30/18   0425  10/01/18   0413   WBC  8.6  7.7   RBC  3.52*  3.62*   HEMOGLOBIN  9.1*  9.4*   HEMATOCRIT  29.9*  30.6*   MCV  84.9  84.5   MCH  25.9*  26.0*   MCHC  30.4*  30.7*   RDW  53.1*  53.7*   PLATELETCT  615*  674*   MPV  9.2  9.2     Recent Labs      09/30/18 0425  10/01/18   0413   SODIUM  137  137   POTASSIUM  4.0  4.2   CHLORIDE  100  99   CO2  32  31   GLUCOSE  100*  90   BUN  9  9   CREATININE  0.54  0.66   CALCIUM  8.7  8.9                      Assessment/Plan     Empyema lung (HCC)- (present on admission)   Assessment & Plan    9/19 Zosyn initiated  9/24 Zosyn altered cefepime initiated for Enterobacter cloacae and Streptococcus viridans  9/28 Enterobacter cloacae now resistant to cefepime therapy altered to meropenem.  ID consulted, will continue on meropenem  Abx recommendations per ID  CT management per surgical team         Acute respiratory failure with hypoxia (HCC)- (present on admission)   Assessment & Plan    Secondary to empyema and HCAP and alveolar-pleural fistula  Chest tubes still with leak, surgery following  Continue abx  RT protocol  Improving        Acute bilateral low back pain- (present on admission)   Assessment & Plan    Likely musculoskeletal  Add Flexeril as needed  No anesthesia or focal weakness  Improved          HCAP  (healthcare-associated pneumonia)- (present on admission)   Assessment & Plan    Continue abx per ID  Started on supplemental oxygen and RT protocol        Gastroesophageal reflux disease without esophagitis- (present on admission)   Assessment & Plan    pepcid prn        Protein malnutrition (HCC)- (present on admission)   Assessment & Plan    Optimize nutrition         Panlobular emphysema (HCC)- (present on admission)   Assessment & Plan    Continue supplemental oxygen and RT protocol  DuoNeb as needed        Tobacco abuse- (present on admission)   Assessment & Plan    Counseled and educated             Quality-Core Measures   Reviewed items::  EKG reviewed, Labs reviewed, Medications reviewed and Radiology images reviewed  Motley catheter::  No Motley  DVT prophylaxis - mechanical:  SCDs

## 2018-10-01 NOTE — PROGRESS NOTES
10/1  S:  58 y.o.male s/p Thoracotomy, Decortication, Muscle Flap transposition by Dr Gaviria POD# 6.  Patient doing well, ambulating, pain controlled, tolerating PO.  Still with airleak from CT 2, remains to wall suction    O:  Blood pressure 143/90, pulse 74, temperature 36.7 °C (98 °F), resp. rate 18, height 1.829 m (6'), weight 62.2 kg (137 lb 2 oz), SpO2 96 %.  I/O last 3 completed shifts:  In: 1560 [P.O.:1080; I.V.:280]  Out: 3870 [Urine:3680]  Recent Labs      09/30/18   0425  10/01/18   0413   SODIUM  137  137   POTASSIUM  4.0  4.2   CHLORIDE  100  99   CO2  32  31   GLUCOSE  100*  90   BUN  9  9   CREATININE  0.54  0.66   CALCIUM  8.7  8.9     Recent Labs      09/30/18   0425  10/01/18   0413   WBC  8.6  7.7   RBC  3.52*  3.62*   HEMOGLOBIN  9.1*  9.4*   HEMATOCRIT  29.9*  30.6*   MCV  84.9  84.5   MCH  25.9*  26.0*   MCHC  30.4*  30.7*   RDW  53.1*  53.7*   PLATELETCT  615*  674*   MPV  9.2  9.2       Alert and Oriented x3, No Acute Distress  Normal Respiratory Effort  Abdomen soft, appropriately tender  Incisions/Bandages clean/dry/intact  Extremities warm and well perfused  CTs in place x2- serosanguinosu output (150cc), airleak present in #2 but not in #1     A/P:  Pain control with PO meds  Diet as tolerated  Fluids SLIV  Ambulate tid and ad yu- PT/OT  Continue with CTs in place until airleak resolves.  Ok to place CT #1 to waterseal, CT #2 to remain on suction until airleak resolves

## 2018-10-01 NOTE — PROGRESS NOTES
Bedside report completed, assumed pt care.   Pt resting in bed, A&Ox4.   Assessment complete.   Pt has chest tube to left flank, chest tube one to water seal, chest tube two to 20 cm H2O suction with air leak noted, both with serosanguinous output.   Pt has prevena to left flank, dressing intact.   Lungs diminished, more so on left side  Encouraged Is use, pt pulls 1250 mL   Encouraged oral care, pt refused at this time  Encouraged ambulation, pt refused at this time   in use  Pt has strong cough, sputum is thick and has some brown and blood noted.   Pt denies numbness tingling chest pain SOB and nausea  Heat pack given for abd pain  BM today per pt and RN, but still having some pain, +flatus, tolerating diet  Normo active bs x 4 quadrants  complaints of pain. Declines medication. Pepcid given for stomach irritation and indigestion per MAR  Discussed plan of care with pt.   All questions answered.   Call light within reach, bed in low position, possessions within reach, treaded socks on, floor free from trip hazard, Hourly rounding in place.   SCDs refused despite education.

## 2018-10-01 NOTE — PROGRESS NOTES
Infectious Disease Progress Note    Author: Laina Lopez M.D. Date & Time of service: 10/1/2018  12:43 PM    Chief Complaint:  Follow-up of recurrent empyema    Interval History:   afebrile, white count continues to down trend, 8.6 today.  Patient says that he is constipated with abdominal pain.  No issues with breathing.  Patient reports that primary team is aware and seeks no further help from me  10/1/2018 no fevers.  Patient denies any new issues.  WBC is down to 7.7  Labs Reviewed, Medications Reviewed, Radiology Reviewed and Wound Reviewed.    Review of Systems:  Review of Systems   Constitutional: Negative for chills and fever.   Respiratory: Negative for cough and shortness of breath.    Cardiovascular: Positive for chest pain ( At chest tube site, mild).   Gastrointestinal: Negative for abdominal pain and constipation.   Genitourinary: Negative for dysuria.   Skin: Negative for itching.   Neurological: Negative for weakness.   All other systems reviewed and are negative.      Hemodynamics:  Temp (24hrs), Av.6 °C (97.8 °F), Min:36.2 °C (97.2 °F), Max:36.8 °C (98.3 °F)  Temperature: 36.7 °C (98 °F)  Pulse  Av.6  Min: 43  Max: 112   Blood Pressure: 143/90       Physical Exam:  Physical Exam   Constitutional: He is oriented to person, place, and time. No distress.   Thin  Appears uncomfortable, sitting up on the side of the bed   HENT:   Head: Normocephalic and atraumatic.   Mouth/Throat: No oropharyngeal exudate.   Eyes: Pupils are equal, round, and reactive to light. Conjunctivae are normal. No scleral icterus.   Cardiovascular: Normal rate, regular rhythm and normal heart sounds.  Exam reveals no gallop and no friction rub.    No murmur heard.  Pulmonary/Chest: Effort normal. He has no rales.   Decreased breath sounds left middle and lower lung zones   Abdominal: Soft. There is tenderness. There is guarding. There is no rebound.   Bowel sounds hyperactive   Musculoskeletal: Normal range of  motion. He exhibits no edema.   Lymphadenopathy:     He has no cervical adenopathy.   Neurological: He is alert and oriented to person, place, and time.   No gross cranial nerve or focal neuro deficit   Skin: Skin is warm and dry.   Psychiatric: His behavior is normal. Thought content normal.   Vitals reviewed.      Meds:    Current Facility-Administered Medications:   •  meropenem (MERREM) IV  •  oxyCODONE immediate-release **OR** oxyCODONE immediate-release  •  HYDROmorphone  •  Pharmacy Consult Request  •  scopolamine  •  celecoxib  •  famotidine  •  cyclobenzaprine  •  senna-docusate **AND** polyethylene glycol/lytes **AND** magnesium hydroxide **AND** bisacodyl  •  Respiratory Care per Protocol  •  acetaminophen  •  ondansetron  •  ondansetron  •  nicotine **AND** Nicotine Replacement Patient Education Materials **AND** nicotine polacrilex    Labs:  Recent Labs      09/30/18   0425  10/01/18   0413   WBC  8.6  7.7   RBC  3.52*  3.62*   HEMOGLOBIN  9.1*  9.4*   HEMATOCRIT  29.9*  30.6*   MCV  84.9  84.5   MCH  25.9*  26.0*   RDW  53.1*  53.7*   PLATELETCT  615*  674*   MPV  9.2  9.2     Recent Labs      09/30/18   0425  10/01/18   0413   SODIUM  137  137   POTASSIUM  4.0  4.2   CHLORIDE  100  99   CO2  32  31   GLUCOSE  100*  90   BUN  9  9     Recent Labs      09/30/18   0425  10/01/18   0413   CREATININE  0.54  0.66       Imaging:  Ct-chest (thorax) With    Result Date: 9/23/2018 9/23/2018 10:11 PM HISTORY/REASON FOR EXAM:  LEFT hydropneumothorax with chest tube in place TECHNIQUE/EXAM DESCRIPTION: CT thorax with contrast. Thin-section helical images were obtained from the lung apices through the adrenal glands following the bolus administration of 80 mL of nonionic (Omnipaque 350) contrast. Low dose optimization technique was utilized for this CT exam including automated exposure control and adjustment of the mA and/or kV according to patient size. COMPARISON:  9/18/2018 FINDINGS: THORACIC INLET: The thyroid  gland appears unremarkable. No pathologic lymphadenopathy. AXILLA: No pathologic lymphadenopathy. MEDIASTINUM: Enlarged lymph nodes are redemonstrated. A RIGHT paratracheal lymph node measures 10 mm in short axis on image 36 of series 2. This is unchanged. An enlarged subcarinal lymph node measures 11 mm in short axis on image 58 of series 2. This is  unchanged. Enlarged RIGHT hilar lymph node measures 11 mm in short axis on axial image 54 of series 2. Previously this measured 13 mm. HEART: Normal size. No pericardial effusion. VASCULAR STRUCTURES: Thoracic aorta appears unremarkable. Origins of the great vessels appear normal. PLEURA: There is a pigtail catheter in the loculated LEFT pleural fluid and gas collection. Size of the collection overall is similar to the prior study. This may communicate with the central bronchial tree as on axial image 66 of series 2. No RIGHT pleural disease is present. LUNGS: RIGHT upper lobe cavity and bronchiectasis are redemonstrated. Multifocal LEFT upper and lower lobe lobe airspace opacities are smaller than on the prior study. Some of these in the upper lobe are cavitary. There is bronchial thickening and fluid in the LEFT lower lobe bronchi. BONES: No suspicious lytic or sclerotic bony lesions identified. UPPER ABDOMEN: Unremarkable     1.  Interval placement of a drain in the LEFT hydropneumothorax with a decrease in the fluid component and increase in the gaseous component. Suspect underlying bronchopleural fistula. 2.  LEFT central upper lobe pulmonary opacity is no longer masslike in appearance 3.  Interval decrease in multifocal partially cavitary LEFT lung disease, likely improving infection. Aspiration related lung disease is a consideration. 4.  Unchanged RIGHT upper lobe cavitation 5.  Mediastinal and RIGHT hilar adenopathy     Ct-chest (thorax) With    Result Date: 9/18/2018 9/18/2018 5:05 PM HISTORY/REASON FOR EXAM:  Left-sided chest pain and coughing up green  sputum. Prior left hemithorax surgery. TECHNIQUE/EXAM DESCRIPTION: CT scan of the chest with contrast. Thin-section helical images were obtained from the lung apices through the adrenal glands following the bolus administration of 80 mL of Optiray 350 nonionic contrast. Low dose optimization technique was utilized for this CT exam including automated exposure control and adjustment of the mA and/or kV according to patient size. COMPARISON:  07/17/2018 FINDINGS: Air-filled cavities in the superior and superior anterior right hemithorax are again noted. Left-sided loculated pleural fluid collection contains air-fluid level located laterally throughout much of the left hemithorax that extends towards the left hilar area. Some bronchi do extend to the edge of the area. Focal pulmonary opacifications with poorly defined borders are again noted in each lung. Most of these appear less prominent than on the prior CT but some new smaller lesions do appear to be present mainly in the left lung. Opacifications in the right lung have decreased compared to the prior exam. Fibrotic opacifications and bronchiectasis are again noted bilaterally. Linear and fibrotic opacifications around the bronchi in the left lower lobe have increased compared to the prior exam and scattered opacifications with poorly defined borders have also increased slightly in the left lower lobe. Left-sided chest tube is no longer present. Mild to moderate mediastinal and hilar adenopathy is again noted. There is no evidence of pericardial effusion. No large masses are seen within the upper abdomen. Calcific atherosclerosis is again noted.     1.  Loculated fluid collection that does contain a significant amount of air with an air-fluid level is now identified in the left lateral hemithorax. This extends towards the left hilar area and there is some pulmonary opacification and volume loss including air bronchograms immediately adjacent to this collection.  Differential diagnosis does include bronchopleural fistula. Empyema could be present. Postoperative fluid collection is also possible. 2.  Consolidation versus mass in the left upper lobe is decreased compared to prior exam. 3.  Air-filled cavities again noted superiorly in right hemithorax. 4.  Size of pulmonary opacifications with poorly defined borders and some cavities in each lung have decreased compared to the prior exam. Multifocal opacifications in the left lung which are small in size have likely increased in number compared to the prior exam. Progression of inflammatory or infectious process in these areas is a possibility. 5.  Opacifications throughout the right lung have decreased compared to the prior exam. 6.  Mediastinal and hilar adenopathy is again noted.     Ct-chest Tube-empyema Left    Result Date: 9/19/2018 9/19/2018 1:30 PM HISTORY/REASON FOR EXAM:  Left-sided empyema and history of blunt bronchopleural fistula. TECHNIQUE/EXAM DESCRIPTION AND NUMBER OF VIEWS:  CT guided empyema drainage in the left hemithorax. The biopsy/drainage was done utilizing low dose optimization CT techniques including auto modulation for  imaging and low mA CT/fluoroscopy mode for the procedure. PROCEDURE:  Informed consent was obtained. Localizing CT images were obtained with the patient in supine oblique position. The skin was prepped with Betadine and draped in a sterile fashion. SEDATION: Conscious sedation with 50 mcg of Fentanyl and 1 mg Versed was administered during the procedure with appropriate continuous patient monitoring by the radiology nurse. Sedation duration: 30 minutes Following local anesthesia with 1% Lidocaine, a 17 G guiding needle was placed and needle path confirmed with CT. An Amplatz guidewire was placed and following serial tract dilatation, a 12 [place] pigtail locking catheter was placed in the loculated collection. A specimen was collected and submitted for culture and sensitivity and  Gram stain. Total of 360 mLs of purulent fluid was drained. Following catheter drainage the patient began coughing up purulent material. This continued for approximately 10 minutes during which time the patient's vital signs and O2 sat remained stable. The catheter was secured to the skin and connected to suction bulb drainage. Final CT images were obtained documenting catheter position. The patient tolerated the procedure well with no evidence of complication. COMPARISON: Recent CT scan of the thorax FINDINGS: CT images demonstrate excellent positioning of the locking loop catheter in the loculated pleural collection. No evidence of pneumothorax is noted.     1. Purulent Empyema Drainage With Ct Guidance. 2. Patient coughed up a moderate amount of purulent material following the procedure suspicious for a bronchopleural fistula.     Dx-chest-portable (1 View)    Result Date: 9/30/2018 9/30/2018 5:02 AM HISTORY/REASON FOR EXAM:  Chest tube evaluation TECHNIQUE/EXAM DESCRIPTION AND NUMBER OF VIEWS: Single portable view of the chest. COMPARISON: 9/29/2018 FINDINGS: 2 left chest tubes are redemonstrated. Slightly smaller left pneumothorax. Improved aeration of the left upper lobe. Redemonstration of extensive airspace opacity in the left lung, more in the lung bases. Right upper lobe linear atelectasis or scarring. No significant pleural effusion. No pneumothorax. Stable cardiopericardial silhouette.     1. Slightly decreased left apical pneumothorax and improved aeration of the left upper lobe.    Dx-chest-portable (1 View)    Result Date: 9/29/2018 9/29/2018 5:07 AM HISTORY/REASON FOR EXAM:  Chest tube. TECHNIQUE/EXAM DESCRIPTION AND NUMBER OF VIEWS: Single portable view of the chest. COMPARISON: 9/28/2018 FINDINGS: There are 2 left-sided chest tube. Stable left pneumothorax is seen. There is consolidation of the left lobe. Linear atelectasis is noted in the right upper lobe. There is no right pleural effusion.  There is no right pneumothorax. The cardiac silhouette is obliterated by consolidation.     1.  Stable left-sided chest tubes. 2.  Diffuse consolidation of left middle lower lung zones. 3.  Stable left pneumothorax. 4.  Stable right linear atelectasis.    Dx-chest-portable (1 View)    Result Date: 9/28/2018 9/28/2018 5:04 AM HISTORY/REASON FOR EXAM: Thoracostomy tube TECHNIQUE/EXAM DESCRIPTION AND NUMBER OF VIEWS: Single AP view of the chest. COMPARISON: Yesterday FINDINGS: Hardware: No change. Left thoracostomy tubes. Lungs: Stable left lung consolidation and partial collapse with apical pneumothorax moderate size. Stable right apical bullous change with scarring. Pleura:  No right pleural space process is seen. Heart and mediastinum: The cardiomediastinal contours are stable.     Stable left apical pneumothorax with lung collapse and consolidation    Dx-chest-portable (1 View)    Result Date: 9/27/2018 9/27/2018 10:24 AM HISTORY/REASON FOR EXAM:  Follow-up left chest tubes and pneumothorax TECHNIQUE/EXAM DESCRIPTION AND NUMBER OF VIEWS: Single portable view of the chest. COMPARISON: 09/25/2018 FINDINGS: 2 separate left-sided chest tubes are again identified with no change in positioning. Left pneumothorax is again identified which appears stable since prior radiograph. Opacification of volume loss in the remaining left hemithorax is again noted. Linear opacification is in the right upper lung are again noted. No new infiltrates or consolidations or effusions are noted.     1.  Left-sided pneumothorax again identified. 2.  2. Left chest tubes again noted. 3.  No new infiltrates or consolidations have developed since the prior radiograph.    Dx-chest-portable (1 View)    Result Date: 9/25/2018 9/25/2018 2:24 PM HISTORY/REASON FOR EXAM:  Recent left thoracotomy, decortication. TECHNIQUE/EXAM DESCRIPTION AND NUMBER OF VIEWS: Single portable view of the chest. COMPARISON: 9/19/2018 FINDINGS: LUNGS: Chronic  atelectasis/scarring in the right upper lobe. Hyperinflation, consistent with COPD. Postsurgical changes in the left lung with volume loss in the left lung base. PNEUMOTHORAX: Small to moderate left pneumothorax. Mild subcutaneous emphysema of the left chest wall. LINES AND TUBES: 2 left chest tubes are in place, one in the upper and one in the lower left pleural space. MEDIASTINUM: Partially obscured cardiac silhouette. MUSCULOSKELETAL STRUCTURES: Recent left thoracotomy.     1. Postoperative chest with small to moderate left pneumothorax and volume loss in the left lung. Left chest tubes are positioned as described. 2. Stable hyperinflation of the right lung, consistent with COPD. Stable scarring in the right upper lobe.    Dx-chest-portable (1 View)    Result Date: 9/19/2018 9/19/2018 4:45 PM HISTORY/REASON FOR EXAM:  LEFT pneumothorax with chest tube in place TECHNIQUE/EXAM DESCRIPTION AND NUMBER OF VIEWS: Single portable view of the chest. COMPARISON: The study from earlier the same date at 2:55 PM FINDINGS: LEFT pigtail chest tube is in unchanged position. HEART: Stable size. LUNGS: Mixed lucency and opacity in the RIGHT upper lobe is redemonstrated. The lungs are overall hyperlucent. PLEURA: Partially loculated LEFT pneumothorax is likely small changed.     1.  Slight interval decrease in size of the loculated LEFT pneumothorax with chest tube in place 2.  No other interval change    Dx-chest-portable (1 View)    Result Date: 9/19/2018 9/19/2018 2:36 PM HISTORY/REASON FOR EXAM: Respiratory distress TECHNIQUE/EXAM DESCRIPTION AND NUMBER OF VIEWS: Single portable view of the chest. COMPARISON: Previous date FINDINGS: There is been interval placement of a small bore percutaneous drain into the left thorax. The previously seen loculated left pleural effusion has resolved with a moderate-sized left loculated pneumothorax remaining in its place. There is emphysematous and bullous change involving the right lung  apex with scarring. There is no evidence of focal consolidation or evidence of pulmonary edema. There is no pleural effusion. The heart is normal in size.     Interval placement of a percutaneous catheter into the left thorax. Previously seen left pleural effusion is now absent with a moderate size left pneumothorax remaining in its place. This was discussed with Dr. Glze at 3:46 PM on 9/19/2018.    Dx-chest-portable (1 View)    Result Date: 9/18/2018 9/18/2018 4:10 PM HISTORY/REASON FOR EXAM:  Sepsis. Left thoracotomy. Left-sided chest pain TECHNIQUE/EXAM DESCRIPTION AND NUMBER OF VIEWS: Single portable view of the chest. COMPARISON: 7/25/2018 FINDINGS: Single portable view of the chest demonstrates a normal cardiac silhouette and mediastinal contours. Left pneumothorax has filled with fluid. Left chest tube has been removed. There are postoperative changes from prior left eighth rib resection. There is scarring in the right upper lobe with cystic change in the right lung apex and associated pleural thickening.     1.  Loculated left pneumothorax is now filled with fluid following chest tube removal. 2.  Scarring and bullous change in the right upper lobe.      Micro:  Results     Procedure Component Value Units Date/Time    ANAEROBIC CULTURE [[id number]] Collected:  09/25/18 1245    Order Status:  Completed Specimen:  Tissue Updated:  09/28/18 1745     Significant Indicator NEG     Source TISS     Site Left Pleural Peel     Anaerobic Culture, Culture Res No Anaerobes isolated.    CULTURE TISSUE W/ GRM STAIN [[id number]]  (Abnormal)  (Susceptibility) Collected:  09/25/18 1245    Order Status:  Completed Specimen:  Tissue Updated:  09/28/18 1745     Significant Indicator POS (POS)     Source TISS     Site Left Pleural Peel     Tissue Culture -- (A)     Gram Stain Result No organisms seen.     Tissue Culture Enterobacter cloacae  Light growth   (A)      Viridans Streptococcus  Light growth   (A)    Culture &  Susceptibility     ENTEROBACTER CLOACAE     Antibiotic Sensitivity Microscan Unit Status    Ampicillin Resistant >16 mcg/mL Final    Method: SENSITIVITY, SOREN    Cefepime Resistant >16 mcg/mL Final    Method: SENSITIVITY, SOREN    Cefotaxime Resistant >32 mcg/mL Final    Method: SENSITIVITY, SOREN    Cefotetan Resistant >32 mcg/mL Final    Method: SENSITIVITY, SOREN    Ceftazidime Resistant >16 mcg/mL Final    Method: SENSITIVITY, SOREN    Ceftriaxone Resistant >32 mcg/mL Final    Method: SENSITIVITY, SOREN    Cefuroxime Resistant >16 mcg/mL Final    Method: SENSITIVITY, SOREN    Ciprofloxacin Sensitive <=1 mcg/mL Final    Method: SENSITIVITY, SOREN    Ertapenem Sensitive <=1 mcg/mL Final    Method: SENSITIVITY, SOREN    Gentamicin Sensitive <=4 mcg/mL Final    Method: SENSITIVITY, SOREN    Pip/Tazobactam Resistant >64 mcg/mL Final    Method: SENSITIVITY, SOREN    Tigecycline Sensitive <=2 mcg/mL Final    Method: SENSITIVITY, SORNE    Tobramycin Sensitive <=4 mcg/mL Final    Method: SENSITIVITY, SOREN    Trimeth/Sulfa Sensitive <=2/38 mcg/mL Final    Method: SENSITIVITY, SOREN                       GRAM STAIN [[id number]] Collected:  09/25/18 1245    Order Status:  Completed Specimen:  Tissue Updated:  09/25/18 2247     Significant Indicator .     Source TISS     Site Left Pleural Peel     Gram Stain Result No organisms seen.          Assessment:  Donna Ceballos is a 58 y.o. male with a history of COPD and tobacco use, recent hospitalization for hydropneumothorax and left empyema status post left thoracotomy, decortication, debridement, and rib resection in July 2018 (cultures grew group F streptococcus, treated with IV Unasyn for 2 weeks followed by p.o. Augmentin for 2 weeks).  Patient is now admitted 9/18/2018 with persistent left hydropneumothorax, empyema, and concern for an alveolar pleural fistula. He underwent repeat thoracotomy, decortication, and intercostal muscle flap transposition to the parenchymal airway fistula on  9/25.     Pleural fluid cultures from 9/19 grew Enterobacter cloacae and viridans Strep.  OR cultures again grew Enterobacter, but this time resistant to cephalosporins and Zosyn.  Antibiotics were switched to meropenem.     Pertinent Diagnoses:  1. Sepsis, improved  2. L empyema, status post surgery as above  3. L hydropneumothorax, stable  4. L alveolar pleural fistula, status post surgery as above  5. COPD  6. Tobacco dependence    Active Hospital Problems    Diagnosis   • Empyema lung (HCC) [J86.9]   • Acute respiratory failure with hypoxia ([facility]) [J96.01]   • Panlobular emphysema (HCC) [J43.1]   • Protein malnutrition ([facility]) [E46]   • Tobacco abuse [Z72.0]   • Gastroesophageal reflux disease without esophagitis [K21.9]   • Acute bilateral low back pain [M54.5]   • HCAP (healthcare-associated pneumonia) [J18.9]       Plan:  1. Sepsis, improved  White count downtrending  Continue antibiotics as below    2. L empyema, status post surgery as above  Culture growing cephalosporin resistant Enterobacter and strep viridans  Continue IV meropenem 500 mg every 6 hours  Anticipate at least 4 weeks of antibiotics -  Check sed rate and CRP    3. L hydropneumothorax, stable  Chest tubes in place, managed by CT surgery    4. COPD, tobacco dependence  Patient reports that he will now quit  Consult regarding the importance of quitting smoking, especially in the setting    5.  Disposition  Patient reports that he has no insurance  Will likely need to stay in the hospital for the duration of IV antibiotics  Will need to discuss with  on the weekday    Discussed with internal medicine, Dr. Peguero

## 2018-10-01 NOTE — CARE PLAN
Problem: Respiratory:  Goal: Respiratory status will improve  Pt has diminished lungs, more so on left side  X 2 chest tubes and prevena wound vac to left flank, serosangiunous output from chest tubes   in use  Pt pulls 1250 mL with IS  Strong productive cough  Educated on IS use, oral care, , coughing, and ambulation.   Refused ambulation and oral care at this time.     Problem: Pain Management  Goal: Pain level will decrease to patient's comfort goal  Heat pack given  Encouraged ambulation  pepcid given prn indigestion  Medication for pain offered, pt declined    Problem: Mobility  Goal: Risk for activity intolerance will decrease  Educated on mobilization benefits, pt refused at this time.

## 2018-10-01 NOTE — CARE PLAN
Problem: Respiratory:  Goal: Respiratory status will improve  Outcome: PROGRESSING AS EXPECTED  Encouraged coughing, deep breathing, ambulation.  Chest tubes remain in place.   Pulling IS to 1250. Encouraged frequent use.     Problem: Pain Management  Goal: Pain level will decrease to patient's comfort goal  Outcome: PROGRESSING AS EXPECTED  Assessing pain frequently. Declining intervention this AM, continuing to offer.    Problem: Psychosocial Needs:  Goal: Level of anxiety will decrease  Outcome: PROGRESSING AS EXPECTED  Pt intermittently agitated. Yelling at MD this AM. Providing verbal de-escalation. Pt declining pain intervention which may be a source of agitation. Continuing to monitor.

## 2018-10-01 NOTE — PROGRESS NOTES
"Pt A&Ox4, sitting at edge of bed.  Chest tubes in place x2 to left chest. #1 to water seal per MD with no leak noted, minimal output. #2 to suction with minimal output but air leak still persists, MD is aware.  Left chest incision with prevena wound vac in place.   States pain 4/10, declines intervention.  Expiratory wheezes heard to L upper lobe. Diminished throughout.   Tolerating regular diet, denies n/v. + bowel sounds, + flatus, LBM yesterday 9/30.  Saturating >90% on 1L NC. Educated on importance of IS use. Pulling 1250.  Pt ambulates SBA.  Encouraged ambulation in halls today and pt became irritable and stated \"harrison if I get out there maybe one time today, it is so cold in this hospital you make it impossible to be warm\". Offered heat packs, warm blankets, and to turn heat up in room. Pt declined all offerings.   Updated on plan of care. Safety education provided. Bed locked in low. Call light within reach. Rounding in place.   "

## 2018-10-01 NOTE — THERAPY
"Occupational Therapy Evaluation completed.   Functional Status:    Pt standing up donning pants when OT entered. Demonstrated ADL xfers at supv level and simulated grooming tasks.   Plan of Care: Patient with no further skilled OT needs in the acute care setting at this time  Discharge Recommendations:  Equipment: No Equipment Needed. Post-acute therapy Currently anticipate no further skilled therapy needs once patient is discharged from the inpatient setting.    See \"Rehab Therapy-Acute\" Patient Summary Report for complete documentation.    59 y/o male admitted for shortness of breath and cough, history of COPD. Pt doing well today, demonstrated LB dressing and ADL xfers and Mod I level. Pt is on 1.5 L of O2 and sats between 90-95, reports that he will not go home with O2 tank as it impedes his work. Pt reports that he is at his baseline level and has no concerns about going home. Pt has no further acute OT needs at this time, recommend D/C home.   "

## 2018-10-02 ENCOUNTER — APPOINTMENT (OUTPATIENT)
Dept: RADIOLOGY | Facility: MEDICAL CENTER | Age: 58
DRG: 853 | End: 2018-10-02
Attending: NURSE PRACTITIONER

## 2018-10-02 ENCOUNTER — APPOINTMENT (OUTPATIENT)
Dept: RADIOLOGY | Facility: MEDICAL CENTER | Age: 58
DRG: 853 | End: 2018-10-02
Attending: INTERNAL MEDICINE

## 2018-10-02 PROBLEM — K59.00 CONSTIPATION: Status: ACTIVE | Noted: 2018-10-02

## 2018-10-02 LAB
ANION GAP SERPL CALC-SCNC: 5 MMOL/L (ref 0–11.9)
BUN SERPL-MCNC: 9 MG/DL (ref 8–22)
CALCIUM SERPL-MCNC: 8.8 MG/DL (ref 8.5–10.5)
CHLORIDE SERPL-SCNC: 101 MMOL/L (ref 96–112)
CO2 SERPL-SCNC: 31 MMOL/L (ref 20–33)
CREAT SERPL-MCNC: 0.66 MG/DL (ref 0.5–1.4)
CRP SERPL HS-MCNC: 1.79 MG/DL (ref 0–0.75)
ERYTHROCYTE [DISTWIDTH] IN BLOOD BY AUTOMATED COUNT: 52.9 FL (ref 35.9–50)
ERYTHROCYTE [SEDIMENTATION RATE] IN BLOOD BY WESTERGREN METHOD: 102 MM/HOUR (ref 0–20)
GLUCOSE SERPL-MCNC: 90 MG/DL (ref 65–99)
HCT VFR BLD AUTO: 30.6 % (ref 42–52)
HGB BLD-MCNC: 9.6 G/DL (ref 14–18)
MCH RBC QN AUTO: 26.7 PG (ref 27–33)
MCHC RBC AUTO-ENTMCNC: 31.4 G/DL (ref 33.7–35.3)
MCV RBC AUTO: 85 FL (ref 81.4–97.8)
PLATELET # BLD AUTO: 686 K/UL (ref 164–446)
PMV BLD AUTO: 9.5 FL (ref 9–12.9)
POTASSIUM SERPL-SCNC: 4.1 MMOL/L (ref 3.6–5.5)
RBC # BLD AUTO: 3.6 M/UL (ref 4.7–6.1)
SODIUM SERPL-SCNC: 137 MMOL/L (ref 135–145)
WBC # BLD AUTO: 7.9 K/UL (ref 4.8–10.8)

## 2018-10-02 PROCEDURE — 85027 COMPLETE CBC AUTOMATED: CPT

## 2018-10-02 PROCEDURE — G8979 MOBILITY GOAL STATUS: HCPCS | Mod: CI

## 2018-10-02 PROCEDURE — 85652 RBC SED RATE AUTOMATED: CPT

## 2018-10-02 PROCEDURE — 80048 BASIC METABOLIC PNL TOTAL CA: CPT

## 2018-10-02 PROCEDURE — 700102 HCHG RX REV CODE 250 W/ 637 OVERRIDE(OP): Performed by: NURSE PRACTITIONER

## 2018-10-02 PROCEDURE — 36415 COLL VENOUS BLD VENIPUNCTURE: CPT

## 2018-10-02 PROCEDURE — A9270 NON-COVERED ITEM OR SERVICE: HCPCS | Performed by: ANESTHESIOLOGY

## 2018-10-02 PROCEDURE — 99232 SBSQ HOSP IP/OBS MODERATE 35: CPT | Performed by: INTERNAL MEDICINE

## 2018-10-02 PROCEDURE — 86140 C-REACTIVE PROTEIN: CPT

## 2018-10-02 PROCEDURE — 700102 HCHG RX REV CODE 250 W/ 637 OVERRIDE(OP): Performed by: ANESTHESIOLOGY

## 2018-10-02 PROCEDURE — 700102 HCHG RX REV CODE 250 W/ 637 OVERRIDE(OP): Performed by: INTERNAL MEDICINE

## 2018-10-02 PROCEDURE — A9270 NON-COVERED ITEM OR SERVICE: HCPCS | Performed by: NURSE PRACTITIONER

## 2018-10-02 PROCEDURE — 700111 HCHG RX REV CODE 636 W/ 250 OVERRIDE (IP): Performed by: SURGERY

## 2018-10-02 PROCEDURE — 71045 X-RAY EXAM CHEST 1 VIEW: CPT

## 2018-10-02 PROCEDURE — 99232 SBSQ HOSP IP/OBS MODERATE 35: CPT | Performed by: HOSPITALIST

## 2018-10-02 PROCEDURE — 770006 HCHG ROOM/CARE - MED/SURG/GYN SEMI*

## 2018-10-02 PROCEDURE — A9270 NON-COVERED ITEM OR SERVICE: HCPCS | Performed by: INTERNAL MEDICINE

## 2018-10-02 PROCEDURE — 700105 HCHG RX REV CODE 258: Performed by: SURGERY

## 2018-10-02 PROCEDURE — G8980 MOBILITY D/C STATUS: HCPCS | Mod: CI

## 2018-10-02 PROCEDURE — 74018 RADEX ABDOMEN 1 VIEW: CPT

## 2018-10-02 RX ADMIN — CELECOXIB 200 MG: 200 CAPSULE ORAL at 17:55

## 2018-10-02 RX ADMIN — MEROPENEM 500 MG: 500 INJECTION, POWDER, FOR SOLUTION INTRAVENOUS at 06:16

## 2018-10-02 RX ADMIN — OXYCODONE HYDROCHLORIDE 10 MG: 10 TABLET ORAL at 20:19

## 2018-10-02 RX ADMIN — MEROPENEM 500 MG: 500 INJECTION, POWDER, FOR SOLUTION INTRAVENOUS at 17:55

## 2018-10-02 RX ADMIN — OXYCODONE HYDROCHLORIDE 10 MG: 10 TABLET ORAL at 16:30

## 2018-10-02 RX ADMIN — OXYCODONE HYDROCHLORIDE 10 MG: 10 TABLET ORAL at 03:03

## 2018-10-02 RX ADMIN — OXYCODONE HYDROCHLORIDE 10 MG: 10 TABLET ORAL at 06:16

## 2018-10-02 RX ADMIN — MEROPENEM 500 MG: 500 INJECTION, POWDER, FOR SOLUTION INTRAVENOUS at 00:00

## 2018-10-02 RX ADMIN — STANDARDIZED SENNA CONCENTRATE AND DOCUSATE SODIUM 2 TABLET: 8.6; 5 TABLET ORAL at 17:55

## 2018-10-02 RX ADMIN — CELECOXIB 200 MG: 200 CAPSULE ORAL at 06:16

## 2018-10-02 RX ADMIN — STANDARDIZED SENNA CONCENTRATE AND DOCUSATE SODIUM 2 TABLET: 8.6; 5 TABLET ORAL at 06:16

## 2018-10-02 RX ADMIN — MEROPENEM 500 MG: 500 INJECTION, POWDER, FOR SOLUTION INTRAVENOUS at 11:32

## 2018-10-02 RX ADMIN — OXYCODONE HYDROCHLORIDE 10 MG: 10 TABLET ORAL at 11:27

## 2018-10-02 RX ADMIN — NICOTINE 14 MG: 14 PATCH, EXTENDED RELEASE TRANSDERMAL at 06:16

## 2018-10-02 RX ADMIN — FAMOTIDINE 20 MG: 20 TABLET ORAL at 20:19

## 2018-10-02 ASSESSMENT — ENCOUNTER SYMPTOMS
BACK PAIN: 1
HEADACHES: 0
COUGH: 1
CHILLS: 0
MYALGIAS: 0
FEVER: 0
COUGH: 0
SHORTNESS OF BREATH: 0
CONSTIPATION: 0
ABDOMINAL PAIN: 0
HEMOPTYSIS: 1
WEAKNESS: 0
NAUSEA: 0
WEAKNESS: 1

## 2018-10-02 ASSESSMENT — PAIN SCALES - GENERAL
PAINLEVEL_OUTOF10: 6
PAINLEVEL_OUTOF10: 6
PAINLEVEL_OUTOF10: 8

## 2018-10-02 NOTE — PROGRESS NOTES
"Assumed care of pt at 1900, report given.  A+O x 4, VSS, and on 1L O2 NC.   Pt has 2 left sided chest tubes. Chest tube #1 to water seal. Chest tube #2 to 20mm continuous wall suction.  Chest tube #2 has continuous air leak present; MD aware.   Left chest surgical incisions covered with Prevena wound vac; no drainage. CDI  Chest tube dressing changed; mild serosanguinous drainage around both chest tube insertion sites noted. Pt tolerated well.   Lung sounds diminished and wheezy over left side.   Pt stating 5/10 pain this evening, medicated per MAR; tolerating well.  Pt tolerating a regular diet; denies N/V at this time.   +void  -BM (last 9/30/18)  Pt ambulates with a standby assist and FWW; tolerates well.    Pt updated on POC and all questions answered at this time.  Bed in lowest position, call light within reach, and no current needs.     Pt getting agitated towards end of assessment. When pt handed the IS and to demonstrate he threw it back down on his bed and said \"I've done that thing enough today, that is all you get!\". Pt refusing ambulation and further assessment at this time.   "

## 2018-10-02 NOTE — PROGRESS NOTES
Progress Note:  10/2/2018, 11:28 AM    S: No acute events.  Afebrile, pain controlled.  Mild cough, but minimal clear sputum.     O:  Blood pressure 139/78, pulse 76, temperature 36.7 °C (98.1 °F), resp. rate 18, height 1.829 m (6'), weight 62.2 kg (137 lb 2 oz), SpO2 97 %.    NAD, awake, alert  Breathing nonlabored  L chest tubes x2, angled to water seal without leak, straight to -10 suction, small intermittent mostly expiratory leak    CXR: L pulmonary infiltrates stable but improved over several days.  ~2cm apical and R lateral space    A:   Active Hospital Problems    Diagnosis   • Empyema lung (HCC) [J86.9]     Priority: High     9/19 Zosyn initiated  9/24 Zosyn altered cefepime initiated for Enterobacter cloacae and Streptococcus viridans  9/27 Cefepime day 4  9/28 Enterobacter cloacae now resistant to cefepime therapy altered to meropenem.  9/28 Meropenem day 1     • Acute respiratory failure with hypoxia (HCC) [J96.01]     Priority: Medium   • Gastroesophageal reflux disease without esophagitis [K21.9]     Priority: Low   • Panlobular emphysema (HCC) [J43.1]     Priority: Low   • Protein malnutrition (HCC) [E46]     Priority: Low   • Tobacco abuse [Z72.0]     Priority: Low   • Acute bilateral low back pain [M54.5]   • HCAP (healthcare-associated pneumonia) [J18.9]         P:   -I placed both chest tubes to -40 suction, without any change in size of air leak.  -Continue aggressive pulmonary hygiene  -I discussed the situation with Mr. Ceballos in detail.  He has made progress, but still has a residual Pneumothorax & small air leak.  The pneumothorax is decreasing however, and the air leak is small.  It may take a significant amount of time for the leak to stop and the pneumothorax to resolve, but as long as he is making progress we will continue current management.      Pb Gaviria M.D.  [facility]  [phone]

## 2018-10-02 NOTE — PROGRESS NOTES
Bedside report received. Assessment complete.  A&O x 4. Patient calls appropriately. Patient up with x1 assist.   Patient resting in bed comfortably, only requesting coffee at present.  Denies N&V. Tolerating reular diet.  Surgical sites to left chest CDI with Prevena wound vac. Chest tube x2 CDI, #1 to water seal, #2 to 20cm wall suction.  + void, last BM 09/30/18  Patient denies SOB.  Reviewed plan of care with patient. Call light and personal belongings within reach. Hourly rounding in place. All needs met at this time.      1125 - Dr Gaviria adjusted CT #1 to wall suction at 40cm, also adjusted #2 to 40cm wall suction

## 2018-10-02 NOTE — PROGRESS NOTES
Infectious Disease Progress Note    Author: Laina Lopez M.D. Date & Time of service: 10/2/2018  2:11 PM    Chief Complaint:  Follow-up of recurrent empyema    Interval History:   afebrile, white count continues to down trend, 8.6 today.  Patient says that he is constipated with abdominal pain.  No issues with breathing.  Patient reports that primary team is aware and seeks no further help from me  10/1/2018 no fevers.  Patient denies any new issues.  WBC is down to 7.7  10/2/2018 no fevers.  Denies any new issues.  Labs Reviewed, Medications Reviewed, Radiology Reviewed and Wound Reviewed.    Review of Systems:  Review of Systems   Constitutional: Negative for chills and fever.   Respiratory: Negative for cough and shortness of breath.    Cardiovascular: Positive for chest pain ( At chest tube site, mild).   Gastrointestinal: Negative for abdominal pain and constipation.   Genitourinary: Negative for dysuria.   Skin: Negative for itching.   Neurological: Negative for weakness.   All other systems reviewed and are negative.      Hemodynamics:  Temp (24hrs), Av.7 °C (98.1 °F), Min:36.5 °C (97.7 °F), Max:36.9 °C (98.4 °F)  Temperature: 36.8 °C (98.2 °F)  Pulse  Av.3  Min: 43  Max: 112   Blood Pressure: 143/88       Physical Exam:  Physical Exam   Constitutional: He is oriented to person, place, and time. No distress.   HENT:   Head: Normocephalic and atraumatic.   Mouth/Throat: No oropharyngeal exudate.   Eyes: Pupils are equal, round, and reactive to light. Conjunctivae are normal. No scleral icterus.   Cardiovascular: Normal rate, regular rhythm and normal heart sounds.  Exam reveals no gallop and no friction rub.    No murmur heard.  Pulmonary/Chest: Effort normal. He has rales.   Chest tubes x2   Abdominal: Soft. There is no tenderness. There is no rebound.   Bowel sounds hyperactive   Musculoskeletal: Normal range of motion. He exhibits no edema.   Lymphadenopathy:     He has no cervical  adenopathy.   Neurological: He is alert and oriented to person, place, and time.   No gross cranial nerve or focal neuro deficit   Skin: Skin is warm and dry.   Psychiatric: His behavior is normal. Thought content normal.   Vitals reviewed.      Meds:    Current Facility-Administered Medications:   •  meropenem (MERREM) IV  •  oxyCODONE immediate-release **OR** oxyCODONE immediate-release  •  HYDROmorphone  •  Pharmacy Consult Request  •  scopolamine  •  celecoxib  •  famotidine  •  cyclobenzaprine  •  senna-docusate **AND** polyethylene glycol/lytes **AND** magnesium hydroxide **AND** bisacodyl  •  Respiratory Care per Protocol  •  acetaminophen  •  ondansetron  •  ondansetron  •  nicotine **AND** Nicotine Replacement Patient Education Materials **AND** nicotine polacrilex    Labs:  Recent Labs      09/30/18   0425  10/01/18   0413  10/02/18   0316   WBC  8.6  7.7  7.9   RBC  3.52*  3.62*  3.60*   HEMOGLOBIN  9.1*  9.4*  9.6*   HEMATOCRIT  29.9*  30.6*  30.6*   MCV  84.9  84.5  85.0   MCH  25.9*  26.0*  26.7*   RDW  53.1*  53.7*  52.9*   PLATELETCT  615*  674*  686*   MPV  9.2  9.2  9.5     Recent Labs      09/30/18   0425  10/01/18   0413  10/02/18   0316   SODIUM  137  137  137   POTASSIUM  4.0  4.2  4.1   CHLORIDE  100  99  101   CO2  32  31  31   GLUCOSE  100*  90  90   BUN  9  9  9     Recent Labs      09/30/18   0425  10/01/18   0413  10/02/18   0316   CREATININE  0.54  0.66  0.66       Imaging:  Ct-chest (thorax) With    Result Date: 9/23/2018 9/23/2018 10:11 PM HISTORY/REASON FOR EXAM:  LEFT hydropneumothorax with chest tube in place TECHNIQUE/EXAM DESCRIPTION: CT thorax with contrast. Thin-section helical images were obtained from the lung apices through the adrenal glands following the bolus administration of 80 mL of nonionic (Omnipaque 350) contrast. Low dose optimization technique was utilized for this CT exam including automated exposure control and adjustment of the mA and/or kV according to patient  size. COMPARISON:  9/18/2018 FINDINGS: THORACIC INLET: The thyroid gland appears unremarkable. No pathologic lymphadenopathy. AXILLA: No pathologic lymphadenopathy. MEDIASTINUM: Enlarged lymph nodes are redemonstrated. A RIGHT paratracheal lymph node measures 10 mm in short axis on image 36 of series 2. This is unchanged. An enlarged subcarinal lymph node measures 11 mm in short axis on image 58 of series 2. This is  unchanged. Enlarged RIGHT hilar lymph node measures 11 mm in short axis on axial image 54 of series 2. Previously this measured 13 mm. HEART: Normal size. No pericardial effusion. VASCULAR STRUCTURES: Thoracic aorta appears unremarkable. Origins of the great vessels appear normal. PLEURA: There is a pigtail catheter in the loculated LEFT pleural fluid and gas collection. Size of the collection overall is similar to the prior study. This may communicate with the central bronchial tree as on axial image 66 of series 2. No RIGHT pleural disease is present. LUNGS: RIGHT upper lobe cavity and bronchiectasis are redemonstrated. Multifocal LEFT upper and lower lobe lobe airspace opacities are smaller than on the prior study. Some of these in the upper lobe are cavitary. There is bronchial thickening and fluid in the LEFT lower lobe bronchi. BONES: No suspicious lytic or sclerotic bony lesions identified. UPPER ABDOMEN: Unremarkable     1.  Interval placement of a drain in the LEFT hydropneumothorax with a decrease in the fluid component and increase in the gaseous component. Suspect underlying bronchopleural fistula. 2.  LEFT central upper lobe pulmonary opacity is no longer masslike in appearance 3.  Interval decrease in multifocal partially cavitary LEFT lung disease, likely improving infection. Aspiration related lung disease is a consideration. 4.  Unchanged RIGHT upper lobe cavitation 5.  Mediastinal and RIGHT hilar adenopathy     Ct-chest (thorax) With    Result Date: 9/18/2018 9/18/2018 5:05 PM  HISTORY/REASON FOR EXAM:  Left-sided chest pain and coughing up green sputum. Prior left hemithorax surgery. TECHNIQUE/EXAM DESCRIPTION: CT scan of the chest with contrast. Thin-section helical images were obtained from the lung apices through the adrenal glands following the bolus administration of 80 mL of Optiray 350 nonionic contrast. Low dose optimization technique was utilized for this CT exam including automated exposure control and adjustment of the mA and/or kV according to patient size. COMPARISON:  07/17/2018 FINDINGS: Air-filled cavities in the superior and superior anterior right hemithorax are again noted. Left-sided loculated pleural fluid collection contains air-fluid level located laterally throughout much of the left hemithorax that extends towards the left hilar area. Some bronchi do extend to the edge of the area. Focal pulmonary opacifications with poorly defined borders are again noted in each lung. Most of these appear less prominent than on the prior CT but some new smaller lesions do appear to be present mainly in the left lung. Opacifications in the right lung have decreased compared to the prior exam. Fibrotic opacifications and bronchiectasis are again noted bilaterally. Linear and fibrotic opacifications around the bronchi in the left lower lobe have increased compared to the prior exam and scattered opacifications with poorly defined borders have also increased slightly in the left lower lobe. Left-sided chest tube is no longer present. Mild to moderate mediastinal and hilar adenopathy is again noted. There is no evidence of pericardial effusion. No large masses are seen within the upper abdomen. Calcific atherosclerosis is again noted.     1.  Loculated fluid collection that does contain a significant amount of air with an air-fluid level is now identified in the left lateral hemithorax. This extends towards the left hilar area and there is some pulmonary opacification and volume loss  including air bronchograms immediately adjacent to this collection. Differential diagnosis does include bronchopleural fistula. Empyema could be present. Postoperative fluid collection is also possible. 2.  Consolidation versus mass in the left upper lobe is decreased compared to prior exam. 3.  Air-filled cavities again noted superiorly in right hemithorax. 4.  Size of pulmonary opacifications with poorly defined borders and some cavities in each lung have decreased compared to the prior exam. Multifocal opacifications in the left lung which are small in size have likely increased in number compared to the prior exam. Progression of inflammatory or infectious process in these areas is a possibility. 5.  Opacifications throughout the right lung have decreased compared to the prior exam. 6.  Mediastinal and hilar adenopathy is again noted.     Ct-chest Tube-empyema Left    Result Date: 9/19/2018 9/19/2018 1:30 PM HISTORY/REASON FOR EXAM:  Left-sided empyema and history of blunt bronchopleural fistula. TECHNIQUE/EXAM DESCRIPTION AND NUMBER OF VIEWS:  CT guided empyema drainage in the left hemithorax. The biopsy/drainage was done utilizing low dose optimization CT techniques including auto modulation for  imaging and low mA CT/fluoroscopy mode for the procedure. PROCEDURE:  Informed consent was obtained. Localizing CT images were obtained with the patient in supine oblique position. The skin was prepped with Betadine and draped in a sterile fashion. SEDATION: Conscious sedation with 50 mcg of Fentanyl and 1 mg Versed was administered during the procedure with appropriate continuous patient monitoring by the radiology nurse. Sedation duration: 30 minutes Following local anesthesia with 1% Lidocaine, a 17 G guiding needle was placed and needle path confirmed with CT. An Amplatz guidewire was placed and following serial tract dilatation, a 12 Nepali pigtail locking catheter was placed in the loculated collection. A  specimen was collected and submitted for culture and sensitivity and Gram stain. Total of 360 mLs of purulent fluid was drained. Following catheter drainage the patient began coughing up purulent material. This continued for approximately 10 minutes during which time the patient's vital signs and O2 sat remained stable. The catheter was secured to the skin and connected to suction bulb drainage. Final CT images were obtained documenting catheter position. The patient tolerated the procedure well with no evidence of complication. COMPARISON: Recent CT scan of the thorax FINDINGS: CT images demonstrate excellent positioning of the locking loop catheter in the loculated pleural collection. No evidence of pneumothorax is noted.     1. Purulent Empyema Drainage With Ct Guidance. 2. Patient coughed up a moderate amount of purulent material following the procedure suspicious for a bronchopleural fistula.     Dx-chest-portable (1 View)    Result Date: 9/30/2018 9/30/2018 5:02 AM HISTORY/REASON FOR EXAM:  Chest tube evaluation TECHNIQUE/EXAM DESCRIPTION AND NUMBER OF VIEWS: Single portable view of the chest. COMPARISON: 9/29/2018 FINDINGS: 2 left chest tubes are redemonstrated. Slightly smaller left pneumothorax. Improved aeration of the left upper lobe. Redemonstration of extensive airspace opacity in the left lung, more in the lung bases. Right upper lobe linear atelectasis or scarring. No significant pleural effusion. No pneumothorax. Stable cardiopericardial silhouette.     1. Slightly decreased left apical pneumothorax and improved aeration of the left upper lobe.    Dx-chest-portable (1 View)    Result Date: 9/29/2018 9/29/2018 5:07 AM HISTORY/REASON FOR EXAM:  Chest tube. TECHNIQUE/EXAM DESCRIPTION AND NUMBER OF VIEWS: Single portable view of the chest. COMPARISON: 9/28/2018 FINDINGS: There are 2 left-sided chest tube. Stable left pneumothorax is seen. There is consolidation of the left lobe. Linear atelectasis is  noted in the right upper lobe. There is no right pleural effusion. There is no right pneumothorax. The cardiac silhouette is obliterated by consolidation.     1.  Stable left-sided chest tubes. 2.  Diffuse consolidation of left middle lower lung zones. 3.  Stable left pneumothorax. 4.  Stable right linear atelectasis.    Dx-chest-portable (1 View)    Result Date: 9/28/2018 9/28/2018 5:04 AM HISTORY/REASON FOR EXAM: Thoracostomy tube TECHNIQUE/EXAM DESCRIPTION AND NUMBER OF VIEWS: Single AP view of the chest. COMPARISON: Yesterday FINDINGS: Hardware: No change. Left thoracostomy tubes. Lungs: Stable left lung consolidation and partial collapse with apical pneumothorax moderate size. Stable right apical bullous change with scarring. Pleura:  No right pleural space process is seen. Heart and mediastinum: The cardiomediastinal contours are stable.     Stable left apical pneumothorax with lung collapse and consolidation    Dx-chest-portable (1 View)    Result Date: 9/27/2018 9/27/2018 10:24 AM HISTORY/REASON FOR EXAM:  Follow-up left chest tubes and pneumothorax TECHNIQUE/EXAM DESCRIPTION AND NUMBER OF VIEWS: Single portable view of the chest. COMPARISON: 09/25/2018 FINDINGS: 2 separate left-sided chest tubes are again identified with no change in positioning. Left pneumothorax is again identified which appears stable since prior radiograph. Opacification of volume loss in the remaining left hemithorax is again noted. Linear opacification is in the right upper lung are again noted. No new infiltrates or consolidations or effusions are noted.     1.  Left-sided pneumothorax again identified. 2.  2. Left chest tubes again noted. 3.  No new infiltrates or consolidations have developed since the prior radiograph.    Dx-chest-portable (1 View)    Result Date: 9/25/2018 9/25/2018 2:24 PM HISTORY/REASON FOR EXAM:  Recent left thoracotomy, decortication. TECHNIQUE/EXAM DESCRIPTION AND NUMBER OF VIEWS: Single portable view of  the chest. COMPARISON: 9/19/2018 FINDINGS: LUNGS: Chronic atelectasis/scarring in the right upper lobe. Hyperinflation, consistent with COPD. Postsurgical changes in the left lung with volume loss in the left lung base. PNEUMOTHORAX: Small to moderate left pneumothorax. Mild subcutaneous emphysema of the left chest wall. LINES AND TUBES: 2 left chest tubes are in place, one in the upper and one in the lower left pleural space. MEDIASTINUM: Partially obscured cardiac silhouette. MUSCULOSKELETAL STRUCTURES: Recent left thoracotomy.     1. Postoperative chest with small to moderate left pneumothorax and volume loss in the left lung. Left chest tubes are positioned as described. 2. Stable hyperinflation of the right lung, consistent with COPD. Stable scarring in the right upper lobe.    Dx-chest-portable (1 View)    Result Date: 9/19/2018 9/19/2018 4:45 PM HISTORY/REASON FOR EXAM:  LEFT pneumothorax with chest tube in place TECHNIQUE/EXAM DESCRIPTION AND NUMBER OF VIEWS: Single portable view of the chest. COMPARISON: The study from earlier the same date at 2:55 PM FINDINGS: LEFT pigtail chest tube is in unchanged position. HEART: Stable size. LUNGS: Mixed lucency and opacity in the RIGHT upper lobe is redemonstrated. The lungs are overall hyperlucent. PLEURA: Partially loculated LEFT pneumothorax is likely small changed.     1.  Slight interval decrease in size of the loculated LEFT pneumothorax with chest tube in place 2.  No other interval change    Dx-chest-portable (1 View)    Result Date: 9/19/2018 9/19/2018 2:36 PM HISTORY/REASON FOR EXAM: Respiratory distress TECHNIQUE/EXAM DESCRIPTION AND NUMBER OF VIEWS: Single portable view of the chest. COMPARISON: Previous date FINDINGS: There is been interval placement of a small bore percutaneous drain into the left thorax. The previously seen loculated left pleural effusion has resolved with a moderate-sized left loculated pneumothorax remaining in its place. There is  emphysematous and bullous change involving the right lung apex with scarring. There is no evidence of focal consolidation or evidence of pulmonary edema. There is no pleural effusion. The heart is normal in size.     Interval placement of a percutaneous catheter into the left thorax. Previously seen left pleural effusion is now absent with a moderate size left pneumothorax remaining in its place. This was discussed with Dr. Glez at 3:46 PM on 9/19/2018.    Dx-chest-portable (1 View)    Result Date: 9/18/2018 9/18/2018 4:10 PM HISTORY/REASON FOR EXAM:  Sepsis. Left thoracotomy. Left-sided chest pain TECHNIQUE/EXAM DESCRIPTION AND NUMBER OF VIEWS: Single portable view of the chest. COMPARISON: 7/25/2018 FINDINGS: Single portable view of the chest demonstrates a normal cardiac silhouette and mediastinal contours. Left pneumothorax has filled with fluid. Left chest tube has been removed. There are postoperative changes from prior left eighth rib resection. There is scarring in the right upper lobe with cystic change in the right lung apex and associated pleural thickening.     1.  Loculated left pneumothorax is now filled with fluid following chest tube removal. 2.  Scarring and bullous change in the right upper lobe.      Micro:  Results     Procedure Component Value Units Date/Time    ANAEROBIC CULTURE [[id number]] Collected:  09/25/18 1245    Order Status:  Completed Specimen:  Tissue Updated:  09/28/18 1745     Significant Indicator NEG     Source TISS     Site Left Pleural Peel     Anaerobic Culture, Culture Res No Anaerobes isolated.    CULTURE TISSUE W/ GRM STAIN [[id number]]  (Abnormal)  (Susceptibility) Collected:  09/25/18 1245    Order Status:  Completed Specimen:  Tissue Updated:  09/28/18 1745     Significant Indicator POS (POS)     Source TISS     Site Left Pleural Peel     Tissue Culture -- (A)     Gram Stain Result No organisms seen.     Tissue Culture Enterobacter cloacae  Light growth   (A)       Viridans Streptococcus  Light growth   (A)    Culture & Susceptibility     ENTEROBACTER CLOACAE     Antibiotic Sensitivity Microscan Unit Status    Ampicillin Resistant >16 mcg/mL Final    Method: SENSITIVITY, SOREN    Cefepime Resistant >16 mcg/mL Final    Method: SENSITIVITY, SOREN    Cefotaxime Resistant >32 mcg/mL Final    Method: SENSITIVITY, SOREN    Cefotetan Resistant >32 mcg/mL Final    Method: SENSITIVITY, SOREN    Ceftazidime Resistant >16 mcg/mL Final    Method: SENSITIVITY, SOREN    Ceftriaxone Resistant >32 mcg/mL Final    Method: SENSITIVITY, SOREN    Cefuroxime Resistant >16 mcg/mL Final    Method: SENSITIVITY, SOREN    Ciprofloxacin Sensitive <=1 mcg/mL Final    Method: SENSITIVITY, SOREN    Ertapenem Sensitive <=1 mcg/mL Final    Method: SENSITIVITY, SOREN    Gentamicin Sensitive <=4 mcg/mL Final    Method: SENSITIVITY, SOREN    Pip/Tazobactam Resistant >64 mcg/mL Final    Method: SENSITIVITY, SOREN    Tigecycline Sensitive <=2 mcg/mL Final    Method: SENSITIVITY, SOREN    Tobramycin Sensitive <=4 mcg/mL Final    Method: SENSITIVITY, SOREN    Trimeth/Sulfa Sensitive <=2/38 mcg/mL Final    Method: SENSITIVITY, SOREN                       GRAM STAIN [[id number]] Collected:  09/25/18 1245    Order Status:  Completed Specimen:  Tissue Updated:  09/25/18 3349     Significant Indicator .     Source TISS     Site Left Pleural Peel     Gram Stain Result No organisms seen.          Assessment:  Donna Ceballos is a 58 y.o. male with a history of COPD and tobacco use, recent hospitalization for hydropneumothorax and left empyema status post left thoracotomy, decortication, debridement, and rib resection in July 2018 (cultures grew group F streptococcus, treated with IV Unasyn for 2 weeks followed by p.o. Augmentin for 2 weeks).  Patient is now admitted 9/18/2018 with persistent left hydropneumothorax, empyema, and concern for an alveolar pleural fistula. He underwent repeat thoracotomy, decortication, and intercostal muscle flap  transposition to the parenchymal airway fistula on 9/25.     Pleural fluid cultures from 9/19 grew Enterobacter cloacae and viridans Strep.  OR cultures again grew Enterobacter, but this time resistant to cephalosporins and Zosyn.  Antibiotics were switched to meropenem.     Pertinent Diagnoses:  1. Sepsis, improved  2. L empyema, status post surgery as above  3. L hydropneumothorax, stable  4. L alveolar pleural fistula, status post surgery as above  5. COPD  6. Tobacco dependence    Active Hospital Problems    Diagnosis   • Empyema lung (HCC) [J86.9]   • Acute respiratory failure with hypoxia (HCC) [J96.01]   • Panlobular emphysema (HCC) [J43.1]   • Protein malnutrition (HCC) [E46]   • Tobacco abuse [Z72.0]   • Gastroesophageal reflux disease without esophagitis [K21.9]   • Acute bilateral low back pain [M54.5]   • HCAP (healthcare-associated pneumonia) [J18.9]       Plan:  1. Sepsis, improved  White count downtrending  Continue antibiotics as below    2. L empyema, status post surgery as above  Culture growing cephalosporin resistant Enterobacter and strep viridans  Continue IV meropenem 500 mg every 6 hours  Anticipate at least 4 weeks of antibiotics -  Sed rate is 102 and CRP is 1.79    3. L hydropneumothorax, stable  Chest tubes in place, managed by CT surgery    4. COPD, tobacco dependence  Patient reports that he will now quit  Consult regarding the importance of quitting smoking, especially in the setting    5.  Disposition  Patient reports that he has no insurance  Will likely need to stay in the hospital for the duration of IV antibiotics  Will need to discuss with  on the weekday    Discussed with internal medicine,

## 2018-10-02 NOTE — PROGRESS NOTES
[facility] Hospitalist Progress Note    Date of Service: 10/2/2018        Chief Complaint  58 y.o. male admitted 9/18/2018 with COPD and tobacco, recent hospitalization for hydropneumothorax and empyema s/p left thoracotomy decortication, lung debridement and rib resection. Resp cx grew strep group B and was discharged on course of augmentin. He presented from Dr. Gaviria's office with cough and SOB. CT showed persistent left hydropneumothorax. IR consulted for pigtail drain placement to pleurvac suction. He had bronchoscopy that showed white purulent secretions that was irrigated and suctioned. 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     Interval Problem Update  Patient seen and evaluated this a.m.  Denies any acute complaint. Hs a draining chest tube. He complained about abdominal imaging but had been complaining of abdominal pain since admission which was why KUB was ordered by the previous day hospitalist.  Chest tube to suction at -40.   HE was pleasant with me this morning. Says he is uset because he was not told why it was ordered. I reassured him that we strive to explain every procedure but sometimes due to time limitations we may not be able to reach him on time before ordering a test and that he should ask questions of his nurses and physicians especially for clarifications of care plan.     KUB showed constipation. He remains on Meropenem on ID recommendation.      Consultants/Specialty  Surgery   IR  Pulm  ID    Disposition  Chest tube management, likely needs PICC for meropenem  Lives in [place]        Review of Systems   HENT: Negative for congestion.    Respiratory: Positive for cough and hemoptysis. Negative for shortness of breath.    Cardiovascular: Positive for chest pain.   Gastrointestinal: Negative for abdominal pain, constipation and nausea.   Musculoskeletal: Positive for back  pain. Negative for myalgias.   Skin: Negative for itching.   Neurological: Positive for weakness. Negative for headaches.      Physical Exam  Laboratory/Imaging   Hemodynamics  Temp (24hrs), Av.7 °C (98 °F), Min:36.5 °C (97.7 °F), Max:36.8 °C (98.2 °F)   Temperature: 36.8 °C (98.2 °F)  Pulse  Av.3  Min: 43  Max: 112    Blood Pressure: 143/88      Respiratory      Respiration: 18, Pulse Oximetry: 96 %     Work Of Breathing / Effort: Mild  RUL Breath Sounds: Diminished, RML Breath Sounds: Diminished, RLL Breath Sounds: Diminished, JOSSE Breath Sounds: Expiratory Wheezes;Diminished, LLL Breath Sounds: Diminished    Fluids    Intake/Output Summary (Last 24 hours) at 10/02/18 1557  Last data filed at 10/02/18 1230   Gross per 24 hour   Intake             1660 ml   Output             2960 ml   Net            -1300 ml       Nutrition  Orders Placed This Encounter   Procedures   • Diet Order Regular     Standing Status:   Standing     Number of Occurrences:   1     Order Specific Question:   Diet:     Answer:   Regular [1]     Physical Exam   Constitutional: He is oriented to person, place, and time. He appears well-developed and well-nourished. No distress.   HENT:   Head: Normocephalic and atraumatic.   Eyes: Pupils are equal, round, and reactive to light. Conjunctivae and EOM are normal.   Neck: Normal range of motion. Neck supple. No JVD present. No tracheal deviation present.   Cardiovascular: Normal rate, regular rhythm and normal heart sounds.  Exam reveals no friction rub.    No murmur heard.  Pulmonary/Chest: Effort normal. No stridor. No respiratory distress. He has no wheezes. He has rales. He exhibits no tenderness.   Patient has 2 chest tubes in place on the left lung. Diminished breath sounds on ipsilateral side. No evidence of acute respiratory distress.    Abdominal: Soft. Bowel sounds are normal. He exhibits no distension and no mass. There is no tenderness. There is no rebound and no guarding.    Musculoskeletal: Normal range of motion. He exhibits no edema.   Neurological: He is alert and oriented to person, place, and time. He has normal reflexes.   Skin: Skin is warm and dry. No rash noted. He is not diaphoretic. No erythema.   Psychiatric: He has a normal mood and affect. His behavior is normal. Thought content normal.           Recent Labs      09/30/18   0425  10/01/18   0413  10/02/18   0316   WBC  8.6  7.7  7.9   RBC  3.52*  3.62*  3.60*   HEMOGLOBIN  9.1*  9.4*  9.6*   HEMATOCRIT  29.9*  30.6*  30.6*   MCV  84.9  84.5  85.0   MCH  25.9*  26.0*  26.7*   MCHC  30.4*  30.7*  31.4*   RDW  53.1*  53.7*  52.9*   PLATELETCT  615*  674*  686*   MPV  9.2  9.2  9.5     Recent Labs      09/30/18   0425  10/01/18   0413  10/02/18   0316   SODIUM  137  137  137   POTASSIUM  4.0  4.2  4.1   CHLORIDE  100  99  101   CO2  32  31  31   GLUCOSE  100*  90  90   BUN  9  9  9   CREATININE  0.54  0.66  0.66   CALCIUM  8.7  8.9  8.8                      Assessment/Plan     Empyema lung (HCC)- (present on admission)   Assessment & Plan    9/19 Zosyn initiated  9/24 Zosyn stopped, cefepime initiated for Enterobacter cloacae and Streptococcus viridans  9/28 Enterobacter cloacae now resistant to cefepime therapy changed to meropenem.  ID consulted, will continue on meropenem for 2 weeks  Abx recommendations per ID; see note.   CT management per surgical team   Consider pulmonary consultation.  Counseled on tobacco use and need to quit. He says he is quitting upon discharge.         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 Continue Senokot, Miralax etc.         Acute bilateral low back pain- (present on admission)   Assessment & Plan    Likely  musculoskeletal  Add Flexeril as needed  No anesthesia or focal weakness  Improved          HCAP (healthcare-associated pneumonia)- (present on admission)   Assessment & Plan    Continue abx per ID  Continue supplemental oxygen and RT protocol        Gastroesophageal reflux disease without esophagitis- (present on admission)   Assessment & Plan    pepcid prn        Protein malnutrition (HCC)- (present on admission)   Assessment & Plan    Optimize nutrition         Panlobular emphysema (HCC)- (present on admission)   Assessment & Plan    Continue supplemental oxygen and RT protocol  DuoNeb as needed        Tobacco abuse- (present on admission)   Assessment & Plan    Counseled and educated. Said he plans to quit upon discharge. He will continue with nicotine patch for now.             Quality-Core Measures   Reviewed items::  EKG reviewed, Labs reviewed, Medications reviewed and Radiology images reviewed  Motley catheter::  No Motley  DVT prophylaxis - mechanical:  SCDs

## 2018-10-02 NOTE — THERAPY
Physical Therapy Contact Note    Pt session attempted; pt adamantly refusing, yelling that he feels comfortable in physical d/c whenever medically appropriate; pt not agreeable to participate in acute PT at this time; will be discharged from service; please reconsult should condition change or deficits noted with RN mobility;    Janette Lantigua, PT, DPT Pager: [phone]

## 2018-10-03 ENCOUNTER — APPOINTMENT (OUTPATIENT)
Dept: RADIOLOGY | Facility: MEDICAL CENTER | Age: 58
DRG: 853 | End: 2018-10-03
Attending: NURSE PRACTITIONER

## 2018-10-03 PROCEDURE — 700105 HCHG RX REV CODE 258

## 2018-10-03 PROCEDURE — 700102 HCHG RX REV CODE 250 W/ 637 OVERRIDE(OP): Performed by: NURSE PRACTITIONER

## 2018-10-03 PROCEDURE — 99232 SBSQ HOSP IP/OBS MODERATE 35: CPT | Performed by: HOSPITALIST

## 2018-10-03 PROCEDURE — A9270 NON-COVERED ITEM OR SERVICE: HCPCS | Performed by: ANESTHESIOLOGY

## 2018-10-03 PROCEDURE — 700102 HCHG RX REV CODE 250 W/ 637 OVERRIDE(OP): Performed by: INTERNAL MEDICINE

## 2018-10-03 PROCEDURE — 700111 HCHG RX REV CODE 636 W/ 250 OVERRIDE (IP): Performed by: SURGERY

## 2018-10-03 PROCEDURE — A9270 NON-COVERED ITEM OR SERVICE: HCPCS | Performed by: INTERNAL MEDICINE

## 2018-10-03 PROCEDURE — 71045 X-RAY EXAM CHEST 1 VIEW: CPT

## 2018-10-03 PROCEDURE — 99232 SBSQ HOSP IP/OBS MODERATE 35: CPT | Performed by: INTERNAL MEDICINE

## 2018-10-03 PROCEDURE — A9270 NON-COVERED ITEM OR SERVICE: HCPCS | Performed by: NURSE PRACTITIONER

## 2018-10-03 PROCEDURE — 700102 HCHG RX REV CODE 250 W/ 637 OVERRIDE(OP): Performed by: ANESTHESIOLOGY

## 2018-10-03 PROCEDURE — 700105 HCHG RX REV CODE 258: Performed by: SURGERY

## 2018-10-03 PROCEDURE — 770001 HCHG ROOM/CARE - MED/SURG/GYN PRIV*

## 2018-10-03 RX ORDER — SODIUM CHLORIDE 9 MG/ML
INJECTION, SOLUTION INTRAVENOUS
Status: COMPLETED
Start: 2018-10-03 | End: 2018-10-03

## 2018-10-03 RX ADMIN — SODIUM CHLORIDE 500 ML: 9 INJECTION, SOLUTION INTRAVENOUS at 00:16

## 2018-10-03 RX ADMIN — OXYCODONE HYDROCHLORIDE 10 MG: 10 TABLET ORAL at 00:16

## 2018-10-03 RX ADMIN — MEROPENEM 500 MG: 500 INJECTION, POWDER, FOR SOLUTION INTRAVENOUS at 11:44

## 2018-10-03 RX ADMIN — OXYCODONE HYDROCHLORIDE 10 MG: 10 TABLET ORAL at 05:48

## 2018-10-03 RX ADMIN — OXYCODONE HYDROCHLORIDE 10 MG: 10 TABLET ORAL at 16:03

## 2018-10-03 RX ADMIN — NICOTINE 14 MG: 14 PATCH, EXTENDED RELEASE TRANSDERMAL at 05:48

## 2018-10-03 RX ADMIN — CELECOXIB 200 MG: 200 CAPSULE ORAL at 05:48

## 2018-10-03 RX ADMIN — MEROPENEM 500 MG: 500 INJECTION, POWDER, FOR SOLUTION INTRAVENOUS at 00:15

## 2018-10-03 RX ADMIN — STANDARDIZED SENNA CONCENTRATE AND DOCUSATE SODIUM 2 TABLET: 8.6; 5 TABLET ORAL at 17:55

## 2018-10-03 RX ADMIN — STANDARDIZED SENNA CONCENTRATE AND DOCUSATE SODIUM 2 TABLET: 8.6; 5 TABLET ORAL at 05:48

## 2018-10-03 RX ADMIN — MEROPENEM 500 MG: 500 INJECTION, POWDER, FOR SOLUTION INTRAVENOUS at 05:48

## 2018-10-03 RX ADMIN — FAMOTIDINE 20 MG: 20 TABLET ORAL at 20:46

## 2018-10-03 RX ADMIN — CELECOXIB 200 MG: 200 CAPSULE ORAL at 17:55

## 2018-10-03 RX ADMIN — OXYCODONE HYDROCHLORIDE 10 MG: 10 TABLET ORAL at 11:29

## 2018-10-03 RX ADMIN — OXYCODONE HYDROCHLORIDE 10 MG: 10 TABLET ORAL at 20:46

## 2018-10-03 RX ADMIN — MEROPENEM 500 MG: 500 INJECTION, POWDER, FOR SOLUTION INTRAVENOUS at 17:55

## 2018-10-03 ASSESSMENT — ENCOUNTER SYMPTOMS
CONSTIPATION: 0
WEAKNESS: 1
NAUSEA: 0
COUGH: 1
WEAKNESS: 0
MYALGIAS: 0
SHORTNESS OF BREATH: 0
HEADACHES: 0
COUGH: 0
FEVER: 0
HEMOPTYSIS: 1
BACK PAIN: 1
ABDOMINAL PAIN: 0
CHILLS: 0

## 2018-10-03 ASSESSMENT — PAIN SCALES - GENERAL
PAINLEVEL_OUTOF10: 5
PAINLEVEL_OUTOF10: 5
PAINLEVEL_OUTOF10: 8
PAINLEVEL_OUTOF10: 5

## 2018-10-03 NOTE — PROGRESS NOTES
Assumed care of pt at 1900, report given.  A+O x 4, VSS, and on 1L O2 NC.   Pt has 2 left sided chest tubes. Both chest tubes now to 40mm continuous wall suction.  Chest tube #2 has continuous air leak present; MD aware.   Left chest surgical incisions covered with Prevena wound vac; no drainage. CDI  Chest tube dressing changed; mild serosanguinous drainage around both chest tube insertion sites noted. Pt tolerated well.   Lung sounds diminished and wheezy over left side.   Pt stating moderate pain this evening, medicated per MAR; tolerating well.  Pt tolerating a regular diet; denies N/V at this time.   +void  -BM (last 9/30/18)  Pt ambulates with a standby assist and FWW; tolerates well.    Pt updated on POC and all questions answered at this time.  Bed in lowest position, call light within reach, and no current needs.     Pt still having irritable episodes throughout day. Refused IS this evening. No other needs at this time.

## 2018-10-03 NOTE — PROGRESS NOTES
Infectious Disease Progress Note    Author: Laina Lopez M.D. Date & Time of service: 10/3/2018  3:30 PM    Chief Complaint:  Follow-up of recurrent empyema    Interval History:   afebrile, white count continues to down trend, 8.6 today.  Patient says that he is constipated with abdominal pain.  No issues with breathing.  Patient reports that primary team is aware and seeks no further help from me  10/1/2018 no fevers.  Patient denies any new issues.  WBC is down to 7.7  10/2/2018 no fevers.  Denies any new issues.  10/3/2018-overall feels better.  No new issues overnight.  No fevers.  Still has 2 chest tubes  Labs Reviewed, Medications Reviewed, Radiology Reviewed and Wound Reviewed.    Review of Systems:  Review of Systems   Constitutional: Negative for chills and fever.   Respiratory: Negative for cough and shortness of breath.    Cardiovascular: Positive for chest pain ( At chest tube site, mild).   Gastrointestinal: Negative for abdominal pain and constipation.   Genitourinary: Negative for dysuria.   Skin: Negative for itching.   Neurological: Negative for weakness.   All other systems reviewed and are negative.      Hemodynamics:  Temp (24hrs), Av.8 °C (98.3 °F), Min:36.7 °C (98 °F), Max:37 °C (98.6 °F)  Temperature: 36.7 °C (98 °F)  Pulse  Av.9  Min: 43  Max: 112   Blood Pressure: 124/77       Physical Exam:  Physical Exam   Constitutional: He is oriented to person, place, and time. No distress.   HENT:   Head: Normocephalic and atraumatic.   Mouth/Throat: No oropharyngeal exudate.   Eyes: Pupils are equal, round, and reactive to light. Conjunctivae are normal. No scleral icterus.   Cardiovascular: Normal rate, regular rhythm and normal heart sounds.  Exam reveals no gallop and no friction rub.    No murmur heard.  Pulmonary/Chest: Effort normal. He has rales.   Chest tubes x2   Abdominal: Soft. There is no tenderness. There is no rebound.   Bowel sounds hyperactive   Musculoskeletal: Normal  range of motion. He exhibits no edema.   Lymphadenopathy:     He has no cervical adenopathy.   Neurological: He is alert and oriented to person, place, and time.   No gross cranial nerve or focal neuro deficit   Skin: Skin is warm and dry.   Psychiatric: His behavior is normal. Thought content normal.   Vitals reviewed.      Meds:    Current Facility-Administered Medications:   •  meropenem (MERREM) IV  •  oxyCODONE immediate-release **OR** oxyCODONE immediate-release  •  HYDROmorphone  •  Pharmacy Consult Request  •  scopolamine  •  celecoxib  •  famotidine  •  cyclobenzaprine  •  senna-docusate **AND** polyethylene glycol/lytes **AND** magnesium hydroxide **AND** bisacodyl  •  Respiratory Care per Protocol  •  acetaminophen  •  ondansetron  •  ondansetron  •  nicotine **AND** Nicotine Replacement Patient Education Materials **AND** nicotine polacrilex    Labs:  Recent Labs      10/01/18   0413  10/02/18   0316   WBC  7.7  7.9   RBC  3.62*  3.60*   HEMOGLOBIN  9.4*  9.6*   HEMATOCRIT  30.6*  30.6*   MCV  84.5  85.0   MCH  26.0*  26.7*   RDW  53.7*  52.9*   PLATELETCT  674*  686*   MPV  9.2  9.5     Recent Labs      10/01/18   0413  10/02/18   0316   SODIUM  137  137   POTASSIUM  4.2  4.1   CHLORIDE  99  101   CO2  31  31   GLUCOSE  90  90   BUN  9  9     Recent Labs      10/01/18   0413  10/02/18   0316   CREATININE  0.66  0.66       Imaging:  Ct-chest (thorax) With    Result Date: 9/23/2018 9/23/2018 10:11 PM HISTORY/REASON FOR EXAM:  LEFT hydropneumothorax with chest tube in place TECHNIQUE/EXAM DESCRIPTION: CT thorax with contrast. Thin-section helical images were obtained from the lung apices through the adrenal glands following the bolus administration of 80 mL of nonionic (Omnipaque 350) contrast. Low dose optimization technique was utilized for this CT exam including automated exposure control and adjustment of the mA and/or kV according to patient size. COMPARISON:  9/18/2018 FINDINGS: THORACIC INLET: The  thyroid gland appears unremarkable. No pathologic lymphadenopathy. AXILLA: No pathologic lymphadenopathy. MEDIASTINUM: Enlarged lymph nodes are redemonstrated. A RIGHT paratracheal lymph node measures 10 mm in short axis on image 36 of series 2. This is unchanged. An enlarged subcarinal lymph node measures 11 mm in short axis on image 58 of series 2. This is  unchanged. Enlarged RIGHT hilar lymph node measures 11 mm in short axis on axial image 54 of series 2. Previously this measured 13 mm. HEART: Normal size. No pericardial effusion. VASCULAR STRUCTURES: Thoracic aorta appears unremarkable. Origins of the great vessels appear normal. PLEURA: There is a pigtail catheter in the loculated LEFT pleural fluid and gas collection. Size of the collection overall is similar to the prior study. This may communicate with the central bronchial tree as on axial image 66 of series 2. No RIGHT pleural disease is present. LUNGS: RIGHT upper lobe cavity and bronchiectasis are redemonstrated. Multifocal LEFT upper and lower lobe lobe airspace opacities are smaller than on the prior study. Some of these in the upper lobe are cavitary. There is bronchial thickening and fluid in the LEFT lower lobe bronchi. BONES: No suspicious lytic or sclerotic bony lesions identified. UPPER ABDOMEN: Unremarkable     1.  Interval placement of a drain in the LEFT hydropneumothorax with a decrease in the fluid component and increase in the gaseous component. Suspect underlying bronchopleural fistula. 2.  LEFT central upper lobe pulmonary opacity is no longer masslike in appearance 3.  Interval decrease in multifocal partially cavitary LEFT lung disease, likely improving infection. Aspiration related lung disease is a consideration. 4.  Unchanged RIGHT upper lobe cavitation 5.  Mediastinal and RIGHT hilar adenopathy     Ct-chest (thorax) With    Result Date: 9/18/2018 9/18/2018 5:05 PM HISTORY/REASON FOR EXAM:  Left-sided chest pain and coughing up  green sputum. Prior left hemithorax surgery. TECHNIQUE/EXAM DESCRIPTION: CT scan of the chest with contrast. Thin-section helical images were obtained from the lung apices through the adrenal glands following the bolus administration of 80 mL of Optiray 350 nonionic contrast. Low dose optimization technique was utilized for this CT exam including automated exposure control and adjustment of the mA and/or kV according to patient size. COMPARISON:  07/17/2018 FINDINGS: Air-filled cavities in the superior and superior anterior right hemithorax are again noted. Left-sided loculated pleural fluid collection contains air-fluid level located laterally throughout much of the left hemithorax that extends towards the left hilar area. Some bronchi do extend to the edge of the area. Focal pulmonary opacifications with poorly defined borders are again noted in each lung. Most of these appear less prominent than on the prior CT but some new smaller lesions do appear to be present mainly in the left lung. Opacifications in the right lung have decreased compared to the prior exam. Fibrotic opacifications and bronchiectasis are again noted bilaterally. Linear and fibrotic opacifications around the bronchi in the left lower lobe have increased compared to the prior exam and scattered opacifications with poorly defined borders have also increased slightly in the left lower lobe. Left-sided chest tube is no longer present. Mild to moderate mediastinal and hilar adenopathy is again noted. There is no evidence of pericardial effusion. No large masses are seen within the upper abdomen. Calcific atherosclerosis is again noted.     1.  Loculated fluid collection that does contain a significant amount of air with an air-fluid level is now identified in the left lateral hemithorax. This extends towards the left hilar area and there is some pulmonary opacification and volume loss including air bronchograms immediately adjacent to this  collection. Differential diagnosis does include bronchopleural fistula. Empyema could be present. Postoperative fluid collection is also possible. 2.  Consolidation versus mass in the left upper lobe is decreased compared to prior exam. 3.  Air-filled cavities again noted superiorly in right hemithorax. 4.  Size of pulmonary opacifications with poorly defined borders and some cavities in each lung have decreased compared to the prior exam. Multifocal opacifications in the left lung which are small in size have likely increased in number compared to the prior exam. Progression of inflammatory or infectious process in these areas is a possibility. 5.  Opacifications throughout the right lung have decreased compared to the prior exam. 6.  Mediastinal and hilar adenopathy is again noted.     Ct-chest Tube-empyema Left    Result Date: 9/19/2018 9/19/2018 1:30 PM HISTORY/REASON FOR EXAM:  Left-sided empyema and history of blunt bronchopleural fistula. TECHNIQUE/EXAM DESCRIPTION AND NUMBER OF VIEWS:  CT guided empyema drainage in the left hemithorax. The biopsy/drainage was done utilizing low dose optimization CT techniques including auto modulation for  imaging and low mA CT/fluoroscopy mode for the procedure. PROCEDURE:  Informed consent was obtained. Localizing CT images were obtained with the patient in supine oblique position. The skin was prepped with Betadine and draped in a sterile fashion. SEDATION: Conscious sedation with 50 mcg of Fentanyl and 1 mg Versed was administered during the procedure with appropriate continuous patient monitoring by the radiology nurse. Sedation duration: 30 minutes Following local anesthesia with 1% Lidocaine, a 17 G guiding needle was placed and needle path confirmed with CT. An Amplatz guidewire was placed and following serial tract dilatation, a 12 Scottish pigtail locking catheter was placed in the loculated collection. A specimen was collected and submitted for culture and  sensitivity and Gram stain. Total of 360 mLs of purulent fluid was drained. Following catheter drainage the patient began coughing up purulent material. This continued for approximately 10 minutes during which time the patient's vital signs and O2 sat remained stable. The catheter was secured to the skin and connected to suction bulb drainage. Final CT images were obtained documenting catheter position. The patient tolerated the procedure well with no evidence of complication. COMPARISON: Recent CT scan of the thorax FINDINGS: CT images demonstrate excellent positioning of the locking loop catheter in the loculated pleural collection. No evidence of pneumothorax is noted.     1. Purulent Empyema Drainage With Ct Guidance. 2. Patient coughed up a moderate amount of purulent material following the procedure suspicious for a bronchopleural fistula.     Dx-chest-portable (1 View)    Result Date: 9/30/2018 9/30/2018 5:02 AM HISTORY/REASON FOR EXAM:  Chest tube evaluation TECHNIQUE/EXAM DESCRIPTION AND NUMBER OF VIEWS: Single portable view of the chest. COMPARISON: 9/29/2018 FINDINGS: 2 left chest tubes are redemonstrated. Slightly smaller left pneumothorax. Improved aeration of the left upper lobe. Redemonstration of extensive airspace opacity in the left lung, more in the lung bases. Right upper lobe linear atelectasis or scarring. No significant pleural effusion. No pneumothorax. Stable cardiopericardial silhouette.     1. Slightly decreased left apical pneumothorax and improved aeration of the left upper lobe.    Dx-chest-portable (1 View)    Result Date: 9/29/2018 9/29/2018 5:07 AM HISTORY/REASON FOR EXAM:  Chest tube. TECHNIQUE/EXAM DESCRIPTION AND NUMBER OF VIEWS: Single portable view of the chest. COMPARISON: 9/28/2018 FINDINGS: There are 2 left-sided chest tube. Stable left pneumothorax is seen. There is consolidation of the left lobe. Linear atelectasis is noted in the right upper lobe. There is no right  pleural effusion. There is no right pneumothorax. The cardiac silhouette is obliterated by consolidation.     1.  Stable left-sided chest tubes. 2.  Diffuse consolidation of left middle lower lung zones. 3.  Stable left pneumothorax. 4.  Stable right linear atelectasis.    Dx-chest-portable (1 View)    Result Date: 9/28/2018 9/28/2018 5:04 AM HISTORY/REASON FOR EXAM: Thoracostomy tube TECHNIQUE/EXAM DESCRIPTION AND NUMBER OF VIEWS: Single AP view of the chest. COMPARISON: Yesterday FINDINGS: Hardware: No change. Left thoracostomy tubes. Lungs: Stable left lung consolidation and partial collapse with apical pneumothorax moderate size. Stable right apical bullous change with scarring. Pleura:  No right pleural space process is seen. Heart and mediastinum: The cardiomediastinal contours are stable.     Stable left apical pneumothorax with lung collapse and consolidation    Dx-chest-portable (1 View)    Result Date: 9/27/2018 9/27/2018 10:24 AM HISTORY/REASON FOR EXAM:  Follow-up left chest tubes and pneumothorax TECHNIQUE/EXAM DESCRIPTION AND NUMBER OF VIEWS: Single portable view of the chest. COMPARISON: 09/25/2018 FINDINGS: 2 separate left-sided chest tubes are again identified with no change in positioning. Left pneumothorax is again identified which appears stable since prior radiograph. Opacification of volume loss in the remaining left hemithorax is again noted. Linear opacification is in the right upper lung are again noted. No new infiltrates or consolidations or effusions are noted.     1.  Left-sided pneumothorax again identified. 2.  2. Left chest tubes again noted. 3.  No new infiltrates or consolidations have developed since the prior radiograph.    Dx-chest-portable (1 View)    Result Date: 9/25/2018 9/25/2018 2:24 PM HISTORY/REASON FOR EXAM:  Recent left thoracotomy, decortication. TECHNIQUE/EXAM DESCRIPTION AND NUMBER OF VIEWS: Single portable view of the chest. COMPARISON: 9/19/2018 FINDINGS: LUNGS:  Chronic atelectasis/scarring in the right upper lobe. Hyperinflation, consistent with COPD. Postsurgical changes in the left lung with volume loss in the left lung base. PNEUMOTHORAX: Small to moderate left pneumothorax. Mild subcutaneous emphysema of the left chest wall. LINES AND TUBES: 2 left chest tubes are in place, one in the upper and one in the lower left pleural space. MEDIASTINUM: Partially obscured cardiac silhouette. MUSCULOSKELETAL STRUCTURES: Recent left thoracotomy.     1. Postoperative chest with small to moderate left pneumothorax and volume loss in the left lung. Left chest tubes are positioned as described. 2. Stable hyperinflation of the right lung, consistent with COPD. Stable scarring in the right upper lobe.    Dx-chest-portable (1 View)    Result Date: 9/19/2018 9/19/2018 4:45 PM HISTORY/REASON FOR EXAM:  LEFT pneumothorax with chest tube in place TECHNIQUE/EXAM DESCRIPTION AND NUMBER OF VIEWS: Single portable view of the chest. COMPARISON: The study from earlier the same date at 2:55 PM FINDINGS: LEFT pigtail chest tube is in unchanged position. HEART: Stable size. LUNGS: Mixed lucency and opacity in the RIGHT upper lobe is redemonstrated. The lungs are overall hyperlucent. PLEURA: Partially loculated LEFT pneumothorax is likely small changed.     1.  Slight interval decrease in size of the loculated LEFT pneumothorax with chest tube in place 2.  No other interval change    Dx-chest-portable (1 View)    Result Date: 9/19/2018 9/19/2018 2:36 PM HISTORY/REASON FOR EXAM: Respiratory distress TECHNIQUE/EXAM DESCRIPTION AND NUMBER OF VIEWS: Single portable view of the chest. COMPARISON: Previous date FINDINGS: There is been interval placement of a small bore percutaneous drain into the left thorax. The previously seen loculated left pleural effusion has resolved with a moderate-sized left loculated pneumothorax remaining in its place. There is emphysematous and bullous change involving the  right lung apex with scarring. There is no evidence of focal consolidation or evidence of pulmonary edema. There is no pleural effusion. The heart is normal in size.     Interval placement of a percutaneous catheter into the left thorax. Previously seen left pleural effusion is now absent with a moderate size left pneumothorax remaining in its place. This was discussed with Dr. Glez at 3:46 PM on 9/19/2018.    Dx-chest-portable (1 View)    Result Date: 9/18/2018 9/18/2018 4:10 PM HISTORY/REASON FOR EXAM:  Sepsis. Left thoracotomy. Left-sided chest pain TECHNIQUE/EXAM DESCRIPTION AND NUMBER OF VIEWS: Single portable view of the chest. COMPARISON: 7/25/2018 FINDINGS: Single portable view of the chest demonstrates a normal cardiac silhouette and mediastinal contours. Left pneumothorax has filled with fluid. Left chest tube has been removed. There are postoperative changes from prior left eighth rib resection. There is scarring in the right upper lobe with cystic change in the right lung apex and associated pleural thickening.     1.  Loculated left pneumothorax is now filled with fluid following chest tube removal. 2.  Scarring and bullous change in the right upper lobe.      Micro:  Results     Procedure Component Value Units Date/Time    ANAEROBIC CULTURE [[id number]] Collected:  09/25/18 1245    Order Status:  Completed Specimen:  Tissue Updated:  09/28/18 1745     Significant Indicator NEG     Source TISS     Site Left Pleural Peel     Anaerobic Culture, Culture Res No Anaerobes isolated.    CULTURE TISSUE W/ GRM STAIN [[id number]]  (Abnormal)  (Susceptibility) Collected:  09/25/18 1245    Order Status:  Completed Specimen:  Tissue Updated:  09/28/18 1745     Significant Indicator POS (POS)     Source TISS     Site Left Pleural Peel     Tissue Culture -- (A)     Gram Stain Result No organisms seen.     Tissue Culture Enterobacter cloacae  Light growth   (A)      Viridans Streptococcus  Light growth   (A)    Culture  & Susceptibility     ENTEROBACTER CLOACAE     Antibiotic Sensitivity Microscan Unit Status    Ampicillin Resistant >16 mcg/mL Final    Method: SENSITIVITY, SOREN    Cefepime Resistant >16 mcg/mL Final    Method: SENSITIVITY, SOREN    Cefotaxime Resistant >32 mcg/mL Final    Method: SENSITIVITY, SOREN    Cefotetan Resistant >32 mcg/mL Final    Method: SENSITIVITY, SOREN    Ceftazidime Resistant >16 mcg/mL Final    Method: SENSITIVITY, SOREN    Ceftriaxone Resistant >32 mcg/mL Final    Method: SENSITIVITY, SOREN    Cefuroxime Resistant >16 mcg/mL Final    Method: SENSITIVITY, SOREN    Ciprofloxacin Sensitive <=1 mcg/mL Final    Method: SENSITIVITY, SOREN    Ertapenem Sensitive <=1 mcg/mL Final    Method: SENSITIVITY, SOREN    Gentamicin Sensitive <=4 mcg/mL Final    Method: SENSITIVITY, SOREN    Pip/Tazobactam Resistant >64 mcg/mL Final    Method: SENSITIVITY, SOREN    Tigecycline Sensitive <=2 mcg/mL Final    Method: SENSITIVITY, SOREN    Tobramycin Sensitive <=4 mcg/mL Final    Method: SENSITIVITY, SOREN    Trimeth/Sulfa Sensitive <=2/38 mcg/mL Final    Method: SENSITIVITY, SOREN                             Assessment:  Donna Frederic Ceballos is a 58 y.o. male with a history of COPD and tobacco use, recent hospitalization for hydropneumothorax and left empyema status post left thoracotomy, decortication, debridement, and rib resection in July 2018 (cultures grew group F streptococcus, treated with IV Unasyn for 2 weeks followed by p.o. Augmentin for 2 weeks).  Patient is now admitted 9/18/2018 with persistent left hydropneumothorax, empyema, and concern for an alveolar pleural fistula. He underwent repeat thoracotomy, decortication, and intercostal muscle flap transposition to the parenchymal airway fistula on 9/25.     Pleural fluid cultures from 9/19 grew Enterobacter cloacae and viridans Strep.  OR cultures again grew Enterobacter, but this time resistant to cephalosporins and Zosyn.  Antibiotics were switched to meropenem.     Pertinent  Diagnoses:  1. Sepsis, improved  2. L empyema, status post surgery as above  3. L hydropneumothorax, stable  4. L alveolar pleural fistula, status post surgery as above  5. COPD  6. Tobacco dependence    Active Hospital Problems    Diagnosis   • Empyema lung (HCC) [J86.9]   • Acute respiratory failure with hypoxia (HCC) [J96.01]   • Panlobular emphysema (HCC) [J43.1]   • Protein malnutrition (HCC) [E46]   • Tobacco abuse [Z72.0]   • Gastroesophageal reflux disease without esophagitis [K21.9]   • Acute bilateral low back pain [M54.5]   • HCAP (healthcare-associated pneumonia) [J18.9]       Plan:  1. Sepsis, improved  White count downtrending  Continue antibiotics as below    2. L empyema, status post surgery as above  Culture growing cephalosporin resistant Enterobacter and strep viridans  Continue IV meropenem 500 mg every 6 hours  PICC line  Anticipate at least 4 weeks of antibiotics -  Sed rate is 102 and CRP is 1.79    3. L hydropneumothorax, stable  Chest tubes in place, managed by CT surgery    4. COPD, tobacco dependence  Patient reports that he will now quit  Consult regarding the importance of quitting smoking, especially in the setting    5.  Disposition  Patient reports that he has no insurance  Will likely need to stay in the hospital for the duration of IV antibiotics  Will need to discuss with  on the weekday    Discussed with internal medicine,

## 2018-10-03 NOTE — PROGRESS NOTES
Progress Note:  10/3/2018, 6:54 AM    S: No acute changes or events.  No SOB, no fever. No sputum or serosanguinous fluid production    O:  Blood pressure 122/71, pulse 65, temperature 36.8 °C (98.2 °F), resp. rate 18, height 1.829 m (6'), weight 62.2 kg (137 lb 2 oz), SpO2 97 %.    NAD, awake, alert, calm  Breathing nonlabored  L straight chest tube with near continuous air leak, angle tube no leak on -40 suction  Thoracotomy with Prevena in place, functioning    No significant change in CXR on 24h of suction    A:   Active Hospital Problems    Diagnosis   • Empyema lung (HCC) [J86.9]     Priority: High     9/19 Zosyn initiated  9/24 Zosyn altered cefepime initiated for Enterobacter cloacae and Streptococcus viridans  9/27 Cefepime day 4  9/28 Enterobacter cloacae now resistant to cefepime therapy altered to meropenem.  9/28 Meropenem day 1     • Acute respiratory failure with hypoxia (HCC) [J96.01]     Priority: Medium   • Gastroesophageal reflux disease without esophagitis [K21.9]     Priority: Low   • Panlobular emphysema (HCC) [J43.1]     Priority: Low   • Protein malnutrition (HCC) [E46]     Priority: Low   • Tobacco abuse [Z72.0]     Priority: Low   • Constipation [K59.00]   • Acute bilateral low back pain [M54.5]   • HCAP (healthcare-associated pneumonia) [J18.9]         P:   -Place both chest tubes to water seal  -Ambulate QID  -OOB to chair for all meals  -Will see if pt tolerates 24-48 hrs of water seal.      Pb Gaviria M.D.  [facility]  [phone]

## 2018-10-03 NOTE — PROGRESS NOTES
Bedside report received. Assessment complete.  A&O x 4. Patient calls appropriately. Patient up with x1 assist.   Patient resting in bed comfortably.  Denies N&V. Tolerating reular diet.  Surgical sites to left chest CDI with Prevena wound vac. Chest tube x2 CDI, both to water seal this AM per Dr Gaviria.  + void, last BM 09/30/18  Patient denies SOB.  Reviewed plan of care with patient. Call light and personal belongings within reach. Hourly rounding in place. All needs met at this time.

## 2018-10-03 NOTE — PROGRESS NOTES
[facility] Hospitalist Progress Note    Date of Service: 10/3/2018        Chief Complaint  58 y.o. male admitted 9/18/2018 with COPD and tobacco, recent hospitalization for hydropneumothorax and empyema s/p left thoracotomy decortication, lung debridement and rib resection. Resp cx grew strep group B and was discharged on course of augmentin. He presented from Dr. Gaviria's office with cough and SOB. CT showed persistent left hydropneumothorax. IR consulted for pigtail drain placement to pleurvac suction. He had bronchoscopy that showed white purulent secretions that was irrigated and suctioned. 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     Interval Problem Update  10/2: Patient seen and evaluated this a.m.  Denies any acute complaint. Hs a draining chest tube. He complained about abdominal imaging but had been complaining of abdominal pain since admission which was why KUB was ordered by the previous day hospitalist.  Chest tube to suction at -40.   HE was pleasant with me this morning. Says he is uset because he was not told why it was ordered. I reassured him that we strive to explain every procedure but sometimes due to time limitations we may not be able to reach him on time before ordering a test and that he should ask questions of his nurses and physicians especially for clarifications of care plan.     KUB showed constipation. He remains on Meropenem on ID recommendation.    10/3: doing well. Denies any complaint today. No pleuritic pain or sob. Both chest tubes placed to water seal this a.m. By surgery. Plan to monitor closely for next 24 to 48 hours to see if he tolerates it.      Consultants/Specialty  Surgery   IR  Pulm  ID    Disposition  Chest tube management, likely needs PICC for meropenem  Lives in [place]        Review of Systems   HENT: Negative for congestion.    Respiratory:  Positive for cough and hemoptysis. Negative for shortness of breath.    Cardiovascular: Positive for chest pain.   Gastrointestinal: Negative for abdominal pain, constipation and nausea.   Musculoskeletal: Positive for back pain. Negative for myalgias.   Skin: Negative for itching.   Neurological: Positive for weakness. Negative for headaches.      Physical Exam  Laboratory/Imaging   Hemodynamics  Temp (24hrs), Av.8 °C (98.3 °F), Min:36.7 °C (98 °F), Max:37 °C (98.6 °F)   Temperature: 36.7 °C (98 °F)  Pulse  Av.9  Min: 43  Max: 112    Blood Pressure: 124/77      Respiratory      Respiration: 16, Pulse Oximetry: 96 %, O2 Daily Delivery Respiratory : Silicone Nasal Cannula     Work Of Breathing / Effort: Mild  RUL Breath Sounds: Diminished, RML Breath Sounds: Diminished, RLL Breath Sounds: Diminished;Expiratory Wheezes, JOSSE Breath Sounds: Diminished;Expiratory Wheezes, LLL Breath Sounds: Diminished    Fluids    Intake/Output Summary (Last 24 hours) at 10/03/18 1531  Last data filed at 10/03/18 1457   Gross per 24 hour   Intake             1040 ml   Output             3285 ml   Net            -2245 ml       Nutrition  Orders Placed This Encounter   Procedures   • Diet Order Regular     Standing Status:   Standing     Number of Occurrences:   1     Order Specific Question:   Diet:     Answer:   Regular [1]     Physical Exam   Constitutional: He is oriented to person, place, and time. He appears well-developed and well-nourished. No distress.   HENT:   Head: Normocephalic and atraumatic.   Eyes: Pupils are equal, round, and reactive to light. Conjunctivae and EOM are normal.   Neck: Normal range of motion. Neck supple. No JVD present. No tracheal deviation present.   Cardiovascular: Normal rate, regular rhythm and normal heart sounds.  Exam reveals no friction rub.    No murmur heard.  Pulmonary/Chest: Effort normal. No stridor. No respiratory distress. He has no wheezes. He has rales. He exhibits no tenderness.    Patient has 2 chest tubes in place on the left lung. Diminished breath sounds on ipsilateral side. No evidence of acute respiratory distress.    Abdominal: Soft. Bowel sounds are normal. He exhibits no distension and no mass. There is no tenderness. There is no rebound and no guarding.   Musculoskeletal: Normal range of motion. He exhibits no edema.   Neurological: He is alert and oriented to person, place, and time. He has normal reflexes.   Skin: Skin is warm and dry. No rash noted. He is not diaphoretic. No erythema.   Psychiatric: He has a normal mood and affect. His behavior is normal. Thought content normal.           Recent Labs      10/01/18   0413  10/02/18   0316   WBC  7.7  7.9   RBC  3.62*  3.60*   HEMOGLOBIN  9.4*  9.6*   HEMATOCRIT  30.6*  30.6*   MCV  84.5  85.0   MCH  26.0*  26.7*   MCHC  30.7*  31.4*   RDW  53.7*  52.9*   PLATELETCT  674*  686*   MPV  9.2  9.5     Recent Labs      10/01/18   0413  10/02/18   0316   SODIUM  137  137   POTASSIUM  4.2  4.1   CHLORIDE  99  101   CO2  31  31   GLUCOSE  90  90   BUN  9  9   CREATININE  0.66  0.66   CALCIUM  8.9  8.8                      Assessment/Plan     Empyema lung (HCC)- (present on admission)   Assessment & Plan    9/19 Zosyn initiated  9/24 Zosyn stopped, cefepime initiated for Enterobacter cloacae and Streptococcus viridans  9/28 Enterobacter cloacae now resistant to cefepime therapy changed to meropenem.  ID consulted, will continue on meropenem for 4 weeks total per ID  Abx recommendations per ID; see note.   CT management per surgical team   Consider pulmonary consultation for outpatient management considering panlobular emphysema as well.   Counseled on tobacco use and need to quit. He says he is quitting upon discharge.         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        Constipation   Assessment & Plan    Moderate stools on imaging. Continue Senokot, Miralax etc.         Acute bilateral low back pain- (present on admission)   Assessment & Plan    Likely musculoskeletal  Add Flexeril as needed  No anesthesia or focal weakness  Improved          HCAP (healthcare-associated pneumonia)- (present on admission)   Assessment & Plan    Continue abx per ID, plan for 4 weeks total.  Continue supplemental oxygen and RT protocol        Gastroesophageal reflux disease without esophagitis- (present on admission)   Assessment & Plan    pepcid prn        Protein malnutrition (HCC)- (present on admission)   Assessment & Plan    Optimize nutrition         Panlobular emphysema (HCC)- (present on admission)   Assessment & Plan    Continue supplemental oxygen and RT protocol  DuoNeb as needed  Consider outpatient pulmonary rehab and consultation at discharge.        Tobacco abuse- (present on admission)   Assessment & Plan    Counseled and educated. Said he plans to quit upon discharge. He will continue with nicotine patch for now.             Quality-Core Measures   Reviewed items::  EKG reviewed, Labs reviewed, Medications reviewed and Radiology images reviewed  Motley catheter::  No Motley  DVT prophylaxis - mechanical:  SCDs

## 2018-10-04 ENCOUNTER — APPOINTMENT (OUTPATIENT)
Dept: RADIOLOGY | Facility: MEDICAL CENTER | Age: 58
DRG: 853 | End: 2018-10-04
Attending: NURSE PRACTITIONER

## 2018-10-04 PROCEDURE — 770001 HCHG ROOM/CARE - MED/SURG/GYN PRIV*

## 2018-10-04 PROCEDURE — 71045 X-RAY EXAM CHEST 1 VIEW: CPT

## 2018-10-04 PROCEDURE — 700102 HCHG RX REV CODE 250 W/ 637 OVERRIDE(OP): Performed by: ANESTHESIOLOGY

## 2018-10-04 PROCEDURE — A9270 NON-COVERED ITEM OR SERVICE: HCPCS | Performed by: INTERNAL MEDICINE

## 2018-10-04 PROCEDURE — 700102 HCHG RX REV CODE 250 W/ 637 OVERRIDE(OP): Performed by: INTERNAL MEDICINE

## 2018-10-04 PROCEDURE — 99232 SBSQ HOSP IP/OBS MODERATE 35: CPT | Performed by: INTERNAL MEDICINE

## 2018-10-04 PROCEDURE — 700111 HCHG RX REV CODE 636 W/ 250 OVERRIDE (IP): Performed by: SURGERY

## 2018-10-04 PROCEDURE — A9270 NON-COVERED ITEM OR SERVICE: HCPCS | Performed by: NURSE PRACTITIONER

## 2018-10-04 PROCEDURE — 700102 HCHG RX REV CODE 250 W/ 637 OVERRIDE(OP): Performed by: NURSE PRACTITIONER

## 2018-10-04 PROCEDURE — 99232 SBSQ HOSP IP/OBS MODERATE 35: CPT | Performed by: HOSPITALIST

## 2018-10-04 PROCEDURE — A9270 NON-COVERED ITEM OR SERVICE: HCPCS | Performed by: ANESTHESIOLOGY

## 2018-10-04 PROCEDURE — 700105 HCHG RX REV CODE 258: Performed by: SURGERY

## 2018-10-04 RX ADMIN — OXYCODONE HYDROCHLORIDE 10 MG: 10 TABLET ORAL at 17:13

## 2018-10-04 RX ADMIN — MEROPENEM 500 MG: 500 INJECTION, POWDER, FOR SOLUTION INTRAVENOUS at 17:14

## 2018-10-04 RX ADMIN — OXYCODONE HYDROCHLORIDE 10 MG: 10 TABLET ORAL at 00:53

## 2018-10-04 RX ADMIN — CELECOXIB 200 MG: 200 CAPSULE ORAL at 06:58

## 2018-10-04 RX ADMIN — NICOTINE 14 MG: 14 PATCH, EXTENDED RELEASE TRANSDERMAL at 06:59

## 2018-10-04 RX ADMIN — FAMOTIDINE 20 MG: 20 TABLET ORAL at 23:53

## 2018-10-04 RX ADMIN — OXYCODONE HYDROCHLORIDE 10 MG: 10 TABLET ORAL at 11:19

## 2018-10-04 RX ADMIN — BISACODYL 10 MG: 10 SUPPOSITORY RECTAL at 08:24

## 2018-10-04 RX ADMIN — OXYCODONE HYDROCHLORIDE 10 MG: 10 TABLET ORAL at 20:13

## 2018-10-04 RX ADMIN — MEROPENEM 500 MG: 500 INJECTION, POWDER, FOR SOLUTION INTRAVENOUS at 12:26

## 2018-10-04 RX ADMIN — MEROPENEM 500 MG: 500 INJECTION, POWDER, FOR SOLUTION INTRAVENOUS at 00:49

## 2018-10-04 RX ADMIN — STANDARDIZED SENNA CONCENTRATE AND DOCUSATE SODIUM 2 TABLET: 8.6; 5 TABLET ORAL at 17:14

## 2018-10-04 RX ADMIN — CELECOXIB 200 MG: 200 CAPSULE ORAL at 17:13

## 2018-10-04 RX ADMIN — OXYCODONE HYDROCHLORIDE 10 MG: 10 TABLET ORAL at 06:59

## 2018-10-04 RX ADMIN — MEROPENEM 500 MG: 500 INJECTION, POWDER, FOR SOLUTION INTRAVENOUS at 06:59

## 2018-10-04 RX ADMIN — STANDARDIZED SENNA CONCENTRATE AND DOCUSATE SODIUM 2 TABLET: 8.6; 5 TABLET ORAL at 06:58

## 2018-10-04 RX ADMIN — MEROPENEM 500 MG: 500 INJECTION, POWDER, FOR SOLUTION INTRAVENOUS at 23:50

## 2018-10-04 ASSESSMENT — ENCOUNTER SYMPTOMS
MYALGIAS: 0
CONSTIPATION: 1
VOMITING: 0
DIARRHEA: 0
COUGH: 1
BACK PAIN: 1
SHORTNESS OF BREATH: 0
NAUSEA: 0
SORE THROAT: 0
ABDOMINAL PAIN: 0
CHILLS: 0
HEADACHES: 0
FEVER: 0
WEAKNESS: 1
HEMOPTYSIS: 1
CONSTIPATION: 0

## 2018-10-04 ASSESSMENT — PAIN SCALES - GENERAL
PAINLEVEL_OUTOF10: 7
PAINLEVEL_OUTOF10: 6
PAINLEVEL_OUTOF10: 5

## 2018-10-04 NOTE — PROGRESS NOTES
[facility] Hospitalist Progress Note    Date of Service: 10/4/2018        Chief Complaint  58 y.o. male admitted 9/18/2018 with COPD and tobacco, recent hospitalization for hydropneumothorax and empyema s/p left thoracotomy decortication, lung debridement and rib resection. Resp cx grew strep group B and was discharged on course of augmentin. He presented from Dr. Gaviria's office with cough and SOB. CT showed persistent left hydropneumothorax. IR consulted for pigtail drain placement to pleurvac suction. He had bronchoscopy that showed white purulent secretions that was irrigated and suctioned. 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     Interval Problem Update  10/2: Patient seen and evaluated this a.m.  Denies any acute complaint. Hs a draining chest tube. He complained about abdominal imaging but had been complaining of abdominal pain since admission which was why KUB was ordered by the previous day hospitalist.  Chest tube to suction at -40.   HE was pleasant with me this morning. Says he is uset because he was not told why it was ordered. I reassured him that we strive to explain every procedure but sometimes due to time limitations we may not be able to reach him on time before ordering a test and that he should ask questions of his nurses and physicians especially for clarifications of care plan.     KUB showed constipation. He remains on Meropenem on ID recommendation.    10/3: doing well. Denies any complaint today. No pleuritic pain or sob. Both chest tubes placed to water seal this a.m. By surgery. Plan to monitor closely for next 24 to 48 hours to see if he tolerates it.    10/4: denies any complaint today. Says he feels same as yesterday. One of the chest tube taken off this morning by surgery. Chest x-ray with persistent air leaks which is expected. No respiratory distress, fever or  chills.      Consultants/Specialty  Surgery   IR  Pulm  ID    Disposition  Chest tube management, likely needs PICC for meropenem  Lives in [place]        Review of Systems   HENT: Negative for congestion.    Respiratory: Positive for cough and hemoptysis. Negative for shortness of breath.    Cardiovascular: Positive for chest pain.   Gastrointestinal: Negative for abdominal pain, constipation and nausea.   Musculoskeletal: Positive for back pain. Negative for myalgias.   Skin: Negative for itching.   Neurological: Positive for weakness. Negative for headaches.      Physical Exam  Laboratory/Imaging   Hemodynamics  Temp (24hrs), Av.6 °C (97.9 °F), Min:36.2 °C (97.2 °F), Max:36.9 °C (98.4 °F)   Temperature: 36.9 °C (98.4 °F)  Pulse  Av.7  Min: 43  Max: 112    Blood Pressure: 111/73      Respiratory      Respiration: 17, Pulse Oximetry: 95 %, O2 Daily Delivery Respiratory : Silicone Nasal Cannula     Work Of Breathing / Effort: Mild  RUL Breath Sounds: Diminished, RML Breath Sounds: Diminished, RLL Breath Sounds: Diminished, JOSSE Breath Sounds: Diminished;Expiratory Wheezes, LLL Breath Sounds: Diminished    Fluids    Intake/Output Summary (Last 24 hours) at 10/04/18 1011  Last data filed at 10/04/18 0705   Gross per 24 hour   Intake              700 ml   Output             2940 ml   Net            -2240 ml       Nutrition  Orders Placed This Encounter   Procedures   • Diet Order Regular     Standing Status:   Standing     Number of Occurrences:   1     Order Specific Question:   Diet:     Answer:   Regular [1]     Physical Exam   Constitutional: He is oriented to person, place, and time. He appears well-developed and well-nourished. No distress.   HENT:   Head: Normocephalic and atraumatic.   Eyes: Pupils are equal, round, and reactive to light. Conjunctivae and EOM are normal.   Neck: Normal range of motion. Neck supple. No JVD present. No tracheal deviation present.   Cardiovascular: Normal rate, regular  rhythm and normal heart sounds.  Exam reveals no friction rub.    No murmur heard.  Pulmonary/Chest: Effort normal. No stridor. No respiratory distress. He has no wheezes. He has rales. He exhibits no tenderness.   Patient now has 1 chest tubes in place, the angle one absent after removal by surgery.  Diminished breath sounds on ipsilateral side improved. No evidence of acute respiratory distress.    Abdominal: Soft. Bowel sounds are normal. He exhibits no distension and no mass. There is no tenderness. There is no rebound and no guarding.   Musculoskeletal: Normal range of motion. He exhibits no edema.   Neurological: He is alert and oriented to person, place, and time. He has normal reflexes.   Skin: Skin is warm and dry. No rash noted. He is not diaphoretic. No erythema.   Psychiatric: He has a normal mood and affect. His behavior is normal. Thought content normal.           Recent Labs      10/02/18   0316   WBC  7.9   RBC  3.60*   HEMOGLOBIN  9.6*   HEMATOCRIT  30.6*   MCV  85.0   MCH  26.7*   MCHC  31.4*   RDW  52.9*   PLATELETCT  686*   MPV  9.5     Recent Labs      10/02/18   0316   SODIUM  137   POTASSIUM  4.1   CHLORIDE  101   CO2  31   GLUCOSE  90   BUN  9   CREATININE  0.66   CALCIUM  8.8                      Assessment/Plan     Empyema lung (HCC)- (present on admission)   Assessment & Plan    9/19 Zosyn initiated  9/24 Zosyn stopped, cefepime initiated for Enterobacter cloacae and Streptococcus viridans  9/28 Enterobacter cloacae now resistant to cefepime therapy changed to meropenem.  ID consulted, will continue on meropenem for at least 4 weeks total per ID  Abx recommendations per ID; see note.   CT management per surgical team. Angle chest tube removed. No respiratory distress. Chest tube to water seal.  Consider pulmonary consultation for outpatient management considering panlobular emphysema as well.    Home oxygen evaluation prior to discharge.  Counseled on tobacco use and need to quit  previously. He said he is quitting upon discharge.   Due to lack of insurance, he may have to stay at the hospital for the duration of his IV antibiotics.        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  See above        Constipation   Assessment & Plan    Moderate stools on imaging. Continue Senokot, Miralax etc.         Acute bilateral low back pain- (present on admission)   Assessment & Plan    Likely musculoskeletal  Add Flexeril as needed  No anesthesia or focal weakness  Improved          HCAP (healthcare-associated pneumonia)- (present on admission)   Assessment & Plan    Continue abx per ID, plan for at least 4 weeks total.  Continue supplemental oxygen and RT protocol        Gastroesophageal reflux disease without esophagitis- (present on admission)   Assessment & Plan    pepcid prn        Protein malnutrition (HCC)- (present on admission)   Assessment & Plan    Optimize nutrition         Panlobular emphysema (HCC)- (present on admission)   Assessment & Plan    Continue supplemental oxygen and RT protocol  DuoNeb as needed  Consider outpatient pulmonary rehab and consultation at discharge.  Home oxygen evaluation at discharge        Tobacco abuse- (present on admission)   Assessment & Plan    Counseled and educated. Said he plans to quit upon discharge. He will continue with nicotine patch for now.             Quality-Core Measures   Reviewed items::  EKG reviewed, Labs reviewed, Medications reviewed and Radiology images reviewed  Motley catheter::  No Motley  DVT prophylaxis - mechanical:  SCDs

## 2018-10-04 NOTE — PROGRESS NOTES
Assumed care of pt at 1900, report given.  A+O x 4, VSS, and on 1L O2 NC.   Pt has 2 left sided chest tubes. Both chest tubes now to water seal; tolerated well.  Chest tube #2 has continuous air leak present; MD aware.   Left chest surgical incision approximated with staples and now covered with dry gauze and hypafix tape. CDI with no drainage. Prevena wound vac removed after seal broken per Dr. Gaviria.  Chest tube dressing changed; mild serosanguinous drainage around both chest tube insertion sites noted. Pt tolerated well.   Lung sounds diminished and wheezy over left side.   Pt stating moderate pain this evening, medicated per MAR; tolerating well.  Pt tolerating a regular diet; denies N/V at this time.   +void  -BM (last 9/30/18) hperactive bowel sounds and stool softeners on board.   Pt ambulates with a standby assist and FWW; tolerates well.    Pt updated on POC and all questions answered at this time.  Bed in lowest position, call light within reach, and no current needs.     Refused IS this evening. No other needs at this time.

## 2018-10-04 NOTE — DISCHARGE PLANNING
Anticipated Discharge Disposition: TBD    Action: Pt needs four weeks of IV Abx's, has no insurance, no PCP and has denied post acute services.  In addition, pt states he cannot afford medical treatment outside the hospital.  Pt's cause discussed during IDT rounds.  Per MD, pt will stay in hospital for his IV abx's; as of now, per ID pt needs 4 weeks.     Barriers to Discharge: IV abx's and no payor source    Plan: Dr. Owens is going to speak with ID to see if PO abx's are possible.

## 2018-10-04 NOTE — PROGRESS NOTES
Bedside report received. Assessment complete.  A&O x 4. Patient calls appropriately. Patient up with x1 assist/standby.   Patient resting in bed comfortably.  Denies N&V. Tolerating regular diet.  Surgical sites to left chest CDI with dry gauze. Chest tube site and dressing CDI, to water seal with air leak (MD aware). One removed this AM by Dr Gaviria. Pt tolerated well.  + void, last BM 09/30/18 - pt given a suppository by request.  Patient denies SOB.  Reviewed plan of care with patient. Call light and personal belongings within reach. Hourly rounding in place. All needs met at this time.

## 2018-10-04 NOTE — PROGRESS NOTES
Infectious Disease Progress Note    Author: SHERRY Crawford Date & Time of service: 10/4/2018  12:51 PM    Chief Complaint:  Follow-up of recurrent empyema    Interval History:   afebrile, white count continues to down trend, 8.6 today.  Patient says that he is constipated with abdominal pain.  No issues with breathing.  Patient reports that primary team is aware and seeks no further help from me  10/1/2018 no fevers.  Patient denies any new issues.  WBC is down to 7.7  10/2/2018 no fevers.  Denies any new issues.  10/3/2018-overall feels better.  No new issues overnight.  No fevers.  Still has 2 chest tubes  10/4- AF, no WBC, no issue with abx, tenderness at L CT site.  Labs Reviewed, Medications Reviewed, Radiology Reviewed and Wound Reviewed.    Review of Systems:  Review of Systems   Constitutional: Negative for chills, fever and malaise/fatigue.   HENT: Negative for sore throat.    Respiratory: Positive for cough and hemoptysis. Negative for shortness of breath.         Very minimal   Cardiovascular: Positive for chest pain (Left CT site). Negative for leg swelling.   Gastrointestinal: Positive for constipation. Negative for abdominal pain, diarrhea, nausea and vomiting.   Genitourinary: Negative for dysuria.   Musculoskeletal: Negative for joint pain and myalgias.   Skin: Negative for rash.   Neurological: Negative for headaches.       Hemodynamics:  Temp (24hrs), Av.6 °C (97.9 °F), Min:36.2 °C (97.2 °F), Max:36.9 °C (98.4 °F)  Temperature: 36.8 °C (98.2 °F)  Pulse  Av.6  Min: 43  Max: 112   Blood Pressure: 106/69       Physical Exam:  Physical Exam   Constitutional: He is oriented to person, place, and time. He appears well-developed. No distress.   HENT:   Head: Normocephalic and atraumatic.   Eyes: Pupils are equal, round, and reactive to light. Conjunctivae are normal.   Neck: Normal range of motion. Neck supple. No tracheal deviation present.   Cardiovascular: Normal rate, regular  rhythm, normal heart sounds and intact distal pulses.    No murmur heard.  Pulmonary/Chest: Effort normal. No respiratory distress. He has no wheezes. He has rales. He exhibits tenderness.   Left Chest tubes x2  Decreased BS to Left chest   Abdominal: Soft. Bowel sounds are normal. He exhibits no distension. There is no tenderness.   Musculoskeletal: Normal range of motion. He exhibits no edema or tenderness.   Neurological: He is alert and oriented to person, place, and time.   Skin: Skin is warm and dry. No rash noted. He is not diaphoretic.   Psychiatric: He has a normal mood and affect. Thought content normal.   Nursing note and vitals reviewed.      Meds:    Current Facility-Administered Medications:   •  meropenem (MERREM) IV  •  oxyCODONE immediate-release **OR** oxyCODONE immediate-release  •  HYDROmorphone  •  Pharmacy Consult Request  •  scopolamine  •  celecoxib  •  famotidine  •  cyclobenzaprine  •  senna-docusate **AND** polyethylene glycol/lytes **AND** magnesium hydroxide **AND** bisacodyl  •  Respiratory Care per Protocol  •  acetaminophen  •  ondansetron  •  ondansetron  •  nicotine **AND** Nicotine Replacement Patient Education Materials **AND** nicotine polacrilex    Labs:  Recent Labs      10/02/18   0316   WBC  7.9   RBC  3.60*   HEMOGLOBIN  9.6*   HEMATOCRIT  30.6*   MCV  85.0   MCH  26.7*   RDW  52.9*   PLATELETCT  686*   MPV  9.5     Recent Labs      10/02/18   0316   SODIUM  137   POTASSIUM  4.1   CHLORIDE  101   CO2  31   GLUCOSE  90   BUN  9     Recent Labs      10/02/18   0316   CREATININE  0.66       Imaging:  10/4/2018  DX-CHEST-PORTABLE   Impression       1.  Pulmonary infiltrates, stable.  2.  Residual left pneumothorax, increased in size since prior study. Thoracostomy tubes remain in place.       Micro:  Results     Procedure Component Value Units Date/Time    ANAEROBIC CULTURE [[id number]] Collected:  09/25/18 1245    Order Status:  Completed Specimen:  Tissue Updated:  09/28/18  1745     Significant Indicator NEG     Source TISS     Site Left Pleural Peel     Anaerobic Culture, Culture Res No Anaerobes isolated.    CULTURE TISSUE W/ GRM STAIN [[id number]]  (Abnormal)  (Susceptibility) Collected:  09/25/18 1245    Order Status:  Completed Specimen:  Tissue Updated:  09/28/18 1745     Significant Indicator POS (POS)     Source TISS     Site Left Pleural Peel     Tissue Culture -- (A)     Gram Stain Result No organisms seen.     Tissue Culture Enterobacter cloacae  Light growth   (A)      Viridans Streptococcus  Light growth   (A)    Culture & Susceptibility     ENTEROBACTER CLOACAE     Antibiotic Sensitivity Microscan Unit Status    Ampicillin Resistant >16 mcg/mL Final    Method: SENSITIVITY, SOREN    Cefepime Resistant >16 mcg/mL Final    Method: SENSITIVITY, SOREN    Cefotaxime Resistant >32 mcg/mL Final    Method: SENSITIVITY, SOREN    Cefotetan Resistant >32 mcg/mL Final    Method: SENSITIVITY, SOREN    Ceftazidime Resistant >16 mcg/mL Final    Method: SENSITIVITY, SOREN    Ceftriaxone Resistant >32 mcg/mL Final    Method: SENSITIVITY, SOREN    Cefuroxime Resistant >16 mcg/mL Final    Method: SENSITIVITY, SOREN    Ciprofloxacin Sensitive <=1 mcg/mL Final    Method: SENSITIVITY, SOREN    Ertapenem Sensitive <=1 mcg/mL Final    Method: SENSITIVITY, SOREN    Gentamicin Sensitive <=4 mcg/mL Final    Method: SENSITIVITY, SOREN    Pip/Tazobactam Resistant >64 mcg/mL Final    Method: SENSITIVITY, SOREN    Tigecycline Sensitive <=2 mcg/mL Final    Method: SENSITIVITY, SOREN    Tobramycin Sensitive <=4 mcg/mL Final    Method: SENSITIVITY, SOREN    Trimeth/Sulfa Sensitive <=2/38 mcg/mL Final    Method: SENSITIVITY, SOREN                             Assessment:  rk Frederic Ceballos is a 58 y.o. male with a history of COPD and tobacco use, recent hospitalization for hydropneumothorax and left empyema status post left thoracotomy, decortication, debridement, and rib resection in July 2018 (cultures grew group F streptococcus,  treated with IV Unasyn for 2 weeks followed by p.o. Augmentin for 2 weeks).  Patient is now admitted 9/18/2018 with persistent left hydropneumothorax, empyema, and concern for an alveolar pleural fistula. He underwent repeat thoracotomy, decortication, and intercostal muscle flap transposition to the parenchymal airway fistula on 9/25.     Pleural fluid cultures from 9/19 grew Enterobacter cloacae and viridans Strep.  OR cultures again grew Enterobacter, but this time resistant to cephalosporins and Zosyn.  Antibiotics were switched to meropenem.     Pertinent Diagnoses:  1. Sepsis, improved  2. L empyema, status post surgery as above  3. L hydropneumothorax, stable  4. L alveolar pleural fistula, status post surgery as above  5. COPD  6. Tobacco dependence    Active Hospital Problems    Diagnosis   • Empyema lung (HCC) [J86.9]   • Acute respiratory failure with hypoxia ([facility]) [J96.01]   • Tobacco abuse [Z72.0]       Plan:  Sepsis, improved  Afebrile  No leukocytosis on last check  Abx as below    L empyema   S/p repeat thoracotomy, decortication, and intercostal muscle flap transposition to the parenchymal airway fistula on 9/25 with Dr. Gaviria  OR cx +Enterobacter cloacae (cephalosporin resistant) and Strep Viridans  Continue IV Meropenem 500 mg every 6 hours  Anticipate at least 4 weeks of antibiotics  Estimated end date 10/23/18    L hydropneumothorax, stable  Chest tubes in place, managed by CT surgery    COPD, tobacco dependence  Patient reports that he will now quit      Pt does not have insurance, may need to complete IV abx treatment inpt.

## 2018-10-05 ENCOUNTER — APPOINTMENT (OUTPATIENT)
Dept: RADIOLOGY | Facility: MEDICAL CENTER | Age: 58
DRG: 853 | End: 2018-10-05
Attending: NURSE PRACTITIONER

## 2018-10-05 PROCEDURE — 700102 HCHG RX REV CODE 250 W/ 637 OVERRIDE(OP): Performed by: NURSE PRACTITIONER

## 2018-10-05 PROCEDURE — 700111 HCHG RX REV CODE 636 W/ 250 OVERRIDE (IP): Performed by: SURGERY

## 2018-10-05 PROCEDURE — 99232 SBSQ HOSP IP/OBS MODERATE 35: CPT | Performed by: INTERNAL MEDICINE

## 2018-10-05 PROCEDURE — 71045 X-RAY EXAM CHEST 1 VIEW: CPT

## 2018-10-05 PROCEDURE — 99232 SBSQ HOSP IP/OBS MODERATE 35: CPT | Performed by: HOSPITALIST

## 2018-10-05 PROCEDURE — A9270 NON-COVERED ITEM OR SERVICE: HCPCS | Performed by: NURSE PRACTITIONER

## 2018-10-05 PROCEDURE — A9270 NON-COVERED ITEM OR SERVICE: HCPCS | Performed by: ANESTHESIOLOGY

## 2018-10-05 PROCEDURE — 700102 HCHG RX REV CODE 250 W/ 637 OVERRIDE(OP): Performed by: ANESTHESIOLOGY

## 2018-10-05 PROCEDURE — 700105 HCHG RX REV CODE 258: Performed by: SURGERY

## 2018-10-05 PROCEDURE — 770001 HCHG ROOM/CARE - MED/SURG/GYN PRIV*

## 2018-10-05 PROCEDURE — 700105 HCHG RX REV CODE 258

## 2018-10-05 PROCEDURE — 700102 HCHG RX REV CODE 250 W/ 637 OVERRIDE(OP): Performed by: INTERNAL MEDICINE

## 2018-10-05 PROCEDURE — A9270 NON-COVERED ITEM OR SERVICE: HCPCS | Performed by: INTERNAL MEDICINE

## 2018-10-05 RX ORDER — SODIUM CHLORIDE 9 MG/ML
INJECTION, SOLUTION INTRAVENOUS
Status: COMPLETED
Start: 2018-10-05 | End: 2018-10-05

## 2018-10-05 RX ADMIN — NICOTINE 14 MG: 14 PATCH, EXTENDED RELEASE TRANSDERMAL at 06:25

## 2018-10-05 RX ADMIN — OXYCODONE HYDROCHLORIDE 10 MG: 10 TABLET ORAL at 10:33

## 2018-10-05 RX ADMIN — CELECOXIB 200 MG: 200 CAPSULE ORAL at 17:22

## 2018-10-05 RX ADMIN — STANDARDIZED SENNA CONCENTRATE AND DOCUSATE SODIUM 2 TABLET: 8.6; 5 TABLET ORAL at 06:26

## 2018-10-05 RX ADMIN — SODIUM CHLORIDE 500 ML: 9 INJECTION, SOLUTION INTRAVENOUS at 15:56

## 2018-10-05 RX ADMIN — MEROPENEM 500 MG: 500 INJECTION, POWDER, FOR SOLUTION INTRAVENOUS at 17:22

## 2018-10-05 RX ADMIN — MEROPENEM 500 MG: 500 INJECTION, POWDER, FOR SOLUTION INTRAVENOUS at 06:25

## 2018-10-05 RX ADMIN — OXYCODONE HYDROCHLORIDE 10 MG: 10 TABLET ORAL at 15:53

## 2018-10-05 RX ADMIN — STANDARDIZED SENNA CONCENTRATE AND DOCUSATE SODIUM 2 TABLET: 8.6; 5 TABLET ORAL at 17:22

## 2018-10-05 RX ADMIN — MEROPENEM 500 MG: 500 INJECTION, POWDER, FOR SOLUTION INTRAVENOUS at 12:11

## 2018-10-05 RX ADMIN — OXYCODONE HYDROCHLORIDE 10 MG: 10 TABLET ORAL at 04:41

## 2018-10-05 RX ADMIN — OXYCODONE HYDROCHLORIDE 10 MG: 10 TABLET ORAL at 20:12

## 2018-10-05 RX ADMIN — CELECOXIB 200 MG: 200 CAPSULE ORAL at 07:45

## 2018-10-05 ASSESSMENT — PAIN SCALES - GENERAL
PAINLEVEL_OUTOF10: 4
PAINLEVEL_OUTOF10: 8
PAINLEVEL_OUTOF10: 7
PAINLEVEL_OUTOF10: 4
PAINLEVEL_OUTOF10: 4
PAINLEVEL_OUTOF10: 7
PAINLEVEL_OUTOF10: 7

## 2018-10-05 ASSESSMENT — ENCOUNTER SYMPTOMS
NAUSEA: 0
SHORTNESS OF BREATH: 0
HEADACHES: 0
PALPITATIONS: 0
MYALGIAS: 0
WEAKNESS: 0
CHILLS: 0
WEAKNESS: 1
ABDOMINAL PAIN: 0
VOMITING: 0
CONSTIPATION: 0
FEVER: 0
COUGH: 1
HEMOPTYSIS: 1
BACK PAIN: 1
DIARRHEA: 0

## 2018-10-05 NOTE — PROGRESS NOTES
Infectious Disease Progress Note    Author: SEHRRY Crawford Date & Time of service: 10/5/2018  1:03 PM    Chief Complaint:  Follow-up of recurrent empyema    Interval History:   afebrile, white count continues to down trend, 8.6 today.  Patient says that he is constipated with abdominal pain.  No issues with breathing.  Patient reports that primary team is aware and seeks no further help from me  10/1/2018 no fevers.  Patient denies any new issues.  WBC is down to 7.7  10/2/2018 no fevers.  Denies any new issues.  10/3/2018-overall feels better.  No new issues overnight.  No fevers.  Still has 2 chest tubes  10/4- AF, no WBC, no issue with abx, tenderness at L CT site.  10/5-AF, no WBC, no pain to left chest-just took pain pill, tolerating ABX, still has 1 CT in place.  Labs Reviewed, Medications Reviewed, Radiology Reviewed and Wound Reviewed.    Review of Systems:  Review of Systems   Constitutional: Negative for chills and fever.   HENT: Negative for congestion.    Respiratory: Positive for cough. Negative for shortness of breath.    Cardiovascular: Negative for chest pain, palpitations and leg swelling.   Gastrointestinal: Negative for abdominal pain, diarrhea, nausea and vomiting.   Genitourinary: Negative for dysuria and hematuria.   Musculoskeletal: Negative for joint pain and myalgias.   Skin: Negative for itching.   Neurological: Negative for weakness and headaches.       Hemodynamics:  Temp (24hrs), Av.4 °C (97.6 °F), Min:36.3 °C (97.4 °F), Max:36.6 °C (97.8 °F)  Temperature: 36.5 °C (97.7 °F)  Pulse  Av.4  Min: 43  Max: 112   Blood Pressure: 111/79       Physical Exam:  Physical Exam   Constitutional: He is oriented to person, place, and time. He appears well-developed and well-nourished. No distress.   Appears older than stated age.   HENT:   Head: Normocephalic and atraumatic.   Eyes: Pupils are equal, round, and reactive to light. Conjunctivae and EOM are normal. No scleral  icterus.   Neck: Normal range of motion. Neck supple. No JVD present.   Cardiovascular: Normal rate, regular rhythm and normal heart sounds.  Exam reveals no friction rub.    No murmur heard.  Pulmonary/Chest: Effort normal. No respiratory distress. He has no wheezes. He exhibits no tenderness.   Left Chest tubes x1  Decreased BS to left lower lobe.  Staples to upper left chest, no erythema or active drainage.   Abdominal: Soft. Bowel sounds are normal. He exhibits no distension. There is no rebound and no guarding.   Musculoskeletal: Normal range of motion. He exhibits no edema or tenderness.   Neurological: He is alert and oriented to person, place, and time.   Skin: Skin is warm and dry. No erythema.   Psychiatric: He has a normal mood and affect. His behavior is normal.   Nursing note and vitals reviewed.      Meds:    Current Facility-Administered Medications:   •  meropenem (MERREM) IV  •  oxyCODONE immediate-release **OR** oxyCODONE immediate-release  •  HYDROmorphone  •  Pharmacy Consult Request  •  scopolamine  •  celecoxib  •  famotidine  •  cyclobenzaprine  •  senna-docusate **AND** polyethylene glycol/lytes **AND** magnesium hydroxide **AND** bisacodyl  •  Respiratory Care per Protocol  •  acetaminophen  •  ondansetron  •  ondansetron  •  nicotine **AND** Nicotine Replacement Patient Education Materials **AND** nicotine polacrilex    Labs:  No results for input(s): WBC, RBC, HEMOGLOBIN, HEMATOCRIT, MCV, MCH, RDW, PLATELETCT, MPV, NEUTSPOLYS, LYMPHOCYTES, MONOCYTES, EOSINOPHILS, BASOPHILS, RBCMORPHOLO in the last 72 hours.  No results for input(s): SODIUM, POTASSIUM, CHLORIDE, CO2, GLUCOSE, BUN, CPKTOTAL in the last 72 hours.  No results for input(s): ALBUMIN, TBILIRUBIN, ALKPHOSPHAT, TOTPROTEIN, ALTSGPT, ASTSGOT, CREATININE in the last 72 hours.    Imaging:    10/5/2018   DX-CHEST-PORTABLE   Impression:       1. Pulmonary infiltrates, stable.  2.  Residual left pneumothorax, increased in size since prior  study. Thoracostomy tubes remain in place.        Micro:  Results     Procedure Component Value Units Date/Time    ANAEROBIC CULTURE [[id number]] Collected:  09/25/18 1245    Order Status:  Completed Specimen:  Tissue Updated:  09/28/18 1745     Significant Indicator NEG     Source TISS     Site Left Pleural Peel     Anaerobic Culture, Culture Res No Anaerobes isolated.    CULTURE TISSUE W/ GRM STAIN [[id number]]  (Abnormal)  (Susceptibility) Collected:  09/25/18 1245    Order Status:  Completed Specimen:  Tissue Updated:  09/28/18 1745     Significant Indicator POS (POS)     Source TISS     Site Left Pleural Peel     Tissue Culture -- (A)     Gram Stain Result No organisms seen.     Tissue Culture Enterobacter cloacae  Light growth   (A)      Viridans Streptococcus  Light growth   (A)    Culture & Susceptibility     ENTEROBACTER CLOACAE     Antibiotic Sensitivity Microscan Unit Status    Ampicillin Resistant >16 mcg/mL Final    Method: SENSITIVITY, SOREN    Cefepime Resistant >16 mcg/mL Final    Method: SENSITIVITY, SOREN    Cefotaxime Resistant >32 mcg/mL Final    Method: SENSITIVITY, SOREN    Cefotetan Resistant >32 mcg/mL Final    Method: SENSITIVITY, SOREN    Ceftazidime Resistant >16 mcg/mL Final    Method: SENSITIVITY, SOREN    Ceftriaxone Resistant >32 mcg/mL Final    Method: SENSITIVITY, SOREN    Cefuroxime Resistant >16 mcg/mL Final    Method: SENSITIVITY, SOREN    Ciprofloxacin Sensitive <=1 mcg/mL Final    Method: SENSITIVITY, SOREN    Ertapenem Sensitive <=1 mcg/mL Final    Method: SENSITIVITY, SOREN    Gentamicin Sensitive <=4 mcg/mL Final    Method: SENSITIVITY, SOREN    Pip/Tazobactam Resistant >64 mcg/mL Final    Method: SENSITIVITY, SOREN    Tigecycline Sensitive <=2 mcg/mL Final    Method: SENSITIVITY, SOREN    Tobramycin Sensitive <=4 mcg/mL Final    Method: SENSITIVITY, SOREN    Trimeth/Sulfa Sensitive <=2/38 mcg/mL Final    Method: SENSITIVITY, SOREN                             Assessment:  Donna Ceballos is a 58 y.o.  male with a history of COPD and tobacco use, recent hospitalization for hydropneumothorax and left empyema status post left thoracotomy, decortication, debridement, and rib resection in July 2018 (cultures grew group F streptococcus, treated with IV Unasyn for 2 weeks followed by p.o. Augmentin for 2 weeks).  Patient is now admitted 9/18/2018 with persistent left hydropneumothorax, empyema, and concern for an alveolar pleural fistula. He underwent repeat thoracotomy, decortication, and intercostal muscle flap transposition to the parenchymal airway fistula on 9/25.     Pleural fluid cultures from 9/19 grew Enterobacter cloacae and viridans Strep.  OR cultures again grew Enterobacter, but this time resistant to cephalosporins and Zosyn.  Antibiotics were switched to meropenem.     Pertinent Diagnoses:  1. Sepsis, improved  2. L empyema, status post surgery as above  3. L hydropneumothorax, stable  4. L alveolar pleural fistula, status post surgery as above  5. COPD  6. Tobacco dependence    Active Hospital Problems    Diagnosis   • Empyema lung ([facility]) [J86.9]   • Acute respiratory failure with hypoxia ([facility]) [J96.01]   • Tobacco abuse [Z72.0]       Plan:  Sepsis, improved  Afebrile  No leukocytosis on last check  Abx as below    L empyema   S/p repeat thoracotomy, decortication, and intercostal muscle flap transposition to the parenchymal airway fistula on 9/25 with Dr. Gaviria  OR cx +Enterobacter cloacae (cephalosporin resistant) and Strep Viridans  Continue IV Meropenem 500 mg every 6 hours  Anticipate at least 4 weeks of antibiotics  Estimated end date 10/23/18  Midline ordred    L hydropneumothorax, stable  Chest tube x1 in place, managed by CT surgery    COPD, tobacco dependence  Cessation encouraged, patient reporting willingness to quit    Discussed with ROLANDO Rosales.  Due to lack of insurance will likely finish treatment inpatient.

## 2018-10-05 NOTE — PROGRESS NOTES
Progress Note:  10/5/2018, 1:07 PM    S: No acute events.  No change in breathing, but on 2L NC today.  CXR shows mild increase in apical PTX    O:  Blood pressure 111/79, pulse 91, temperature 36.5 °C (97.7 °F), resp. rate 17, height 1.829 m (6'), weight 62.2 kg (137 lb 2 oz), SpO2 95 %.    NAD, awake, alert  Breathing nonlabored on 2 L nasal cannula  Remaining left chest tube in place, expiratory air leak slightly smaller than yesterday but still moderate      A:   Active Hospital Problems    Diagnosis   • Empyema lung (HCC) [J86.9]     Priority: High     9/19 Zosyn initiated  9/24 Zosyn altered cefepime initiated for Enterobacter cloacae and Streptococcus viridans  9/27 Cefepime day 4  9/28 Enterobacter cloacae now resistant to cefepime therapy altered to meropenem.  9/28 Meropenem day 1     • Acute respiratory failure with hypoxia (HCC) [J96.01]     Priority: Medium   • Gastroesophageal reflux disease without esophagitis [K21.9]     Priority: Low   • Panlobular emphysema (HCC) [J43.1]     Priority: Low   • Protein malnutrition (HCC) [E46]     Priority: Low   • Tobacco abuse [Z72.0]     Priority: Low   • Constipation [K59.00]   • Acute bilateral low back pain [M54.5]   • HCAP (healthcare-associated pneumonia) [J18.9]         P:   -Continue a chest tube in place to water seal  -Will evaluate tomorrow's chest x-ray  -Continue to monitor breathing  -Still not ready for discharge, unclear if patient will be able to tolerate water seal for further intervention required very likely remain through the weekend  -Continue IV antibiotics per ID recommendations    Pb Gaviria M.D.  [facility]  [phone]

## 2018-10-05 NOTE — PROGRESS NOTES
Chest tube dressing changed. Air leak noted. MD aware per note. Left chest incision SANTIAGO well approximated with staples. No drainage noted at this time.

## 2018-10-05 NOTE — CARE PLAN
Problem: Safety  Goal: Will remain free from falls  Outcome: PROGRESSING AS EXPECTED    Intervention: Assess risk factors for falls   10/04/18 2205   OTHER   Fall Risk Risk to Fall - 0 - 1 point   Risk for Injury-Any positive answers results in the pt being at high risk for fall related injury Surgery: Thoracic or Abdominal Surgery or Lower Limb Amputation   Mobility Status Assessment 0-Ambulates & Transfers Independently. No Assistance Required   History of fall 0   Pt Calls for Assistance Yes;No assistance required     Intervention: Implement fall precautions   10/04/18 2000 10/04/18 2205   OTHER   Environmental Precautions Treaded Slipper Socks on Patient;Personal Belongings, Wastebasket, Call Bell etc. in Easy Reach;Transferred to Stronger Side;Report Given to Other Health Care Providers Regarding Fall Risk;Bed in Low Position;Communication Sign for Patients & Families;Mobility Assessed & Appropriate Sign Placed --    IV Pole on Same Side of Bed as Bathroom --  Yes   Bedrails --  Bedrails Closest to Bathroom Down         Problem: Bowel/Gastric:  Goal: Normal bowel function is maintained or improved  Outcome: PROGRESSING AS EXPECTED   10/04/18 2000   OTHER   Last BM 10/04/18   Number of Times Stooled 1       Problem: Respiratory:  Goal: Respiratory status will improve  Outcome: PROGRESSING SLOWER THAN EXPECTED  Increased O2 demands up to 2L this evening. Pt refusing humidification and silicone nasal cannula.

## 2018-10-05 NOTE — PROGRESS NOTES
This CNA informed pt of upcoming walking home O2 test.Pt declined. Pt informed of importance of test. Pt still declined. RN notified.

## 2018-10-05 NOTE — PROGRESS NOTES
Pt &AO x 4.  Reporting 4/10 pain. Pt medicated for pain per MAR.  Left chest tube in place. No output noted overnight.    Dressing with some old drainage noted. Dressing to be changed today.   Left chest incision with dressing. Dressing CDI.   Tolerating diet. +BM 10/4/18. Denies SOB.  Plan for 4 weeks abx until 10/23/18 per ID.  POC discussed with pt. All needs addressed at this time.

## 2018-10-05 NOTE — PROGRESS NOTES
[facility] Hospitalist Progress Note    Date of Service: 10/5/2018        Chief Complaint  58 y.o. male admitted 9/18/2018 with COPD and tobacco, recent hospitalization for hydropneumothorax and empyema s/p left thoracotomy decortication, lung debridement and rib resection. Resp cx grew strep group B and was discharged on course of augmentin. He presented from Dr. Gaviria's office with cough and SOB. CT showed persistent left hydropneumothorax. IR consulted for pigtail drain placement to pleurvac suction. He had bronchoscopy that showed white purulent secretions that was irrigated and suctioned. 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     Interval Problem Update  10/2: Patient seen and evaluated this a.m.  Denies any acute complaint. Hs a draining chest tube. He complained about abdominal imaging but had been complaining of abdominal pain since admission which was why KUB was ordered by the previous day hospitalist.  Chest tube to suction at -40.   HE was pleasant with me this morning. Says he is uset because he was not told why it was ordered. I reassured him that we strive to explain every procedure but sometimes due to time limitations we may not be able to reach him on time before ordering a test and that he should ask questions of his nurses and physicians especially for clarifications of care plan.     KUB showed constipation. He remains on Meropenem on ID recommendation.    10/3: doing well. Denies any complaint today. No pleuritic pain or sob. Both chest tubes placed to water seal this a.m. By surgery. Plan to monitor closely for next 24 to 48 hours to see if he tolerates it.    10/4: denies any complaint today. Says he feels same as yesterday. One of the chest tube taken off this morning by surgery. Chest x-ray with persistent air leaks which is expected. No respiratory distress, fever or  chills.    10/5: stable. Denies any acute needs. Worried about prolonged hospitalization and mounting bills. He does not want to stay in the hospital much longer once cleared by CTX surgery. He is on Meropenem. Insurance application pending. Denies pleuritic chest pain, fever or chills.      Consultants/Specialty  Surgery   IR  Pulm  ID    Disposition  Chest tube management, likely needs PICC for meropenem  Lives in [place]        Review of Systems   HENT: Negative for congestion.    Respiratory: Positive for cough and hemoptysis. Negative for shortness of breath.    Cardiovascular: Positive for chest pain.   Gastrointestinal: Negative for abdominal pain, constipation and nausea.   Musculoskeletal: Positive for back pain. Negative for myalgias.   Skin: Negative for itching.   Neurological: Positive for weakness. Negative for headaches.      Physical Exam  Laboratory/Imaging   Hemodynamics  Temp (24hrs), Av.4 °C (97.6 °F), Min:36.3 °C (97.4 °F), Max:36.5 °C (97.7 °F)   Temperature: 36.5 °C (97.7 °F)  Pulse  Av.4  Min: 43  Max: 112    Blood Pressure: 111/79      Respiratory      Respiration: 17, Pulse Oximetry: 95 %        RUL Breath Sounds: Diminished, RML Breath Sounds: Diminished, RLL Breath Sounds: Diminished, JOSSE Breath Sounds: Diminished, LLL Breath Sounds: Diminished    Fluids    Intake/Output Summary (Last 24 hours) at 10/05/18 1634  Last data filed at 10/05/18 1154   Gross per 24 hour   Intake             1050 ml   Output             2690 ml   Net            -1640 ml       Nutrition  Orders Placed This Encounter   Procedures   • Diet Order Regular     Standing Status:   Standing     Number of Occurrences:   1     Order Specific Question:   Diet:     Answer:   Regular [1]     Physical Exam   Constitutional: He is oriented to person, place, and time. He appears well-developed and well-nourished. No distress.   HENT:   Head: Normocephalic and atraumatic.   Eyes: Pupils are equal, round, and reactive to  light. Conjunctivae and EOM are normal.   Neck: Normal range of motion. Neck supple. No JVD present. No tracheal deviation present.   Cardiovascular: Normal rate, regular rhythm and normal heart sounds.  Exam reveals no friction rub.    No murmur heard.  Pulmonary/Chest: Effort normal. No stridor. No respiratory distress. He has no wheezes. He has rales. He exhibits no tenderness.   Patient now has 1 chest tubes in place, the angle one absent after removal by surgery.  Diminished breath sounds on ipsilateral side improved. No evidence of acute respiratory distress.    Abdominal: Soft. Bowel sounds are normal. He exhibits no distension and no mass. There is no tenderness. There is no rebound and no guarding.   Musculoskeletal: Normal range of motion. He exhibits no edema.   Neurological: He is alert and oriented to person, place, and time. He has normal reflexes.   Skin: Skin is warm and dry. No rash noted. He is not diaphoretic. No erythema.   Psychiatric: He has a normal mood and affect. His behavior is normal. Thought content normal.                                    Assessment/Plan     Empyema lung (HCC)- (present on admission)   Assessment & Plan    9/19 Zosyn initiated  9/24 Zosyn stopped, cefepime initiated for Enterobacter cloacae and Streptococcus viridans  9/28 Enterobacter cloacae now resistant to cefepime therapy changed to meropenem.  ID consulted, will continue on meropenem for at least 4 weeks total per ID  Abx recommendations per ID; see note.   CT management per surgical team. Angle chest tube removed. No respiratory distress. Chest tube to water seal. Not ready for discharge per surgery.   Consider pulmonary consultation for outpatient management considering panlobular emphysema as well.    Home oxygen evaluation prior to discharge.  Counseled on tobacco use and need to quit previously. He said he is quitting upon discharge.   Due to lack of insurance, he may have to stay at the hospital for the  duration of his IV antibiotics due for completion 10/23 but he does not want to wait that long due to mounting bills and need to work.        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   RT protocol  Improved.  See above        Constipation   Assessment & Plan    Moderate stools on imaging. Continue Senokot, Miralax etc.         Acute bilateral low back pain- (present on admission)   Assessment & Plan    Likely musculoskeletal, significantly improved.   Continue Flexeril as needed  No anesthesia or focal weakness  Improved          HCAP (healthcare-associated pneumonia)- (present on admission)   Assessment & Plan    Continue abx per ID, plan for at least 4 weeks total.  Continue supplemental oxygen and RT protocol        Gastroesophageal reflux disease without esophagitis- (present on admission)   Assessment & Plan    pepcid prn        Protein malnutrition (HCC)- (present on admission)   Assessment & Plan    Optimize nutrition         Panlobular emphysema (HCC)- (present on admission)   Assessment & Plan    Continue supplemental oxygen and RT protocol  DuoNeb as needed  Consider outpatient pulmonary rehab and consultation at discharge.  Home oxygen evaluation prior to discharge        Tobacco abuse- (present on admission)   Assessment & Plan    Counseled and educated. Said he plans to quit upon discharge. He will continue with nicotine patch for now.             Quality-Core Measures   Reviewed items::  EKG reviewed, Labs reviewed, Medications reviewed and Radiology images reviewed  Motley catheter::  No Motley  DVT prophylaxis - mechanical:  SCDs

## 2018-10-05 NOTE — PROGRESS NOTES
Pt A&O x4. Irritable, noncompliant.     Vitals: /74   Pulse 72   Temp 36.4 °C (97.6 °F)   Resp 17   Ht 1.829 m (6')   Wt 62.2 kg (137 lb 2 oz)   SpO2 92%   BMI 18.60 kg/m²     Pt rates pain 7 out of 10. Medicated for pain.    Neuro: KNIGHT. Denies new onset of numbness/ tingling.    Cardiac: Denies new onset of chest pain.    Vascular: Pulses 2+ BUE, BLE. No edema noted.    Respiratory: Lungs sound diminished throughout with expiratory wheezes in left upper lung. Pt refusing to use IS in front of RN, however IS in bed with pt. Increased O2 up to 2L NC. Pt refusing humidification and silicone NC at this time.  on, satting in 90's. Denies SOB. Left chest tube in place, draining small amount of dark red blood, to water seal, air leak present, water fluctuating with respirations.     GI: Abdomen soft. + bowel sounds, + flatus, + BM today. On regular diet, tolerating well. - nausea/ vomiting.    : Pt voiding adequately. Using urinal at bedside.     MSK: Pt stated that he didn't walk today. Pt refusing to walk tonight before bed.    Integumentary: Left upper lateral thoracic incision site dressing CDI, observed, no drainage noted. Left chest tube in place, dressing CDI, observed. Second chest tube removed today, dressing CDI, no drainage noted.     Labs noted.    Fall precautions in place: Bed locked in lowest position, Upper bed rails up, treaded socks in place, personal belongings within reach, call light within reach, appropriate mobility signs in place, - bed alarm. Pt calls appropriately.     Pt updated on POC.

## 2018-10-06 ENCOUNTER — APPOINTMENT (OUTPATIENT)
Dept: RADIOLOGY | Facility: MEDICAL CENTER | Age: 58
DRG: 853 | End: 2018-10-06
Attending: NURSE PRACTITIONER

## 2018-10-06 PROCEDURE — A9270 NON-COVERED ITEM OR SERVICE: HCPCS | Performed by: INTERNAL MEDICINE

## 2018-10-06 PROCEDURE — 770001 HCHG ROOM/CARE - MED/SURG/GYN PRIV*

## 2018-10-06 PROCEDURE — 700102 HCHG RX REV CODE 250 W/ 637 OVERRIDE(OP): Performed by: INTERNAL MEDICINE

## 2018-10-06 PROCEDURE — 700102 HCHG RX REV CODE 250 W/ 637 OVERRIDE(OP): Performed by: NURSE PRACTITIONER

## 2018-10-06 PROCEDURE — 700111 HCHG RX REV CODE 636 W/ 250 OVERRIDE (IP): Performed by: SURGERY

## 2018-10-06 PROCEDURE — 99232 SBSQ HOSP IP/OBS MODERATE 35: CPT | Performed by: INTERNAL MEDICINE

## 2018-10-06 PROCEDURE — A9270 NON-COVERED ITEM OR SERVICE: HCPCS | Performed by: ANESTHESIOLOGY

## 2018-10-06 PROCEDURE — 99233 SBSQ HOSP IP/OBS HIGH 50: CPT | Performed by: HOSPITALIST

## 2018-10-06 PROCEDURE — 71045 X-RAY EXAM CHEST 1 VIEW: CPT

## 2018-10-06 PROCEDURE — 700102 HCHG RX REV CODE 250 W/ 637 OVERRIDE(OP): Performed by: ANESTHESIOLOGY

## 2018-10-06 PROCEDURE — 700105 HCHG RX REV CODE 258: Performed by: SURGERY

## 2018-10-06 PROCEDURE — A9270 NON-COVERED ITEM OR SERVICE: HCPCS | Performed by: NURSE PRACTITIONER

## 2018-10-06 RX ADMIN — CELECOXIB 200 MG: 200 CAPSULE ORAL at 07:58

## 2018-10-06 RX ADMIN — OXYCODONE HYDROCHLORIDE 10 MG: 10 TABLET ORAL at 21:19

## 2018-10-06 RX ADMIN — OXYCODONE HYDROCHLORIDE 5 MG: 5 TABLET ORAL at 17:01

## 2018-10-06 RX ADMIN — OXYCODONE HYDROCHLORIDE 10 MG: 10 TABLET ORAL at 03:21

## 2018-10-06 RX ADMIN — NICOTINE 14 MG: 14 PATCH, EXTENDED RELEASE TRANSDERMAL at 06:09

## 2018-10-06 RX ADMIN — MEROPENEM 500 MG: 500 INJECTION, POWDER, FOR SOLUTION INTRAVENOUS at 06:09

## 2018-10-06 RX ADMIN — MEROPENEM 500 MG: 500 INJECTION, POWDER, FOR SOLUTION INTRAVENOUS at 00:11

## 2018-10-06 RX ADMIN — FAMOTIDINE 20 MG: 20 TABLET ORAL at 21:19

## 2018-10-06 RX ADMIN — MEROPENEM 500 MG: 500 INJECTION, POWDER, FOR SOLUTION INTRAVENOUS at 12:14

## 2018-10-06 RX ADMIN — STANDARDIZED SENNA CONCENTRATE AND DOCUSATE SODIUM 2 TABLET: 8.6; 5 TABLET ORAL at 17:01

## 2018-10-06 RX ADMIN — CELECOXIB 200 MG: 200 CAPSULE ORAL at 17:01

## 2018-10-06 RX ADMIN — OXYCODONE HYDROCHLORIDE 10 MG: 10 TABLET ORAL at 12:14

## 2018-10-06 RX ADMIN — OXYCODONE HYDROCHLORIDE 10 MG: 10 TABLET ORAL at 06:14

## 2018-10-06 RX ADMIN — MEROPENEM 500 MG: 500 INJECTION, POWDER, FOR SOLUTION INTRAVENOUS at 17:01

## 2018-10-06 RX ADMIN — STANDARDIZED SENNA CONCENTRATE AND DOCUSATE SODIUM 2 TABLET: 8.6; 5 TABLET ORAL at 06:09

## 2018-10-06 ASSESSMENT — ENCOUNTER SYMPTOMS
MYALGIAS: 0
NAUSEA: 0
SHORTNESS OF BREATH: 0
CHILLS: 0
HEMOPTYSIS: 1
VOMITING: 0
WEAKNESS: 1
HEADACHES: 0
BACK PAIN: 1
CONSTIPATION: 0
DIARRHEA: 0
COUGH: 1
ABDOMINAL PAIN: 0
FEVER: 0

## 2018-10-06 ASSESSMENT — PAIN SCALES - GENERAL
PAINLEVEL_OUTOF10: 7
PAINLEVEL_OUTOF10: 5
PAINLEVEL_OUTOF10: 3
PAINLEVEL_OUTOF10: ASSUMED PAIN PRESENT
PAINLEVEL_OUTOF10: 7
PAINLEVEL_OUTOF10: 6

## 2018-10-06 NOTE — PROGRESS NOTES
Progress Note:  10/6/2018, 8:05 AM    S: No acute events.  CXR slightly small apicolateral PTX. Stable O2 on 2L NC.  AFebrile    O:  Blood pressure 112/75, pulse 65, temperature 36.9 °C (98.5 °F), resp. rate 18, height 1.829 m (6'), weight 62.2 kg (137 lb 2 oz), SpO2 92 %.    NAD, awake, alert  Breathing nonlabored  L chest tube to water seal, moderate expiratory air leak, unchanged      A:   Active Hospital Problems    Diagnosis   • Empyema lung (HCC) [J86.9]     Priority: High     9/19 Zosyn initiated  9/24 Zosyn altered cefepime initiated for Enterobacter cloacae and Streptococcus viridans  9/27 Cefepime day 4  9/28 Enterobacter cloacae now resistant to cefepime therapy altered to meropenem.  9/28 Meropenem day 1     • Acute respiratory failure with hypoxia (HCC) [J96.01]     Priority: Medium   • Gastroesophageal reflux disease without esophagitis [K21.9]     Priority: Low   • Panlobular emphysema (HCC) [J43.1]     Priority: Low   • Protein malnutrition (HCC) [E46]     Priority: Low   • Tobacco abuse [Z72.0]     Priority: Low   • Constipation [K59.00]   • Acute bilateral low back pain [M54.5]   • HCAP (healthcare-associated pneumonia) [J18.9]         P:   -Continue chest tube to water seal through weekend  -continue aggressive pulm hygiene  -continue abx  -Will continue to follow daily.  Hopeful that he may be able to go home with chest tube in place early next week    Pb Gaviria M.D.  [facility]  [phone]

## 2018-10-06 NOTE — PROGRESS NOTES
Infectious Disease Progress Note    Author: SHERRY Crawford Date & Time of service: 10/6/2018  9:39 AM    Chief Complaint:  Follow-up of recurrent empyema    Interval History:   afebrile, white count continues to down trend, 8.6 today.  Patient says that he is constipated with abdominal pain.  No issues with breathing.  Patient reports that primary team is aware and seeks no further help from me  10/1/2018 no fevers.  Patient denies any new issues.  WBC is down to 7.7  10/2/2018 no fevers.  Denies any new issues.  10/3/2018-overall feels better.  No new issues overnight.  No fevers.  Still has 2 chest tubes  10/4- AF, no WBC, no issue with abx, tenderness at L CT site.  10/5-AF, no WBC, no pain to left chest-just took pain pill, tolerating ABX, still has 1 CT in place.  10/6-AF, no WBC, no pain at this time-can get up to 8/10, refusing midline-states as soon as the surgeon clears him he is going to leave regardless of recommendations for IV antibiotics.  Labs Reviewed, Medications Reviewed, Radiology Reviewed and Wound Reviewed.    Review of Systems:  Review of Systems   Constitutional: Negative for chills, fever and malaise/fatigue.   HENT: Negative for hearing loss.    Respiratory: Positive for cough. Negative for shortness of breath.    Cardiovascular: Negative for chest pain and leg swelling.   Gastrointestinal: Negative for abdominal pain, constipation, diarrhea, nausea and vomiting.   Genitourinary: Negative for dysuria.   Musculoskeletal: Negative for joint pain and myalgias.   Skin: Negative for rash.   Neurological: Negative for headaches.       Hemodynamics:  Temp (24hrs), Av.6 °C (97.8 °F), Min:36.4 °C (97.5 °F), Max:36.9 °C (98.5 °F)  Temperature: 36.4 °C (97.5 °F)  Pulse  Av.2  Min: 43  Max: 112   Blood Pressure: 121/83       Physical Exam:  Physical Exam   Constitutional: He is oriented to person, place, and time. He appears well-developed. No distress.   Appears older than  stated age.   HENT:   Head: Normocephalic and atraumatic.   Eyes: Pupils are equal, round, and reactive to light. Conjunctivae and EOM are normal.   Neck: Normal range of motion. Neck supple. No tracheal deviation present.   Cardiovascular: Normal rate, regular rhythm, normal heart sounds and intact distal pulses.    No murmur heard.  Pulmonary/Chest: Effort normal. No respiratory distress. He has no rales.   Left Chest tubes x1  Decreased BS to left lower lobe.  Staples to upper left chest, no erythema, no drainage, nontender to palpation.   Abdominal: Soft. Bowel sounds are normal. He exhibits no distension. There is no tenderness.   Musculoskeletal: Normal range of motion. He exhibits no edema or deformity.   Neurological: He is alert and oriented to person, place, and time.   Skin: Skin is warm and dry. No rash noted. He is not diaphoretic.   Psychiatric:   Irritable and rude   Nursing note and vitals reviewed.      Meds:    Current Facility-Administered Medications:   •  meropenem (MERREM) IV  •  oxyCODONE immediate-release **OR** oxyCODONE immediate-release  •  HYDROmorphone  •  Pharmacy Consult Request  •  scopolamine  •  celecoxib  •  famotidine  •  cyclobenzaprine  •  senna-docusate **AND** polyethylene glycol/lytes **AND** magnesium hydroxide **AND** bisacodyl  •  Respiratory Care per Protocol  •  acetaminophen  •  ondansetron  •  ondansetron  •  nicotine **AND** Nicotine Replacement Patient Education Materials **AND** nicotine polacrilex    Labs:  No results for input(s): WBC, RBC, HEMOGLOBIN, HEMATOCRIT, MCV, MCH, RDW, PLATELETCT, MPV, NEUTSPOLYS, LYMPHOCYTES, MONOCYTES, EOSINOPHILS, BASOPHILS, RBCMORPHOLO in the last 72 hours.  No results for input(s): SODIUM, POTASSIUM, CHLORIDE, CO2, GLUCOSE, BUN, CPKTOTAL in the last 72 hours.  No results for input(s): ALBUMIN, TBILIRUBIN, ALKPHOSPHAT, TOTPROTEIN, ALTSGPT, ASTSGOT, CREATININE in the last 72 hours.    Imaging:    10/6/2018   DX-CHEST-PORTABLE    Impression:       1. Pulmonary infiltrates, stable.  2.  Residual left pneumothorax, increased in size since prior study. Thoracostomy tubes remain in place.      Micro:  Results     ** No results found for the last 168 hours. **          Assessment:  Donna Ceballos is a 58 y.o. male with a history of COPD and tobacco use, recent hospitalization for hydropneumothorax and left empyema status post left thoracotomy, decortication, debridement, and rib resection in July 2018 (cultures grew group F streptococcus, treated with IV Unasyn for 2 weeks followed by p.o. Augmentin for 2 weeks).  Patient is now admitted 9/18/2018 with persistent left hydropneumothorax, empyema, and concern for an alveolar pleural fistula.  Pleural fluid cultures from 9/19 grew Enterobacter cloacae and viridans Strep.  He underwent repeat thoracotomy, decortication, and intercostal muscle flap transposition to the parenchymal airway fistula on 9/25.  OR cultures again grew Enterobacter, but this time resistant to cephalosporins and Zosyn.       Pertinent Diagnoses:  1. Sepsis, improved  2. L empyema, status post surgery as above  3. L hydropneumothorax, stable  4. L alveolar pleural fistula, status post surgery as above  5. COPD  6. Tobacco dependence    Active Hospital Problems    Diagnosis   • Empyema lung (HCC) [J86.9]   • Acute respiratory failure with hypoxia (HCC) [J96.01]   • Tobacco abuse [Z72.0]       Plan:  Sepsis, improved  Afebrile  No leukocytosis on last check  Abx as below    L empyema   S/p repeat thoracotomy, decortication, and intercostal muscle flap transposition to the parenchymal airway fistula on 9/25 with Dr. Gaviria  OR cx +Enterobacter cloacae (cephalosporin resistant) and Strep Viridans  Continue IV Meropenem 500 mg every 6 hours  Anticipate at least 4 weeks of antibiotics  Estimated end date 10/23/18  Midline ordered-patient refusing stating that he is leaving as soon as surgery clears him  If he leaves AMA, he may  be sent out on p.o. Bactrim DS and Augmentin through 10/23/18    L hydropneumothorax, stable  Chest tube x1 in place, managed by CT surgery    COPD, tobacco dependence  Cessation encouraged      Due to lack of insurance will likely finish treatment inpatient.

## 2018-10-06 NOTE — PROGRESS NOTES
Order received for midline placement. Spoke with Agatha RN. Per Agatha pt previously refusing PICC because he lives in [place] and didn't think there were resources in [place] to warrant a PICC. Agatha unsure if pt agreeable to midline placement. She will speak with pt and notify VAT at [phone] if pt agreeable to midline placement.

## 2018-10-06 NOTE — PROGRESS NOTES
Received return call from Agatha MARSHALL. Pt refusing midline placement. Please call [phone] if pt agreeable to placement.

## 2018-10-06 NOTE — CARE PLAN
Problem: Safety  Goal: Will remain free from injury  Updated about POC. Reinforce call light use. Pt acknowledged understanding    Problem: Infection  Goal: Will remain free from infection  Am labs ordered. Iv abx continued    Problem: Respiratory:  Goal: Respiratory status will improve  Encouraged IS use. Pulls about 1500ml

## 2018-10-06 NOTE — CARE PLAN
Report received, poc discussed, assumed care of pt.   Call light in reach, hourly rounding in place.   Pt gets up self.   Reg diet.  + void. LBM 10/4.   Oxy for pain.  L CT to water seal.  No further needs.

## 2018-10-06 NOTE — PROGRESS NOTES
Assumed care at 1845. Pt resting in bed. A&ox 4  Ambulating independently  Left CT in place to water seal. Air leak present  Incision CDI SANTIAGO  O2 @ 2LNC  Pain controlled  Tolerating diet. Voiding in urinal  Last bm on 10/4  Iv abx continued  Call light within reach. Hourly rounding in place

## 2018-10-06 NOTE — CARE PLAN
Problem: Pain Management  Goal: Pain level will decrease to patient’s comfort goal  Outcome: PROGRESSING AS EXPECTED  Pt has pain in L chest, receiving PRN pain meds per MAR, repositioned for comfort. Non-pharmacologic options offered.    Problem: Bowel/Gastric:  Goal: Normal bowel function is maintained or improved  Outcome: PROGRESSING AS EXPECTED  Pt's last BM was 10/4, hypoactive bowel sounds. Bowel protocol in place. Pt took scheduled senna and is declining PRN bowel meds at this time. Educated pt.

## 2018-10-06 NOTE — PROGRESS NOTES
[facility] Hospitalist Progress Note    Date of Service: 10/6/2018        Chief Complaint  58 y.o. male admitted 9/18/2018 with COPD and tobacco, recent hospitalization for hydropneumothorax and empyema s/p left thoracotomy decortication, lung debridement and rib resection. Resp cx grew strep group B and was discharged on course of augmentin. He presented from Dr. Gaviria's office with cough and SOB. CT showed persistent left hydropneumothorax. IR consulted for pigtail drain placement to pleurvac suction. He had bronchoscopy that showed white purulent secretions that was irrigated and suctioned. 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     Interval Problem Update  10/2: Patient seen and evaluated this a.m.  Denies any acute complaint. Hs a draining chest tube. He complained about abdominal imaging but had been complaining of abdominal pain since admission which was why KUB was ordered by the previous day hospitalist.  Chest tube to suction at -40.   HE was pleasant with me this morning. Says he is uset because he was not told why it was ordered. I reassured him that we strive to explain every procedure but sometimes due to time limitations we may not be able to reach him on time before ordering a test and that he should ask questions of his nurses and physicians especially for clarifications of care plan.     KUB showed constipation. He remains on Meropenem on ID recommendation.    10/3: doing well. Denies any complaint today. No pleuritic pain or sob. Both chest tubes placed to water seal this a.m. By surgery. Plan to monitor closely for next 24 to 48 hours to see if he tolerates it.    10/4: denies any complaint today. Says he feels same as yesterday. One of the chest tube taken off this morning by surgery. Chest x-ray with persistent air leaks which is expected. No respiratory distress, fever or  chills.    10/5: stable. Denies any acute needs. Worried about prolonged hospitalization and mounting bills. He does not want to stay in the hospital much longer once cleared by CTX surgery. He is on Meropenem. Insurance application pending. Denies pleuritic chest pain, fever or chills.    10/6: Patient seen and examined at bedside this a.m. Denies any new complaint. Declined midline because he does not trust the facility in [facility] and will not go there to complete infusion treatment. He also cannot make it here from his home daily as it takes an hour. He wants oral antibiotics. Informed patient I will speak with ID Monday to review all options once he is cleared for discharge by CTX surgery. Patient has residual PTX that has increased in size; thoracostomy tube remains in place however and surgery following closely. Chest tube to water seal still for the weekend.       Consultants/Specialty  Surgery   IR  Pulm  ID    Disposition  Chest tube management, likely needs PICC for meropenem  Lives in [place]        Review of Systems   HENT: Negative for congestion.    Respiratory: Positive for cough and hemoptysis. Negative for shortness of breath.    Cardiovascular: Positive for chest pain.   Gastrointestinal: Negative for abdominal pain, constipation and nausea.   Musculoskeletal: Positive for back pain. Negative for myalgias.   Skin: Negative for itching.   Neurological: Positive for weakness. Negative for headaches.      Physical Exam  Laboratory/Imaging   Hemodynamics  Temp (24hrs), Av.6 °C (97.8 °F), Min:36.4 °C (97.5 °F), Max:36.9 °C (98.5 °F)   Temperature: 36.4 °C (97.5 °F)  Pulse  Av.2  Min: 43  Max: 112    Blood Pressure: 121/83      Respiratory      Respiration: 17, Pulse Oximetry: 93 %        RUL Breath Sounds: Clear, RML Breath Sounds: Diminished, RLL Breath Sounds: Diminished, JOSSE Breath Sounds: Diminished, LLL Breath Sounds: Diminished    Fluids    Intake/Output Summary (Last 24 hours) at 10/06/18  0927  Last data filed at 10/06/18 0719   Gross per 24 hour   Intake             1040 ml   Output             3415 ml   Net            -2375 ml       Nutrition  Orders Placed This Encounter   Procedures   • Diet Order Regular     Standing Status:   Standing     Number of Occurrences:   1     Order Specific Question:   Diet:     Answer:   Regular [1]     Physical Exam   Constitutional: He is oriented to person, place, and time. He appears well-developed and well-nourished. No distress.   HENT:   Head: Normocephalic and atraumatic.   Eyes: Pupils are equal, round, and reactive to light. Conjunctivae and EOM are normal.   Neck: Normal range of motion. Neck supple. No JVD present. No tracheal deviation present.   Cardiovascular: Normal rate, regular rhythm and normal heart sounds.  Exam reveals no friction rub.    No murmur heard.  Pulmonary/Chest: Effort normal. No stridor. No respiratory distress. He has no wheezes. He has rales. He exhibits no tenderness.   Patient now has 1 chest tubes in place, the angle one absent after removal by surgery.  Diminished breath sounds on ipsilateral side improved. No evidence of acute respiratory distress.    Abdominal: Soft. Bowel sounds are normal. He exhibits no distension and no mass. There is no tenderness. There is no rebound and no guarding.   Musculoskeletal: Normal range of motion. He exhibits no edema.   Neurological: He is alert and oriented to person, place, and time. He has normal reflexes.   Skin: Skin is warm and dry. No rash noted. He is not diaphoretic. No erythema.   Psychiatric: He has a normal mood and affect. His behavior is normal. Thought content normal.                                    Assessment/Plan     Empyema lung (HCC)- (present on admission)   Assessment & Plan    9/19 Zosyn initiated  9/24 Zosyn stopped, cefepime initiated for Enterobacter cloacae and Streptococcus viridans  9/28 Enterobacter cloacae now resistant to cefepime therapy, changed to  meropenem.  ID consulted, will continue on meropenem for at least 4 weeks total per ID  Abx recommendations per ID; see note.   CT management per surgical team. Angle chest tube removed, still has one in place. Persistent but slightly enlarged PTX. No respiratory distress. Chest tube to water seal oer surgery for the weekend. Not ready for discharge per surgery.   Consider pulmonary consultation for outpatient management considering panlobular emphysema as well.    Home oxygen evaluation prior to discharge.  Counseled on tobacco use and need to quit previously. He said he is quitting upon discharge.   Due to lack of insurance, he may have to stay at the hospital for the duration of his IV antibiotics due for completion 10/23 but he does not want to wait that long due to mounting bills and need to work. He is lso refusing midline for outpatient IV antibiotics infusion for reasons stated in subjective. Will discuss with ID Monday for potential alternatives however limited.         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 Stable despite report of enlarged PTHx. CTX surgery following and assisting with care. Aware of PTX report on CXR this a.m.  Chest tubes to water seal.  Continue abx through 10/23.   RT protocol  See above        Constipation   Assessment & Plan    Moderate stools on imaging. Continue Senokot, Miralax etc.         Acute bilateral low back pain- (present on admission)   Assessment & Plan    Likely musculoskeletal, significantly improved.   Continue Flexeril as needed  No anesthesia or focal weakness  Improved          HCAP (healthcare-associated pneumonia)- (present on admission)   Assessment & Plan    Continue abx per ID, plan for at least 4 weeks total.  Continue supplemental oxygen and RT protocol  Check RFP and CBC Q72 hours.        Gastroesophageal reflux disease without  esophagitis- (present on admission)   Assessment & Plan    pepcid prn        Protein malnutrition (HCC)- (present on admission)   Assessment & Plan    Optimize nutrition         Panlobular emphysema (HCC)- (present on admission)   Assessment & Plan    Continue supplemental oxygen and RT protocol. Suspect variability in pulse rate likely related to pulmonary status and bradycardia likely from hyper vagal tone while asleep.   DuoNeb as needed  Consider outpatient pulmonary rehab and consultation at discharge.  Home oxygen evaluation prior to discharge  Barrier to care mainly from patient refusing some reasonable treatment options.         Tobacco abuse- (present on admission)   Assessment & Plan    Counseled and educated. Said he plans to quit upon discharge. He will continue with nicotine patch for now.             Quality-Core Measures   Reviewed items::  EKG reviewed, Labs reviewed, Medications reviewed and Radiology images reviewed  Motley catheter::  No Motley  DVT prophylaxis - mechanical:  SCDs

## 2018-10-07 ENCOUNTER — APPOINTMENT (OUTPATIENT)
Dept: RADIOLOGY | Facility: MEDICAL CENTER | Age: 58
DRG: 853 | End: 2018-10-07
Attending: NURSE PRACTITIONER

## 2018-10-07 PROCEDURE — 71045 X-RAY EXAM CHEST 1 VIEW: CPT

## 2018-10-07 PROCEDURE — 700105 HCHG RX REV CODE 258: Performed by: SURGERY

## 2018-10-07 PROCEDURE — 770001 HCHG ROOM/CARE - MED/SURG/GYN PRIV*

## 2018-10-07 PROCEDURE — A9270 NON-COVERED ITEM OR SERVICE: HCPCS | Performed by: ANESTHESIOLOGY

## 2018-10-07 PROCEDURE — 700102 HCHG RX REV CODE 250 W/ 637 OVERRIDE(OP): Performed by: NURSE PRACTITIONER

## 2018-10-07 PROCEDURE — 700102 HCHG RX REV CODE 250 W/ 637 OVERRIDE(OP): Performed by: INTERNAL MEDICINE

## 2018-10-07 PROCEDURE — 99233 SBSQ HOSP IP/OBS HIGH 50: CPT | Performed by: HOSPITALIST

## 2018-10-07 PROCEDURE — 700102 HCHG RX REV CODE 250 W/ 637 OVERRIDE(OP): Performed by: ANESTHESIOLOGY

## 2018-10-07 PROCEDURE — A9270 NON-COVERED ITEM OR SERVICE: HCPCS | Performed by: INTERNAL MEDICINE

## 2018-10-07 PROCEDURE — 99232 SBSQ HOSP IP/OBS MODERATE 35: CPT | Performed by: INTERNAL MEDICINE

## 2018-10-07 PROCEDURE — A9270 NON-COVERED ITEM OR SERVICE: HCPCS | Performed by: NURSE PRACTITIONER

## 2018-10-07 PROCEDURE — 700111 HCHG RX REV CODE 636 W/ 250 OVERRIDE (IP): Performed by: SURGERY

## 2018-10-07 RX ORDER — SODIUM CHLORIDE 9 MG/ML
INJECTION, SOLUTION INTRAVENOUS
Status: ACTIVE
Start: 2018-10-07 | End: 2018-10-07

## 2018-10-07 RX ADMIN — MEROPENEM 500 MG: 500 INJECTION, POWDER, FOR SOLUTION INTRAVENOUS at 06:08

## 2018-10-07 RX ADMIN — MEROPENEM 500 MG: 500 INJECTION, POWDER, FOR SOLUTION INTRAVENOUS at 00:10

## 2018-10-07 RX ADMIN — MEROPENEM 500 MG: 500 INJECTION, POWDER, FOR SOLUTION INTRAVENOUS at 11:33

## 2018-10-07 RX ADMIN — OXYCODONE HYDROCHLORIDE 10 MG: 10 TABLET ORAL at 16:51

## 2018-10-07 RX ADMIN — BISACODYL 10 MG: 10 SUPPOSITORY RECTAL at 18:01

## 2018-10-07 RX ADMIN — MEROPENEM 500 MG: 500 INJECTION, POWDER, FOR SOLUTION INTRAVENOUS at 16:51

## 2018-10-07 RX ADMIN — OXYCODONE HYDROCHLORIDE 10 MG: 10 TABLET ORAL at 20:03

## 2018-10-07 RX ADMIN — OXYCODONE HYDROCHLORIDE 10 MG: 10 TABLET ORAL at 00:13

## 2018-10-07 RX ADMIN — OXYCODONE HYDROCHLORIDE 10 MG: 10 TABLET ORAL at 11:33

## 2018-10-07 RX ADMIN — OXYCODONE HYDROCHLORIDE 10 MG: 10 TABLET ORAL at 06:07

## 2018-10-07 RX ADMIN — MEROPENEM 500 MG: 500 INJECTION, POWDER, FOR SOLUTION INTRAVENOUS at 23:41

## 2018-10-07 RX ADMIN — CELECOXIB 200 MG: 200 CAPSULE ORAL at 16:51

## 2018-10-07 RX ADMIN — NICOTINE 14 MG: 14 PATCH, EXTENDED RELEASE TRANSDERMAL at 06:07

## 2018-10-07 RX ADMIN — STANDARDIZED SENNA CONCENTRATE AND DOCUSATE SODIUM 2 TABLET: 8.6; 5 TABLET ORAL at 06:07

## 2018-10-07 RX ADMIN — STANDARDIZED SENNA CONCENTRATE AND DOCUSATE SODIUM 2 TABLET: 8.6; 5 TABLET ORAL at 16:51

## 2018-10-07 RX ADMIN — CELECOXIB 200 MG: 200 CAPSULE ORAL at 07:31

## 2018-10-07 ASSESSMENT — ENCOUNTER SYMPTOMS
BACK PAIN: 1
ABDOMINAL PAIN: 0
COUGH: 1
CONSTIPATION: 0
DIARRHEA: 0
MYALGIAS: 0
SENSORY CHANGE: 0
FEVER: 0
PALPITATIONS: 0
HEADACHES: 0
WEAKNESS: 0
NAUSEA: 0
HEMOPTYSIS: 1
CHILLS: 0
WEAKNESS: 1
SHORTNESS OF BREATH: 0
VOMITING: 0

## 2018-10-07 ASSESSMENT — PAIN SCALES - GENERAL
PAINLEVEL_OUTOF10: 7
PAINLEVEL_OUTOF10: 2
PAINLEVEL_OUTOF10: 5

## 2018-10-07 NOTE — PROGRESS NOTES
Assumed care at 1845. Pt resting in bed. A&ox 4  Ambulating independently  Left CT in place to water seal. Air leak present. Scant drainage. Xray in am  Incision CDI SANTIAGO  O2 @ 2LNC  Pain controlled  Tolerating diet. Voiding in urinal  Last bm on 10/4  Iv abx continued  C/o heartburn. pepcid given  Call light within reach. Hourly rounding in place

## 2018-10-07 NOTE — PROGRESS NOTES
Patient still refusing PICC line placement, MD aware.  Will check back tomorrow to see if patient is cooperative.

## 2018-10-07 NOTE — PROGRESS NOTES
Infectious Disease Progress Note    Author: SHERRY Crawford Date & Time of service: 10/7/2018  11:22 AM    Chief Complaint:  Follow-up of recurrent empyema    Interval History:   afebrile, white count continues to down trend, 8.6 today.  Patient says that he is constipated with abdominal pain.  No issues with breathing.  Patient reports that primary team is aware and seeks no further help from me  10/1/2018 no fevers.  Patient denies any new issues.  WBC is down to 7.7  10/2/2018 no fevers.  Denies any new issues.  10/3/2018-overall feels better.  No new issues overnight.  No fevers.  Still has 2 chest tubes  10/4- AF, no WBC, no issue with abx, tenderness at L CT site.  10/5-AF, no WBC, no pain to left chest-just took pain pill, tolerating ABX, still has 1 CT in place.  10/6-AF, no WBC, no pain at this time-can get up to 8/10, refusing midline-states as soon as the surgeon clears him he is going to leave regardless of recommendations for IV antibiotics.  10/7-AF, no WBC, denies any pain, tolerating antibiotics, no complaints.  Labs Reviewed, Medications Reviewed, Radiology Reviewed and Wound Reviewed.    Review of Systems:  Review of Systems   Constitutional: Negative for chills and fever.   HENT: Negative for congestion.    Respiratory: Negative for shortness of breath.    Cardiovascular: Negative for chest pain, palpitations and leg swelling.   Gastrointestinal: Negative for abdominal pain, diarrhea, nausea and vomiting.   Genitourinary: Negative for dysuria and hematuria.   Musculoskeletal: Negative for joint pain and myalgias.   Skin: Negative for itching.   Neurological: Negative for sensory change, weakness and headaches.       Hemodynamics:  Temp (24hrs), Av.6 °C (97.9 °F), Min:36.2 °C (97.2 °F), Max:36.9 °C (98.5 °F)  Temperature: 36.2 °C (97.2 °F)  Pulse  Av  Min: 43  Max: 112   Blood Pressure: 127/82       Physical Exam:  Physical Exam   Constitutional: He is oriented to person,  place, and time. He appears well-developed and well-nourished. No distress.   Appears older than stated age.   HENT:   Head: Normocephalic and atraumatic.   Eyes: Pupils are equal, round, and reactive to light. Conjunctivae and EOM are normal. No scleral icterus.   Neck: Normal range of motion. Neck supple. No JVD present.   Cardiovascular: Normal rate, regular rhythm, normal heart sounds and intact distal pulses.  Exam reveals no gallop and no friction rub.    Pulmonary/Chest: Effort normal. No respiratory distress. He has no rales.   Left Chest tubes x1  Decreased BS to left lower lobe-slightly improved.  Staples to upper left chest, no erythema, no drainage, nontender to palpation.   Abdominal: Soft. Bowel sounds are normal. He exhibits no distension. There is no rebound and no guarding.   Musculoskeletal: Normal range of motion. He exhibits no edema.   Neurological: He is alert and oriented to person, place, and time.   Skin: Skin is warm and dry. No erythema.   Psychiatric:   Irritable   Nursing note and vitals reviewed.      Meds:    Current Facility-Administered Medications:   •  NS  •  meropenem (MERREM) IV  •  oxyCODONE immediate-release **OR** oxyCODONE immediate-release  •  HYDROmorphone  •  Pharmacy Consult Request  •  scopolamine  •  celecoxib  •  famotidine  •  cyclobenzaprine  •  senna-docusate **AND** polyethylene glycol/lytes **AND** magnesium hydroxide **AND** bisacodyl  •  Respiratory Care per Protocol  •  acetaminophen  •  ondansetron  •  ondansetron  •  nicotine **AND** Nicotine Replacement Patient Education Materials **AND** nicotine polacrilex    Labs:  No results for input(s): WBC, RBC, HEMOGLOBIN, HEMATOCRIT, MCV, MCH, RDW, PLATELETCT, MPV, NEUTSPOLYS, LYMPHOCYTES, MONOCYTES, EOSINOPHILS, BASOPHILS, RBCMORPHOLO in the last 72 hours.  No results for input(s): SODIUM, POTASSIUM, CHLORIDE, CO2, GLUCOSE, BUN, CPKTOTAL in the last 72 hours.  No results for input(s): ALBUMIN, TBILIRUBIN,  ALKPHOSPHAT, TOTPROTEIN, ALTSGPT, ASTSGOT, CREATININE in the last 72 hours.    Imaging:  10/7/2018   DX-CHEST-PORTABLE   FINDINGS:  Single portable view of the chest demonstrates a normal cardiac silhouette and mediastinal contours.    Left pneumothorax is unchanged with left chest tube in place. Left and right upper lobe opacification is unchanged. There is bullous emphysema in the right upper lobe.      Micro:  Results     ** No results found for the last 168 hours. **          Assessment:  Donna Ceballos is a 58 y.o. male with a history of COPD and tobacco use, recent hospitalization for hydropneumothorax and left empyema status post left thoracotomy, decortication, debridement, and rib resection in July 2018 (cultures grew group F streptococcus, treated with IV Unasyn for 2 weeks followed by p.o. Augmentin for 2 weeks).  Patient is now admitted 9/18/2018 with persistent left hydropneumothorax, empyema, and concern for an alveolar pleural fistula.  Pleural fluid cultures from 9/19 grew Enterobacter cloacae and viridans Strep.  He underwent repeat thoracotomy, decortication, and intercostal muscle flap transposition to the parenchymal airway fistula on 9/25.  OR cultures again grew Enterobacter, but this time resistant to cephalosporins and Zosyn.       Pertinent Diagnoses:  1. Sepsis, improved  2. L empyema, status post surgery as above  3. L hydropneumothorax, stable  4. L alveolar pleural fistula, status post surgery as above  5. COPD  6. Tobacco dependence    Active Hospital Problems    Diagnosis   • Empyema lung ([facility]) [J86.9]   • Acute respiratory failure with hypoxia ([facility]) [J96.01]   • Tobacco abuse [Z72.0]       Plan:  Sepsis, improved  Afebrile  No leukocytosis on last check  Abx as below    L empyema   S/p repeat thoracotomy, decortication, and intercostal muscle flap transposition to the parenchymal airway fistula on 9/25 with Dr. Gaviria  OR cx +Enterobacter cloacae (cephalosporin resistant) and Strep  Viridans  Continue IV Meropenem   Anticipate at least 4 weeks of antibiotics  Estimated end date 10/23/18  Midline ordered-patient refusing stating that he is leaving as soon as surgery clears him  For insight into disease process  If he leaves AMA, he may be sent out on p.o. Bactrim DS and Augmentin through 10/23/18    L hydropneumothorax, stable  Chest tube x1 in place, managed by CT surgery    COPD, tobacco dependence  Cessation encouraged      Due to lack of insurance will likely finish treatment inpatient.  Patient stating he needs to get back to work as he has bills to pay.

## 2018-10-07 NOTE — PROGRESS NOTES
Progress Note:  10/7/2018, 10:02 AM    S: No acute events. No significant changes. Breathing stable, afebrile, no significant sputum    O:  Blood pressure 127/82, pulse 76, temperature 36.2 °C (97.2 °F), resp. rate 18, height 1.829 m (6'), weight 62.2 kg (137 lb 2 oz), SpO2 95 %.      NAD, awake alert  Breathing nonlabored  L chest tube to water seal, expiratory leak stable    CXR: stable apical space    A:   Active Hospital Problems    Diagnosis   • Empyema lung (HCC) [J86.9]     Priority: High     9/19 Zosyn initiated  9/24 Zosyn altered cefepime initiated for Enterobacter cloacae and Streptococcus viridans  9/27 Cefepime day 4  9/28 Enterobacter cloacae now resistant to cefepime therapy altered to meropenem.  9/28 Meropenem day 1     • Acute respiratory failure with hypoxia (HCC) [J96.01]     Priority: Medium   • Gastroesophageal reflux disease without esophagitis [K21.9]     Priority: Low   • Panlobular emphysema (HCC) [J43.1]     Priority: Low   • Protein malnutrition (HCC) [E46]     Priority: Low   • Tobacco abuse [Z72.0]     Priority: Low   • Constipation [K59.00]   • Acute bilateral low back pain [M54.5]   • HCAP (healthcare-associated pneumonia) [J18.9]         P:   -No change in current treatment plan  -Needs IV abx for next few weeks per ID  -Difficult social situation as no insurance and outpatient therapy may be difficult.  -Recommend discussion with social work/DC planning to see if patient would qualify for outpatient IV abx   -Pt understands current treatment plan, goals, and the complex nature of his diagnosis making long term treatment necessary    Pb Gaviria M.D.  [facility]  [phone]

## 2018-10-07 NOTE — CARE PLAN
Problem: Safety  Goal: Will remain free from injury    Intervention: Educate patient and significant other/support system about adaptive mobility strategies and safe transfers  Pt room close to nursing station, call light within reach       Problem: Respiratory:  Goal: Respiratory status will improve  Outcome: PROGRESSING AS EXPECTED   in use r/t CT

## 2018-10-07 NOTE — PROGRESS NOTES
[facility] Hospitalist Progress Note    Date of Service: 10/7/2018        Chief Complaint  58 y.o. male admitted 9/18/2018 with COPD and tobacco, recent hospitalization for hydropneumothorax and empyema s/p left thoracotomy decortication, lung debridement and rib resection. Resp cx grew strep group B and was discharged on course of augmentin. He presented from Dr. Gaviria's office with cough and SOB. CT showed persistent left hydropneumothorax. IR consulted for pigtail drain placement to pleurvac suction. He had bronchoscopy that showed white purulent secretions that was irrigated and suctioned. 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     Interval Problem Update  10/2: Patient seen and evaluated this a.m.  Denies any acute complaint. Hs a draining chest tube. He complained about abdominal imaging but had been complaining of abdominal pain since admission which was why KUB was ordered by the previous day hospitalist.  Chest tube to suction at -40.   HE was pleasant with me this morning. Says he is uset because he was not told why it was ordered. I reassured him that we strive to explain every procedure but sometimes due to time limitations we may not be able to reach him on time before ordering a test and that he should ask questions of his nurses and physicians especially for clarifications of care plan.     KUB showed constipation. He remains on Meropenem on ID recommendation.    10/3: doing well. Denies any complaint today. No pleuritic pain or sob. Both chest tubes placed to water seal this a.m. By surgery. Plan to monitor closely for next 24 to 48 hours to see if he tolerates it.    10/4: denies any complaint today. Says he feels same as yesterday. One of the chest tube taken off this morning by surgery. Chest x-ray with persistent air leaks which is expected. No respiratory distress, fever or  chills.    10/5: stable. Denies any acute needs. Worried about prolonged hospitalization and mounting bills. He does not want to stay in the hospital much longer once cleared by CTX surgery. He is on Meropenem. Insurance application pending. Denies pleuritic chest pain, fever or chills.    10/6: Patient seen and examined at bedside this a.m. Denies any new complaint. Declined midline because he does not trust the facility in [facility] and will not go there to complete infusion treatment. He also cannot make it here from his home daily as it takes an hour. He wants oral antibiotics. Informed patient I will speak with ID Monday to review all options once he is cleared for discharge by CTX surgery. Patient has residual PTX that has increased in size; thoracostomy tube remains in place however and surgery following closely. Chest tube to water seal still for the weekend.     10/7: patient is stable. Denies any new complaints. No fever or chills. Spoke with Dr. Gaviria briefly about patient and reviewed ID and surgery notes from 10/6 and today as well. Patient eager to go home once surgery clears him. He continues to decline midline access because he will it use it for reasons given the previous day.     Consultants/Specialty  Surgery   IR  Pulm  ID    Disposition  Chest tube management, likely needs PICC for meropenem  Lives in [place]        Review of Systems   HENT: Negative for congestion.    Respiratory: Positive for cough and hemoptysis. Negative for shortness of breath.    Cardiovascular: Positive for chest pain.   Gastrointestinal: Negative for abdominal pain, constipation and nausea.   Musculoskeletal: Positive for back pain. Negative for myalgias.   Skin: Negative for itching.   Neurological: Positive for weakness. Negative for headaches.      Physical Exam  Laboratory/Imaging   Hemodynamics  Temp (24hrs), Av.6 °C (97.9 °F), Min:36.2 °C (97.2 °F), Max:36.9 °C (98.5 °F)   Temperature: 36.2 °C (97.2 °F)  Pulse   Av  Min: 43  Max: 112    Blood Pressure: 127/82      Respiratory      Respiration: 18, Pulse Oximetry: 95 %        RUL Breath Sounds: Clear, RML Breath Sounds: Diminished, RLL Breath Sounds: Diminished, JOSSE Breath Sounds: Diminished, LLL Breath Sounds: Diminished    Fluids    Intake/Output Summary (Last 24 hours) at 10/07/18 1028  Last data filed at 10/07/18 1006   Gross per 24 hour   Intake             1800 ml   Output             3560 ml   Net            -1760 ml       Nutrition  Orders Placed This Encounter   Procedures   • Diet Order Regular     Standing Status:   Standing     Number of Occurrences:   1     Order Specific Question:   Diet:     Answer:   Regular [1]     Physical Exam   Constitutional: He is oriented to person, place, and time. He appears well-developed and well-nourished. No distress.   HENT:   Head: Normocephalic and atraumatic.   Eyes: Pupils are equal, round, and reactive to light. Conjunctivae and EOM are normal.   Neck: Normal range of motion. Neck supple. No JVD present. No tracheal deviation present.   Cardiovascular: Normal rate, regular rhythm and normal heart sounds.  Exam reveals no friction rub.    No murmur heard.  Pulmonary/Chest: Effort normal. No stridor. No respiratory distress. He has no wheezes. He has rales. He exhibits no tenderness.   Left chest tube to water seal. Leak is stable.   Abdominal: Soft. Bowel sounds are normal. He exhibits no distension and no mass. There is no tenderness. There is no rebound and no guarding.   Musculoskeletal: Normal range of motion. He exhibits no edema.   Neurological: He is alert and oriented to person, place, and time. He has normal reflexes.   Skin: Skin is warm and dry. No rash noted. He is not diaphoretic. No erythema.   Psychiatric: He has a normal mood and affect. His behavior is normal. Thought content normal.                                    Assessment/Plan     Empyema lung (HCC)- (present on admission)   Assessment & Plan     9/19 Zosyn initiated  9/24 Zosyn stopped, cefepime initiated for Enterobacter cloacae and Streptococcus viridans  9/28 Enterobacter cloacae now resistant to cefepime therapy, changed to meropenem.  ID consulted, will continue on meropenem for at least 4 weeks total per ID  Abx recommendations per ID; see note.   CT management per surgical team. Left  Chest tube in place, to water seal. Stable leak. Persistent but slightly enlarged PTX. No respiratory distress.  Not ready for discharge per surgery. Understands the complex nature of his condition.   Consider pulmonary consultation for outpatient management considering panlobular emphysema as well.    Home oxygen evaluation prior to discharge.  Counseled on tobacco use and need to quit previously. He said he is quitting upon discharge.   Due to lack of insurance, he may have to stay at the hospital for the duration of his IV antibiotics due for completion 10/23 but he does not want to wait that long due to mounting bills and need to work. Patient likely to go AMA once cleared by surgery. Will recommend prescribing Augmentin and Bactrim for patient to last through 10/23 if he decides to leave AMA as it is better than nothing.          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 Stable despite report of enlarged PTHx. CTX surgery following and assisting with care.   Chest tube to water seal.  Continue abx through 10/23.   RT protocol  See above        Constipation   Assessment & Plan    Moderate stools on imaging. Continue Senokot, Miralax etc.         Acute bilateral low back pain- (present on admission)   Assessment & Plan    Resolved. Continue Flexeril as needed  No anesthesia or focal weakness            HCAP (healthcare-associated pneumonia)- (present on admission)   Assessment & Plan    Continue abx per ID, plan for at least 4 weeks total.  Continue  supplemental oxygen and RT protocol  Check RFP and CBC Q72 hours.  If he must leave AMA afyter surgery clearance, discharge on Augmentin and Bactrim DS through 10/23 per ID recommendation.         Gastroesophageal reflux disease without esophagitis- (present on admission)   Assessment & Plan    pepcid prn        Protein malnutrition (HCC)- (present on admission)   Assessment & Plan    Optimize nutrition         Panlobular emphysema (HCC)- (present on admission)   Assessment & Plan    Continue supplemental oxygen and RT protocol. Suspect variability in pulse rate likely related to pulmonary status and bradycardia likely from hyper vagal tone while asleep.   DuoNeb as needed  Consider outpatient pulmonary rehab and consultation at discharge. May be limited by insurance.   Home oxygen evaluation prior to discharge  Barrier to care mainly from patient refusing some reasonable treatment options.         Tobacco abuse- (present on admission)   Assessment & Plan    Counseled and educated. Said he plans to quit upon discharge. He will continue with nicotine patch for now.             Quality-Core Measures   Reviewed items::  EKG reviewed, Labs reviewed, Medications reviewed and Radiology images reviewed  Motley catheter::  No Motley  DVT prophylaxis - mechanical:  SCDs

## 2018-10-08 ENCOUNTER — APPOINTMENT (OUTPATIENT)
Dept: RADIOLOGY | Facility: MEDICAL CENTER | Age: 58
DRG: 853 | End: 2018-10-08
Attending: NURSE PRACTITIONER

## 2018-10-08 PROBLEM — F32.A DEPRESSION: Status: ACTIVE | Noted: 2018-10-08

## 2018-10-08 PROCEDURE — 99233 SBSQ HOSP IP/OBS HIGH 50: CPT | Performed by: HOSPITALIST

## 2018-10-08 PROCEDURE — 700102 HCHG RX REV CODE 250 W/ 637 OVERRIDE(OP): Performed by: ANESTHESIOLOGY

## 2018-10-08 PROCEDURE — 700102 HCHG RX REV CODE 250 W/ 637 OVERRIDE(OP): Performed by: NURSE PRACTITIONER

## 2018-10-08 PROCEDURE — A9270 NON-COVERED ITEM OR SERVICE: HCPCS | Performed by: INTERNAL MEDICINE

## 2018-10-08 PROCEDURE — 770001 HCHG ROOM/CARE - MED/SURG/GYN PRIV*

## 2018-10-08 PROCEDURE — 700105 HCHG RX REV CODE 258: Performed by: SURGERY

## 2018-10-08 PROCEDURE — A9270 NON-COVERED ITEM OR SERVICE: HCPCS | Performed by: NURSE PRACTITIONER

## 2018-10-08 PROCEDURE — A9270 NON-COVERED ITEM OR SERVICE: HCPCS | Performed by: ANESTHESIOLOGY

## 2018-10-08 PROCEDURE — 700102 HCHG RX REV CODE 250 W/ 637 OVERRIDE(OP): Performed by: INTERNAL MEDICINE

## 2018-10-08 PROCEDURE — 700111 HCHG RX REV CODE 636 W/ 250 OVERRIDE (IP): Performed by: SURGERY

## 2018-10-08 PROCEDURE — 71045 X-RAY EXAM CHEST 1 VIEW: CPT

## 2018-10-08 PROCEDURE — 99232 SBSQ HOSP IP/OBS MODERATE 35: CPT | Performed by: INTERNAL MEDICINE

## 2018-10-08 RX ADMIN — FAMOTIDINE 20 MG: 20 TABLET ORAL at 19:54

## 2018-10-08 RX ADMIN — MEROPENEM 500 MG: 500 INJECTION, POWDER, FOR SOLUTION INTRAVENOUS at 05:33

## 2018-10-08 RX ADMIN — MEROPENEM 500 MG: 500 INJECTION, POWDER, FOR SOLUTION INTRAVENOUS at 23:41

## 2018-10-08 RX ADMIN — OXYCODONE HYDROCHLORIDE 10 MG: 10 TABLET ORAL at 23:56

## 2018-10-08 RX ADMIN — CELECOXIB 200 MG: 200 CAPSULE ORAL at 18:23

## 2018-10-08 RX ADMIN — STANDARDIZED SENNA CONCENTRATE AND DOCUSATE SODIUM 2 TABLET: 8.6; 5 TABLET ORAL at 18:23

## 2018-10-08 RX ADMIN — OXYCODONE HYDROCHLORIDE 10 MG: 10 TABLET ORAL at 01:48

## 2018-10-08 RX ADMIN — FAMOTIDINE 20 MG: 20 TABLET ORAL at 02:10

## 2018-10-08 RX ADMIN — STANDARDIZED SENNA CONCENTRATE AND DOCUSATE SODIUM 2 TABLET: 8.6; 5 TABLET ORAL at 05:33

## 2018-10-08 RX ADMIN — MEROPENEM 500 MG: 500 INJECTION, POWDER, FOR SOLUTION INTRAVENOUS at 18:23

## 2018-10-08 RX ADMIN — OXYCODONE HYDROCHLORIDE 10 MG: 10 TABLET ORAL at 08:40

## 2018-10-08 RX ADMIN — CELECOXIB 200 MG: 200 CAPSULE ORAL at 08:40

## 2018-10-08 RX ADMIN — NICOTINE 14 MG: 14 PATCH, EXTENDED RELEASE TRANSDERMAL at 05:33

## 2018-10-08 RX ADMIN — MEROPENEM 500 MG: 500 INJECTION, POWDER, FOR SOLUTION INTRAVENOUS at 12:35

## 2018-10-08 RX ADMIN — OXYCODONE HYDROCHLORIDE 10 MG: 10 TABLET ORAL at 19:54

## 2018-10-08 RX ADMIN — OXYCODONE HYDROCHLORIDE 10 MG: 10 TABLET ORAL at 15:36

## 2018-10-08 ASSESSMENT — ENCOUNTER SYMPTOMS
COUGH: 0
ABDOMINAL PAIN: 0
SHORTNESS OF BREATH: 0
WEAKNESS: 1
SENSORY CHANGE: 0
PALPITATIONS: 0
CONSTIPATION: 0
HEMOPTYSIS: 0
FEVER: 0
MYALGIAS: 0
CHILLS: 0
HEADACHES: 0
NAUSEA: 0
WEAKNESS: 0
VOMITING: 0
DIARRHEA: 0
BACK PAIN: 0

## 2018-10-08 ASSESSMENT — PAIN SCALES - GENERAL
PAINLEVEL_OUTOF10: 7
PAINLEVEL_OUTOF10: 5
PAINLEVEL_OUTOF10: 2
PAINLEVEL_OUTOF10: 7
PAINLEVEL_OUTOF10: 7
PAINLEVEL_OUTOF10: 2
PAINLEVEL_OUTOF10: 5
PAINLEVEL_OUTOF10: 8

## 2018-10-08 NOTE — CARE PLAN
Problem: Bowel/Gastric:  Goal: Normal bowel function is maintained or improved  Tolerating diet  +flatus  Normo active bs x 4 quadrants  No nausea  +BM today, large  Encourage ambulation, pt would like to rest.     Problem: Respiratory:  Goal: Respiratory status will improve  Pt has diminished lungs to auscultation on left side  Pt pulls 1500 mL with IS  Strong on productive cough   in use  Encouraged ambulation, pt refused.   On room air.   Chest to has air leak, to water seal on left flank, small serosanguinous output.     Problem: Mobility  Goal: Risk for activity intolerance will decrease  Educated pt on ambulation benefits, pt would like to rest, refused despite education.

## 2018-10-08 NOTE — ASSESSMENT & PLAN NOTE
Patient with depressed mood and anhedonia likely from financial stressors. Offered help on multiple level but he is refusing. May benefit from counseling services and SSRI but currently wanting nothing.  Monitor closely and evidence of self harm.  Depending on behavior, may require 1:1 sitter. Denies SI currently.

## 2018-10-08 NOTE — PROGRESS NOTES
Bedside report completed, assumed pt care.   Pt resting in bed, A&Ox4.   Assessment complete.  Pt has chest to to water seal on left flank, dressing CDI, air leak noted, pt has small serosanguinous output  Pt has incision SANTIAGO with staples to left flank, intact, approximated  Pt has diminished lungs, more so on left side upon auscultation  Education pt on need to ambulate, pt agreeable, but would like to rest  Pt pulls 1500mL with IS  Pt has strong non productive cough   in use  +BM today  +flatus  Tolerating diet  Normo active bs x 4 quadrants  Pt has small red scaly patch of skin to right cheek area, pt states he thinks its from the antibiotics which have caused his skin to be flaky in spots. No itchy per pt. Not an open wound at this time.   Pt denies numbness tingling chest pain (aside from chest tube site)   Denies SOB and nausea   complaints of pain, medicated per MAR.  All questions answered.    Discussed plan of care with pt.   Call light within reach, bed in low position, possessions within reach, treaded socks on, floor free from trip hazard, Hourly rounding in place.   SCDs refused despite education.

## 2018-10-08 NOTE — PROGRESS NOTES
Surgery Note   10/8/2018, 10:46 AM    No acute change.  Air leak may be slightly smaller today.  Chest x-ray unchanged  -Needs continued IV antibiotics  -Continue chest tube to waterseal indefinitely  -If the patient qualifies for any insurance/situation that it would allow him to be discharged home with a PICC line for IV antibiotics and chest tube to Heimlich valve, the patient would be open to this.  Otherwise, needs to stay here to complete the IV antibiotics  -Oral supplements 3 times a day and before bedtime to improve nutrition    Pb Gaviria M.D.  [facility]  [phone]

## 2018-10-08 NOTE — PROGRESS NOTES
"Pt irritable this am. AA&Ox4. Pain rating at 7. Medicated per MAR. No c/o n/v, n/t or SOB. Pt on 1L NC. Left chest incision with staples and azra. No signs of infection noted. Left chest tube to waterseal with airleak present. MD aware. Pt refusing to ambulate in hallway. Pt states \"i will walk inside room. I will damn well walk outside when I please to do so\". Encouraged to ambulate three times a day. Pt refusing to show RN how well he can pull on IS. States \"i've been working on it\". Plan of care discussed. Hourly rounding in place. Call light within reach. Blood pressure 132/84, pulse 79, temperature 36.6 °C (97.9 °F), resp. rate 18, height 1.829 m (6'), weight 62.4 kg (137 lb 9.1 oz), SpO2 93 %.      "

## 2018-10-08 NOTE — PROGRESS NOTES
Order received for midline placement. Per Jordan MARSHALL pt refusing. Pt has refused placement 3 days in a row. Order cancelled, please place new order when pt agreeable to placement.

## 2018-10-08 NOTE — PROGRESS NOTES
[facility] Hospitalist Progress Note    Date of Service: 10/8/2018        Chief Complaint  58 y.o. male admitted 9/18/2018 with COPD and tobacco, recent hospitalization for hydropneumothorax and empyema s/p left thoracotomy decortication, lung debridement and rib resection. Resp cx grew strep group B and was discharged on course of augmentin. He presented from Dr. Gaviria's office with cough and SOB. CT showed persistent left hydropneumothorax. IR consulted for pigtail drain placement to pleurvac suction. He had bronchoscopy that showed white purulent secretions that was irrigated and suctioned. 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     Interval Problem Update  10/2: Patient seen and evaluated this a.m.  Denies any acute complaint. Hs a draining chest tube. He complained about abdominal imaging but had been complaining of abdominal pain since admission which was why KUB was ordered by the previous day hospitalist.  Chest tube to suction at -40.   HE was pleasant with me this morning. Says he is uset because he was not told why it was ordered. I reassured him that we strive to explain every procedure but sometimes due to time limitations we may not be able to reach him on time before ordering a test and that he should ask questions of his nurses and physicians especially for clarifications of care plan.     KUB showed constipation. He remains on Meropenem on ID recommendation.    10/3: doing well. Denies any complaint today. No pleuritic pain or sob. Both chest tubes placed to water seal this a.m. By surgery. Plan to monitor closely for next 24 to 48 hours to see if he tolerates it.    10/4: denies any complaint today. Says he feels same as yesterday. One of the chest tube taken off this morning by surgery. Chest x-ray with persistent air leaks which is expected. No respiratory distress, fever or  "chills.    10/5: stable. Denies any acute needs. Worried about prolonged hospitalization and mounting bills. He does not want to stay in the hospital much longer once cleared by CTX surgery. He is on Meropenem. Insurance application pending. Denies pleuritic chest pain, fever or chills.    10/6: Patient seen and examined at bedside this a.m. Denies any new complaint. Declined midline because he does not trust the facility in [facility] and will not go there to complete infusion treatment. He also cannot make it here from his home daily as it takes an hour. He wants oral antibiotics. Informed patient I will speak with ID Monday to review all options once he is cleared for discharge by CTX surgery. Patient has residual PTX that has increased in size; thoracostomy tube remains in place however and surgery following closely. Chest tube to water seal still for the weekend.     10/7: patient is stable. Denies any new complaints. No fever or chills. Spoke with Dr. Gaviria briefly about patient and reviewed ID and surgery notes from 10/6 and today as well. Patient eager to go home once surgery clears him. He continues to decline midline access because he will it use it for reasons given the previous day.     10/8: patient is stable. Asked how are you doing? He said \"I am here.\" Looks depressed this morning but refusing any offer for help. Remains hemodynamically stable.      Consultants/Specialty  Surgery   IR  Pulm  ID    Disposition  Chest tube management, likely needs PICC for meropenem  Lives in [place]        Review of Systems   HENT: Negative for congestion.    Respiratory: Negative for cough, hemoptysis and shortness of breath.    Cardiovascular: Negative for chest pain.   Gastrointestinal: Negative for abdominal pain, constipation and nausea.   Musculoskeletal: Negative for back pain and myalgias.   Skin: Negative for itching.   Neurological: Positive for weakness. Negative for headaches.      Physical Exam  " Laboratory/Imaging   Hemodynamics  Temp (24hrs), Av.5 °C (97.7 °F), Min:36.1 °C (96.9 °F), Max:36.6 °C (97.9 °F)   Temperature: 36.6 °C (97.9 °F)  Pulse  Av.9  Min: 43  Max: 112    Blood Pressure: 132/84      Respiratory      Respiration: 18, Pulse Oximetry: 93 %        RUL Breath Sounds: Clear, RML Breath Sounds: Clear, RLL Breath Sounds: Clear;Diminished, JOSSE Breath Sounds: Diminished, LLL Breath Sounds: Diminished    Fluids    Intake/Output Summary (Last 24 hours) at 10/08/18 1050  Last data filed at 10/08/18 0843   Gross per 24 hour   Intake             1220 ml   Output             1830 ml   Net             -610 ml       Nutrition  Orders Placed This Encounter   Procedures   • Diet Order Regular     Standing Status:   Standing     Number of Occurrences:   1     Order Specific Question:   Diet:     Answer:   Regular [1]     Physical Exam   Constitutional: He is oriented to person, place, and time. He appears well-developed and well-nourished. No distress.   Calm male in depressed mood but refusing offer for help. HE still wants to go home once cleared by surgery.    HENT:   Head: Normocephalic and atraumatic.   Eyes: Pupils are equal, round, and reactive to light. Conjunctivae and EOM are normal.   Neck: Normal range of motion. Neck supple. No JVD present. No tracheal deviation present.   Cardiovascular: Normal rate, regular rhythm and normal heart sounds.  Exam reveals no friction rub.    No murmur heard.  Pulmonary/Chest: Effort normal. No stridor. No respiratory distress. He has no wheezes. He has rales. He exhibits no tenderness.   Left chest tube to water seal. Leak is stable.   Abdominal: Soft. Bowel sounds are normal. He exhibits no distension and no mass. There is no tenderness. There is no rebound and no guarding.   Musculoskeletal: Normal range of motion. He exhibits no edema.   Neurological: He is alert and oriented to person, place, and time. He has normal reflexes.   Skin: Skin is warm and  dry. No rash noted. He is not diaphoretic. No erythema.   Psychiatric: He has a normal mood and affect. His behavior is normal. Thought content normal.                                    Assessment/Plan     Empyema lung (HCC)- (present on admission)   Assessment & Plan    9/19 Zosyn initiated  9/24 Zosyn stopped, cefepime initiated for Enterobacter cloacae and Streptococcus viridans  9/28 Enterobacter cloacae now resistant to cefepime therapy, changed to meropenem.  IID on board, recommending meropenem for at least 4 weeks but should patient leave AMA, can send home with Bactrim DS and Augmentin through 10/23.  CT management per surgical team. Left  Chest tube in place, to water seal. Stable leak. Persistent but slightly enlarged PTX. No respiratory distress.  Not ready for discharge per surgery. Understands the complex nature of his condition.   Consider pulmonary consultation for outpatient management considering panlobular emphysema as well.    Home oxygen evaluation prior to discharge.  Counseled on tobacco use and need to quit previously. He said he is quitting upon discharge.   Due to lack of insurance, he may have to stay at the hospital for the duration of his IV antibiotics due for completion 10/23 but he does not want to wait that long due to mounting bills and need to work. Patient likely to go AMA once cleared by surgery.   I recommend prescribing Augmentin and Bactrim for patient to last through 10/23 if he decides to leave AMA as it is better than nothing.          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 Stable despite report of enlarged PTHx. CTX surgery following and assisting with care.   Chest tube to water seal.  Continue abx through 10/23.   RT protocol  See above        Depression- (present on admission)   Assessment & Plan    Patient with depressed mood and anhedonia likely  from financial stressors. Offered help on multiple level but he is refusing. May benefit from counseling services and SSRI but currently wanting nothing.  Monitor closely and evidence of self harm.  Depending on behavior, may require 1:1 sitter. Denies SI currently.        Constipation   Assessment & Plan    Moderate stools on imaging. Continue Senokot, Miralax etc.         Acute bilateral low back pain- (present on admission)   Assessment & Plan    Resolved. Continue Flexeril as needed  No anesthesia or focal weakness            HCAP (healthcare-associated pneumonia)- (present on admission)   Assessment & Plan    Continue abx per ID, plan for at least 4 weeks total.  Continue supplemental oxygen and RT protocol  Check RFP and CBC Q72 hours.  If he must leave AMA afyter surgery clearance, discharge on Augmentin and Bactrim DS through 10/23 per ID recommendation.         Gastroesophageal reflux disease without esophagitis- (present on admission)   Assessment & Plan    pepcid prn        Protein malnutrition (HCC)- (present on admission)   Assessment & Plan    Optimize nutrition         Panlobular emphysema (HCC)- (present on admission)   Assessment & Plan    Continue supplemental oxygen and RT protocol. Suspect variability in pulse rate likely related to pulmonary status and bradycardia likely from hyper vagal tone while asleep.   DuoNeb as needed  Consider outpatient pulmonary rehab and consultation at discharge. May be limited by insurance.   Home oxygen evaluation prior to discharge  Barrier to care mainly from patient refusing some reasonable treatment options.         Tobacco abuse- (present on admission)   Assessment & Plan    Counseled and educated. Said he plans to quit upon discharge. He will continue with nicotine patch for now.             Quality-Core Measures   Reviewed items::  EKG reviewed, Labs reviewed, Medications reviewed and Radiology images reviewed  Motley catheter::  No Mtoley  DVT prophylaxis -  mechanical:  SCDs

## 2018-10-08 NOTE — PROGRESS NOTES
"Pt continues to refuse for midline placement. Pt states \"i'm not getting anything done until I make up my mind\". PICC nurse notified.   "

## 2018-10-08 NOTE — PROGRESS NOTES
Infectious Disease Progress Note    Author: Tessa Rich M.D. Date & Time of service: 10/8/2018  10:12 AM    Chief Complaint:  Follow-up of recurrent empyema    Interval History:   afebrile, white count continues to down trend, 8.6 today.  Patient says that he is constipated with abdominal pain.  No issues with breathing.  Patient reports that primary team is aware and seeks no further help from me  10/1/2018 no fevers.  Patient denies any new issues.  WBC is down to 7.7  10/2/2018 no fevers.  Denies any new issues.  10/3/2018-overall feels better.  No new issues overnight.  No fevers.  Still has 2 chest tubes  10/4- AF, no WBC, no issue with abx, tenderness at L CT site.  10/5-AF, no WBC, no pain to left chest-just took pain pill, tolerating ABX, still has 1 CT in place.  10/6-AF, no WBC, no pain at this time-can get up to /10, refusing midline-states as soon as the surgeon clears him he is going to leave regardless of recommendations for IV antibiotics.  10/7-AF, no WBC, denies any pain, tolerating antibiotics, no complaints.  10/8 AF patient very ornery and upset, states he just wants to go home  Labs Reviewed, Medications Reviewed, Radiology Reviewed and Wound Reviewed.    Review of Systems:  Review of Systems   Constitutional: Negative for chills and fever.   HENT: Negative for congestion.    Respiratory: Negative for shortness of breath.    Cardiovascular: Negative for chest pain, palpitations and leg swelling.   Gastrointestinal: Negative for abdominal pain, diarrhea, nausea and vomiting.   Genitourinary: Negative for dysuria and hematuria.   Musculoskeletal: Negative for joint pain and myalgias.   Skin: Negative for itching.   Neurological: Negative for sensory change, weakness and headaches.       Hemodynamics:  Temp (24hrs), Av.5 °C (97.7 °F), Min:36.1 °C (96.9 °F), Max:36.6 °C (97.9 °F)  Temperature: 36.6 °C (97.9 °F)  Pulse  Av.9  Min: 43  Max: 112   Blood Pressure: 132/84        Physical Exam:  Physical Exam   Constitutional: He is oriented to person, place, and time. He appears well-developed and well-nourished. No distress.   Appears older than stated age.   HENT:   Head: Normocephalic and atraumatic.   Eyes: Pupils are equal, round, and reactive to light. Conjunctivae and EOM are normal. No scleral icterus.   Neck: Normal range of motion. Neck supple. No JVD present.   Cardiovascular: Normal rate, regular rhythm, normal heart sounds and intact distal pulses.  Exam reveals no gallop and no friction rub.    Pulmonary/Chest: Effort normal. No respiratory distress. He has no rales.   Left Chest tubes x1  Decreased BS to left lower lobe-slightly improved.  Staples to upper left chest, no erythema, no drainage, nontender to palpation.   Abdominal: Soft. Bowel sounds are normal. He exhibits no distension. There is no rebound and no guarding.   Musculoskeletal: Normal range of motion. He exhibits no edema.   Neurological: He is alert and oriented to person, place, and time.   Skin: Skin is warm and dry. No erythema.   Psychiatric:   Irritable  Ornery   Nursing note and vitals reviewed.      Meds:    Current Facility-Administered Medications:   •  meropenem (MERREM) IV  •  oxyCODONE immediate-release **OR** oxyCODONE immediate-release  •  HYDROmorphone  •  Pharmacy Consult Request  •  scopolamine  •  celecoxib  •  famotidine  •  cyclobenzaprine  •  senna-docusate **AND** polyethylene glycol/lytes **AND** magnesium hydroxide **AND** bisacodyl  •  Respiratory Care per Protocol  •  acetaminophen  •  ondansetron  •  ondansetron  •  nicotine **AND** Nicotine Replacement Patient Education Materials **AND** nicotine polacrilex    Labs:  No results for input(s): WBC, RBC, HEMOGLOBIN, HEMATOCRIT, MCV, MCH, RDW, PLATELETCT, MPV, NEUTSPOLYS, LYMPHOCYTES, MONOCYTES, EOSINOPHILS, BASOPHILS, RBCMORPHOLO in the last 72 hours.  No results for input(s): SODIUM, POTASSIUM, CHLORIDE, CO2, GLUCOSE, BUN, CPKTOTAL  in the last 72 hours.  No results for input(s): ALBUMIN, TBILIRUBIN, ALKPHOSPHAT, TOTPROTEIN, ALTSGPT, ASTSGOT, CREATININE in the last 72 hours.    Imaging:  10/7/2018   DX-CHEST-PORTABLE   FINDINGS:  Single portable view of the chest demonstrates a normal cardiac silhouette and mediastinal contours.    Left pneumothorax is unchanged with left chest tube in place. Left and right upper lobe opacification is unchanged. There is bullous emphysema in the right upper lobe.      Micro:  Results     ** No results found for the last 168 hours. **          Assessment:  Donna Ceballos is a 58 y.o. male with a history of COPD and tobacco use, recent hospitalization for hydropneumothorax and left empyema status post left thoracotomy, decortication, debridement, and rib resection in July 2018 (cultures grew group F streptococcus, treated with IV Unasyn for 2 weeks followed by p.o. Augmentin for 2 weeks).  Patient is now admitted 9/18/2018 with persistent left hydropneumothorax, empyema, and concern for an alveolar pleural fistula.  Pleural fluid cultures from 9/19 grew Enterobacter cloacae and viridans Strep.  He underwent repeat thoracotomy, decortication, and intercostal muscle flap transposition to the parenchymal airway fistula on 9/25.  OR cultures again grew Enterobacter, but this time resistant to cephalosporins and Zosyn.       Pertinent Diagnoses:  1. Sepsis, improved  2. L empyema, status post surgery as above  3. L hydropneumothorax, stable  4. L alveolar pleural fistula, status post surgery as above  5. COPD  6. Tobacco dependence    Active Hospital Problems    Diagnosis   • Empyema lung (HCC) [J86.9]   • Acute respiratory failure with hypoxia (HCC) [J96.01]   • Tobacco abuse [Z72.0]       Plan:  Sepsis, improved  Secondary to below  Afebrile  Leukocytosis resolved  Abx as below    Left empyema   S/p repeat thoracotomy, decortication, and intercostal muscle flap transposition to the parenchymal airway fistula on  9/25 with Dr. Gaviria  OR cx +Enterobacter cloacae (cephalosporin resistant) and Strep Viridans  Continue IV Meropenem   Anticipate at least 4 weeks of antibiotics  Estimated end date 10/23/18  Midline ordered-patient refusing stating that he is leaving as soon as surgery clears him  For insight into disease process  If he leaves AMA, he may be sent out on p.o. Bactrim DS and Augmentin through 10/23/18  Chest tube removal per surgery    L hydropneumothorax, stable  Chest tube x1 in place, managed by CT surgery    COPD, tobacco dependence  Cessation encouraged    Due to lack of insurance will likely finish treatment inpatient.  Patient stating he needs to get back to work as he has bills to pay.

## 2018-10-08 NOTE — CARE PLAN
Problem: Safety  Goal: Will remain free from falls    Intervention: Assess risk factors for falls  Pt ambulates without assistance. Call light within reach. Calls for assistance as needed.       Problem: Psychosocial Needs:  Goal: Level of anxiety will decrease  Pt encouraged to ask questions and verbalize concerns.

## 2018-10-09 ENCOUNTER — APPOINTMENT (OUTPATIENT)
Dept: RADIOLOGY | Facility: MEDICAL CENTER | Age: 58
DRG: 853 | End: 2018-10-09
Attending: NURSE PRACTITIONER

## 2018-10-09 ENCOUNTER — PATIENT OUTREACH (OUTPATIENT)
Dept: HEALTH INFORMATION MANAGEMENT | Facility: OTHER | Age: 58
End: 2018-10-09

## 2018-10-09 PROCEDURE — 700102 HCHG RX REV CODE 250 W/ 637 OVERRIDE(OP): Performed by: INTERNAL MEDICINE

## 2018-10-09 PROCEDURE — A9270 NON-COVERED ITEM OR SERVICE: HCPCS | Performed by: INTERNAL MEDICINE

## 2018-10-09 PROCEDURE — 700102 HCHG RX REV CODE 250 W/ 637 OVERRIDE(OP): Performed by: NURSE PRACTITIONER

## 2018-10-09 PROCEDURE — 700105 HCHG RX REV CODE 258: Performed by: SURGERY

## 2018-10-09 PROCEDURE — 99232 SBSQ HOSP IP/OBS MODERATE 35: CPT | Performed by: INTERNAL MEDICINE

## 2018-10-09 PROCEDURE — 700111 HCHG RX REV CODE 636 W/ 250 OVERRIDE (IP): Performed by: SURGERY

## 2018-10-09 PROCEDURE — 700105 HCHG RX REV CODE 258

## 2018-10-09 PROCEDURE — 770001 HCHG ROOM/CARE - MED/SURG/GYN PRIV*

## 2018-10-09 PROCEDURE — 99233 SBSQ HOSP IP/OBS HIGH 50: CPT | Performed by: INTERNAL MEDICINE

## 2018-10-09 PROCEDURE — 700102 HCHG RX REV CODE 250 W/ 637 OVERRIDE(OP): Performed by: ANESTHESIOLOGY

## 2018-10-09 PROCEDURE — A9270 NON-COVERED ITEM OR SERVICE: HCPCS | Performed by: NURSE PRACTITIONER

## 2018-10-09 PROCEDURE — A9270 NON-COVERED ITEM OR SERVICE: HCPCS | Performed by: ANESTHESIOLOGY

## 2018-10-09 PROCEDURE — 71045 X-RAY EXAM CHEST 1 VIEW: CPT

## 2018-10-09 RX ORDER — SODIUM CHLORIDE 9 MG/ML
INJECTION, SOLUTION INTRAVENOUS
Status: COMPLETED
Start: 2018-10-09 | End: 2018-10-09

## 2018-10-09 RX ORDER — SULFAMETHOXAZOLE AND TRIMETHOPRIM 800; 160 MG/1; MG/1
1 TABLET ORAL 2 TIMES DAILY
Qty: 28 TAB | Refills: 0 | Status: SHIPPED | OUTPATIENT
Start: 2018-10-09 | End: 2018-10-10

## 2018-10-09 RX ORDER — AMOXICILLIN AND CLAVULANATE POTASSIUM 875; 125 MG/1; MG/1
1 TABLET, FILM COATED ORAL 2 TIMES DAILY
Qty: 28 TAB | Refills: 0 | Status: SHIPPED | OUTPATIENT
Start: 2018-10-09 | End: 2018-10-10

## 2018-10-09 RX ADMIN — OXYCODONE HYDROCHLORIDE 10 MG: 10 TABLET ORAL at 20:46

## 2018-10-09 RX ADMIN — MEROPENEM 500 MG: 500 INJECTION, POWDER, FOR SOLUTION INTRAVENOUS at 05:52

## 2018-10-09 RX ADMIN — STANDARDIZED SENNA CONCENTRATE AND DOCUSATE SODIUM 2 TABLET: 8.6; 5 TABLET ORAL at 05:52

## 2018-10-09 RX ADMIN — CELECOXIB 200 MG: 200 CAPSULE ORAL at 08:32

## 2018-10-09 RX ADMIN — MEROPENEM 500 MG: 500 INJECTION, POWDER, FOR SOLUTION INTRAVENOUS at 23:53

## 2018-10-09 RX ADMIN — CELECOXIB 200 MG: 200 CAPSULE ORAL at 18:21

## 2018-10-09 RX ADMIN — OXYCODONE HYDROCHLORIDE 10 MG: 10 TABLET ORAL at 12:21

## 2018-10-09 RX ADMIN — FAMOTIDINE 20 MG: 20 TABLET ORAL at 20:46

## 2018-10-09 RX ADMIN — MEROPENEM 500 MG: 500 INJECTION, POWDER, FOR SOLUTION INTRAVENOUS at 12:21

## 2018-10-09 RX ADMIN — OXYCODONE HYDROCHLORIDE 10 MG: 10 TABLET ORAL at 05:52

## 2018-10-09 RX ADMIN — MEROPENEM 500 MG: 500 INJECTION, POWDER, FOR SOLUTION INTRAVENOUS at 18:21

## 2018-10-09 RX ADMIN — SODIUM CHLORIDE: 9 INJECTION, SOLUTION INTRAVENOUS at 05:45

## 2018-10-09 RX ADMIN — OXYCODONE HYDROCHLORIDE 10 MG: 10 TABLET ORAL at 23:56

## 2018-10-09 RX ADMIN — NICOTINE 14 MG: 14 PATCH, EXTENDED RELEASE TRANSDERMAL at 05:52

## 2018-10-09 ASSESSMENT — ENCOUNTER SYMPTOMS
INSOMNIA: 0
SHORTNESS OF BREATH: 0
MYALGIAS: 0
NAUSEA: 0
STRIDOR: 0
BACK PAIN: 0
COUGH: 0
WEAKNESS: 1
HEADACHES: 0
CHILLS: 0
DIARRHEA: 0
VOMITING: 0
CONSTIPATION: 0
WEAKNESS: 0
SENSORY CHANGE: 0
FEVER: 0
PALPITATIONS: 0
HEMOPTYSIS: 0
ABDOMINAL PAIN: 0

## 2018-10-09 ASSESSMENT — PAIN SCALES - GENERAL
PAINLEVEL_OUTOF10: 0
PAINLEVEL_OUTOF10: 2
PAINLEVEL_OUTOF10: 7
PAINLEVEL_OUTOF10: 7
PAINLEVEL_OUTOF10: 2
PAINLEVEL_OUTOF10: 7
PAINLEVEL_OUTOF10: 7

## 2018-10-09 NOTE — PROGRESS NOTES
Bedside report completed, assumed pt care.   Pt resting in bed, A&Ox4.   Assessment complete.   Pt has chest tube to left flank, dressing CDI, air leak present, small serosanguinous output, water seal.   onroom air  Pt pulls 1500 mL With IS  Moist strong cough  encouraged IS, use, ambulation, and coughing, pt would like to rest  Lungs diminished and crackles noted upon auscultation on the left side.   Pt had BM today  Tolerating diet   in use  Pt denies numbness tingling chest pain SOB and nausea.   complaints of pain. Medicated per MAR  Discussed plan of care with pt.   All questions answered.   Call light within reach, bed in low position, possessions within reach, treaded socks on, floor free from trip hazard, Hourly rounding in place.   SCDs refused despite education.

## 2018-10-09 NOTE — PROGRESS NOTES
Pt irritable this am. AA&Ox4. Has no c/o pain this am. Medicated per MAR. No c/o n/v, n/t or SOB. Pt on RA. Left chest incision with staples and azra. No signs of infection noted. Left chest tube to waterseal with airleak present. MD aware. Dressing CDI. Left surgical incision with staples and azra. No signs of infection noted.  Pt refusing to ambulate in hallway. Encouraged to ambulate three times a day. Plan of care discussed. Hourly rounding in place. Call light within reach. Blood pressure 147/94, pulse 67, temperature 36.6 °C (97.9 °F), resp. rate 18, height 1.829 m (6'), weight 62.4 kg (137 lb 9.1 oz), SpO2 94 %.       no

## 2018-10-09 NOTE — PROGRESS NOTES
Progress Note:  10/9/2018, 7:30 AM    S: No acute events. Breathing is stable on 0-1.5 L nasal cannula. Pain controlled, low chest tube output, cough mostly resolved, no serosanguineous output with cough    O:  Blood pressure 126/79, pulse 60, temperature 36.3 °C (97.3 °F), resp. rate 14, height 1.829 m (6'), weight 62.4 kg (137 lb 9.1 oz), SpO2 92 %.    NAD, awake, alert  Breathing nonlabored  Left chest tube in place to Pleur-evac on water seal. Unchanged expiratory air leak  Abdomen soft, nontender, nondistended    A:   Active Hospital Problems    Diagnosis   • Empyema lung (HCC) [J86.9]     Priority: High     9/19 Zosyn initiated  9/24 Zosyn altered cefepime initiated for Enterobacter cloacae and Streptococcus viridans  9/27 Cefepime day 4  9/28 Enterobacter cloacae now resistant to cefepime therapy altered to meropenem.  9/28 Meropenem day 1     • Acute respiratory failure with hypoxia (HCC) [J96.01]     Priority: Medium   • Gastroesophageal reflux disease without esophagitis [K21.9]     Priority: Low   • Panlobular emphysema (HCC) [J43.1]     Priority: Low   • Protein malnutrition (HCC) [E46]     Priority: Low   • Tobacco abuse [Z72.0]     Priority: Low   • Depression [F32.9]   • Constipation [K59.00]   • Acute bilateral low back pain [M54.5]   • HCAP (healthcare-associated pneumonia) [J18.9]         P:   -At this point, the patient is stable for discharge from a respiratory standpoint. We discussed the benefit of continued outpatient IV antibiotics, but he is refusing a PICC/midline, and is unclear whether he would be able to receive the outpatient IV therapy due to the lack of insurance. ID has recommended oral coverage, which is fine with me at this time.  -We will plan for discharge tomorrow after the patient contact his family and makes arrangements to take him home.  -I will place his chest tube to 1-way Heimlich valve tomorrow. The patient has experience with this  -I will then need to see him in one  week in my office  -The patient should be discharged with instructions to continue oral supplementation    Pb Gaivria M.D.  [facility]  [phone]

## 2018-10-09 NOTE — DISCHARGE PLANNING
Anticipated Discharge Disposition: SNF    Action: This RN CM called [facility] regarding possible home IV abx's for pt.  This RN CM asked [facility] if they had an RN that could change the pt's PICC Line dressings and complete pt's weekly labs in [place].  Per [facility] they do not and the pt would have to follow up at the local hospital.      Unfortunately, [facility] does not take DERRICK's for PICC Line dressing changes or labs and the pt states he cannot travel back and forth from [facility] for dressing changes and labs.     Barriers to Discharge: IV abx's and lack of payor source    Plan: RN EMETERIO available to possibly arrange a DERRICK for SNF r/t IV abx's if pt changes his desire to stay local and receive IV abx's till 10/23/18 per ID's request.

## 2018-10-09 NOTE — CARE PLAN
Problem: Respiratory:  Goal: Respiratory status will improve  Educated on IS use, ambulation, and TCDB  Pt pulls 1500 mL with IS  Refused ambulation  Strong moist cough  Pt has crackles and diminished lungs upon auscultation on left side  Chest tube on left side, air leak, water seal, small serosanguinous output   in use  On room air    Problem: Pain Management  Goal: Pain level will decrease to patient's comfort goal  Medicated per MAR  Resting in bed    Problem: Mobility  Goal: Risk for activity intolerance will decrease  Educated on ambulation benefits, pt refused

## 2018-10-09 NOTE — PROGRESS NOTES
[facility]    Date of Service: 10/9/2018        Chief Complaint  58 y.o. male admitted 2018 with COPD and tobacco, recent hospitalization for hydropneumothorax and empyema s/p left thoracotomy decortication, lung debridement and rib resection. Resp cx grew strep group B and was discharged on course of augmentin. He presented from Dr. Gaviria's office with cough and SOB. CT showed persistent left hydropneumothorax. IR consulted for pigtail drain placement to pleurvac suction. He had bronchoscopy that showed white purulent secretions that was irrigated and suctioned. 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     Interval Problem Update    Patient agitated and grumpy mood this morning.  \"I am leaving tomorrow, I do not care what you have to say.\"  Patient advised to stay on IV antibiotics per ID recommendations till 10/23 but will discharge patient home on oral antibiotics if he leaves prior to then.  Per Dr. Gaviria he will place his chest tube to 1-way Heimlich valve tomorrow and then he can be discharged.       Consultants/Specialty  Surgery   IR  Pulm  ID    Disposition  Chest tube management, likely needs PICC for meropenem  Lives in [place]        Review of Systems   HENT: Negative for congestion and hearing loss.    Respiratory: Negative for cough, hemoptysis, shortness of breath and stridor.    Cardiovascular: Negative for chest pain and palpitations.   Gastrointestinal: Negative for abdominal pain, constipation and nausea.   Musculoskeletal: Negative for back pain and myalgias.   Skin: Negative for itching.   Neurological: Positive for weakness. Negative for headaches.   Psychiatric/Behavioral: The patient does not have insomnia.       Physical Exam  Laboratory/Imaging   Hemodynamics  Temp (24hrs), Av.6 °C (97.8 °F), Min:36.3 °C (97.3 °F), Max:36.8 °C (98.2 °F)   " Temperature: 36.6 °C (97.9 °F)  Pulse  Av.6  Min: 43  Max: 112    Blood Pressure: 147/94      Respiratory      Respiration: 18, Pulse Oximetry: 94 %     Work Of Breathing / Effort: Mild  RUL Breath Sounds: Clear, RML Breath Sounds: Clear, RLL Breath Sounds: Clear;Diminished, JOSSE Breath Sounds: Diminished, LLL Breath Sounds: Diminished    Fluids    Intake/Output Summary (Last 24 hours) at 10/09/18 1017  Last data filed at 10/09/18 0800   Gross per 24 hour   Intake             1530 ml   Output             2975 ml   Net            -1445 ml       Nutrition  Orders Placed This Encounter   Procedures   • Diet Order Regular     Standing Status:   Standing     Number of Occurrences:   1     Order Specific Question:   Diet:     Answer:   Regular [1]     Physical Exam   Constitutional: He is oriented to person, place, and time. He appears well-developed and well-nourished. No distress.   Patient is grumpy this morning.    HENT:   Head: Normocephalic and atraumatic.   Eyes: Pupils are equal, round, and reactive to light. Conjunctivae and EOM are normal.   Neck: Normal range of motion. Neck supple. No JVD present. No tracheal deviation present.   Cardiovascular: Normal rate, regular rhythm and normal heart sounds.  Exam reveals no friction rub.    No murmur heard.  Pulmonary/Chest: Effort normal. No stridor. No respiratory distress. He has no wheezes. He has rales. He exhibits no tenderness.   Left chest tube to water seal. Leak is stable.   Abdominal: Soft. Bowel sounds are normal. He exhibits no distension. There is no tenderness.   Musculoskeletal: Normal range of motion. He exhibits no edema.   Neurological: He is alert and oriented to person, place, and time. He has normal reflexes.   Skin: Skin is warm and dry. No rash noted. He is not diaphoretic.   Psychiatric: He has a normal mood and affect. Thought content normal. He is agitated.                                    Assessment/Plan     Empyema lung (HCC)- (present  on admission)   Assessment & Plan    9/19 Zosyn initiated  9/24 Zosyn stopped, cefepime initiated for Enterobacter cloacae and Streptococcus viridans  9/28 Enterobacter cloacae now resistant to cefepime therapy, changed to meropenem.  IID on board, recommending meropenem for at least 4 weeks but should patient leave AMA, can send home with Bactrim DS and Augmentin through 10/23.  CT management per surgical team. Left  Chest tube in place, to water seal. Stable leak. Persistent but slightly enlarged PTX. No respiratory distress.  Not ready for discharge per surgery. Understands the complex nature of his condition.   Consider pulmonary consultation for outpatient management considering panlobular emphysema as well.    Home oxygen evaluation prior to discharge.  Counseled on tobacco use and need to quit previously. He said he is quitting upon discharge.   Due to lack of insurance, he may have to stay at the hospital for the duration of his IV antibiotics due for completion 10/23 but he does not want to wait that long due to mounting bills and need to work. Patient likely to go AMA once cleared by surgery.   Will be prescribing Augmentin and Bactrim for patient to last through 10/23 if he decides to leave AMA as it is better than nothing.          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 Stable despite report of enlarged PTHx. CTX surgery following and assisting with care.   Chest tube to water seal.  Continue abx through 10/23.   RT protocol  See above        Depression- (present on admission)   Assessment & Plan    Patient with depressed mood and anhedonia likely from financial stressors. Offered help on multiple level but he is refusing. May benefit from counseling services and SSRI but currently wanting nothing.  Monitor closely and evidence of self harm.  Depending on behavior, may require 1:1  ramona. Denies SI currently.        Constipation   Assessment & Plan    Moderate stools on imaging. Continue Senokot, Miralax etc.         Acute bilateral low back pain- (present on admission)   Assessment & Plan    Resolved. Continue Flexeril as needed  No anesthesia or focal weakness            HCAP (healthcare-associated pneumonia)- (present on admission)   Assessment & Plan    Continue abx per ID, plan for at least 4 weeks total.  Continue supplemental oxygen and RT protocol  Check RFP and CBC Q72 hours.  If he must leave AMA afyter surgery clearance, discharge on Augmentin and Bactrim DS through 10/23 per ID recommendation.         Gastroesophageal reflux disease without esophagitis- (present on admission)   Assessment & Plan    pepcid prn        Protein malnutrition (HCC)- (present on admission)   Assessment & Plan    Optimize nutrition         Panlobular emphysema (HCC)- (present on admission)   Assessment & Plan    Continue supplemental oxygen and RT protocol. Suspect variability in pulse rate likely related to pulmonary status and bradycardia likely from hyper vagal tone while asleep.   DuoNeb as needed  Consider outpatient pulmonary rehab and consultation at discharge. May be limited by insurance.   Home oxygen evaluation prior to discharge  Barrier to care mainly from patient refusing some reasonable treatment options.         Tobacco abuse- (present on admission)   Assessment & Plan    Counseled and educated. Said he plans to quit upon discharge. He will continue with nicotine patch for now.             Quality-Core Measures   Reviewed items::  Labs reviewed and Medications reviewed  Motley catheter::  No Motley  DVT prophylaxis - mechanical:  SCDs  Antibiotics:  Treating active infection/contamination beyond 24 hours perioperative coverage

## 2018-10-09 NOTE — DISCHARGE PLANNING
"Anticipated Discharge Disposition: SNF    Action: This RN CM spoke with pt at bedside regarding pt's IV Abx's and dc disposition.  Per the pt he is leaving tomorrow because he cannot afford anymore care.  This RN CM asked the pt if [facility] could pay for him to receive his IV abx's at a local SNF if he would be willing to go.  The pt adamantly refused and stated, \"I will not go, I need to go home to work in [place].\"    Barriers to Discharge: IV abx's and lack of payor source    Plan: RN CM available to possibly arrange a DERRICK for SNF r/t IV abx's if pt changes his mind    "

## 2018-10-10 VITALS
DIASTOLIC BLOOD PRESSURE: 95 MMHG | HEIGHT: 72 IN | WEIGHT: 137.57 LBS | HEART RATE: 93 BPM | SYSTOLIC BLOOD PRESSURE: 141 MMHG | TEMPERATURE: 96.6 F | BODY MASS INDEX: 18.63 KG/M2 | OXYGEN SATURATION: 94 % | RESPIRATION RATE: 17 BRPM

## 2018-10-10 PROBLEM — J18.9 HCAP (HEALTHCARE-ASSOCIATED PNEUMONIA): Status: RESOLVED | Noted: 2018-09-19 | Resolved: 2018-10-10

## 2018-10-10 PROBLEM — M54.50 ACUTE BILATERAL LOW BACK PAIN: Status: RESOLVED | Noted: 2018-09-22 | Resolved: 2018-10-10

## 2018-10-10 PROBLEM — J86.9 EMPYEMA LUNG (HCC): Status: RESOLVED | Noted: 2018-09-19 | Resolved: 2018-10-10

## 2018-10-10 PROBLEM — J96.01 ACUTE RESPIRATORY FAILURE WITH HYPOXIA (HCC): Status: RESOLVED | Noted: 2018-07-16 | Resolved: 2018-10-10

## 2018-10-10 PROBLEM — J43.1 PANLOBULAR EMPHYSEMA (HCC): Status: RESOLVED | Noted: 2018-07-11 | Resolved: 2018-10-10

## 2018-10-10 PROBLEM — K59.00 CONSTIPATION: Status: RESOLVED | Noted: 2018-10-02 | Resolved: 2018-10-10

## 2018-10-10 PROCEDURE — 700102 HCHG RX REV CODE 250 W/ 637 OVERRIDE(OP): Performed by: NURSE PRACTITIONER

## 2018-10-10 PROCEDURE — 700101 HCHG RX REV CODE 250: Performed by: SURGERY

## 2018-10-10 PROCEDURE — A9270 NON-COVERED ITEM OR SERVICE: HCPCS | Performed by: ANESTHESIOLOGY

## 2018-10-10 PROCEDURE — A9270 NON-COVERED ITEM OR SERVICE: HCPCS | Performed by: INTERNAL MEDICINE

## 2018-10-10 PROCEDURE — 700111 HCHG RX REV CODE 636 W/ 250 OVERRIDE (IP): Performed by: SURGERY

## 2018-10-10 PROCEDURE — 700102 HCHG RX REV CODE 250 W/ 637 OVERRIDE(OP): Performed by: INTERNAL MEDICINE

## 2018-10-10 PROCEDURE — A9270 NON-COVERED ITEM OR SERVICE: HCPCS | Performed by: NURSE PRACTITIONER

## 2018-10-10 PROCEDURE — 700102 HCHG RX REV CODE 250 W/ 637 OVERRIDE(OP): Performed by: ANESTHESIOLOGY

## 2018-10-10 PROCEDURE — 700105 HCHG RX REV CODE 258: Performed by: SURGERY

## 2018-10-10 PROCEDURE — 99239 HOSP IP/OBS DSCHRG MGMT >30: CPT | Performed by: INTERNAL MEDICINE

## 2018-10-10 RX ORDER — LEVOFLOXACIN 750 MG/1
750 TABLET, FILM COATED ORAL DAILY
Qty: 13 TAB | Refills: 0 | Status: SHIPPED | OUTPATIENT
Start: 2018-10-10 | End: 2018-10-23

## 2018-10-10 RX ORDER — LIDOCAINE HYDROCHLORIDE 20 MG/ML
20 INJECTION, SOLUTION INFILTRATION; PERINEURAL ONCE
Status: COMPLETED | OUTPATIENT
Start: 2018-10-10 | End: 2018-10-10

## 2018-10-10 RX ADMIN — OXYCODONE HYDROCHLORIDE 10 MG: 10 TABLET ORAL at 05:49

## 2018-10-10 RX ADMIN — OXYCODONE HYDROCHLORIDE 10 MG: 10 TABLET ORAL at 09:56

## 2018-10-10 RX ADMIN — MEROPENEM 500 MG: 500 INJECTION, POWDER, FOR SOLUTION INTRAVENOUS at 05:49

## 2018-10-10 RX ADMIN — STANDARDIZED SENNA CONCENTRATE AND DOCUSATE SODIUM 2 TABLET: 8.6; 5 TABLET ORAL at 05:48

## 2018-10-10 RX ADMIN — NICOTINE 14 MG: 14 PATCH, EXTENDED RELEASE TRANSDERMAL at 05:48

## 2018-10-10 RX ADMIN — CELECOXIB 200 MG: 200 CAPSULE ORAL at 08:15

## 2018-10-10 RX ADMIN — LIDOCAINE HYDROCHLORIDE 20 ML: 20 INJECTION, SOLUTION INFILTRATION; PERINEURAL at 08:15

## 2018-10-10 ASSESSMENT — PAIN SCALES - GENERAL: PAINLEVEL_OUTOF10: 6

## 2018-10-10 NOTE — DISCHARGE SUMMARY
Discharge Summary    CHIEF COMPLAINT ON ADMISSION  Chief Complaint   Patient presents with   • Sent by MD     reports he had part of LT lung removed and drained in July. Saw by surgeon today, sent to ER as he reports he is now having pain to LT side and is coughing up green sputum.    • Cough       Reason for Admission  Post-Op Complications     Admission Date  9/18/2018    CODE STATUS  Prior    HPI & HOSPITAL COURSE  Please see H&P dictated by Dr. Tellez for further details.  This is a 58 y.o. male here with recent hospitalization for hydropneumothorax and empyema s/p left thoracotomy decortication, lung debridement and rib resection. Resp cx grew strep group B and was discharged on course of augmentin. He presented from Dr. Gaviria's office with cough and shortness of breath.  Patient had a CT chest which showed persistent left hydropneumothorax.  Interventional radiology was consulted and patient had a pigtail drain placement to pleurvac suction.  Patient then underwent bronchoscopy with irrigation and suction.  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  Patient had a chest tube placed which was changed to Heimlich valve per cardiothoracic surgery.  Patient's or cultures were positive for Enterobacter clinically and strep viridans.  Infectious disease was consulted and patient was on IV meropenem and it was recommended the patient continue on IV antibiotics till October 23, 2018.  Patient decided to leave AGAINST MEDICAL ADVICE therefore he was discharged home with Levaquin 750 mg daily until October 23, 2018.  Patient is alert awake oriented x4 and risks of leaving AGAINST MEDICAL ADVICE including sepsis, respiratory failure, death were discussed with him and he voiced understanding.       Therefore, he is discharged in guarded and stable condition against medical advice.    The patient met  2-midnight criteria for an inpatient stay at the time of discharge.    Discharge Date  10/10/2018    FOLLOW UP ITEMS POST DISCHARGE  Dr. Gaviria on 10/16 as scheduled    DISCHARGE DIAGNOSES  Active Problems:    Tobacco abuse POA: Yes    Protein malnutrition (HCC) POA: Yes    Gastroesophageal reflux disease without esophagitis POA: Yes    Depression POA: Yes  Resolved Problems:    Empyema lung (HCC) POA: Yes      Overview: 9/19 Zosyn initiated      9/24 Zosyn altered cefepime initiated for Enterobacter cloacae and       Streptococcus viridans      9/27 Cefepime day 4      9/28 Enterobacter cloacae now resistant to cefepime therapy altered to       meropenem.      9/28 Meropenem day 1    Acute respiratory failure with hypoxia (HCC) POA: Yes    Panlobular emphysema (HCC) POA: Yes    HCAP (healthcare-associated pneumonia) POA: Yes    Acute bilateral low back pain POA: Yes    Constipation POA: Clinically Undetermined      FOLLOW UP    Pb Gaviria M.D.  [address]  [facility] [address]  [phone]    Go on 10/16/2018  Please arrive at 1:30pm for your appointmentat 1:45 pm . Thank you     [facility]  [address] [facility] [address] [facility] [address]  [facility] [address]  [phone]    [facility]  called office and left a message with the answering serivce. Office will call to schedule your appointment. If you do not hear from them please call to schedule your appointment. Thank you     Primary Care Physician       Please call to establish with a Primary Care Physician in [place]. Thank you       MEDICATIONS ON DISCHARGE     Medication List      START taking these medications      Instructions   levoFLOXacin 750 MG tablet  Commonly known as:  LEVAQUIN   Take 1 Tab by mouth every day for 13 days.  Dose:  750 mg        CONTINUE taking these medications      Instructions   aspirin 325 MG Tabs  Commonly known as:  ASA   Take 325 mg by mouth every day. Pt only took for 2 days  Dose:  325 mg     ibuprofen 200 MG  Tabs  Commonly known as:  MOTRIN   Take 400 mg by mouth every day.  Dose:  400 mg     oxyCODONE immediate-release 5 MG Tabs  Commonly known as:  ROXICODONE   Take 5 mg by mouth every 8 hours as needed for Severe Pain.  Dose:  5 mg        STOP taking these medications    amoxicillin-clavulanate 875-125 MG Tabs  Commonly known as:  AUGMENTIN            Allergies  No Known Allergies    DIET  No orders of the defined types were placed in this encounter.      ACTIVITY  As tolerated.  Weight bearing as tolerated    CONSULTATIONS  Dr. Gaviria -Cardiothoracic surgery  Dr. Faria - ID  Dr. Gondal - Pulmonology     PROCEDURES  Bronchoscopy with irrigation and suction.   Thoracotomy, decortication and intercostal muscle flap transposition to parenchymal airway fistula.  Left chest tube placement    LABORATORY  Lab Results   Component Value Date    SODIUM 137 10/02/2018    POTASSIUM 4.1 10/02/2018    CHLORIDE 101 10/02/2018    CO2 31 10/02/2018    GLUCOSE 90 10/02/2018    BUN 9 10/02/2018    CREATININE 0.66 10/02/2018        Lab Results   Component Value Date    WBC 7.9 10/02/2018    HEMOGLOBIN 9.6 (L) 10/02/2018    HEMATOCRIT 30.6 (L) 10/02/2018    PLATELETCT 686 (H) 10/02/2018      This dictation was created using voice recognition software. The accuracy of the dictation is limited to the abilities of the software. I expect there may be some errors of grammar and possibly content.    Total time of the discharge process exceeds 42 minutes.

## 2018-10-10 NOTE — PROGRESS NOTES
Bedside report received.  Assessment complete.  A&O x 4. Patient calls appropriately.  Patient up with stand by assist.   Patient has 5/10 pain. Scheduled meds given.  Denies N&V. Tolerating regular dysphasia diet.  Surgical L ribcage to staples, Chest tube to L ribcage to waterseal.  + void, + flatus. Last BM 10/8  Patient denies SOB.  SCD's in place.  Patient anticipating discharge.  Review plan with of care with patient. Call light and personal belongings with in reach. Hourly rounding in place. All needs met at this time.

## 2018-10-10 NOTE — PROGRESS NOTES
Patient discharged to home with sister against medical advice. All lines removed. Prescription reviewed and understanding verbalized. Patient states they will fill prescriptions. Patient states they will return to emergency if discussed symptoms arise. Chest tube dressing CDI, Heimlich valve in place, bags given to patient to cover valve. Patient left walking with sister. Medications from drawer removed and sent back to pharmacy or disposed of per protocol. Chart given to unit clerk.

## 2018-10-10 NOTE — CARE PLAN
Problem: Communication  Goal: The ability to communicate needs accurately and effectively will improve  Outcome: PROGRESSING AS EXPECTED  Patient updated on POC, all questions answered at this time.    Problem: Bowel/Gastric:  Goal: Normal bowel function is maintained or improved  Outcome: PROGRESSING AS EXPECTED   10/09/18 2050   OTHER   Last BM 10/08/18  (Pe rpt)     Patient tolerating diet, denies N/V.

## 2018-10-10 NOTE — PROGRESS NOTES
Infectious Disease Progress Note    Author: Ag Faria M.D. Date & Time of service: 10/9/2018  5:14 PM    Chief Complaint:  Follow-up of recurrent empyema    Interval History:   afebrile, white count continues to down trend, 8.6 today.  Patient says that he is constipated with abdominal pain.  No issues with breathing.  Patient reports that primary team is aware and seeks no further help from me  10/1/2018 no fevers.  Patient denies any new issues.  WBC is down to 7.7  10/2/2018 no fevers.  Denies any new issues.  10/3/2018-overall feels better.  No new issues overnight.  No fevers.  Still has 2 chest tubes  10/4- AF, no WBC, no issue with abx, tenderness at L CT site.  10/5-AF, no WBC, no pain to left chest-just took pain pill, tolerating ABX, still has 1 CT in place.  10/6-AF, no WBC, no pain at this time-can get up to /10, refusing midline-states as soon as the surgeon clears him he is going to leave regardless of recommendations for IV antibiotics.  10/7-AF, no WBC, denies any pain, tolerating antibiotics, no complaints.  10/8 AF patient very ornery and upset, states he just wants to go home  10/9 afebrile, no white count.  Patient reiterated that once cleared by surgery, he will leave AMA    Labs Reviewed, Medications Reviewed, Radiology Reviewed and Wound Reviewed.    Review of Systems:  Review of Systems   Constitutional: Negative for chills and fever.   HENT: Negative for congestion.    Respiratory: Negative for shortness of breath.    Cardiovascular: Negative for chest pain, palpitations and leg swelling.   Gastrointestinal: Negative for abdominal pain, diarrhea, nausea and vomiting.   Genitourinary: Negative for dysuria and hematuria.   Musculoskeletal: Negative for joint pain and myalgias.   Skin: Negative for itching.   Neurological: Negative for sensory change, weakness and headaches.   All other systems reviewed and are negative.      Hemodynamics:  Temp (24hrs), Av.6 °C (97.9 °F),  Min:36.3 °C (97.3 °F), Max:36.8 °C (98.2 °F)  Temperature: 36.7 °C (98.1 °F)  Pulse  Av.5  Min: 43  Max: 112   Blood Pressure: 125/77       Physical Exam:  Physical Exam   Constitutional: He is oriented to person, place, and time. He appears well-developed and well-nourished. No distress.   Appears older than stated age.   HENT:   Head: Normocephalic and atraumatic.   Eyes: Pupils are equal, round, and reactive to light. Conjunctivae and EOM are normal. No scleral icterus.   Neck: Normal range of motion. Neck supple. No JVD present.   Cardiovascular: Normal rate, regular rhythm, normal heart sounds and intact distal pulses.  Exam reveals no gallop and no friction rub.    Pulmonary/Chest: Effort normal. No respiratory distress. He has no rales.   Left Chest tubes x1  Decreased BS to left lower lobe-slightly improved.  Staples to upper left chest, no erythema, no drainage, nontender to palpation.   Abdominal: Soft. Bowel sounds are normal. He exhibits no distension. There is no rebound and no guarding.   Musculoskeletal: Normal range of motion. He exhibits no edema.   Neurological: He is alert and oriented to person, place, and time.   Skin: Skin is warm and dry. No erythema.   Psychiatric:   Flat affect   Nursing note and vitals reviewed.      Meds:    Current Facility-Administered Medications:   •  meropenem (MERREM) IV  •  oxyCODONE immediate-release **OR** oxyCODONE immediate-release  •  HYDROmorphone  •  Pharmacy Consult Request  •  scopolamine  •  celecoxib  •  famotidine  •  cyclobenzaprine  •  senna-docusate **AND** polyethylene glycol/lytes **AND** magnesium hydroxide **AND** bisacodyl  •  Respiratory Care per Protocol  •  acetaminophen  •  ondansetron  •  ondansetron  •  nicotine **AND** Nicotine Replacement Patient Education Materials **AND** nicotine polacrilex    Labs:  No results for input(s): WBC, RBC, HEMOGLOBIN, HEMATOCRIT, MCV, MCH, RDW, PLATELETCT, MPV, NEUTSPOLYS, LYMPHOCYTES, MONOCYTES,  EOSINOPHILS, BASOPHILS, RBCMORPHOLO in the last 72 hours.  No results for input(s): SODIUM, POTASSIUM, CHLORIDE, CO2, GLUCOSE, BUN, CPKTOTAL in the last 72 hours.  No results for input(s): ALBUMIN, TBILIRUBIN, ALKPHOSPHAT, TOTPROTEIN, ALTSGPT, ASTSGOT, CREATININE in the last 72 hours.    Imaging:  10/7/2018   DX-CHEST-PORTABLE   FINDINGS:  Single portable view of the chest demonstrates a normal cardiac silhouette and mediastinal contours.    Left pneumothorax is unchanged with left chest tube in place. Left and right upper lobe opacification is unchanged. There is bullous emphysema in the right upper lobe.      Micro:  Results     ** No results found for the last 168 hours. **          Assessment:  Donna Ceballos is a 58 y.o. male with a history of COPD and tobacco use, recent hospitalization for hydropneumothorax and left empyema status post left thoracotomy, decortication, debridement, and rib resection in July 2018 (cultures grew group F streptococcus, treated with IV Unasyn for 2 weeks followed by p.o. Augmentin for 2 weeks).  Patient is now admitted 9/18/2018 with persistent left hydropneumothorax, empyema, and concern for an alveolar pleural fistula.  Pleural fluid cultures from 9/19 grew Enterobacter cloacae and viridans Strep.  He underwent repeat thoracotomy, decortication, and intercostal muscle flap transposition to the parenchymal airway fistula on 9/25.  OR cultures again grew Enterobacter, but this time resistant to cephalosporins and Zosyn.      His chest tube remains in place.  Plan is to continue IV antibiotics (meropenem) for 4 weeks.  Unfortunately, he lives in [place] and he does not have insurance.   has tried various means but in order to receive 4 weeks of IV antibiotics he will need to stay inpatient.  Patient states that he will leave AMA once he is cleared by surgery.     Pertinent Diagnoses:  1. Sepsis, improved  2. L empyema, status post surgery as above  3. L  hydropneumothorax, stable  4. L alveolar pleural fistula, status post surgery as above  5. COPD  6. Tobacco dependence    Active Hospital Problems    Diagnosis   • Empyema lung (HCC) [J86.9]   • Acute respiratory failure with hypoxia (HCC) [J96.01]   • Tobacco abuse [Z72.0]       Plan:  Sepsis, improved  Secondary to below  Afebrile  Leukocytosis resolved  Abx as below    Left empyema   S/p repeat thoracotomy, decortication, and intercostal muscle flap transposition to the parenchymal airway fistula on 9/25 with Dr. Gaviria  OR cx +Enterobacter cloacae (cephalosporin resistant) and Strep Viridans  Continue IV Meropenem (or IV ertapenem if transition to outpatient IV antibiotics)  Anticipate at least 4 weeks of antibiotics  Estimated end date 10/23/18  Midline ordered-patient refusing stating that he is leaving as soon as surgery clears him  If he leaves AMA, he may be sent out on p.o. Levaquin 750mg q24h through 10/23/18  Chest tube removal per surgery    L hydropneumothorax, stable  Chest tube x1 in place, managed by CT surgery    COPD, tobacco dependence  Cessation encouraged.  Patient seems motivated to quit    Due to lack of insurance will likely finish treatment inpatient.  Patient stating he needs to get back to work as he has bills to pay, and will likely leave AMA once he is cleared by surgery.  If that were to occur, plan to switch to p.o. antibiotics as above.    If he leaves AMA, will need ID follow-up in 1-2 weeks.

## 2018-10-10 NOTE — CARE PLAN
Problem: Safety  Goal: Will remain free from injury  Outcome: PROGRESSING AS EXPECTED  Patient free from accidental injury at this time. Safety precautions in place. Hourly rounding in place.     Problem: Pain Management  Goal: Pain level will decrease to patient's comfort goal  Outcome: PROGRESSING AS EXPECTED  Patient experiencing pian relief with MAR medication. Patent encouraged to call if pian worsens. Hourly rounding in place.

## 2018-10-10 NOTE — PROGRESS NOTES
Pt A&O x 4.     Vitals: /70   Pulse 77   Temp 36.3 °C (97.4 °F)   Resp 16   Ht 1.829 m (6')   Wt 62.4 kg (137 lb 9.1 oz)   SpO2 95%   BMI 18.66 kg/m²      Pt rates pain 7 out of 10. Medicated per MAR.      Neuro: KNIGHT. Denies new onset of numbness/ tingling.     Cardiac: Denies new onset of chest pain.     Vascular: Pulses 2+ BUE, BLE. No edema noted.     Respiratory: Lungs sound clear/diminished to auscultation. Patient refusing to use IS at this time. On 1.5L of O2 via nasal cannula.  on, satting in 90's. Denies SOB.     GI: Abdomen semi-firm, non-tender. Normoactive bowel sounds, + flatus, + BM. Denies nausea/ vomiting.     : Pt voiding adequately.      MSK: Pt up to bathroom up self, tolerating well.     Integumentary: L chest tube to water seal, dressing is CDI, leak noted, MD aware, minimal output. L flank surgical incision, well approximated with staples, SANTIAGO, no drainage noted. Generalized loose skin.     Labs noted.     Fall precautions in place: Bed locked in lowest position, Upper bed rails up, treaded socks in place, personal belongings within reach, call light within reach, appropriate mobility signs in place, - bed alarm. Pt calls appropriately.      Pt updated on POC.

## 2018-10-16 NOTE — DOCUMENTATION QUERY
DOCUMENTATION QUERY    PROVIDERS: Please select “Cosign w/ note”to reply to query.    To better represent the severity of illness of your patient, please review the following information and exercise your independent professional judgment in responding to this query.     Severe sepsis (with respiratory failure) is documented in the History and Physical and Progress Notes.  Coding and reimbursement guidelines require consistent, supportive documentation and sepsis is not reflected on the Discharge summary or your progress notes.     Based upon the clinical findings, risk factors, and treatment, can this diagnosis be further specified?    • Severe sepsis has been ruled in  • Severe sepsis has been ruled out  • Other explanation of clinical findings (please document)  • Unable to determine          The medical record reflects the following:   Clinical Findings  Severe sepsis with acute respiratory failure  Admitting vitals:  /74; HR 94; resp 20; temp 36.9  WBC 15.8  Empyema with fistula  Pneumonia   Treatment  IV antibiotics  Decortication by surgery  Drainage of empyema by IR   Risk Factors  Malnutrition  Smoker  Non-compliant (left AMA)   Location within medical record  History and Physical, Progress Notes and Discharge Summary     Thank you,     Merlene Epps  [facility] Coder  [phone]

## 2018-10-17 LAB
FUNGUS SPEC CULT: NORMAL
SIGNIFICANT IND 70042: NORMAL
SITE SITE: NORMAL
SOURCE SOURCE: NORMAL

## 2019-07-26 NOTE — PROGRESS NOTES
Progress Note:  10/10/2018, 8:10 AM    S: No acute events.  Stable breathing, afebrile, no pain.      O:  Blood pressure 131/83, pulse 82, temperature 36.3 °C (97.4 °F), resp. rate 16, height 1.829 m (6'), weight 62.4 kg (137 lb 9.1 oz), SpO2 93 %.    NAD, awake, alert  Breathing nonlabored  Thoracotomy healing well, staples in place  L chest tube to water seal, lots of tidaling, but air leak smaller      A:   Active Hospital Problems    Diagnosis   • Empyema lung (HCC) [J86.9]     Priority: High     9/19 Zosyn initiated  9/24 Zosyn altered cefepime initiated for Enterobacter cloacae and Streptococcus viridans  9/27 Cefepime day 4  9/28 Enterobacter cloacae now resistant to cefepime therapy altered to meropenem.  9/28 Meropenem day 1     • Acute respiratory failure with hypoxia (HCC) [J96.01]     Priority: Medium   • Gastroesophageal reflux disease without esophagitis [K21.9]     Priority: Low   • Panlobular emphysema (HCC) [J43.1]     Priority: Low   • Protein malnutrition (HCC) [E46]     Priority: Low   • Tobacco abuse [Z72.0]     Priority: Low   • Depression [F32.9]   • Constipation [K59.00]   • Acute bilateral low back pain [M54.5]   • HCAP (healthcare-associated pneumonia) [J18.9]         P:   -Chest tube to be re-inforced with another stitch prior to dc  -Heimlich valve to be placed.  Pt instructed on use of the valve and he has prior experience  -RN to remove thoracotomy staples  -Discharge on PO levaquin until 10/23  -Pt has appointment to see me on 10/16.  Please send with outpatient CXR order to be performed prior to appointment  -Please discharge with Incentive spirometer    Pb Gaviria M.D.  [facility]  [phone]     normal...

## 2019-12-03 NOTE — DISCHARGE PLANNING
Addended by: MARIAN CATES on: 12/3/2019 07:30 AM     Modules accepted: Orders     Received Choice form at 1100  Agency/Facility Name: [facility]   Referral sent per Choice form @ [id number]

## 2022-07-08 NOTE — PROGRESS NOTES
Progress Note:  10/4/2018, 9:42 AM    S: No acute events.  Breathing stable on low flow O2.  Afebrile.  Still coughing up small amount of pink tinged thick sputum, but no serosanguinous fluid.  Persistent air leak. CXR findings expected while on water seal    O:  Blood pressure 111/73, pulse 89, temperature 36.9 °C (98.4 °F), resp. rate 17, height 1.829 m (6'), weight 62.2 kg (137 lb 2 oz), SpO2 95 %.    NAD, awake, alert  Breathing nonlabored  L angled tube on water seal, no air leak  L straight tube on water seal with expiratory leak      A:   Active Hospital Problems    Diagnosis   • Empyema lung (HCC) [J86.9]     Priority: High     9/19 Zosyn initiated  9/24 Zosyn altered cefepime initiated for Enterobacter cloacae and Streptococcus viridans  9/27 Cefepime day 4  9/28 Enterobacter cloacae now resistant to cefepime therapy altered to meropenem.  9/28 Meropenem day 1     • Acute respiratory failure with hypoxia (HCC) [J96.01]     Priority: Medium   • Gastroesophageal reflux disease without esophagitis [K21.9]     Priority: Low   • Panlobular emphysema (HCC) [J43.1]     Priority: Low   • Protein malnutrition (HCC) [E46]     Priority: Low   • Tobacco abuse [Z72.0]     Priority: Low   • Constipation [K59.00]   • Acute bilateral low back pain [M54.5]   • HCAP (healthcare-associated pneumonia) [J18.9]         P:   -Angle chest tube removed  -Continue remaining tube to water seal  -Will continue to evaluate for improvement in leak.  No current indication for further surgical intervention.  -Pt will likely have to go home with chest tube in place to a 1-way valve, if stable on water seal for the next few days  -Would try to wean of supplemental O2, but will likely need home O2 jed Gaviria M.D.  [facility]  [phone]     no

## 2022-10-03 NOTE — CARE PLAN
Problem: Respiratory:  Goal: Respiratory status will improve  Outcome: PROGRESSING AS EXPECTED  IS use encouraged, pt achieves 1000.     Problem: Skin Integrity  Goal: Risk for impaired skin integrity will decrease  Outcome: PROGRESSING AS EXPECTED  Mepilex intact. Silicone O2 tubing in use. Pt able to mobilize oob.        Cyclophosphamide Pregnancy And Lactation Text: This medication is Pregnancy Category D and it isn't considered safe during pregnancy. This medication is excreted in breast milk.

## 2024-02-29 NOTE — CARE PLAN
Caller: damion Blake    Doctor: Jr    Reason for call: pt retuning schedulers call    Call back#: [phone]   Problem: Pain Management  Goal: Pain level will decrease to patient's comfort goal  Outcome: PROGRESSING AS EXPECTED   09/27/18 0200 09/27/18 1400   OTHER   Pain Scale 0 - 10  --  3   Non Verbal Scale  Calm;Sleeping;Unlabored Breathing --    Comfort Goal --  Comfort at Rest;Comfort with Movement     Patient medicated for pain per MAR. Non pharmacologic pain interventions in place to decrease pain; pillows used for support and repositioning. Blankets offered. Temperature adjusted per pt request.  Patient has epidural catheter in place. Patient states pain is controlled to a tolerable level.    Problem: Skin Integrity  Goal: Risk for impaired skin integrity will decrease  Outcome: PROGRESSING AS EXPECTED    Intervention: Implement precautions to protect skin integrity in collaboration with the interdisciplinary team   09/26/18 0800 09/27/18 0800 09/27/18 1400   OTHER   Skin Preventative Measures --  Pillows in Use for Support / Positioning --    Bed Types --  Pressure Redistribution Mattress (Atmosair) --    Moisturizers --  Barrier Wipes --    Activity  Bed;Edge of bed --  --    Patient Turns / Repositioning --  --  Patient Turns Self from Side to Side   Patient is Receiving Nutrition --  Oral Intake Adequate --    Nutrition Consult Ordered --  No, Consult has Already been Placed --    Vitamin Therapy in Use --  No --      Patient turned Q2H. Pillows used for support and repositioning. Draw sheet used to decrease friction. Mepilex in place on sacrum.

## (undated) DEVICE — TUBE CHEST 32FR. RIGHT ANGLED (10EA/CA)

## (undated) DEVICE — DRESSING NON-ADHERING 8 X 3 - (50/BX)

## (undated) DEVICE — PAD LAP STERILE 18 X 18 - (5/PK 40PK/CA)

## (undated) DEVICE — SUTURE 2-0 SILK SH (36PK/BX)

## (undated) DEVICE — SET LEADWIRE 5 LEAD BEDSIDE DISPOSABLE ECG (1SET OF 5/EA)

## (undated) DEVICE — SUTURE 0 SILK TIES (36PK/BX)

## (undated) DEVICE — KIT ANESTHESIA W/CIRCUIT & 3/LT BAG W/FILTER (20EA/CA)

## (undated) DEVICE — GLOVE BIOGEL SZ 6 PF LATEX - (50EA/BX 4BX/CA)

## (undated) DEVICE — BLADE SURGICAL #15 - (50/BX 3BX/CA)

## (undated) DEVICE — SUTURE 1 VICRYL PLUS CT-2 - 27 INCH (36/BX)

## (undated) DEVICE — DRAPE STRLE REG TOWEL 18X24 - (10/BX 4BX/CA)"

## (undated) DEVICE — TOWELS CLOTH SURGICAL - (4/PK 20PK/CA)

## (undated) DEVICE — STAPLE 45MM BLUE 3.5MM ECHELON (12EA/BX)

## (undated) DEVICE — PROTECTOR ULNA NERVE - (36PR/CA)

## (undated) DEVICE — ANTI-FOG SOLUTION - 60BTL/CA

## (undated) DEVICE — CHLORAPREP 26 ML APPLICATOR - ORANGE TINT(25/CA)

## (undated) DEVICE — TUBE E-T HI-LO CUFF 8.0MM (10EA/PK)

## (undated) DEVICE — BOVIE  BLADE 6 EXTENDED - (50/PK)

## (undated) DEVICE — KIT ROOM DECONTAMINATION

## (undated) DEVICE — SPONGE DRAIN 4 X 4IN 6-PLY - (2/PK25PK/BX12BX/CS)

## (undated) DEVICE — GLOVE BIOGEL INDICATOR SZ 7.5 SURGICAL PF LTX - (50PR/BX 4BX/CA)

## (undated) DEVICE — SUTURE 2-0 VICRYL PLUS CT-1 36 (36PK/BX)"

## (undated) DEVICE — STAPLER POWERED 45MM (3EA/BX)

## (undated) DEVICE — CONNECTOR Y TBG CRTY 5 IN 1 STERILE (50EA/CA)

## (undated) DEVICE — CLIP MED INTNL HRZN TI ESCP - (25/BX)

## (undated) DEVICE — SPONGE GAUZESTER 4 X 4 4PLY - (128PK/CA)

## (undated) DEVICE — CANISTER SUCTION 3000ML MECHANICAL FILTER AUTO SHUTOFF MEDI-VAC NONSTERILE LF DISP  (40EA/CA)

## (undated) DEVICE — SPONGE KITTNER DISSECTORS - (5EA/PK 50PK/CA)

## (undated) DEVICE — SUTURE 3-0 VICRYL PLUS SH - 8X 18 INCH (12/BX)

## (undated) DEVICE — GLOVE BIOGEL PI INDICATOR SZ 6.5 SURGICAL PF LF - (50/BX 4BX/CA)

## (undated) DEVICE — DRAPE IOBAN II INCISE 23X17 - (10EA/BX 4BX/CA)

## (undated) DEVICE — GOWN WARMING STANDARD FLEX - (30/CA)

## (undated) DEVICE — GOWN SURGEONS X-LARGE - DISP. (30/CA)

## (undated) DEVICE — SET EXTENSION WITH 2 PORTS (48EA/CA) ***PART #2C8610 IS A SUBSTITUTE*****

## (undated) DEVICE — SUTURE 1 VICRYL PLUS CTX - 36 INCH (36/BX)

## (undated) DEVICE — DRAPE CHEST/BREAST - (12EA/CA)

## (undated) DEVICE — BAG, SPONGE COUNT 50600

## (undated) DEVICE — CLIP SM INTNL HRZN TI ESCP LGT - (24EA/PK 25PK/BX)

## (undated) DEVICE — GLOVE BIOGEL PI INDICATOR SZ 7.0 SURGICAL PF LF - (50/BX 4BX/CA)

## (undated) DEVICE — ELECTRODE 850 FOAM ADHESIVE - HYDROGEL RADIOTRNSPRNT (50/PK)

## (undated) DEVICE — SODIUM CHL IRRIGATION 0.9% 1000ML (12EA/CA)

## (undated) DEVICE — SUTURE 1 VICRYL PLUS CTX - 8 X 18 INCH (12/BX)

## (undated) DEVICE — GLOVE BIOGEL PI ORTHO SZ 7.5 PF LF (40PR/BX)

## (undated) DEVICE — ELECTRODE DUAL RETURN W/ CORD - (50/PK)

## (undated) DEVICE — STAPLER SKIN DISP - (6/BX 10BX/CA) VISISTAT

## (undated) DEVICE — GOWN SURGICAL XX-LARGE - (28EA/CA) SIRUS NON REINFORCED

## (undated) DEVICE — SUTURE 2-0 VICRYL PLUS SH - 27 INCH (36/BX)

## (undated) DEVICE — DRAIN CHEST ADULT (6EA/CA) DELETED ITEM  ORDER #15909

## (undated) DEVICE — HEAD HOLDER JUNIOR/ADULT

## (undated) DEVICE — TUBE CHEST 32FR. STRAIGHT - (10EA/CA)

## (undated) DEVICE — SENSOR SPO2 NEO LNCS ADHESIVE (20/BX) SEE USER NOTES

## (undated) DEVICE — SUTURE 4-0 PROLENE RB-1 D/A 36 (36PK/BX)"

## (undated) DEVICE — SYRINGE SAFETY 10 ML 18 GA X 1 1/2 BLUNT LL (100/BX 4BX/CA)

## (undated) DEVICE — KIT RADIAL ARTERY 20GA W/MAX BARRIER AND BIOPATCH  (5EA/CA) #10740 IS FOR THE SET RADIAL ARTERIAL

## (undated) DEVICE — PACK MAJOR BASIN - (2EA/CA)

## (undated) DEVICE — DRAPE MAGNETIC (INSTRA-MAG) - (30/CA)

## (undated) DEVICE — ELECTRODE 5MM LHK LAPSCP STERILE DISP- MEGADYNE  (5/CA)

## (undated) DEVICE — SUCTION INSTRUMENT YANKAUER BULBOUS TIP W/O VENT (50EA/CA)

## (undated) DEVICE — PAD BABY LAP 4X18 W/O - RINGS PREWASHED 5/PK 40PK/CS

## (undated) DEVICE — SUTURE 4-0 VICRYL PLUSFS-1 - 27 INCH (36/BX)

## (undated) DEVICE — NEPTUNE 4 PORT MANIFOLD - (20/PK)

## (undated) DEVICE — DRAPE LAPAROTOMY T SHEET - (12EA/CA)

## (undated) DEVICE — CON SEDATION/>5 YR 1ST 15 MIN

## (undated) DEVICE — SUTURE 2-0 SILK 12 X 18" (36PK/BX)"

## (undated) DEVICE — SLEEVE STERILE  A599T - 30/BX 2BX/CS

## (undated) DEVICE — SUTURE 0 SILK CT-1 (36PK/BX)

## (undated) DEVICE — SOD. CHL. INJ. 0.9% 1000 ML - (14EA/CA 60CA/PF)

## (undated) DEVICE — TRAY CATHETER FOLEY URINE METER W/STATLOCK 350ML (10EA/CA)

## (undated) DEVICE — SUTURE  0 ETHIBOND CT-1 30 IN (36PK/BX)

## (undated) DEVICE — SUTURE CV

## (undated) DEVICE — TUBING CLEARLINK DUO-VENT - C-FLO (48EA/CA)

## (undated) DEVICE — KIT INTROPERCUTANEOUS SHEATH - 8.5 FR W/MAX BARRIER AND BIOPATCH  (5/CA)

## (undated) DEVICE — EVICEL 5ML - (1/BX)REFRIGERATE UPON RECEIPT

## (undated) DEVICE — SYRINGE 10 ML CONTROL LL (25EA/BX 4BX/CA)

## (undated) DEVICE — MASK ANESTHESIA ADULT  - (100/CA)

## (undated) DEVICE — LACTATED RINGERS INJ 1000 ML - (14EA/CA 60CA/PF)

## (undated) DEVICE — SODIUM CHL. INJ. 0.9% 500ML (24EA/CA 50CA/PF)

## (undated) DEVICE — SUTURE 2-0 VICRYL PLUS CT-1 - 8 X 18 INCH(12/BX)

## (undated) DEVICE — GLOVE BIOGEL SZ 8 SURGICAL PF LTX - (50PR/BX 4BX/CA)

## (undated) DEVICE — TUBE ENDOBRONCHEAL 39FR - (1/BX)

## (undated) DEVICE — SUTURE GENERAL

## (undated) DEVICE — SUTURE 3-0 PROLENE RB-1 D/A 36 (36PK/BX)"

## (undated) DEVICE — SPONGE RADIOPAQUE CTN X-LG - STERILE (50PK/CA) MADE TO ORDER ITEM AND HAS A 4-6 WEEK LEAD TIME

## (undated) DEVICE — DRAPE LOWER EXTREMETY - (6/CA)

## (undated) DEVICE — SLEEVE, VASO, THIGH, MED

## (undated) DEVICE — GLOVE BIOGEL PI INDICATOR SZ 8.0 SURGICAL PF LF -(50/BX 4BX/CA)

## (undated) DEVICE — SUTURE 3-0 VICRYL PLUS SH - 27 INCH (36/BX)

## (undated) DEVICE — CLIP LG INTNL HRZN TI ESCP LGT - (20/BX)

## (undated) DEVICE — TUBE NG SALEM SUMP 16FR (50EA/CA)

## (undated) DEVICE — GLOVE BIOGEL PI ORTHO SZ 6 SURGICAL PF LF (40PR/BX)

## (undated) DEVICE — SHEET TRANSVERSE LAP - (12EA/CA)

## (undated) DEVICE — SYSTEM PREVENA INCISION MNGM - (1/EA)

## (undated) DEVICE — TROCAR 12MM THORACOPORT (6EA/BX)

## (undated) DEVICE — GOWN SURGEONS LARGE - (32/CA)

## (undated) DEVICE — GLOVE BIOGEL SZ 7.5 SURGICAL PF LTX - (50PR/BX 4BX/CA)

## (undated) DEVICE — CLIP MED LG INTNL HRZN TI ESCP - (20/BX)

## (undated) DEVICE — SUTURE 2-0 SILK FS (12EA/BX)

## (undated) DEVICE — HANDPIECE 10FT INTPLS SCT PLS IRRIGATION HAND CONTROL SET (6/PK)

## (undated) DEVICE — SUTURE 2-0 VICRYL PLUS SH - 8 X 18 INCH (12/BX)

## (undated) DEVICE — GLOVE BIOGEL SZ 8.5 SURGICAL PF LTX - (50PR/BX 4BX/CA)

## (undated) DEVICE — SUTURE 2-0 PROLENE SH (36PK/BX)

## (undated) DEVICE — TIP INTPLS FMCNL IRR PMP SCT - (12/PKG) FOR SURGILAV

## (undated) DEVICE — SYRINGE ASEPTO - (50EA/CA

## (undated) DEVICE — DRAPE LARGE 3 QUARTER - (20/CA)